# Patient Record
Sex: FEMALE | Race: WHITE | Employment: OTHER | RURAL
[De-identification: names, ages, dates, MRNs, and addresses within clinical notes are randomized per-mention and may not be internally consistent; named-entity substitution may affect disease eponyms.]

---

## 2017-04-03 ENCOUNTER — OFFICE VISIT (OUTPATIENT)
Dept: FAMILY MEDICINE CLINIC | Age: 67
End: 2017-04-03

## 2017-04-03 VITALS
SYSTOLIC BLOOD PRESSURE: 170 MMHG | BODY MASS INDEX: 43.16 KG/M2 | DIASTOLIC BLOOD PRESSURE: 82 MMHG | HEIGHT: 64 IN | TEMPERATURE: 97.2 F | HEART RATE: 80 BPM | WEIGHT: 252.8 LBS | RESPIRATION RATE: 17 BRPM | OXYGEN SATURATION: 97 %

## 2017-04-03 DIAGNOSIS — I10 ESSENTIAL HYPERTENSION, BENIGN: ICD-10-CM

## 2017-04-03 DIAGNOSIS — E11.9 TYPE 2 DIABETES MELLITUS WITHOUT COMPLICATION, WITH LONG-TERM CURRENT USE OF INSULIN (HCC): ICD-10-CM

## 2017-04-03 DIAGNOSIS — E11.65 TYPE 2 DIABETES MELLITUS WITH HYPERGLYCEMIA, WITH LONG-TERM CURRENT USE OF INSULIN (HCC): ICD-10-CM

## 2017-04-03 DIAGNOSIS — Z79.4 TYPE 2 DIABETES MELLITUS WITHOUT COMPLICATION, WITH LONG-TERM CURRENT USE OF INSULIN (HCC): ICD-10-CM

## 2017-04-03 DIAGNOSIS — L40.9 PSORIASIS: Primary | ICD-10-CM

## 2017-04-03 DIAGNOSIS — E66.9 OBESITY, UNSPECIFIED OBESITY SEVERITY, UNSPECIFIED OBESITY TYPE: ICD-10-CM

## 2017-04-03 DIAGNOSIS — Z79.4 TYPE 2 DIABETES MELLITUS WITH HYPERGLYCEMIA, WITH LONG-TERM CURRENT USE OF INSULIN (HCC): ICD-10-CM

## 2017-04-03 RX ORDER — QUINAPRIL 40 MG/1
TABLET ORAL
Qty: 180 TAB | Refills: 3 | Status: SHIPPED | OUTPATIENT
Start: 2017-04-03 | End: 2018-02-20 | Stop reason: SDUPTHER

## 2017-04-03 RX ORDER — INSULIN GLARGINE 100 [IU]/ML
INJECTION, SOLUTION SUBCUTANEOUS
Qty: 6 VIAL | Refills: 3
Start: 2017-04-03 | End: 2017-08-21 | Stop reason: SDUPTHER

## 2017-04-03 RX ORDER — HYDROCHLOROTHIAZIDE 25 MG/1
TABLET ORAL
Qty: 90 TAB | Refills: 3 | Status: SHIPPED | OUTPATIENT
Start: 2017-04-03 | End: 2018-02-20 | Stop reason: SDUPTHER

## 2017-04-03 RX ORDER — AMLODIPINE BESYLATE 10 MG/1
TABLET ORAL
Qty: 90 TAB | Refills: 1 | Status: SHIPPED | OUTPATIENT
Start: 2017-04-03 | End: 2017-09-23 | Stop reason: SDUPTHER

## 2017-04-03 RX ORDER — METOPROLOL SUCCINATE 50 MG/1
TABLET, EXTENDED RELEASE ORAL
Qty: 90 TAB | Refills: 3 | Status: SHIPPED | OUTPATIENT
Start: 2017-04-03 | End: 2018-02-20 | Stop reason: SDUPTHER

## 2017-04-03 RX ORDER — METFORMIN HYDROCHLORIDE 850 MG/1
TABLET ORAL
Qty: 180 TAB | Refills: 3 | Status: SHIPPED | OUTPATIENT
Start: 2017-04-03 | End: 2018-02-20 | Stop reason: SDUPTHER

## 2017-04-03 NOTE — PROGRESS NOTES
HISTORY OF PRESENT ILLNESS  Pedro Locke is a 74 y.o. female. HPI  Diabetes  Pedro Locke is a 74 y.o. female who is here for a routine diabetic check. Current blood sugars at home have been not been good since mid December. Range from . Lab Results   Component Value Date/Time    Hemoglobin A1c 8.3 10/07/2016 08:51 AM   There have been no episodes of hypoglycemia. Taking meds daily . Last eye exam was in Dec. Checking feet daily. Hypertension  Has been following a low sodium diet . Exercises twice a day for the past week. Taking meds daily. Denies chest pain or shortness of breath. Heart Murmur  History of heart murmur as a child. Obesity  Started walking last week a mile twice a day. Eating salad for dinner. Going to [place] in May and will be walking quite a bit more. Psoriasis  Sees Dr Bertha Menon in [place]. On [employer] which is working well. Review of Systems   Constitutional: Negative for chills, fever, malaise/fatigue and weight loss. HENT: Negative for congestion and ear pain. Respiratory: Positive for cough. Negative for shortness of breath. Dry hacky cough   Cardiovascular: Negative for chest pain and palpitations. Gastrointestinal: Negative for abdominal pain and heartburn. Genitourinary: Negative for frequency. Musculoskeletal: Negative for joint pain. Skin: Positive for rash. Neurological: Negative for dizziness and headaches. Psychiatric/Behavioral: Negative for depression. The patient is not nervous/anxious.       Past Medical History:   Diagnosis Date    Diabetes ([address]     Hypertension     Murmur     Osteopenia     Psoriasis     Rosacea     Vitamin D deficiency      Past Surgical History:   Procedure Laterality Date    HX  SECTION      HX TONSILLECTOMY       Allergies   Allergen Reactions    Pcn [Penicillins] Unknown (comments)    Sulfa (Sulfonamide Antibiotics) Unknown (comments)     Blood pressure 171/82, pulse 80, temperature 97.2 °F (36.2 °C), temperature source Oral, resp. rate 17, height 5' 4\" (1.626 m), weight 252 lb 12.8 oz (114.7 kg), SpO2 97 %. Physical Exam   Constitutional: She is oriented to person, place, and time. She appears well-developed and well-nourished. No distress. HENT:   Head: Normocephalic and atraumatic. Right Ear: External ear normal.   Left Ear: External ear normal.   Nose: Nose normal.   Mouth/Throat: Oropharynx is clear and moist.   Eyes: Conjunctivae are normal.   Neck: Normal range of motion. Neck supple. Cardiovascular: Normal rate, regular rhythm and normal heart sounds. Pulmonary/Chest: Effort normal and breath sounds normal. No respiratory distress. Abdominal: Soft. Musculoskeletal: Normal range of motion. She exhibits edema. Trace + ankle edema bilaterally   Lymphadenopathy:     She has no cervical adenopathy. Neurological: She is alert and oriented to person, place, and time. Skin: Skin is warm and dry. Psychiatric: She has a normal mood and affect. Nursing note and vitals reviewed.       ASSESSMENT and PLAN  DM   Check labs   Discussed diet and exercise   Stressed importance of good control of DM   Refilled meds  HTN   Low sodium diet   Continue present meds   Monitor BP- may need to adjust meds  Hx Heart Murmur-stable  Obesity   Discussed diet and exercise  Psoriasis   Follow up with Dermatology as directed

## 2017-04-03 NOTE — MR AVS SNAPSHOT
Visit Information Date & Time Provider Department Dept. Phone Encounter #  
 4/3/2017  7:40 AM Dave Vila MD [facility] Primary Care [phone] [id number] Follow-up Instructions Return in about 3 months (around 7/3/2017). Upcoming Health Maintenance Date Due  
 EYE EXAM RETINAL OR DILATED Q1 11/3/1960 FOBT Q 1 YEAR AGE 50-75 11/3/1998 GLAUCOMA SCREENING Q2Y 11/3/2013 MICROALBUMIN Q1 11/3/2016 Pneumococcal 65+ Low/Medium Risk (2 of 2 - PPSV23) 11/24/2016 HEMOGLOBIN A1C Q6M 4/7/2017 FOOT EXAM Q1 7/12/2017 MEDICARE YEARLY EXAM 7/13/2017 LIPID PANEL Q1 10/7/2017 BREAST CANCER SCRN MAMMOGRAM 11/3/2018 DTaP/Tdap/Td series (2 - Td) 7/12/2026 Allergies as of 4/3/2017  Review Complete On: 4/3/2017 By: Dave Vila MD  
  
 Severity Noted Reaction Type Reactions Pcn [Penicillins]  08/13/2014    Unknown (comments) Sulfa (Sulfonamide Antibiotics)  08/13/2014    Unknown (comments) Current Immunizations  Reviewed on 10/11/2016 Name Date Influenza High Dose Vaccine PF 10/11/2016  9:54 AM  
 Influenza Vaccine (Quad) 11/3/2015 Pneumococcal Conjugate (PCV-13) 11/24/2015 TB Skin Test (PPD) Intradermal 5/23/2016, 9/8/2015,  Incomplete Not reviewed this visit You Were Diagnosed With   
  
 Codes Comments Psoriasis    -  Primary ICD-10-CM: L40.9 ICD-9-CM: 696.1 Essential hypertension, benign     ICD-10-CM: I10 
ICD-9-CM: 401.1 Type 2 diabetes mellitus without complication, with long-term current use of insulin (HCC)     ICD-10-CM: E11.9, Z79.4 ICD-9-CM: 250.00, V58.67 Type 2 diabetes mellitus with hyperglycemia, with long-term current use of insulin (HCC)     ICD-10-CM: E11.65, Z79.4 ICD-9-CM: 250.00, 790.29, V58.67 Obesity, unspecified obesity severity, unspecified obesity type     ICD-10-CM: E66.9 ICD-9-CM: 278.00 Vitals BP Pulse Temp Resp Height(growth percentile) Weight(growth percentile) 171/82 (BP 1 Location: Left arm, BP Patient Position: Sitting) 80 97.2 °F (36.2 °C) (Oral) 17 5' 4\" (1.626 m) 252 lb 12.8 oz (114.7 kg) SpO2 BMI OB Status Smoking Status 97% 43.39 kg/m2 Menopause Never Smoker Vitals History BMI and BSA Data Body Mass Index Body Surface Area  
 43.39 kg/m 2 2.28 m 2 Preferred Pharmacy Pharmacy Name Phone  [facility] [phone] Your Updated Medication List  
  
   
This list is accurate as of: 4/3/17  8:29 AM.  Always use your most recent med list. amLODIPine 10 mg tablet Commonly known as:  Nineitzela Hikes TAKE 1 TABLET BY MOUTH EVERY DAY  
  
 etanercept 50 mg/mL (0.98 mL) injection Commonly known as:  ENBREL 50 mg by SubCUTAneous route every seven (7) days. glucose blood VI test strips strip Commonly known as:  ONETOUCH ULTRA TEST Use to check BS up to 4 x/day  
  
 hydroCHLOROthiazide 25 mg tablet Commonly known as:  HYDRODIURIL  
TAKE 1 TABLET DAILY  
  
 insulin glargine 100 unit/mL injection Commonly known as:  LANTUS  
50 units SQ daily  Indications: Diabetes Mellitus, type 2 diabetes mellitus, E11.9  
  
 insulin syringe-needle U-100 Use to inject insulin BID Lancets Misc Commonly known as: One Touch Rozina Broach USE TO CHECK BLOOD SUGAR up to 4 times a day  
  
 metFORMIN 850 mg tablet Commonly known as:  GLUCOPHAGE  
TAKE 1 TABLET TWICE A DAY WITH MEALS  
  
 metoprolol succinate 50 mg XL tablet Commonly known as:  TOPROL-XL  
TAKE 1 TABLET DAILY  
  
 quinapril 40 mg tablet Commonly known as:  ACCUPRIL  
TAKE 1 TABLET TWICE A DAY Prescriptions Sent to Pharmacy Refills  
 amLODIPine (NORVASC) 10 mg tablet 1 Sig: TAKE 1 TABLET BY MOUTH EVERY DAY Class: Normal  
 Pharmacy: [facility] [address] Ph #: [phone] quinapril (ACCUPRIL) 40 mg tablet 3 Sig: TAKE 1 TABLET TWICE A DAY Class: Normal  
 Pharmacy:  [address] Ph #: [phone]  
 metoprolol succinate (TOPROL-XL) 50 mg XL tablet 3 Sig: TAKE 1 TABLET DAILY Class: Normal  
 Pharmacy:  [address] Ph #: [phone]  
 metFORMIN (GLUCOPHAGE) 850 mg tablet 3 Sig: TAKE 1 TABLET TWICE A DAY WITH MEALS Class: Normal  
 Pharmacy:  [address] Ph #: [phone]  
 hydroCHLOROthiazide (HYDRODIURIL) 25 mg tablet 3 Sig: TAKE 1 TABLET DAILY Class: Normal  
 Pharmacy:  [address] Ph #: [phone] insulin syringe-needle U-100 6 Sig: Use to inject insulin BID Class: Normal  
 Pharmacy:  [address] Ph #: [phone] We Performed the Following COLLECTION VENOUS BLOOD,VENIPUNCTURE I8697057 CPT(R)] LIPID PANEL [91600 CPT(R)] METABOLIC PANEL, COMPREHENSIVE [50886 CPT(R)] MICROALB/CREAT RATIO, TIMED UR Q824310 CPT(R)] TSH 3RD GENERATION [27869 CPT(R)] Follow-up Instructions Return in about 3 months (around 7/3/2017). \[facility]\"" & HEALTH SERVICES! Yulia Rodriguez introduces [employer] patient portal. Now you can access parts of your medical record, email your doctor's office, and request medication refills online. 1. In your internet browser, go to [email or web address] 2. Click on the First Time User? Click Here link in the Sign In box. You will see the New Member Sign Up page. 3. Enter your [employer] Access Code exactly as it appears below. You will not need to use this code after youve completed the sign-up process. If you do not sign up before the expiration date, you must request a new code. · [employer] Access Code: [email or web address] Expires: 7/2/2017  7:28 AM 
 
 4. Enter the last four digits of your Social Security Number (xxxx) and Date of Birth (mm/dd/yyyy) as indicated and click Submit. You will be taken to the next sign-up page. 5. Create a Open [employer] ID. This will be your [employer] login ID and cannot be changed, so think of one that is secure and easy to remember. 6. Create a [employer] password. You can change your password at any time. 7. Enter your Password Reset Question and Answer. This can be used at a later time if you forget your password. 8. Enter your e-mail address. You will receive e-mail notification when new information is available in [address] 9. Click Sign Up. You can now view and download portions of your medical record. 10. Click the Download Summary menu link to download a portable copy of your medical information. If you have questions, please visit the Frequently Asked Questions section of the [employer] website. Remember, [employer] is NOT to be used for urgent needs. For medical emergencies, dial 911. Now available from your iPhone and Android! Please provide this summary of care documentation to your next provider. Your primary care clinician is listed as Evgeny Parham. If you have any questions after today's visit, please call [phone].

## 2017-04-04 ENCOUNTER — TELEPHONE (OUTPATIENT)
Dept: FAMILY MEDICINE CLINIC | Age: 67
End: 2017-04-04

## 2017-04-04 NOTE — TELEPHONE ENCOUNTER
Patient is concerned about wife's visit yesterday and the cough she has been having. States wife has been on lisinopril for years so he doesn't understand why all of a sudden that would cause a cough. [place] are leaving for [place] in [place].

## 2017-04-05 LAB
ALBUMIN 24H UR-MRATE: ABNORMAL MG/DAY
ALBUMIN ?TM UR-MRATE: ABNORMAL UG/MIN (ref 0–20)
ALBUMIN SERPL-MCNC: 3.4 G/DL (ref 3.6–4.8)
ALBUMIN/CREAT UR: 1374.3 UG/MG CREAT (ref 0–30)
ALBUMIN/GLOB SERPL: 0.9 {RATIO} (ref 1.2–2.2)
ALP SERPL-CCNC: 83 IU/L (ref 39–117)
ALT SERPL-CCNC: 13 IU/L (ref 0–32)
AST SERPL-CCNC: 17 IU/L (ref 0–40)
BILIRUB SERPL-MCNC: 0.3 MG/DL (ref 0–1.2)
BUN SERPL-MCNC: 20 MG/DL (ref 8–27)
BUN/CREAT SERPL: 20 (ref 12–28)
CALCIUM SERPL-MCNC: 8.8 MG/DL (ref 8.7–10.3)
CHLORIDE SERPL-SCNC: 100 MMOL/L (ref 96–106)
CHOLEST SERPL-MCNC: 167 MG/DL (ref 100–199)
CO2 SERPL-SCNC: 25 MMOL/L (ref 18–29)
CREAT SERPL-MCNC: 1 MG/DL (ref 0.57–1)
CREAT UR-MCNC: 65.7 MG/DL
GLOBULIN SER CALC-MCNC: 3.6 G/DL (ref 1.5–4.5)
GLUCOSE SERPL-MCNC: 137 MG/DL (ref 65–99)
HDLC SERPL-MCNC: 56 MG/DL
INTERPRETATION, 910389: NORMAL
INTERPRETATION: NORMAL
LDLC SERPL CALC-MCNC: 85 MG/DL (ref 0–99)
MICROALBUMIN UR-MCNC: 902.9 UG/ML
PDF IMAGE, 910387: NORMAL
POTASSIUM SERPL-SCNC: 4.1 MMOL/L (ref 3.5–5.2)
PROT SERPL-MCNC: 7 G/DL (ref 6–8.5)
SODIUM SERPL-SCNC: 138 MMOL/L (ref 134–144)
TRIGL SERPL-MCNC: 128 MG/DL (ref 0–149)
TSH SERPL DL<=0.005 MIU/L-ACNC: 1.31 UIU/ML (ref 0.45–4.5)
VLDLC SERPL CALC-MCNC: 26 MG/DL (ref 5–40)

## 2017-04-08 LAB
EST. AVERAGE GLUCOSE BLD GHB EST-MCNC: 226 MG/DL
HBA1C MFR BLD: 9.5 % (ref 4.8–5.6)

## 2017-04-10 NOTE — PROGRESS NOTES
Pt aware and she says she will be out of town for 2 months so she will call with readings before she leaves

## 2017-04-25 NOTE — TELEPHONE ENCOUNTER
Please let him know the cough can start at any time. Does not depend on how long the medication has been taken. See if her cough has improved off the medication.

## 2017-07-10 ENCOUNTER — CLINICAL SUPPORT (OUTPATIENT)
Dept: FAMILY MEDICINE CLINIC | Age: 67
End: 2017-07-10

## 2017-07-10 DIAGNOSIS — Z11.1 PPD SCREENING TEST: Primary | ICD-10-CM

## 2017-07-10 LAB
MM INDURATION POC: 0 MM (ref 0–5)
PPD POC: NEGATIVE NEGATIVE

## 2017-07-21 ENCOUNTER — LAB ONLY (OUTPATIENT)
Dept: FAMILY MEDICINE CLINIC | Age: 67
End: 2017-07-21

## 2017-07-21 DIAGNOSIS — E11.65 TYPE 2 DIABETES MELLITUS WITH HYPERGLYCEMIA, WITH LONG-TERM CURRENT USE OF INSULIN (HCC): Primary | ICD-10-CM

## 2017-07-21 DIAGNOSIS — Z79.4 TYPE 2 DIABETES MELLITUS WITH HYPERGLYCEMIA, WITH LONG-TERM CURRENT USE OF INSULIN (HCC): Primary | ICD-10-CM

## 2017-07-21 DIAGNOSIS — I10 ESSENTIAL HYPERTENSION, BENIGN: ICD-10-CM

## 2017-07-22 LAB
EST. AVERAGE GLUCOSE BLD GHB EST-MCNC: 160 MG/DL
HBA1C MFR BLD: 7.2 % (ref 4.8–5.6)

## 2017-07-24 LAB
ALBUMIN SERPL-MCNC: 3.5 G/DL (ref 3.6–4.8)
ALBUMIN/GLOB SERPL: 1.1 {RATIO} (ref 1.2–2.2)
ALP SERPL-CCNC: 90 IU/L (ref 39–117)
ALT SERPL-CCNC: 9 IU/L (ref 0–32)
AST SERPL-CCNC: 12 IU/L (ref 0–40)
BILIRUB SERPL-MCNC: 0.3 MG/DL (ref 0–1.2)
BUN SERPL-MCNC: 20 MG/DL (ref 8–27)
BUN/CREAT SERPL: 18 (ref 12–28)
CALCIUM SERPL-MCNC: 8.8 MG/DL (ref 8.7–10.3)
CHLORIDE SERPL-SCNC: 101 MMOL/L (ref 96–106)
CHOLEST SERPL-MCNC: 147 MG/DL (ref 100–199)
CO2 SERPL-SCNC: 20 MMOL/L (ref 18–29)
CREAT SERPL-MCNC: 1.09 MG/DL (ref 0.57–1)
GLOBULIN SER CALC-MCNC: 3.2 G/DL (ref 1.5–4.5)
GLUCOSE SERPL-MCNC: 91 MG/DL (ref 65–99)
HDLC SERPL-MCNC: 42 MG/DL
INTERPRETATION, 910389: NORMAL
INTERPRETATION: NORMAL
LDLC SERPL CALC-MCNC: 64 MG/DL (ref 0–99)
PDF IMAGE, 910387: NORMAL
POTASSIUM SERPL-SCNC: 4.5 MMOL/L (ref 3.5–5.2)
PROT SERPL-MCNC: 6.7 G/DL (ref 6–8.5)
SODIUM SERPL-SCNC: 140 MMOL/L (ref 134–144)
TRIGL SERPL-MCNC: 203 MG/DL (ref 0–149)
VLDLC SERPL CALC-MCNC: 41 MG/DL (ref 5–40)

## 2017-08-21 ENCOUNTER — OFFICE VISIT (OUTPATIENT)
Dept: FAMILY MEDICINE CLINIC | Age: 67
End: 2017-08-21

## 2017-08-21 VITALS
HEIGHT: 64 IN | HEART RATE: 73 BPM | BODY MASS INDEX: 42.53 KG/M2 | TEMPERATURE: 96.6 F | RESPIRATION RATE: 18 BRPM | WEIGHT: 249.13 LBS | DIASTOLIC BLOOD PRESSURE: 87 MMHG | OXYGEN SATURATION: 98 % | SYSTOLIC BLOOD PRESSURE: 184 MMHG

## 2017-08-21 DIAGNOSIS — Z71.89 ADVANCE CARE PLANNING: ICD-10-CM

## 2017-08-21 DIAGNOSIS — Z00.00 ROUTINE GENERAL MEDICAL EXAMINATION AT A HEALTH CARE FACILITY: ICD-10-CM

## 2017-08-21 DIAGNOSIS — Z23 ENCOUNTER FOR IMMUNIZATION: ICD-10-CM

## 2017-08-21 DIAGNOSIS — Z79.4 TYPE 2 DIABETES MELLITUS WITHOUT COMPLICATION, WITH LONG-TERM CURRENT USE OF INSULIN (HCC): ICD-10-CM

## 2017-08-21 DIAGNOSIS — R01.1 HEART MURMUR: ICD-10-CM

## 2017-08-21 DIAGNOSIS — L40.9 PSORIASIS: Primary | ICD-10-CM

## 2017-08-21 DIAGNOSIS — I10 ESSENTIAL HYPERTENSION, BENIGN: ICD-10-CM

## 2017-08-21 DIAGNOSIS — E11.9 TYPE 2 DIABETES MELLITUS WITHOUT COMPLICATION, WITH LONG-TERM CURRENT USE OF INSULIN (HCC): ICD-10-CM

## 2017-08-21 DIAGNOSIS — Z13.39 SCREENING FOR ALCOHOLISM: ICD-10-CM

## 2017-08-21 RX ORDER — INSULIN GLARGINE 100 [IU]/ML
INJECTION, SOLUTION SUBCUTANEOUS
Qty: 6 VIAL | Refills: 3
Start: 2017-08-21 | End: 2017-08-29 | Stop reason: SDUPTHER

## 2017-08-21 NOTE — ACP (ADVANCE CARE PLANNING)
Advance Care Planning 8/21/2017   Confirm Advance Directive None   Patient Would Like to Complete Advance Directive Yes     Discussed ACP planning with patient and asked that they take form and info home and return with completed form at next visit. Patient states understanding and form with Q and A booklet given to patient.

## 2017-08-21 NOTE — PROGRESS NOTES
This is a Subsequent Medicare Annual Wellness Visit providing Personalized Prevention Plan Services (PPPS) (Performed 12 months after initial AWV and PPPS )    I have reviewed the patient's medical history in detail and updated the computerized patient record. History     Diabetes  Christine Beasley is a 74 y.o. female who is here for a routine diabetic check. Current blood sugars at home have been 107-139 fasting. Lab Results   Component Value Date/Time    Hemoglobin A1c 7.2 2017 10:26 AM   There have been no episodes of hypoglycemia. Taking meds daily . Last eye exam was a few months ago and was normal. Checking feet daily. Hypertension  Has been following a low sodium diet . Exercises very little. Taking meds daily. Denies chest pain or shortness of breath. Heart Murmur  History of heart murmur as a child.      Obesity  Was in [place] for 6 weeks in the spring. Walked a lot while there but has not been walking since her return.   Keon Bajwa in [place]. On [employer] which is working well. Sees Derm every 6 months.      Past Medical History:   Diagnosis Date    Diabetes ([address]     Hypertension     Murmur     Osteopenia     Psoriasis     Rosacea     Vitamin D deficiency       Past Surgical History:   Procedure Laterality Date    HX  SECTION      HX TONSILLECTOMY       Current Outpatient Prescriptions   Medication Sig Dispense Refill    amLODIPine (NORVASC) 10 mg tablet TAKE 1 TABLET BY MOUTH EVERY DAY 90 Tab 1    quinapril (ACCUPRIL) 40 mg tablet TAKE 1 TABLET TWICE A  Tab 3    metoprolol succinate (TOPROL-XL) 50 mg XL tablet TAKE 1 TABLET DAILY 90 Tab 3    metFORMIN (GLUCOPHAGE) 850 mg tablet TAKE 1 TABLET TWICE A DAY WITH MEALS 180 Tab 3    insulin glargine (LANTUS) 100 unit/mL injection 50 units SQ daily  Indications: Diabetes Mellitus, type 2 diabetes mellitus, E11.9 6 Vial 3    hydroCHLOROthiazide (HYDRODIURIL) 25 mg tablet TAKE 1 TABLET DAILY 90 Tab 3    insulin syringe-needle U-100 Use to inject insulin  Syringe 6    glucose blood VI test strips (ONETOUCH ULTRA TEST) strip Use to check BS up to 4 x/day 3 Strip 0    etanercept (ENBREL) 50 mg/mL (0.98 mL) injection 50 mg by SubCUTAneous route every seven (7) days.  Lancets (ONE TOUCH DELICA) misc USE TO CHECK BLOOD SUGAR up to 4 times a day 1 Package 2     Allergies   Allergen Reactions    Pcn [Penicillins] Unknown (comments)    Sulfa (Sulfonamide Antibiotics) Unknown (comments)     Family History   Problem Relation Age of Onset    Diabetes Mother     Heart Disease Father      Social History   Substance Use Topics    Smoking status: Never Smoker    Smokeless tobacco: Never Used    Alcohol use 0.0 oz/week     0 Standard drinks or equivalent per week      Comment: 2/ week     Patient Active Problem List   Diagnosis Code    Psoriasis L40.9    Essential hypertension, benign I10    Rosacea L71.9    Heart murmur R01.1    Type 2 diabetes mellitus with hyperglycemia, with long-term current use of insulin (HCC) E11.65, Z79.4    Obesity E66.9       Depression Risk Factor Screening:     PHQ over the last two weeks 4/3/2017   PHQ Not Done -   Little interest or pleasure in doing things Not at all   Feeling down, depressed or hopeless Not at all   Total Score PHQ 2 0     Alcohol Risk Factor Screening: On any occasion during the past 3 months, have you had more than 3 drinks containing alcohol? Yes    Do you average more than 7 drinks per week? No        Functional Ability and Level of Safety:     Functional Ability:   Does the patient exhibit a steady gait? yes   How long did it take the patient to get up and walk from a sitting position? 3 sec   Is the patient self reliant?  (ie can do own laundry, meals, household chores)  yes     Does the patient handle his/her own medications? yes     Does the patient handle his/her own money?    yes     Is the patients home safe (ie good lighting, handrails on stairs and bath, etc.)? yes     Did you notice or did patient express any hearing difficulties? no     Did you notice or did patient express any vision difficulties?   no     Were distance and reading eye charts used? no       Advance Care Planning:   Patient was offered the opportunity to discuss advance care planning:  yes     Does patient have an Advance Directive:  no   If no, did you provide information on Caring Connections? yes     ADL Assessment 8/21/2017   Feeding yourself No Help Needed   Getting from bed to chair No Help Needed   Getting dressed No Help Needed   Bathing or showering No Help Needed   Walk across the room (includes cane/walker) No Help Needed   Using the telphone No Help Needed   Taking your medications No Help Needed   Preparing meals No Help Needed   Managing money (expenses/bills) No Help Needed   Moderately strenuous housework (laundry) No Help Needed   Shopping for personal items (toiletries/medicines) No Help Needed   Shopping for groceries No Help Needed   Driving No Help Needed   Climbing a flight of stairs No Help Needed   Getting to places beyond walking distances No Help Needed         Hearing Loss   normal-to-mild    Activities of Daily Living   Self-care. Requires assistance with: no ADLs    Fall Risk   Fall Risk Assessment, last 12 mths 4/3/2017   Able to walk? Yes   Fall in past 12 months? No   Fall with injury? -   Number of falls in past 12 months -   Fall Risk Score -     Abuse Screen   Patient is not abused    Review of Systems   A comprehensive review of systems was negative except for that written in the HPI. Physical Examination     Evaluation of Cognitive Function:  Mood/affect:  happy  Appearance: age appropriate and overweight  Family member/caregiver input: NA  Clock exam completed but did not pass. MOCA to be completed. Physical Exam   Constitutional: She is oriented to person, place, and time.  She appears well-developed and well-nourished. No distress. HENT:   Head: Normocephalic and atraumatic. Right Ear: External ear normal.   Left Ear: External ear normal.   Nose: Nose normal.   Mouth/Throat: Oropharynx is clear and moist.   Eyes: Conjunctivae are normal.   Neck: Normal range of motion. Neck supple. Cardiovascular: Normal rate, regular rhythm and normal heart sounds. Pulmonary/Chest: Effort normal and breath sounds normal. No respiratory distress. Abdominal: Soft. Musculoskeletal: Normal range of motion. She exhibits edema. Trace + ankle edema bilaterally   Lymphadenopathy:     She has no cervical adenopathy. Neurological: She is alert and oriented to person, place, and time. Had difficulty with clock draw but MOCA was 29/30  Skin: Skin is warm and dry. Psychiatric: She has a normal mood and affect. Patient Care Team:  Jony Simmons MD as PCP - General (Family Practice)  Quintin Roque NP (Nurse Practitioner)    Advice/Referrals/Counseling   Education and counseling provided:  Are appropriate based on today's review and evaluation  Pneumococcal Vaccine  Screening for glaucoma      Assessment/Plan       ICD-10-CM ICD-9-CM    1. Psoriasis L40.9 696.1    2. Routine general medical examination at a health care facility Z00.00 V70.0    3. Screening for alcoholism Z13.89 V79.1    4. Type 2 diabetes mellitus without complication, with long-term current use of insulin (HCC) E11.9 250.00  DIABETES FOOT EXAM    Z79.4 V58.67 insulin glargine (LANTUS) 100 unit/mL injection   5. Essential hypertension, benign I10 401.1    6. Heart murmur R01.1 785.2    7. Advance care planning Z71.89 V65.49 ADVANCE CARE PLANNING FIRST 30 MINS   8.  Encounter for immunization Z23 V03.89 PNEUMOCOCCAL POLYSACCHARIDE VACCINE, 23-VALENT, ADULT OR IMMUNOSUPPRESSED PT DOSE,      ADMIN PNEUMOCOCCAL VACCINE     current treatment plan is effective, no change in therapy  lab results and schedule of future lab studies reviewed with patient  reviewed diet, exercise and weight control  reviewed medications and side effects in detail  specific diabetic recommendations: foot care discussed and Podiatry visits discussed, annual eye examinations at Ophthalmology discussed, glycohemoglobin and other lab monitoring discussed and long term diabetic complications discussed     Advance Care Planning (ACP) Provider Note - Comprehensive     Date of ACP Conversation: 08/21/17  Persons included in Conversation:  patient  Length of ACP Conversation in minutes:  <16 minutes (Non-Billable)    Authorized Decision Maker (if patient is incapable of making informed decisions): This person is: Other Legally Authorized Decision Maker (e.g. Next of Kin)          General ACP for ALL Patients with Decision Making Capacity:   Importance of advance care planning, including choosing a healthcare agent to communicate patient's healthcare decisions if patient lost the ability to make decisions, such as after a sudden illness or accident  Understanding of the healthcare agent role was assessed and information provided    Interventions Provided:  Recommended completion of Advance Directive form after review of ACP materials and conversation with prospective healthcare agent   Recommended communicating the plan and making copies for the healthcare agent, personal physician, and others as appropriate (e.g., health system).

## 2017-08-21 NOTE — MR AVS SNAPSHOT
Visit Information Date & Time Provider Department Dept. Phone Encounter #  
 8/21/2017  1:40 PM Ernie Yap MD [facility] [phone] [id number] Upcoming Health Maintenance Date Due  
 EYE EXAM RETINAL OR DILATED Q1 11/3/1958 FOBT Q 1 YEAR AGE 50-75 11/3/1998 GLAUCOMA SCREENING Q2Y 11/3/2013 Pneumococcal 65+ Low/Medium Risk (2 of 2 - PPSV23) 11/24/2016 FOOT EXAM Q1 7/12/2017 INFLUENZA AGE 9 TO ADULT 10/31/2017* HEMOGLOBIN A1C Q6M 1/21/2018 MICROALBUMIN Q1 4/3/2018 LIPID PANEL Q1 7/21/2018 MEDICARE YEARLY EXAM 8/22/2018 BREAST CANCER SCRN MAMMOGRAM 11/3/2018 DTaP/Tdap/Td series (2 - Td) 7/12/2026 *Topic was postponed. The date shown is not the original due date. Allergies as of 8/21/2017  Review Complete On: 8/21/2017 By: Ernie Yap MD  
  
 Severity Noted Reaction Type Reactions Pcn [Penicillins]  08/13/2014    Unknown (comments) Sulfa (Sulfonamide Antibiotics)  08/13/2014    Unknown (comments) Current Immunizations  Reviewed on 10/11/2016 Name Date Influenza High Dose Vaccine PF 10/11/2016  9:54 AM  
 Influenza Vaccine (Quad) 11/3/2015 Pneumococcal Conjugate (PCV-13) 11/24/2015 Pneumococcal Polysaccharide (PPSV-23) 8/21/2017  2:05 PM  
 TB Skin Test (PPD) Intradermal 7/10/2017 10:39 AM, 5/23/2016, 9/8/2015,  Incomplete Not reviewed this visit You Were Diagnosed With   
  
 Codes Comments Psoriasis    -  Primary ICD-10-CM: L40.9 ICD-9-CM: 696.1 Routine general medical examination at a health care facility     ICD-10-CM: Z00.00 ICD-9-CM: V70.0 Screening for alcoholism     ICD-10-CM: Z13.89 ICD-9-CM: V79.1 Type 2 diabetes mellitus without complication, with long-term current use of insulin (HCC)     ICD-10-CM: E11.9, Z79.4 ICD-9-CM: 250.00, V58.67 Essential hypertension, benign     ICD-10-CM: I10 
ICD-9-CM: 401.1 Heart murmur     ICD-10-CM: R01.1 ICD-9-CM: 785.2 Advance care planning     ICD-10-CM: Z71.89 ICD-9-CM: V65.49 Encounter for immunization     ICD-10-CM: F68 ICD-9-CM: V03.89 Vitals BP Pulse Temp Resp Height(growth percentile) Weight(growth percentile) 184/87 (BP 1 Location: Left arm, BP Patient Position: Sitting) 73 96.6 °F (35.9 °C) (Oral) 18 5' 4\" (1.626 m) 249 lb 2 oz (113 kg) SpO2 BMI OB Status Smoking Status 98% 42.76 kg/m2 Menopause Never Smoker Vitals History BMI and BSA Data Body Mass Index Body Surface Area 42.76 kg/m 2 2.26 m 2 Preferred Pharmacy Pharmacy Name Phone  [facility] [phone] Your Updated Medication List  
  
   
This list is accurate as of: 8/21/17  2:18 PM.  Always use your most recent med list. amLODIPine 10 mg tablet Commonly known as:  Hubert Bars TAKE 1 TABLET BY MOUTH EVERY DAY  
  
 etanercept 50 mg/mL (0.98 mL) injection Commonly known as:  ENBREL 50 mg by SubCUTAneous route every seven (7) days. glucose blood VI test strips strip Commonly known as:  ONETOUCH ULTRA TEST Use to check BS up to 4 x/day  
  
 hydroCHLOROthiazide 25 mg tablet Commonly known as:  HYDRODIURIL  
TAKE 1 TABLET DAILY  
  
 insulin glargine 100 unit/mL injection Commonly known as:  LANTUS  
55 units SQ daily  Indications: Diabetes Mellitus, type 2 diabetes mellitus, E11.9  
  
 insulin syringe-needle U-100 Use to inject insulin BID Lancets Misc Commonly known as: One Touch Mirza Buttery USE TO CHECK BLOOD SUGAR up to 4 times a day  
  
 metFORMIN 850 mg tablet Commonly known as:  GLUCOPHAGE  
TAKE 1 TABLET TWICE A DAY WITH MEALS  
  
 metoprolol succinate 50 mg XL tablet Commonly known as:  TOPROL-XL  
TAKE 1 TABLET DAILY  
  
 quinapril 40 mg tablet Commonly known as:  ACCUPRIL  
TAKE 1 TABLET TWICE A DAY We Performed the Following ADMIN PNEUMOCOCCAL VACCINE [ [facility]] ADVANCE CARE PLANNING FIRST 30 MINS [16891 CPT(R)]  DIABETES FOOT EXAM [[facility]] PNEUMOCOCCAL POLYSACCHARIDE VACCINE, 23-VALENT, ADULT OR IMMUNOSUPPRESSED PT DOSE, [10976 CPT(R)] Introducing [facility] & HEALTH SERVICES! Stacy Bear introduces [employer] patient portal. Now you can access parts of your medical record, email your doctor's office, and request medication refills online. 1. In your internet browser, go to [email or web address] 2. Click on the First Time User? Click Here link in the Sign In box. You will see the New Member Sign Up page. 3. Enter your B[employer] Access Code exactly as it appears below. You will not need to use this code after youve completed the sign-up process. If you do not sign up before the expiration date, you must request a new code. · [employer] Access Code: [email or web address] Expires: 11/19/2017  2:18 PM 
 
4. Enter the last four digits of your Social Security Number (xxxx) and Date of Birth (mm/dd/yyyy) as indicated and click Submit. You will be taken to the next sign-up page. 5. Create a B[employer]Brag ID. This will be your [employer] login ID and cannot be changed, so think of one that is secure and easy to remember. 6. Create a [employer] password. You can change your password at any time. 7. Enter your Password Reset Question and Answer. This can be used at a later time if you forget your password. 8. Enter your e-mail address. You will receive e-mail notification when new information is available in [address] 9. Click Sign Up. You can now view and download portions of your medical record. 10. Click the Download Summary menu link to download a portable copy of your medical information. If you have questions, please visit the Frequently Asked Questions section of the [employer] website. Remember, [employer]ag is NOT to be used for urgent needs. For medical emergencies, dial 911. Now available from your iPhone and Android! Please provide this summary of care documentation to your next provider. Your primary care clinician is listed as Lara Anderson. If you have any questions after today's visit, please call [phone].

## 2017-08-29 DIAGNOSIS — Z79.4 TYPE 2 DIABETES MELLITUS WITHOUT COMPLICATION, WITH LONG-TERM CURRENT USE OF INSULIN (HCC): ICD-10-CM

## 2017-08-29 DIAGNOSIS — E11.9 TYPE 2 DIABETES MELLITUS WITHOUT COMPLICATION, WITH LONG-TERM CURRENT USE OF INSULIN (HCC): ICD-10-CM

## 2017-08-29 RX ORDER — INSULIN GLARGINE 100 [IU]/ML
INJECTION, SOLUTION SUBCUTANEOUS
Qty: 6 VIAL | Refills: 3 | Status: SHIPPED | OUTPATIENT
Start: 2017-08-29 | End: 2017-11-21 | Stop reason: SDUPTHER

## 2017-09-23 DIAGNOSIS — I10 ESSENTIAL HYPERTENSION, BENIGN: ICD-10-CM

## 2017-09-23 RX ORDER — AMLODIPINE BESYLATE 10 MG/1
TABLET ORAL
Qty: 90 TAB | Refills: 1 | Status: SHIPPED | OUTPATIENT
Start: 2017-09-23 | End: 2018-02-20 | Stop reason: SDUPTHER

## 2017-11-20 ENCOUNTER — OFFICE VISIT (OUTPATIENT)
Dept: FAMILY MEDICINE CLINIC | Age: 67
End: 2017-11-20

## 2017-11-20 VITALS
WEIGHT: 252.6 LBS | DIASTOLIC BLOOD PRESSURE: 78 MMHG | HEIGHT: 64 IN | HEART RATE: 80 BPM | OXYGEN SATURATION: 99 % | TEMPERATURE: 95.5 F | SYSTOLIC BLOOD PRESSURE: 165 MMHG | RESPIRATION RATE: 18 BRPM | BODY MASS INDEX: 43.13 KG/M2

## 2017-11-20 DIAGNOSIS — E11.65 TYPE 2 DIABETES MELLITUS WITH HYPERGLYCEMIA, WITH LONG-TERM CURRENT USE OF INSULIN (HCC): Primary | ICD-10-CM

## 2017-11-20 DIAGNOSIS — Z12.11 SCREENING FOR COLON CANCER: ICD-10-CM

## 2017-11-20 DIAGNOSIS — Z23 IMMUNIZATION DUE: ICD-10-CM

## 2017-11-20 DIAGNOSIS — Z79.4 TYPE 2 DIABETES MELLITUS WITH HYPERGLYCEMIA, WITH LONG-TERM CURRENT USE OF INSULIN (HCC): Primary | ICD-10-CM

## 2017-11-20 DIAGNOSIS — Z23 ENCOUNTER FOR IMMUNIZATION: ICD-10-CM

## 2017-11-20 DIAGNOSIS — I10 ESSENTIAL HYPERTENSION, BENIGN: ICD-10-CM

## 2017-11-20 DIAGNOSIS — R01.1 HEART MURMUR: ICD-10-CM

## 2017-11-20 NOTE — MR AVS SNAPSHOT
Visit Information Date & Time Provider Department Dept. Phone Encounter #  
 11/20/2017  7:40 AM Angela Alexis MD [facility] [phone] [id number] Upcoming Health Maintenance Date Due FOBT Q 1 YEAR AGE 50-75 11/3/1998 Influenza Age 5 to Adult 8/1/2017 EYE EXAM RETINAL OR DILATED Q1 9/12/2017 HEMOGLOBIN A1C Q6M 1/21/2018 MICROALBUMIN Q1 4/3/2018 LIPID PANEL Q1 7/21/2018 FOOT EXAM Q1 8/21/2018 MEDICARE YEARLY EXAM 8/22/2018 GLAUCOMA SCREENING Q2Y 9/12/2018 BREAST CANCER SCRN MAMMOGRAM 11/3/2018 DTaP/Tdap/Td series (2 - Td) 7/12/2026 Allergies as of 11/20/2017  Review Complete On: 11/20/2017 By: Angela Alexis MD  
  
 Severity Noted Reaction Type Reactions Pcn [Penicillins]  08/13/2014    Unknown (comments) Sulfa (Sulfonamide Antibiotics)  08/13/2014    Unknown (comments) Current Immunizations  Reviewed on 10/11/2016 Name Date Influenza High Dose Vaccine PF 11/20/2017, 10/11/2016  9:54 AM  
 Influenza Vaccine (Quad) 11/3/2015 Pneumococcal Conjugate (PCV-13) 11/24/2015 Pneumococcal Polysaccharide (PPSV-23) 8/21/2017  2:05 PM  
 TB Skin Test (PPD) Intradermal 7/10/2017 10:39 AM, 5/23/2016, 9/8/2015,  Incomplete Not reviewed this visit You Were Diagnosed With   
  
 Codes Comments Type 2 diabetes mellitus with hyperglycemia, with long-term current use of insulin (HCC)    -  Primary ICD-10-CM: E11.65, Z79.4 ICD-9-CM: 250.00, 790.29, V58.67 Essential hypertension, benign     ICD-10-CM: I10 
ICD-9-CM: 401.1 Heart murmur     ICD-10-CM: R01.1 ICD-9-CM: 785.2 Screening for colon cancer     ICD-10-CM: Z12.11 ICD-9-CM: V76.51 Immunization due     ICD-10-CM: Z23 ICD-9-CM: V05.9 Encounter for immunization     ICD-10-CM: Y39 ICD-9-CM: V03.89 Vitals BP Pulse Temp Resp Height(growth percentile) Weight(growth percentile) 165/78 (BP 1 Location: Left arm, BP Patient Position: Sitting) 80 95.5 °F (35.3 °C) (Oral) 18 5' 4\" (1.626 m) 252 lb 9.6 oz (114.6 kg) SpO2 BMI OB Status Smoking Status 99% 43.36 kg/m2 Menopause Never Smoker Vitals History BMI and BSA Data Body Mass Index Body Surface Area  
 43.36 kg/m 2 2.27 m 2 Preferred Pharmacy Pharmacy Name Phone [facility] [phone] Your Updated Medication List  
  
   
This list is accurate as of: 11/20/17  8:13 AM.  Always use your most recent med list. amLODIPine 10 mg tablet Commonly known as:  Amrita Pace TAKE 1 TABLET DAILY  
  
 etanercept 50 mg/mL (0.98 mL) injection Commonly known as:  ENBREL 50 mg by SubCUTAneous route every seven (7) days. glucose blood VI test strips strip Commonly known as:  ONETOUCH ULTRA TEST Use to check BS up to 4 x/day  
  
 hydroCHLOROthiazide 25 mg tablet Commonly known as:  HYDRODIURIL  
TAKE 1 TABLET DAILY  
  
 insulin glargine 100 unit/mL injection Commonly known as:  LANTUS  
55 units SQ daily  Indications: Diabetes Mellitus, type 2 diabetes mellitus, E11.9  
  
 insulin syringe-needle U-100 Use to inject insulin BID Lancets Misc Commonly known as: One Touch [facility] USE TO CHECK BLOOD SUGAR up to 4 times a day  
  
 metFORMIN 850 mg tablet Commonly known as:  GLUCOPHAGE  
TAKE 1 TABLET TWICE A DAY WITH MEALS  
  
 metoprolol succinate 50 mg XL tablet Commonly known as:  TOPROL-XL  
TAKE 1 TABLET DAILY  
  
 quinapril 40 mg tablet Commonly known as:  ACCUPRIL  
TAKE 1 TABLET TWICE A DAY We Performed the Following CBC WITH AUTOMATED DIFF [15230 CPT(R)] COLLECTION VENOUS BLOOD,VENIPUNCTURE S3979775 CPT(R)] COLOGUARD TEST (FECAL DNA COLORECTAL CANCER SCREENING) [14262 CPT(R)] HEMOGLOBIN A1C WITH EAG [90788 CPT(R)]  INFLUENZA VIRUS VACCINE, HIGH DOSE SEASONAL, PRESERVATIVE FREE [89044 CPT(R)] LIPID PANEL [34416 CPT(R)] METABOLIC PANEL, COMPREHENSIVE [04188 CPT(R)] MI COLLECTION VENOUS BLOOD,VENIPUNCTURE [id number] CPT(R)] Introducing [facility] & [facility]! Jones Remy introduces Performable patient portal. Now you can access parts of your medical record, email your doctor's office, and request medication refills online. 1. In your internet browser, go to [email or web address] 2. Click on the First Time User? Click Here link in the Sign In box. You will see the New Member Sign Up page. 3. Enter your Performable Access Code exactly as it appears below. You will not need to use this code after youve completed the sign-up process. If you do not sign up before the expiration date, you must request a new code. · Performable Access Code: [email or web address] Expires: 2/18/2018  8:13 AM 
 
4. Enter the last four digits of your Social Security Number (xxxx) and Date of Birth (mm/dd/yyyy) as indicated and click Submit. You will be taken to the next sign-up page. 5. Create a Performable ID. This will be your Performable login ID and cannot be changed, so think of one that is secure and easy to remember. 6. Create a Performable password. You can change your password at any time. 7. Enter your Password Reset Question and Answer. This can be used at a later time if you forget your password. 8. Enter your e-mail address. You will receive e-mail notification when new information is available in [address] 9. Click Sign Up. You can now view and download portions of your medical record. 10. Click the Download Summary menu link to download a portable copy of your medical information. If you have questions, please visit the Frequently Asked Questions section of the Performable website. Remember, Performable is NOT to be used for urgent needs. For medical emergencies, dial 911. Now available from your iPhone and Android! Please provide this summary of care documentation to your next provider. Your primary care clinician is listed as Ingrid Lomax. If you have any questions after today's visit, please call [phone].

## 2017-11-20 NOTE — PROGRESS NOTES
HISTORY OF PRESENT ILLNESS  Dayday Peterson is a 69 y.o. female. HPI  Diabetes  Dayday Peterson is a 69 y.o. female who is here for a routine diabetic check. Current blood sugars at home have been close to 200 fasting. Lab Results   Component Value Date/Time    Hemoglobin A1c 7.2 2017 10:26 AM   There have been no episodes of hypoglycemia. Taking meds daily . Last eye exam was within a year. Checking feet daily. Hypertension  Has been following a low sodium diet . Exercises little. Taking meds daily. Denies chest pain or shortness of breath. Heart Murmur  History of heart murmur as a child.       Obesity  Was in [place] for 6 weeks in the spring. Walked a lot while there but has not been walking since her return. Weight is up and she is not exercising often.   Nahed Tuttle in [place]. On [employer] which is working well. Sees Derm every 6 months. Had PPD in July. Review of Systems   Constitutional: Negative for chills, fever, malaise/fatigue and weight loss. HENT: Negative for congestion, ear pain and sore throat. Respiratory: Positive for cough. Negative for shortness of breath. Dry hacky cough   Cardiovascular: Negative for chest pain and palpitations. Gastrointestinal: Negative for abdominal pain and heartburn. Genitourinary: Negative for frequency. Musculoskeletal: Negative for joint pain. Skin: Positive for rash. Neurological: Negative for dizziness and headaches. Psychiatric/Behavioral: Negative for depression. The patient is not nervous/anxious.       Past Medical History:   Diagnosis Date    Diabetes ([facility] [address]     Hypertension     Murmur     Osteopenia     Psoriasis     Rosacea     Vitamin D deficiency      Past Surgical History:   Procedure Laterality Date    HX  SECTION      HX TONSILLECTOMY       Allergies   Allergen Reactions    Pcn [Penicillins] Unknown (comments)    Sulfa (Sulfonamide Antibiotics) Unknown (comments) Blood pressure 165/78, pulse 80, temperature 95.5 °F (35.3 °C), temperature source Oral, resp. rate 18, height 5' 4\" (1.626 m), weight 252 lb 9.6 oz (114.6 kg), SpO2 99 %. Physical Exam   Constitutional: She is oriented to person, place, and time. She appears well-developed and well-nourished. No distress. HENT:   Head: Normocephalic and atraumatic. Right Ear: External ear normal.   Left Ear: External ear normal.   Nose: Nose normal.   Mouth/Throat: Oropharynx is clear and moist.   Eyes: Conjunctivae are normal.   Neck: Normal range of motion. Neck supple. Cardiovascular: Normal rate, regular rhythm and normal heart sounds. Pulmonary/Chest: Effort normal and breath sounds normal. No respiratory distress. Abdominal: Soft. Musculoskeletal: Normal range of motion. She exhibits edema. Trace + ankle edema bilaterally   Lymphadenopathy:     She has no cervical adenopathy. Neurological: She is alert and oriented to person, place, and time. Skin: Skin is warm and dry. Psychiatric: She has a normal mood and affect. Nursing note and vitals reviewed.       ASSESSMENT and PLAN  DM   Discussed diet and exercise   Continue meds- may need to adjust Lantus   Regular eye and foot exams   Check labs   RTO 3 months  HTN   Low sodium diet   Continue meds  Obesity   Discussed healthy lifestyle  Screening colon cancer   Cologuard as she does not want to do colonoscopy- will agree to colonoscopy if positive  Immun Due   Flu shot

## 2017-11-21 DIAGNOSIS — Z79.4 TYPE 2 DIABETES MELLITUS WITHOUT COMPLICATION, WITH LONG-TERM CURRENT USE OF INSULIN (HCC): ICD-10-CM

## 2017-11-21 DIAGNOSIS — E11.9 TYPE 2 DIABETES MELLITUS WITHOUT COMPLICATION, WITH LONG-TERM CURRENT USE OF INSULIN (HCC): ICD-10-CM

## 2017-11-21 LAB
ALBUMIN SERPL-MCNC: 3.1 G/DL (ref 3.6–4.8)
ALBUMIN/GLOB SERPL: 0.8 {RATIO} (ref 1.2–2.2)
ALP SERPL-CCNC: 97 IU/L (ref 39–117)
ALT SERPL-CCNC: 13 IU/L (ref 0–32)
AST SERPL-CCNC: 18 IU/L (ref 0–40)
BASOPHILS # BLD AUTO: 0.1 X10E3/UL (ref 0–0.2)
BASOPHILS NFR BLD AUTO: 1 %
BILIRUB SERPL-MCNC: 0.3 MG/DL (ref 0–1.2)
BUN SERPL-MCNC: 32 MG/DL (ref 8–27)
BUN/CREAT SERPL: 28 (ref 12–28)
CALCIUM SERPL-MCNC: 9.2 MG/DL (ref 8.7–10.3)
CHLORIDE SERPL-SCNC: 102 MMOL/L (ref 96–106)
CHOLEST SERPL-MCNC: 178 MG/DL (ref 100–199)
CO2 SERPL-SCNC: 23 MMOL/L (ref 18–29)
CREAT SERPL-MCNC: 1.13 MG/DL (ref 0.57–1)
EOSINOPHIL # BLD AUTO: 0.6 X10E3/UL (ref 0–0.4)
EOSINOPHIL NFR BLD AUTO: 6 %
ERYTHROCYTE [DISTWIDTH] IN BLOOD BY AUTOMATED COUNT: 13.4 % (ref 12.3–15.4)
EST. AVERAGE GLUCOSE BLD GHB EST-MCNC: 209 MG/DL
GFR SERPLBLD CREATININE-BSD FMLA CKD-EPI: 50 ML/MIN/1.73
GFR SERPLBLD CREATININE-BSD FMLA CKD-EPI: 57 ML/MIN/1.73
GLOBULIN SER CALC-MCNC: 3.9 G/DL (ref 1.5–4.5)
GLUCOSE SERPL-MCNC: 76 MG/DL (ref 65–99)
HBA1C MFR BLD: 8.9 % (ref 4.8–5.6)
HCT VFR BLD AUTO: 37.7 % (ref 34–46.6)
HDLC SERPL-MCNC: 62 MG/DL
HGB BLD-MCNC: 12.8 G/DL (ref 11.1–15.9)
IMM GRANULOCYTES # BLD: 0.1 X10E3/UL (ref 0–0.1)
IMM GRANULOCYTES NFR BLD: 1 %
INTERPRETATION, 910389: NORMAL
INTERPRETATION: NORMAL
LDLC SERPL CALC-MCNC: 94 MG/DL (ref 0–99)
LYMPHOCYTES # BLD AUTO: 3.1 X10E3/UL (ref 0.7–3.1)
LYMPHOCYTES NFR BLD AUTO: 32 %
MCH RBC QN AUTO: 30.4 PG (ref 26.6–33)
MCHC RBC AUTO-ENTMCNC: 34 G/DL (ref 31.5–35.7)
MCV RBC AUTO: 90 FL (ref 79–97)
MONOCYTES # BLD AUTO: 0.8 X10E3/UL (ref 0.1–0.9)
MONOCYTES NFR BLD AUTO: 8 %
NEUTROPHILS # BLD AUTO: 5 X10E3/UL (ref 1.4–7)
NEUTROPHILS NFR BLD AUTO: 52 %
PDF IMAGE, 910387: NORMAL
PLATELET # BLD AUTO: 246 X10E3/UL (ref 150–379)
POTASSIUM SERPL-SCNC: 4.3 MMOL/L (ref 3.5–5.2)
PROT SERPL-MCNC: 7 G/DL (ref 6–8.5)
RBC # BLD AUTO: 4.21 X10E6/UL (ref 3.77–5.28)
SODIUM SERPL-SCNC: 140 MMOL/L (ref 134–144)
TRIGL SERPL-MCNC: 109 MG/DL (ref 0–149)
VLDLC SERPL CALC-MCNC: 22 MG/DL (ref 5–40)
WBC # BLD AUTO: 9.6 X10E3/UL (ref 3.4–10.8)

## 2017-11-21 RX ORDER — INSULIN GLARGINE 100 [IU]/ML
INJECTION, SOLUTION SUBCUTANEOUS
Qty: 6 VIAL | Refills: 3
Start: 2017-11-21 | End: 2017-12-26 | Stop reason: CLARIF

## 2017-11-21 NOTE — PROGRESS NOTES
A1c is not good. Has increased to 8.9 from 7.2. Increase Lantus to 60 units daily. Monitor blood sugars at home. Important to watch diet and exercise. RTO 3 months. Lipids look good.

## 2017-12-15 ENCOUNTER — TELEPHONE (OUTPATIENT)
Dept: FAMILY MEDICINE CLINIC | Age: 67
End: 2017-12-15

## 2017-12-15 NOTE — TELEPHONE ENCOUNTER
[facility] is not covering her Lantus anymore. They suggest an alternative drug (Basaglar, Levemir or [place]). A new script needs to be called into [facility].

## 2017-12-20 ENCOUNTER — TELEPHONE (OUTPATIENT)
Dept: FAMILY MEDICINE CLINIC | Age: 67
End: 2017-12-20

## 2017-12-20 NOTE — TELEPHONE ENCOUNTER
----- Message from Jatinder Medeiros sent at 12/20/2017  9:08 AM EST -----  Regarding: Dr. Nelly Ruelas  Pt missed a call from the practice  And would like for a return call to be made this afternoon when she gets back home. Her best contact number is [phone].

## 2017-12-26 ENCOUNTER — OFFICE VISIT (OUTPATIENT)
Dept: FAMILY MEDICINE CLINIC | Age: 67
End: 2017-12-26

## 2017-12-26 VITALS
TEMPERATURE: 98 F | OXYGEN SATURATION: 98 % | WEIGHT: 258 LBS | HEART RATE: 78 BPM | BODY MASS INDEX: 44.05 KG/M2 | RESPIRATION RATE: 18 BRPM | DIASTOLIC BLOOD PRESSURE: 84 MMHG | SYSTOLIC BLOOD PRESSURE: 160 MMHG | HEIGHT: 64 IN

## 2017-12-26 DIAGNOSIS — J40 BRONCHITIS: Primary | ICD-10-CM

## 2017-12-26 DIAGNOSIS — Z79.4 TYPE 2 DIABETES MELLITUS WITHOUT COMPLICATION, WITH LONG-TERM CURRENT USE OF INSULIN (HCC): ICD-10-CM

## 2017-12-26 DIAGNOSIS — I10 ESSENTIAL HYPERTENSION, BENIGN: ICD-10-CM

## 2017-12-26 DIAGNOSIS — E11.9 TYPE 2 DIABETES MELLITUS WITHOUT COMPLICATION, WITH LONG-TERM CURRENT USE OF INSULIN (HCC): ICD-10-CM

## 2017-12-26 PROBLEM — E66.01 OBESITY, MORBID (HCC): Status: ACTIVE | Noted: 2017-12-26

## 2017-12-26 PROBLEM — E11.21 TYPE 2 DIABETES MELLITUS WITH NEPHROPATHY (HCC): Status: ACTIVE | Noted: 2017-12-26

## 2017-12-26 RX ORDER — AZITHROMYCIN 250 MG/1
TABLET, FILM COATED ORAL
Qty: 6 TAB | Refills: 0 | Status: SHIPPED | OUTPATIENT
Start: 2017-12-26 | End: 2017-12-31

## 2017-12-26 NOTE — MR AVS SNAPSHOT
Visit Information Date & Time Provider Department Dept. Phone Encounter #  
 12/26/2017  9:40 AM Cesilia Dobbins MD [facility] Primary Care [phone] [id number] Follow-up Instructions Return if symptoms worsen or fail to improve. Your Appointments 2/20/2018  7:40 AM  
Follow Up with Cesilia Dobbins MD  
[place] [facility] Appt Note: 3 mo f/u  
 [address] [id number] [phone]  
  
   
 [address] [id number] Upcoming Health Maintenance Date Due FOBT Q 1 YEAR AGE 50-75 11/3/1998 EYE EXAM RETINAL OR DILATED Q1 9/12/2017 MICROALBUMIN Q1 4/3/2018 HEMOGLOBIN A1C Q6M 5/20/2018 FOOT EXAM Q1 8/21/2018 MEDICARE YEARLY EXAM 8/22/2018 GLAUCOMA SCREENING Q2Y 9/12/2018 LIPID PANEL Q1 11/20/2018 DTaP/Tdap/Td series (2 - Td) 7/12/2026 Allergies as of 12/26/2017  Review Complete On: 12/26/2017 By: Cesilia Dobbins MD  
  
 Severity Noted Reaction Type Reactions Pcn [Penicillins]  08/13/2014    Unknown (comments) Sulfa (Sulfonamide Antibiotics)  08/13/2014    Unknown (comments) Current Immunizations  Reviewed on 10/11/2016 Name Date Influenza High Dose Vaccine PF 11/20/2017, 10/11/2016  9:54 AM  
 Influenza Vaccine (Quad) 11/3/2015 Pneumococcal Conjugate (PCV-13) 11/24/2015 Pneumococcal Polysaccharide (PPSV-23) 8/21/2017  2:05 PM  
 TB Skin Test (PPD) Intradermal 7/10/2017 10:39 AM, 5/23/2016, 9/8/2015,  Incomplete Not reviewed this visit You Were Diagnosed With   
  
 Codes Comments Bronchitis    -  Primary ICD-10-CM: A78 ICD-9-CM: 095 Essential hypertension, benign     ICD-10-CM: I10 
ICD-9-CM: 401.1 Type 2 diabetes mellitus without complication, with long-term current use of insulin (HCC)     ICD-10-CM: E11.9, Z79.4 ICD-9-CM: 250.00, V58.67 Vitals BP Pulse Temp Resp Height(growth percentile) Weight(growth percentile) 160/84 78 98 °F (36.7 °C) (Oral) 18 5' 4\" (1.626 m) 258 lb (117 kg) SpO2 BMI OB Status Smoking Status 98% 44.29 kg/m2 Menopause Never Smoker Vitals History BMI and BSA Data Body Mass Index Body Surface Area  
 44.29 kg/m 2 2.3 m 2 Preferred Pharmacy Pharmacy Name Phone [facility]ACY #[id number] [phone] Your Updated Medication List  
  
   
This list is accurate as of: 12/26/17 10:08 AM.  Always use your most recent med list. amLODIPine 10 mg tablet Commonly known as:  Brent Lopez TAKE 1 TABLET DAILY  
  
 azithromycin 250 mg tablet Commonly known as:  Nury Quiros Take 2 tablets today, then take 1 tablet daily  
  
 etanercept 50 mg/mL (0.98 mL) injection Commonly known as:  ENBREL 50 mg by SubCUTAneous route every seven (7) days. glucose blood VI test strips strip Commonly known as:  ONETOUCH ULTRA TEST Use to check BS up to 4 x/day  
  
 hydroCHLOROthiazide 25 mg tablet Commonly known as:  HYDRODIURIL  
TAKE 1 TABLET DAILY  
  
 insulin detemir 100 unit/mL (3 mL) Inpn Commonly known as:  LEVEMIR FLEXTOUCH  
60 Units by SubCUTAneous route every morning. insulin syringe-needle U-100 Use to inject insulin BID Lancets Misc Commonly known as: One Touch Miriam Liz USE TO CHECK BLOOD SUGAR up to 4 times a day  
  
 metFORMIN 850 mg tablet Commonly known as:  GLUCOPHAGE  
TAKE 1 TABLET TWICE A DAY WITH MEALS  
  
 metoprolol succinate 50 mg XL tablet Commonly known as:  TOPROL-XL  
TAKE 1 TABLET DAILY  
  
 quinapril 40 mg tablet Commonly known as:  ACCUPRIL  
TAKE 1 TABLET TWICE A DAY Prescriptions Sent to Pharmacy Refills  
 azithromycin (ZITHROMAX) 250 mg tablet 0 Sig: Take 2 tablets today, then take 1 tablet daily  Class: Normal  
 Pharmacy: [facility]/pharmacy #[id number] [address]  #: [phone] Follow-up Instructions Return if symptoms worsen or fail to improve. Introducing [facility]! Jose Loomis introduces [employer] patient portal. Now you can access parts of your medical record, email your doctor's office, and request medication refills online. 1. In your internet browser, go to [email or web address] 2. Click on the First Time User? Click Here link in the Sign In box. You will see the New Member Sign Up page. 3. Enter your Property[employer]uru Access Code exactly as it appears below. You will not need to use this code after youve completed the sign-up process. If you do not sign up before the expiration date, you must request a new code. · [employer] Access Code: [email or web address] Expires: 2/18/2018  8:13 AM 
 
4. Enter the last four digits of your Social Security Number (xxxx) and Date of Birth (mm/dd/yyyy) as indicated and click Submit. You will be taken to the next sign-up page. 5. Create a PropertyGuru ID. This will be your PropertyGuru login ID and cannot be changed, so think of one that is secure and easy to remember. 6. Create a PropertyGuru password. You can change your password at any time. 7. Enter your Password Reset Question and Answer. This can be used at a later time if you forget your password. 8. Enter your e-mail address. You will receive e-mail notification when new information is available in [address] 9. Click Sign Up. You can now view and download portions of your medical record. 10. Click the Download Summary menu link to download a portable copy of your medical information. If you have questions, please visit the Frequently Asked Questions section of the Property[employer] website. Remember, Property[employer] is NOT to be used for urgent needs. For medical emergencies, dial 911. Now available from your iPhone and Android! Please provide this summary of care documentation to your next provider. Your primary care clinician is listed as Jazmin Ball. If you have any questions after today's visit, please call [phone].

## 2017-12-26 NOTE — PROGRESS NOTES
HISTORY OF PRESENT ILLNESS  Telma Thayer is a 69 y.o. female. HPI  She is here with a non productive cough for the past week. Worse at night. Taking OTC cough meds. Started with RN and ST over the weekend. Review of Systems   Constitutional: Negative for chills, fever and malaise/fatigue. HENT: Negative for congestion and sore throat. Respiratory: Positive for cough. Negative for sputum production, shortness of breath and wheezing. Cardiovascular: Negative for chest pain and palpitations. Gastrointestinal: Negative for abdominal pain, heartburn and nausea. Physical Exam   Constitutional: She appears well-developed and well-nourished. No distress. HENT:   Head: Normocephalic. Right Ear: External ear normal.   Left Ear: External ear normal.   Mouth/Throat: Oropharynx is clear and moist.   Nose with swollen turbinates   Neck: Neck supple. Cardiovascular: Normal rate, regular rhythm and normal heart sounds. Pulmonary/Chest: Effort normal. No respiratory distress. Scattered rhonchi through which clears with cough   Lymphadenopathy:     She has no cervical adenopathy. Neurological: She is alert. Skin: Skin is warm. Psychiatric: She has a normal mood and affect.        ASSESSMENT and PLAN  Bronchitis   Zithromax for 5 days   Continue OTC meds for cough   RTO if no improvement  HTN   Continue meds

## 2018-02-12 ENCOUNTER — LAB ONLY (OUTPATIENT)
Dept: FAMILY MEDICINE CLINIC | Age: 68
End: 2018-02-12

## 2018-02-12 DIAGNOSIS — E11.21 TYPE 2 DIABETES MELLITUS WITH NEPHROPATHY (HCC): Primary | ICD-10-CM

## 2018-02-12 DIAGNOSIS — I10 ESSENTIAL HYPERTENSION, BENIGN: ICD-10-CM

## 2018-02-13 LAB
ALBUMIN SERPL-MCNC: 3.2 G/DL (ref 3.6–4.8)
ALBUMIN/GLOB SERPL: 0.9 {RATIO} (ref 1.2–2.2)
ALP SERPL-CCNC: 88 IU/L (ref 39–117)
ALT SERPL-CCNC: 13 IU/L (ref 0–32)
AST SERPL-CCNC: 13 IU/L (ref 0–40)
BASOPHILS # BLD AUTO: 0.1 X10E3/UL (ref 0–0.2)
BASOPHILS NFR BLD AUTO: 1 %
BILIRUB SERPL-MCNC: 0.3 MG/DL (ref 0–1.2)
BUN SERPL-MCNC: 29 MG/DL (ref 8–27)
BUN/CREAT SERPL: 24 (ref 12–28)
CALCIUM SERPL-MCNC: 8.8 MG/DL (ref 8.7–10.3)
CHLORIDE SERPL-SCNC: 101 MMOL/L (ref 96–106)
CHOLEST SERPL-MCNC: 193 MG/DL (ref 100–199)
CO2 SERPL-SCNC: 26 MMOL/L (ref 18–29)
CREAT SERPL-MCNC: 1.19 MG/DL (ref 0.57–1)
EOSINOPHIL # BLD AUTO: 0.9 X10E3/UL (ref 0–0.4)
EOSINOPHIL NFR BLD AUTO: 9 %
ERYTHROCYTE [DISTWIDTH] IN BLOOD BY AUTOMATED COUNT: 13.7 % (ref 12.3–15.4)
GFR SERPLBLD CREATININE-BSD FMLA CKD-EPI: 47 ML/MIN/1.73
GFR SERPLBLD CREATININE-BSD FMLA CKD-EPI: 54 ML/MIN/1.73
GLOBULIN SER CALC-MCNC: 3.7 G/DL (ref 1.5–4.5)
GLUCOSE SERPL-MCNC: 83 MG/DL (ref 65–99)
HBA1C MFR BLD: 9.3 % (ref 4.8–5.6)
HCT VFR BLD AUTO: 38.8 % (ref 34–46.6)
HDLC SERPL-MCNC: 56 MG/DL
HGB BLD-MCNC: 12.8 G/DL (ref 11.1–15.9)
IMM GRANULOCYTES # BLD: 0.1 X10E3/UL (ref 0–0.1)
IMM GRANULOCYTES NFR BLD: 1 %
INTERPRETATION, 910389: NORMAL
INTERPRETATION: NORMAL
LDLC SERPL CALC-MCNC: 113 MG/DL (ref 0–99)
LYMPHOCYTES # BLD AUTO: 3 X10E3/UL (ref 0.7–3.1)
LYMPHOCYTES NFR BLD AUTO: 30 %
MCH RBC QN AUTO: 29.6 PG (ref 26.6–33)
MCHC RBC AUTO-ENTMCNC: 33 G/DL (ref 31.5–35.7)
MCV RBC AUTO: 90 FL (ref 79–97)
MONOCYTES # BLD AUTO: 0.9 X10E3/UL (ref 0.1–0.9)
MONOCYTES NFR BLD AUTO: 9 %
NEUTROPHILS # BLD AUTO: 5.1 X10E3/UL (ref 1.4–7)
NEUTROPHILS NFR BLD AUTO: 50 %
PDF IMAGE, 910387: NORMAL
PLATELET # BLD AUTO: 247 X10E3/UL (ref 150–379)
POTASSIUM SERPL-SCNC: 4.4 MMOL/L (ref 3.5–5.2)
PROT SERPL-MCNC: 6.9 G/DL (ref 6–8.5)
RBC # BLD AUTO: 4.32 X10E6/UL (ref 3.77–5.28)
SODIUM SERPL-SCNC: 140 MMOL/L (ref 134–144)
TRIGL SERPL-MCNC: 122 MG/DL (ref 0–149)
VLDLC SERPL CALC-MCNC: 24 MG/DL (ref 5–40)
WBC # BLD AUTO: 10 X10E3/UL (ref 3.4–10.8)

## 2018-02-20 ENCOUNTER — OFFICE VISIT (OUTPATIENT)
Dept: FAMILY MEDICINE CLINIC | Age: 68
End: 2018-02-20

## 2018-02-20 VITALS
HEART RATE: 79 BPM | WEIGHT: 260.25 LBS | HEIGHT: 64 IN | BODY MASS INDEX: 44.43 KG/M2 | OXYGEN SATURATION: 96 % | DIASTOLIC BLOOD PRESSURE: 79 MMHG | SYSTOLIC BLOOD PRESSURE: 151 MMHG | RESPIRATION RATE: 18 BRPM

## 2018-02-20 DIAGNOSIS — K92.1 BLOOD IN STOOL: ICD-10-CM

## 2018-02-20 DIAGNOSIS — E66.01 OBESITY, MORBID (HCC): ICD-10-CM

## 2018-02-20 DIAGNOSIS — E11.65 TYPE 2 DIABETES MELLITUS WITH HYPERGLYCEMIA, WITH LONG-TERM CURRENT USE OF INSULIN (HCC): ICD-10-CM

## 2018-02-20 DIAGNOSIS — E11.21 TYPE 2 DIABETES MELLITUS WITH NEPHROPATHY (HCC): Primary | ICD-10-CM

## 2018-02-20 DIAGNOSIS — I10 ESSENTIAL HYPERTENSION, BENIGN: ICD-10-CM

## 2018-02-20 DIAGNOSIS — Z79.4 TYPE 2 DIABETES MELLITUS WITH HYPERGLYCEMIA, WITH LONG-TERM CURRENT USE OF INSULIN (HCC): ICD-10-CM

## 2018-02-20 RX ORDER — METOPROLOL SUCCINATE 50 MG/1
TABLET, EXTENDED RELEASE ORAL
Qty: 90 TAB | Refills: 3 | Status: SHIPPED | OUTPATIENT
Start: 2018-02-20 | End: 2019-03-04 | Stop reason: SDUPTHER

## 2018-02-20 RX ORDER — HYDROCHLOROTHIAZIDE 25 MG/1
TABLET ORAL
Qty: 90 TAB | Refills: 3 | Status: SHIPPED | OUTPATIENT
Start: 2018-02-20 | End: 2018-03-29 | Stop reason: SDUPTHER

## 2018-02-20 RX ORDER — METFORMIN HYDROCHLORIDE 850 MG/1
TABLET ORAL
Qty: 180 TAB | Refills: 3 | Status: SHIPPED | OUTPATIENT
Start: 2018-02-20 | End: 2019-06-17

## 2018-02-20 RX ORDER — QUINAPRIL 40 MG/1
TABLET ORAL
Qty: 180 TAB | Refills: 3 | Status: SHIPPED | OUTPATIENT
Start: 2018-02-20 | End: 2019-06-09

## 2018-02-20 RX ORDER — AMLODIPINE BESYLATE 10 MG/1
TABLET ORAL
Qty: 90 TAB | Refills: 3 | Status: SHIPPED | OUTPATIENT
Start: 2018-02-20 | End: 2019-01-28 | Stop reason: DRUGHIGH

## 2018-02-20 NOTE — PROGRESS NOTES
HISTORY OF PRESENT ILLNESS  Steve Mckeon is a 69 y.o. female. Hypertension    Pertinent negatives include no chest pain, no palpitations, no malaise/fatigue, no headaches, no dizziness and no shortness of breath. Diabetes   Pertinent negatives include no chest pain, no abdominal pain, no headaches and no shortness of breath. Diabetes  Steve Mckeon is a 69 y.o. female who is here for a routine diabetic check. Current blood sugars at home have been close to 145-165 fasting. Her Levemir was increased last visit. She is not following a diet. Lab Results   Component Value Date/Time    Hemoglobin A1c 9.3 (H) 02/12/2018 09:04 AM   There have been no episodes of hypoglycemia. Taking meds daily . Last eye exam was within a year. Checking feet daily. Hypertension  Has been following a low sodium diet . Exercises little. Taking meds daily. Denies chest pain or shortness of breath. Heart Murmur  History of heart murmur as a child.       Obesity  Was in [place] for 6 weeks last spring. Walked a lot while there but has not been walking since her return. Weight is up and she is not exercising often. Not watching her diet.  [place] Rings Dr Justin Barnett in [place]. On Embrel which is working well. Sees Derm every 6 months. Had PPD in July. Review of Systems   Constitutional: Negative for chills, fever, malaise/fatigue and weight loss. HENT: Negative for congestion, ear pain and sore throat. Respiratory: Positive for cough. Negative for shortness of breath. Dry hacky cough   Cardiovascular: Negative for chest pain and palpitations. Gastrointestinal: Negative for abdominal pain and heartburn. Genitourinary: Negative for frequency. Musculoskeletal: Negative for joint pain. Neurological: Negative for dizziness and headaches. Psychiatric/Behavioral: Negative for depression. The patient is not nervous/anxious.       Past Medical History:   Diagnosis Date    Diabetes ([facility] [address] [facility]     Hypertension     Murmur     Osteopenia     Psoriasis     Rosacea     Vitamin D deficiency      Past Surgical History:   Procedure Laterality Date    HX  SECTION      HX TONSILLECTOMY       Allergies   Allergen Reactions    Pcn [Penicillins] Unknown (comments)    Sulfa (Sulfonamide Antibiotics) Unknown (comments)     Blood pressure 151/79, pulse 79, resp. rate 18, height 5' 4\" (1.626 m), weight 260 lb 4 oz (118 kg), SpO2 96 %. Physical Exam   Constitutional: She is oriented to person, place, and time. She appears well-developed and well-nourished. No distress. HENT:   Head: Normocephalic and atraumatic. Right Ear: External ear normal.   Left Ear: External ear normal.   Nose: Nose normal.   Mouth/Throat: Oropharynx is clear and moist.   Eyes: Conjunctivae are normal.   Neck: Normal range of motion. Neck supple. Cardiovascular: Normal rate, regular rhythm and normal heart sounds. Pulmonary/Chest: Effort normal and breath sounds normal. No respiratory distress. Abdominal: Soft. Musculoskeletal: Normal range of motion. She exhibits edema. Trace + ankle edema bilaterally   Lymphadenopathy:     She has no cervical adenopathy. Neurological: She is alert and oriented to person, place, and time. Skin: Skin is warm and dry. Psychiatric: She has a normal mood and affect. Nursing note and vitals reviewed.       ASSESSMENT and PLAN  DM   Discussed diet and exercise   Start Toujeo at 60 units   Call in 2 weeks with blood sugars so we can adjust insulin   May need to add mealtime insulin- she is opposed to this   Offered Endocrine referral but she does not want to go at this time   Regular eye and foot exams   RTO 3 months  HTN   Low sodium diet   Continue meds  Obesity/BMI 44   Discussed healthy lifestyle and portion control

## 2018-02-20 NOTE — MR AVS SNAPSHOT
[address] [phone] Patient: Marcy Avendano MRN: [id number] AVQ:46/1/1920 Visit Information Date & Time Provider Department Dept. Phone Encounter #  
 2/20/2018  7:40 AM Freedom Frost  [address] [facility] [phone] [id number] Your Appointments 5/22/2018 11:00 AM  
Follow Up with MD TONJA Salguero [facility] ([address]) Appt Note: 3 mo f/u  
 [facility]  
[phone] [facility] Upcoming Health Maintenance Date Due FOBT Q 1 YEAR AGE 50-75 11/3/1998 EYE EXAM RETINAL OR DILATED Q1 9/12/2017 MICROALBUMIN Q1 4/3/2018 HEMOGLOBIN A1C Q6M 8/12/2018 FOOT EXAM Q1 8/21/2018 MEDICARE YEARLY EXAM 8/22/2018 GLAUCOMA SCREENING Q2Y 9/12/2018 BREAST CANCER SCRN MAMMOGRAM 11/3/2018 LIPID PANEL Q1 2/12/2019 DTaP/Tdap/Td series (2 - Td) 7/12/2026 Allergies as of 2/20/2018  Review Complete On: 2/20/2018 By: Freedom Frost MD  
  
 Severity Noted Reaction Type Reactions Pcn [Penicillins]  08/13/2014    Unknown (comments) Sulfa (Sulfonamide Antibiotics)  08/13/2014    Unknown (comments) Current Immunizations  Reviewed on 10/11/2016 Name Date Influenza High Dose Vaccine PF 11/20/2017, 10/11/2016  9:54 AM  
 Influenza Vaccine (Quad) 11/3/2015 Pneumococcal Conjugate (PCV-13) 11/24/2015 Pneumococcal Polysaccharide (PPSV-23) 8/21/2017  2:05 PM  
 TB Skin Test (PPD) Intradermal 7/10/2017 10:39 AM, 5/23/2016, 9/8/2015,  Incomplete Not reviewed this visit You Were Diagnosed With   
  
 Codes Comments Type 2 diabetes mellitus with nephropathy ([facility] [address]    -  Primary ICD-10-CM: E11.21 
ICD-9-CM: 250.40, 583.81 Essential hypertension, benign     ICD-10-CM: I10 
ICD-9-CM: 401.1 Type 2 diabetes mellitus with hyperglycemia, with long-term current use of insulin (HCC)     ICD-10-CM: E11.65, Z79.4 ICD-9-CM: 250.00, 790.29, V58.67 Vitals BP Pulse Resp Height(growth percentile) Weight(growth percentile) SpO2  
 151/79 (BP 1 Location: Right arm) 79 18 5' 4\" (1.626 m) 260 lb 4 oz (118 kg) 96% BMI OB Status Smoking Status 44.67 kg/m2 Menopause Never Smoker Vitals History BMI and BSA Data Body Mass Index Body Surface Area  
 44.67 kg/m 2 2.31 m 2 Preferred Pharmacy Pharmacy Name Phone [id number], [address] [place] [address] [phone] Your Updated Medication List  
  
   
This list is accurate as of: 2/20/18  8:01 AM.  Always use your most recent med list. amLODIPine 10 mg tablet Commonly known as:  Bitely Pique TAKE 1 TABLET DAILY  
  
 etanercept 50 mg/mL (0.98 mL) injection Commonly known as:  ENBREL 50 mg by SubCUTAneous route every seven (7) days. glucose blood VI test strips strip Commonly known as:  ONETOUCH ULTRA TEST Use to check BS up to 4 x/day  
  
 hydroCHLOROthiazide 25 mg tablet Commonly known as:  HYDRODIURIL  
TAKE 1 TABLET DAILY  
  
 insulin detemir U-100 100 unit/mL (3 mL) Inpn Commonly known as:  LEVEMIR FLEXTOUCH  
60 Units by SubCUTAneous route every morning. insulin glargine 300 unit/mL (1.5 mL) Inpn Commonly known as:  TOUJEO SOLOSTAR U-300 INSULIN  
60 Units by SubCUTAneous route daily. insulin syringe-needle U-100 Use to inject insulin BID Lancets Misc Commonly known as: One Touch Estefany Hendley USE TO CHECK BLOOD SUGAR up to 4 times a day  
  
 metFORMIN 850 mg tablet Commonly known as:  GLUCOPHAGE  
TAKE 1 TABLET TWICE A DAY WITH MEALS  
  
 metoprolol succinate 50 mg XL tablet Commonly known as:  TOPROL-XL  
TAKE 1 TABLET DAILY  
  
 quinapril 40 mg tablet Commonly known as:  ACCUPRIL  
TAKE 1 TABLET TWICE A DAY  
  
 Prescriptions Sent to Pharmacy Refills  
 amLODIPine (NORVASC) 10 mg tablet 3 Sig: TAKE 1 TABLET DAILY Class: Normal  
 Pharmacy:  [address] Ph #: [phone]  
 hydroCHLOROthiazide (HYDRODIURIL) 25 mg tablet 3 Sig: TAKE 1 TABLET DAILY Class: Normal  
 Pharmacy:  [address] Ph #: [phone]  
 metoprolol succinate (TOPROL-XL) 50 mg XL tablet 3 Sig: TAKE 1 TABLET DAILY Class: Normal  
 Pharmacy:  [address] Ph #: [phone]  
 quinapril (ACCUPRIL) 40 mg tablet 3 Sig: TAKE 1 TABLET TWICE A DAY Class: Normal  
 Pharmacy:  [address] Ph #: [phone]  
 metFORMIN (GLUCOPHAGE) 850 mg tablet 3 Sig: TAKE 1 TABLET TWICE A DAY WITH MEALS Class: Normal  
 Pharmacy:  [address] Ph #: [phone] insulin glargine (TOUJEO SOLOSTAR U-300 INSULIN) 300 unit/mL (1.5 mL) inpn 0 Si Units by SubCUTAneous route daily. Class: Normal  
 Pharmacy: [id number] Yemi Huerta, [address] [place] [address] [address] [place] [address] Ph #: [phone] Route: SubCUTAneous Introducing [facility]! [employer] introduces [employer] patient portal. Now you can access parts of your medical record, email your doctor's office, and request medication refills online. 1. In your internet browser, go to [email or web address] 2. Click on the First Time User? Click Here link in the Sign In box. You will see the New Member Sign Up page. 3. Enter your [employer] Access Code exactly as it appears below. You will not need to use this code after youve completed the sign-up process. If you do not sign up before the expiration date, you must request a new code. · [employer] Access Code: [email or web address] Expires: 2018  8:01 AM 
 
4.  Enter the last four digits of your Social Security Number (xxxx) and Date of Birth (mm/dd/yyyy) as indicated and click Submit. You will be taken to the next sign-up page. 5. Create a SCHEDit ID. This will be your SCHEDit login ID and cannot be changed, so think of one that is secure and easy to remember. 6. Create a SCHEDit password. You can change your password at any time. 7. Enter your Password Reset Question and Answer. This can be used at a later time if you forget your password. 8. Enter your e-mail address. You will receive e-mail notification when new information is available in [address] 9. Click Sign Up. You can now view and download portions of your medical record. 10. Click the Download Summary menu link to download a portable copy of your medical information. If you have questions, please visit the Frequently Asked Questions section of the SCHEDit website. Remember, SCHEDit is NOT to be used for urgent needs. For medical emergencies, dial 911. Now available from your iPhone and Android! Please provide this summary of care documentation to your next provider. Your primary care clinician is listed as Vinayak Pacheco. If you have any questions after today's visit, please call [phone].

## 2018-03-20 NOTE — PROGRESS NOTES
Silke is positive. This looks at the DNA of the stool. Needs to schedule colonoscopy. Does she want to do this here or with GI? Please let me know where she wants to go.

## 2018-03-22 ENCOUNTER — OFFICE VISIT (OUTPATIENT)
Dept: SURGERY | Age: 68
End: 2018-03-22

## 2018-03-22 VITALS
SYSTOLIC BLOOD PRESSURE: 167 MMHG | HEIGHT: 64 IN | WEIGHT: 261.3 LBS | BODY MASS INDEX: 44.61 KG/M2 | HEART RATE: 87 BPM | DIASTOLIC BLOOD PRESSURE: 85 MMHG

## 2018-03-22 DIAGNOSIS — R19.5 HEME POSITIVE STOOL: Primary | ICD-10-CM

## 2018-03-22 RX ORDER — SODIUM CHLORIDE, SODIUM LACTATE, POTASSIUM CHLORIDE, CALCIUM CHLORIDE 600; 310; 30; 20 MG/100ML; MG/100ML; MG/100ML; MG/100ML
200 INJECTION, SOLUTION INTRAVENOUS CONTINUOUS
Status: CANCELLED | OUTPATIENT
Start: 2018-03-22 | End: 2018-03-23

## 2018-03-22 RX ORDER — POLYETHYLENE GLYCOL 3350, SODIUM CHLORIDE, SODIUM BICARBONATE, POTASSIUM CHLORIDE 420; 11.2; 5.72; 1.48 G/4L; G/4L; G/4L; G/4L
POWDER, FOR SOLUTION ORAL
Qty: 1 BOTTLE | Refills: 0 | Status: SHIPPED | OUTPATIENT
Start: 2018-03-22 | End: 2018-05-22 | Stop reason: ALTCHOICE

## 2018-03-22 RX ORDER — BISACODYL 5 MG
TABLET, DELAYED RELEASE (ENTERIC COATED) ORAL
Qty: 1 TAB | Refills: 0 | Status: SHIPPED | OUTPATIENT
Start: 2018-03-22 | End: 2018-05-22 | Stop reason: ALTCHOICE

## 2018-03-22 NOTE — PROGRESS NOTES
[address] [facility] SURGICAL   [address] [place] [address], [address] [place] [address]  Phone: [phone] Fax: [phone]                                              OFFICE NOTE    NAME: Ermelinda Richardson  : 1950    Chief Complaint:   Positive cologuard    History: Ermelinda Richardson is a 69 y.o. WHITE OR  female referred for colonoscopy because of positive cologuard. This is  the patient's first colonoscopy. The bowel movements are regular and brown. She does not use any meds on a regular basis for her bowels. She denies any blood in stools or hemorrhoidal disease. Family history is negative for colon cancer or colon polyps. Past Medical History:   Diagnosis Date    Diabetes ([address]     Hypertension     Murmur     Osteopenia     Psoriasis     Rosacea     Vitamin D deficiency      Past Surgical History:   Procedure Laterality Date    HX  SECTION      HX TONSILLECTOMY          Current Outpatient Prescriptions   Medication Sig Dispense Refill    peg-electrolyte soln (GAVILYTE-N) 420 gram solution Take as directed  Indications: BOWEL EVACUATION 1 Bottle 0    bisacodyl (DULCOLAX, BISACODYL,) 5 mg EC tablet Take as directed  Indications: BOWEL EVACUATION 1 Tab 0    amLODIPine (NORVASC) 10 mg tablet TAKE 1 TABLET DAILY 90 Tab 3    hydroCHLOROthiazide (HYDRODIURIL) 25 mg tablet TAKE 1 TABLET DAILY 90 Tab 3    metoprolol succinate (TOPROL-XL) 50 mg XL tablet TAKE 1 TABLET DAILY 90 Tab 3    quinapril (ACCUPRIL) 40 mg tablet TAKE 1 TABLET TWICE A  Tab 3    metFORMIN (GLUCOPHAGE) 850 mg tablet TAKE 1 TABLET TWICE A DAY WITH MEALS 180 Tab 3    insulin glargine (TOUJEO SOLOSTAR U-300 INSULIN) 300 unit/mL (1.5 mL) inpn 60 Units by SubCUTAneous route daily. 5 Pen 0    insulin detemir (LEVEMIR FLEXTOUCH) 100 unit/mL (3 mL) inpn 60 Units by SubCUTAneous route every morning.  1 Adjustable Dose Pre-filled Pen Syringe 0    insulin syringe-needle U-100 Use to inject insulin  Syringe 6    glucose blood VI test strips (ONETOUCH ULTRA TEST) strip Use to check BS up to 4 x/day 3 Strip 0    etanercept (ENBREL) 50 mg/mL (0.98 mL) injection 50 mg by SubCUTAneous route every seven (7) days.  Lancets (ONE TOUCH DELICA) misc USE TO CHECK BLOOD SUGAR up to 4 times a day 1 Package 2     Allergies   Allergen Reactions    Pcn [Penicillins] Unknown (comments)    Sulfa (Sulfonamide Antibiotics) Unknown (comments)     Social History     Social History    Marital status:      Spouse name: N/A    Number of children: N/A    Years of education: N/A     Occupational History    Not on file. Social History Main Topics    Smoking status: Never Smoker    Smokeless tobacco: Never Used    Alcohol use 0.0 oz/week     0 Standard drinks or equivalent per week      Comment: 2/ week    Drug use: Not on file    Sexual activity: Not on file     Other Topics Concern    Not on file     Social History Narrative     Family History   Problem Relation Age of Onset    Diabetes Mother     Heart Disease Father        Patient Active Problem List   Diagnosis Code    Psoriasis L40.9    Essential hypertension, benign I10    Rosacea L71.9    Heart murmur R01.1    Type 2 diabetes mellitus with hyperglycemia, with long-term current use of insulin ([facility]) E11.65, Z79.4    Obesity, morbid ([facility]) E66.01    Type 2 diabetes mellitus with nephropathy ([facility] E11.21       Review of Systems:  Review of Systems   Constitutional: Negative for chills, fever, malaise/fatigue and weight loss. HENT: Negative for congestion, ear discharge, ear pain, hearing loss, nosebleeds, sore throat and tinnitus. Eyes: Negative for blurred vision, double vision, pain, discharge and redness. Respiratory: Negative for cough, sputum production, shortness of breath and wheezing. Cardiovascular: Negative for chest pain, palpitations and leg swelling.    Gastrointestinal: Negative for abdominal pain, blood in stool, constipation, diarrhea, heartburn, melena, nausea and vomiting. Genitourinary: Negative for frequency, hematuria and urgency. Musculoskeletal: Negative for back pain, joint pain and neck pain. Skin: Positive for rash. Negative for itching. Neurological: Negative for dizziness, tremors, focal weakness, seizures, weakness and headaches. Endo/Heme/Allergies: Does not bruise/bleed easily. Psychiatric/Behavioral: Negative for depression and memory loss. The patient is not nervous/anxious and does not have insomnia. Physical Exam:  Visit Vitals    /85 (BP 1 Location: Left arm, BP Patient Position: Sitting)    Pulse 87    Ht 5' 4\" (1.626 m)    Wt 261 lb 4.8 oz (118.5 kg)    BMI 44.85 kg/m2       Physical Exam   Constitutional: She is oriented to person, place, and time. She appears well-developed and well-nourished. HENT:   Head: Normocephalic and atraumatic. Right Ear: External ear normal.   Left Ear: External ear normal.   Nose: Nose normal.   Mouth/Throat: Oropharynx is clear and moist. No oropharyngeal exudate. Eyes: Conjunctivae and EOM are normal. Pupils are equal, round, and reactive to light. Right eye exhibits no discharge. Left eye exhibits no discharge. No scleral icterus. Neck: Neck supple. No JVD present. No tracheal deviation present. No thyromegaly present. Cardiovascular: Normal rate, regular rhythm and normal heart sounds. Exam reveals no gallop and no friction rub. No murmur heard. Pulmonary/Chest: Effort normal and breath sounds normal. She has no wheezes. She has no rales. Abdominal: Soft. Bowel sounds are normal. She exhibits no distension and no mass. There is no tenderness. There is no rebound. Musculoskeletal: Normal range of motion. Lymphadenopathy:     She has no cervical adenopathy. Neurological: She is alert and oriented to person, place, and time. Skin: Skin is warm and dry. No rash noted. No erythema. Psychiatric: She has a normal mood and affect.  Her behavior is normal. Judgment and thought content normal.       Impression:  Positive cologuard    Plan:  Colonoscopy on 4/0/83  Risks and complications were explained to patient and they voiced understanding.     Kassandra Blake M.D.

## 2018-03-29 ENCOUNTER — OFFICE VISIT (OUTPATIENT)
Dept: FAMILY MEDICINE CLINIC | Age: 68
End: 2018-03-29

## 2018-03-29 VITALS
DIASTOLIC BLOOD PRESSURE: 83 MMHG | OXYGEN SATURATION: 97 % | WEIGHT: 259 LBS | RESPIRATION RATE: 18 BRPM | TEMPERATURE: 79 F | HEART RATE: 76 BPM | HEIGHT: 65 IN | BODY MASS INDEX: 43.15 KG/M2 | SYSTOLIC BLOOD PRESSURE: 180 MMHG

## 2018-03-29 DIAGNOSIS — I10 ESSENTIAL HYPERTENSION, BENIGN: Primary | ICD-10-CM

## 2018-03-29 RX ORDER — HYDROCHLOROTHIAZIDE 25 MG/1
TABLET ORAL
Qty: 90 TAB | Refills: 3
Start: 2018-03-29 | End: 2018-05-22 | Stop reason: SDUPTHER

## 2018-03-29 NOTE — MR AVS SNAPSHOT
[address] 
 
 
 [address] [phone] Patient: Latesha Tijerina MRN: [id number] Visit Information Date & Time Provider Department Dept. Phone Encounter #  
 3/29/2018 11:30 AM Zamzam Kauffman  [address] [facility] [phone] [id number] Follow-up Instructions Return in about 2 weeks (around 4/12/2018) for follow up. Your Appointments 4/12/2018 10:20 AM  
POST OP 10 MIN with Roberta Cano MD  
[facility] [address] ([facility]) Appt Note: F/U COLONOSCOPY  
 [facility], [address] [facility],[address], [address] 5/22/2018 11:00 AM  
Follow Up with MD TONJA Blanton [facility] ([facility]) Appt Note: 3 mo f/u  
 [address] [facility]  
[phone] [address] [facility] Upcoming Health Maintenance Date Due FOBT Q 1 YEAR AGE 50-75 11/3/1998 EYE EXAM RETINAL OR DILATED Q1 9/12/2017 MICROALBUMIN Q1 4/3/2018 HEMOGLOBIN A1C Q6M 8/12/2018 FOOT EXAM Q1 8/21/2018 MEDICARE YEARLY EXAM 8/22/2018 GLAUCOMA SCREENING Q2Y 9/12/2018 BREAST CANCER SCRN MAMMOGRAM 11/3/2018 LIPID PANEL Q1 2/12/2019 DTaP/Tdap/Td series (2 - Td) 7/12/2026 Allergies as of 3/29/2018  Review Complete On: 3/29/2018 By: Chencho Schmidt RN Severity Noted Reaction Type Reactions Pcn [Penicillins]  08/13/2014    Unknown (comments) Sulfa (Sulfonamide Antibiotics)  08/13/2014    Unknown (comments) Current Immunizations  Reviewed on 10/11/2016 Name Date Influenza High Dose Vaccine PF 11/20/2017, 10/11/2016  9:54 AM  
 Influenza Vaccine (Quad) 11/3/2015 Pneumococcal Conjugate (PCV-13) 11/24/2015  Pneumococcal Polysaccharide (PPSV-23) 8/21/2017  2:05 PM  
 TB Skin Test (PPD) Intradermal 7/10/2017 10:39 AM, 5/23/2016, 9/8/2015,  Incomplete Not reviewed this visit You Were Diagnosed With   
  
 Codes Comments Essential hypertension, benign    -  Primary ICD-10-CM: I10 
ICD-9-CM: 401.1 Type 2 diabetes mellitus with hyperglycemia, with long-term current use of insulin (HCC)     ICD-10-CM: E11.65, Z79.4 ICD-9-CM: 250.00, 790.29, V58.67 Vitals BP Pulse Temp Resp Height(growth percentile) Weight(growth percentile) 172/80 (BP 1 Location: Left arm, BP Patient Position: Sitting) 76 (!) 79 °F (26.1 °C) (Oral) 18 5' 5\" (1.651 m) 259 lb (117.5 kg) SpO2 BMI OB Status Smoking Status 97% 43.1 kg/m2 Menopause Never Smoker Vitals History BMI and BSA Data Body Mass Index Body Surface Area  
 43.1 kg/m 2 2.32 m 2 Preferred Pharmacy Pharmacy Name Phone [id number] Phelps, [address] [phone] Your Updated Medication List  
  
   
This list is accurate as of 3/29/18 11:52 AM.  Always use your most recent med list. amLODIPine 10 mg tablet Commonly known as:  Latosha Mcclellan TAKE 1 TABLET DAILY  
  
 bisacodyl 5 mg EC tablet Commonly known as:  DULCOLAX (BISACODYL) Take as directed  Indications: BOWEL EVACUATION  
  
 etanercept 50 mg/mL (0.98 mL) injection Commonly known as:  ENBREL 50 mg by SubCUTAneous route every seven (7) days. glucose blood VI test strips strip Commonly known as:  ONETOUCH ULTRA TEST Use to check BS up to 4 x/day  
  
 hydroCHLOROthiazide 25 mg tablet Commonly known as:  HYDRODIURIL  
TAKE 1 TABLET DAILY  
  
 insulin detemir U-100 100 unit/mL (3 mL) Inpn Commonly known as:  LEVEMIR FLEXTOUCH  
60 Units by SubCUTAneous route every morning. insulin glargine 300 unit/mL (1.5 mL) Inpn Commonly known as:  TOUJEO SOLOSTAR U-300 INSULIN  
60 Units by SubCUTAneous route daily. insulin syringe-needle U-100 Use to inject insulin BID Lancets Misc Commonly known as: One Touch Kamlesh Weston USE TO CHECK BLOOD SUGAR up to 4 times a day  
  
 metFORMIN 850 mg tablet Commonly known as:  GLUCOPHAGE  
TAKE 1 TABLET TWICE A DAY WITH MEALS  
  
 metoprolol succinate 50 mg XL tablet Commonly known as:  TOPROL-XL  
TAKE 1 TABLET DAILY  
  
 peg-electrolyte soln 420 gram solution Commonly known as:  GAVILYTE-N Take as directed  Indications: BOWEL EVACUATION  
  
 quinapril 40 mg tablet Commonly known as:  ACCUPRIL  
TAKE 1 TABLET TWICE A DAY Follow-up Instructions Return in about 2 weeks (around 4/12/2018) for follow up. Introducing [facility]! [employer] introduces [employer] patient portal. Now you can access parts of your medical record, email your doctor's office, and request medication refills online. 1. In your internet browser, go to [email or web address] 2. Click on the First Time User? Click Here link in the Sign In box. You will see the New Member Sign Up page. 3. Enter your [employer] Access Code exactly as it appears below. You will not need to use this code after youve completed the sign-up process. If you do not sign up before the expiration date, you must request a new code. · [employer] Access Code: [email or web address] Expires: 5/21/2018  9:01 AM 
 
4. Enter the last four digits of your Social Security Number (xxxx) and Date of Birth (mm/dd/yyyy) as indicated and click Submit. You will be taken to the next sign-up page. 5. Create a Logi-Servet ID. This will be your [employer] login ID and cannot be changed, so think of one that is secure and easy to remember. 6. Create a [employer] password. You can change your password at any time. 7. Enter your Password Reset Question and Answer. This can be used at a later time if you forget your password. 8. Enter your e-mail address. You will receive e-mail notification when new information is available in [address] 9. Click Sign Up. You can now view and download portions of your medical record. 10. Click the Download Summary menu link to download a portable copy of your medical information. If you have questions, please visit the Frequently Asked Questions section of the [employer] website. Remember, [employer] is NOT to be used for urgent needs. For medical emergencies, dial 911. Now available from your iPhone and Android! Please provide this summary of care documentation to your next provider. Your primary care clinician is listed as Melton Dakin. If you have any questions after today's visit, please call [phone].

## 2018-03-29 NOTE — PATIENT INSTRUCTIONS
Acute High Blood Pressure: Care Instructions  Your Care Instructions    Acute high blood pressure is very high blood pressure. It's a serious problem. Very high blood pressure can damage your brain, heart, eyes, and kidneys. You may have been given medicines to lower your blood pressure. You may have gotten them as pills or through a needle in one of your veins. This is called an IV. And maybe you were given other medicines too. These can be needed when high blood pressure causes other problems. To keep your blood pressure at a lower level, you may need to make healthy lifestyle changes. And you will probably need to take medicines. Be sure to follow up with your doctor about your blood pressure and what you can do about it. Follow-up care is a key part of your treatment and safety. Be sure to make and go to all appointments, and call your doctor if you are having problems. It's also a good idea to know your test results and keep a list of the medicines you take. How can you care for yourself at home? · See your doctor as often as he or she recommends. This is to make sure your blood pressure is under control. You will probably go at least 2 times a year. But it may be more often at first.  · Take your blood pressure medicine exactly as prescribed. You may take one or more types. They include diuretics, beta-blockers, ACE inhibitors, calcium channel blockers, and angiotensin II receptor blockers. Call your doctor if you think you are having a problem with your medicine. · If you take blood pressure medicine, talk to your doctor before you take decongestants or anti-inflammatory medicine, such as ibuprofen. These can raise blood pressure. · Learn how to check your blood pressure at home. Check it often. · Ask your doctor if it's okay to drink alcohol. · Talk to your doctor about lifestyle changes that can help blood pressure. These include being active and not smoking.   When should you call for help?  Call 911 anytime you think you may need emergency care. This may mean having symptoms that suggest that your blood pressure is causing a serious heart or blood vessel problem. Your blood pressure may be over 180/110. ? For example, call 911 if:  ? · You have symptoms of a heart attack. These may include:  ¨ Chest pain or pressure, or a strange feeling in the chest.  ¨ Sweating. ¨ Shortness of breath. ¨ Nausea or vomiting. ¨ Pain, pressure, or a strange feeling in the back, neck, jaw, or upper belly or in one or both shoulders or arms. ¨ Lightheadedness or sudden weakness. ¨ A fast or irregular heartbeat. ? · You have symptoms of a stroke. These may include:  ¨ Sudden numbness, tingling, weakness, or loss of movement in your face, arm, or leg, especially on only one side of your body. ¨ Sudden vision changes. ¨ Sudden trouble speaking. ¨ Sudden confusion or trouble understanding simple statements. ¨ Sudden problems with walking or balance. ¨ A sudden, severe headache that is different from past headaches. ? · You have severe back or belly pain. ?Do not wait until your blood pressure comes down on its own. Get help right away. ?Call your doctor now or seek immediate care if:  ? · Your blood pressure is much higher than normal (such as 180/110 or higher), but you don't have symptoms. ? · You think high blood pressure is causing symptoms, such as:  ¨ Severe headache. ¨ Blurry vision. ? Watch closely for changes in your health, and be sure to contact your doctor if:  ? · Your blood pressure measures 140/90 or higher at least 2 times. That means the top number is 140 or higher or the bottom number is 90 or higher, or both. ? · You think you may be having side effects from your blood pressure medicine. ? · Your blood pressure is usually normal, but it goes above normal at least 2 times. Where can you learn more? Go to http://francie-go.info/.   Enter L823 in the search box to learn more about \"Acute High Blood Pressure: Care Instructions. \"  Current as of: September 21, 2016  Content Version: 11.4  © 2215-9831 Healthwise, [employer]. Care instructions adapted under license by [employer] (which disclaims liability or warranty for this information). If you have questions about a medical condition or this instruction, always ask your healthcare professional. [place] [address] any warranty or liability for your use of this information.

## 2018-03-29 NOTE — PROGRESS NOTES
Subjective:     Chief Complaint   Patient presents with    Hypertension       German Forman is a 69 y.o. female who is here today for high BP. She did not take her BP medications this am because she had a pre-op appt today at the [facility] for an upcoming colonoscopy on 4/4/18. At the hospital per-op appt her BP was in the 200's and they told her to take her medications. After rechecking it in one hour it was still elevated and so she was referred here to her PCP office. It has now come down to 180/83 (checked manually after pt sitting for a while) and it has been almost two hours since she took her meds. She takes Lisinopril 40mg, Metoprolol succinate 50mg, and Amlodipine 10mg in the mornings, and takes an additional lisinopril 40mg in the evenings. She denies any noticeable side effects from these medications. Does not have a BP machine at home to check her BP. Denies any CP, SOB, palpitations, H/A, or dizziness. She also mentions that at her last PCP appt, her doctor prescribed a new insulin for her diabtest Freeman Muss solostar 300units/mL). However she could not afford it when she went to pick it up from her pharmacy, so she has continued to take her Lantus 60 units q AM.      Patient Active Problem List   Diagnosis Code    Psoriasis L40.9    Essential hypertension, benign I10    Rosacea L71.9    Heart murmur R01.1    Type 2 diabetes mellitus with hyperglycemia, with long-term current use of insulin ([facility]) E11.65, Z79.4    Obesity, morbid ([facility] E66.01    Type 2 diabetes mellitus with nephropathy ([facility] E11.21       Past Medical History:   Diagnosis Date    Diabetes ([facility]     Hypertension     Murmur     Osteopenia     Psoriasis     Rosacea     Vitamin D deficiency          Current Outpatient Prescriptions:     hydroCHLOROthiazide (HYDRODIURIL) 25 mg tablet, Take 1 tablet (25mg) in the morning and 1/2 tablet (12.5mg) in the evenings.   Indications: hypertension, Disp: 90 Tab, Rfl: 3   peg-electrolyte soln (GAVILYTE-N) 420 gram solution, Take as directed  Indications: BOWEL EVACUATION, Disp: 1 Bottle, Rfl: 0    bisacodyl (DULCOLAX, BISACODYL,) 5 mg EC tablet, Take as directed  Indications: BOWEL EVACUATION, Disp: 1 Tab, Rfl: 0    amLODIPine (NORVASC) 10 mg tablet, TAKE 1 TABLET DAILY, Disp: 90 Tab, Rfl: 3    metoprolol succinate (TOPROL-XL) 50 mg XL tablet, TAKE 1 TABLET DAILY, Disp: 90 Tab, Rfl: 3    quinapril (ACCUPRIL) 40 mg tablet, TAKE 1 TABLET TWICE A DAY, Disp: 180 Tab, Rfl: 3    metFORMIN (GLUCOPHAGE) 850 mg tablet, TAKE 1 TABLET TWICE A DAY WITH MEALS, Disp: 180 Tab, Rfl: 3    insulin detemir (LEVEMIR FLEXTOUCH) 100 unit/mL (3 mL) inpn, 60 Units by SubCUTAneous route every morning., Disp: 1 Adjustable Dose Pre-filled Pen Syringe, Rfl: 0    insulin syringe-needle U-100, Use to inject insulin BID, Disp: 100 Syringe, Rfl: 6    glucose blood VI test strips (ONETOUCH ULTRA TEST) strip, Use to check BS up to 4 x/day, Disp: 3 Strip, Rfl: 0    etanercept (ENBREL) 50 mg/mL (0.98 mL) injection, 50 mg by SubCUTAneous route every seven (7) days. , Disp: , Rfl:     Lancets (ONE TOUCH DELICA) misc, USE TO CHECK BLOOD SUGAR up to 4 times a day, Disp: 1 Package, Rfl: 2    Allergies   Allergen Reactions    Pcn [Penicillins] Unknown (comments)    Sulfa (Sulfonamide Antibiotics) Unknown (comments)       Past Surgical History:   Procedure Laterality Date    HX  SECTION      HX TONSILLECTOMY         History   Smoking Status    Never Smoker   Smokeless Tobacco    Never Used       Social History     Social History    Marital status:      Spouse name: N/A    Number of children: N/A    Years of education: N/A     Social History Main Topics    Smoking status: Never Smoker    Smokeless tobacco: Never Used    Alcohol use 0.0 oz/week     0 Standard drinks or equivalent per week      Comment: 1/couple months    Drug use: None    Sexual activity: Not Asked     Other Topics Concern  None     Social History Narrative       Family History   Problem Relation Age of Onset    Diabetes Mother     Heart Disease Father        ROS:  Gen: denies fever, chills, fatigue  HEENT:denies H/A or changes in vision  Resp: denies dyspnea or cough  CV: denies chest pain, pressure, or palpitations  Extremeties: denies edema, pallor, or cyanosis  Neuro: denies numbness/tingling, dizziness  Skin: denies rashes or new lesions     Objective:     Visit Vitals    /83    Pulse 76    Temp (!) 79 °F (26.1 °C) (Oral)    Resp 18    Ht 5' 5\" (1.651 m)    Wt 259 lb (117.5 kg)    SpO2 97%    BMI 43.1 kg/m2     Body mass index is 43.1 kg/(m^2). General: Alert and oriented. No acute distress. Well nourished. Lungs: Breathing even and unlabored. All lobes clear to auscultation bilaterally   Heart :RRR, S1 and S2 normal intensity, no extra heart sounds  Extremities: mild edema to bilateral ankles. Bilat. radial pulses 2+  Neuro: Cranial nerves grossly normal.  Psych: Mood and thought content appropriate for situation. Dressed appropriately and with good hygiene. Skin: Warm, dry, and intact. No lesions or discoloration. Results for orders placed or performed during the hospital encounter of 03/29/18   EKG, 12 LEAD, INITIAL   Result Value Ref Range    Ventricular Rate 70 BPM    Atrial Rate 70 BPM    P-R Interval 170 ms    QRS Duration 90 ms    Q-T Interval 384 ms    QTC Calculation (Bezet) 414 ms    Calculated P Axis 50 degrees    Calculated R Axis 49 degrees    Calculated T Axis 56 degrees    Diagnosis       Normal sinus rhythm  Normal ECG  No previous ECGs available         No results found for this visit on 03/29/18. Assessment/ Plan:     1. Essential hypertension, benign  -Cont hydroCHLOROthiazide (HYDRODIURIL) 25 mg tablet every morning.  -Take an additional 1/2 tablet (12.5mg) in the evenings.   Indications: hypertension    -Notify PCP for any unpleasant s/e or dizziness/lightheadedness.  -Recommend buying a BP machine and checking it BID. -Also recommend recording home BP readings to bring to appts. -Recommended F/U in 1 week but pt says she will be too busy with her colonoscopy and grandsons(?) birthday.  -Is willing to F/U in 2 weeks to have BP rechecked. -Go to ER for any s/s heart attack or stroke. Orders Placed This Encounter    hydroCHLOROthiazide (HYDRODIURIL) 25 mg tablet     Sig: Take 1 tablet (25mg) in the morning and 1/2 tablet (12.5mg) in the evenings. Indications: hypertension     Dispense:  90 Tab     Refill:  3         Verbal and written instructions (see AVS) provided.  Patient expresses understanding of diagnosis and treatment plan. Health Maintenance Due   Topic Date Due    FOBT Q 1 YEAR AGE 50-75  11/03/1998    EYE EXAM RETINAL OR DILATED Q1  09/12/2017    MICROALBUMIN Q1  04/03/2018         Follow-up Disposition:  Return in about 2 weeks (around 4/12/2018) for follow up. Toshia Roy.  [facility], [place]

## 2018-04-04 PROBLEM — K63.5 COLON POLYP: Status: ACTIVE | Noted: 2018-04-04

## 2018-04-04 PROBLEM — R19.5 POSITIVE COLORECTAL CANCER SCREENING USING COLOGUARD TEST: Status: ACTIVE | Noted: 2018-04-04

## 2018-04-04 PROBLEM — K63.5 COLON POLYP: Status: RESOLVED | Noted: 2018-04-04 | Resolved: 2018-04-04

## 2018-04-04 PROBLEM — R19.5 POSITIVE COLORECTAL CANCER SCREENING USING COLOGUARD TEST: Status: RESOLVED | Noted: 2018-04-04 | Resolved: 2018-04-04

## 2018-04-04 LAB — COLONOSCOPY, EXTERNAL: NORMAL

## 2018-04-12 ENCOUNTER — OFFICE VISIT (OUTPATIENT)
Dept: SURGERY | Age: 68
End: 2018-04-12

## 2018-04-12 VITALS
WEIGHT: 248 LBS | SYSTOLIC BLOOD PRESSURE: 140 MMHG | HEIGHT: 65 IN | HEART RATE: 62 BPM | BODY MASS INDEX: 41.32 KG/M2 | DIASTOLIC BLOOD PRESSURE: 82 MMHG

## 2018-04-12 DIAGNOSIS — K62.1 HYPERPLASTIC RECTAL POLYP: ICD-10-CM

## 2018-04-12 DIAGNOSIS — K51.40 PSEUDOPOLYP OF ASCENDING COLON WITHOUT COMPLICATION (HCC): Primary | ICD-10-CM

## 2018-04-12 NOTE — PROGRESS NOTES
Follow up from colonoscopy  No c/o      Pathology:  Inflammatory polyp x 3                     Hyperplastic rectal polyp x 1    Visit Vitals    /82    Pulse 62    Ht 5' 5\" (1.651 m)    Wt 248 lb (112.5 kg)    BMI 41.27 kg/m2     WDWN patient in no acute distress. Pathophysiology and malignancy potential discussed. Pictures viewed and questions answered. Time spent 15 minutes in discussion, over half the visit was in care coordination and patient counseling. Repeat scope in 7 years.

## 2018-04-19 RX ORDER — CALCIUM CARB/VITAMIN D3/VIT K1 500-100-40
TABLET,CHEWABLE ORAL
Qty: 180 SYRINGE | Refills: 1 | Status: SHIPPED | OUTPATIENT
Start: 2018-04-19 | End: 2018-10-04 | Stop reason: SDUPTHER

## 2018-05-22 ENCOUNTER — OFFICE VISIT (OUTPATIENT)
Dept: FAMILY MEDICINE CLINIC | Age: 68
End: 2018-05-22

## 2018-05-22 VITALS
BODY MASS INDEX: 42.49 KG/M2 | WEIGHT: 255 LBS | RESPIRATION RATE: 18 BRPM | HEIGHT: 65 IN | DIASTOLIC BLOOD PRESSURE: 72 MMHG | HEART RATE: 74 BPM | OXYGEN SATURATION: 97 % | SYSTOLIC BLOOD PRESSURE: 152 MMHG

## 2018-05-22 DIAGNOSIS — R01.1 HEART MURMUR: ICD-10-CM

## 2018-05-22 DIAGNOSIS — E11.21 TYPE 2 DIABETES MELLITUS WITH NEPHROPATHY (HCC): ICD-10-CM

## 2018-05-22 DIAGNOSIS — E66.01 OBESITY, MORBID (HCC): ICD-10-CM

## 2018-05-22 DIAGNOSIS — I10 ESSENTIAL HYPERTENSION, BENIGN: ICD-10-CM

## 2018-05-22 DIAGNOSIS — Z79.4 TYPE 2 DIABETES MELLITUS WITH HYPERGLYCEMIA, WITH LONG-TERM CURRENT USE OF INSULIN (HCC): Primary | ICD-10-CM

## 2018-05-22 DIAGNOSIS — L40.9 PSORIASIS: ICD-10-CM

## 2018-05-22 DIAGNOSIS — E11.65 TYPE 2 DIABETES MELLITUS WITH HYPERGLYCEMIA, WITH LONG-TERM CURRENT USE OF INSULIN (HCC): Primary | ICD-10-CM

## 2018-05-22 RX ORDER — HYDROCHLOROTHIAZIDE 25 MG/1
TABLET ORAL
Qty: 180 TAB | Refills: 1 | Status: ON HOLD | OUTPATIENT
Start: 2018-05-22 | End: 2019-01-19

## 2018-05-22 RX ORDER — HYDROCHLOROTHIAZIDE 25 MG/1
TABLET ORAL
Qty: 180 TAB | Refills: 1 | Status: SHIPPED | OUTPATIENT
Start: 2018-05-22 | End: 2018-05-22 | Stop reason: SDUPTHER

## 2018-05-22 NOTE — PROGRESS NOTES
1. Have you been to the ER, urgent care clinic since your last visit? Hospitalized since your last visit? No    2. Have you seen or consulted any other health care providers outside of the [address] [place] [address] since your last visit? Include any pap smears or colon screening.  Yes When: 4-2018

## 2018-05-22 NOTE — PROGRESS NOTES
HISTORY OF PRESENT ILLNESS  Sheila Fair is a 69 y.o. female. Hypertension    Pertinent negatives include no chest pain, no palpitations, no malaise/fatigue, no headaches, no dizziness and no shortness of breath. Diabetes   Pertinent negatives include no chest pain, no abdominal pain, no headaches and no shortness of breath. Diabetes  Sheila Fair is a 69 y.o. female who is here for a routine diabetic check. Current blood sugars at home have been close to  fasting. Her Levemir was increased to 60 units. She is not following a diet. Lab Results   Component Value Date/Time    Hemoglobin A1c 9.3 (H) 02/12/2018 09:04 AM   There have been no episodes of hypoglycemia. Taking meds daily . Last eye exam was within the last year or so. Checking feet daily. Hypertension  Has been following a low sodium diet . Exercises little. Taking meds daily. Denies chest pain or shortness of breath. Heart Murmur  History of heart murmur as a child. Has not had an ECHO in a few years.       Obesity  Weight is up and she is not exercising often. Not watching her diet.   [address] Cy Amin in [place]. On Embrel which is working well. Sees Derm every 6 months. Had PPD in July. Review of Systems   Constitutional: Negative for chills, fever, malaise/fatigue and weight loss. HENT: Negative for congestion, ear pain and sore throat. Respiratory: Negative for cough and shortness of breath. Cardiovascular: Negative for chest pain and palpitations. Gastrointestinal: Negative for abdominal pain and heartburn. Genitourinary: Negative for frequency. Musculoskeletal: Negative for joint pain. Skin: Positive for rash. Neurological: Negative for dizziness and headaches. Psychiatric/Behavioral: Negative for depression. The patient is not nervous/anxious.       Past Medical History:   Diagnosis Date    Diabetes ([address]     Hypertension     Murmur     Osteopenia     Psoriasis     Rosacea     Vitamin D deficiency      Past Surgical History:   Procedure Laterality Date    COLONOSCOPY N/A 2018    COLONOSCOPY   DIABETIC performed by Erika Abarca MD at [facility] [address]27 HX  SECTION      HX COLONOSCOPY  2018    3 polyps    HX TONSILLECTOMY       Allergies   Allergen Reactions    Pcn [Penicillins] Unknown (comments)    Sulfa (Sulfonamide Antibiotics) Unknown (comments)     Blood pressure 152/72, pulse 74, resp. rate 18, height 5' 5\" (1.651 m), weight 255 lb (115.7 kg), SpO2 97 %. Physical Exam   Constitutional: She is oriented to person, place, and time. She appears well-developed and well-nourished. No distress. HENT:   Head: Normocephalic and atraumatic. Right Ear: External ear normal.   Left Ear: External ear normal.   Nose: Nose normal.   Mouth/Throat: Oropharynx is clear and moist.   Eyes: Conjunctivae are normal.   Neck: Neck supple. Cardiovascular: Normal rate, regular rhythm and normal heart sounds. Pulmonary/Chest: Effort normal and breath sounds normal. No respiratory distress. Abdominal: Soft. Musculoskeletal: Normal range of motion. She exhibits edema. Trace  edema bilaterally   Lymphadenopathy:     She has no cervical adenopathy. Neurological: She is alert and oriented to person, place, and time. Skin: Skin is warm and dry. Psychiatric: She has a normal mood and affect. Nursing note and vitals reviewed.       ASSESSMENT and PLAN  DM   Discussed diet and exercise   Check labs   Continue Levemir and adjust if needed   May need to add mealtime insulin- she is opposed to this   Offered Endocrine referral but she does not want to go at this time   Regular eye and foot exams- refer to Podiatry   RTO 3 months  HTN   Low sodium diet   Continue meds  Obesity/BMI 42   Discussed healthy lifestyle and portion control   Monitor weight regularly    Heart Murmur   Schedule ECHO

## 2018-05-22 NOTE — MR AVS SNAPSHOT
[address] [place] [address] [phone] Patient: Christine Beasley MRN: [id number] GHA:[id number] Visit Information Date & Time Provider Department Dept. Phone Encounter #  
 5/22/2018 11:00 AM Enzo Sever,  [address] [facility] [phone] [id number] Upcoming Health Maintenance Date Due FOBT Q 1 YEAR AGE 50-75 11/3/1998 EYE EXAM RETINAL OR DILATED Q1 9/12/2017 MICROALBUMIN Q1 4/3/2018 Influenza Age 5 to Adult 8/1/2018 HEMOGLOBIN A1C Q6M 8/12/2018 FOOT EXAM Q1 8/21/2018 MEDICARE YEARLY EXAM 8/22/2018 GLAUCOMA SCREENING Q2Y 9/12/2018 BREAST CANCER SCRN MAMMOGRAM 11/3/2018 LIPID PANEL Q1 2/12/2019 DTaP/Tdap/Td series (2 - Td) 7/12/2026 Allergies as of 5/22/2018  Review Complete On: 5/22/2018 By: David Brumfield LPN Severity Noted Reaction Type Reactions Pcn [Penicillins]  08/13/2014    Unknown (comments) Sulfa (Sulfonamide Antibiotics)  08/13/2014    Unknown (comments) Current Immunizations  Reviewed on 10/11/2016 Name Date Influenza High Dose Vaccine PF 11/20/2017, 10/11/2016  9:54 AM  
 Influenza Vaccine (Quad) 11/3/2015 Pneumococcal Conjugate (PCV-13) 11/24/2015 Pneumococcal Polysaccharide (PPSV-23) 8/21/2017  2:05 PM  
 TB Skin Test (PPD) Intradermal 7/10/2017 10:39 AM, 5/23/2016, 9/8/2015,  Incomplete Not reviewed this visit You Were Diagnosed With   
  
 Codes Comments Type 2 diabetes mellitus with hyperglycemia, with long-term current use of insulin (HCC)    -  Primary ICD-10-CM: E11.65, Z79.4 ICD-9-CM: 250.00, 790.29, V58.67 Type 2 diabetes mellitus with nephropathy (HCC)     ICD-10-CM: E11.21 
ICD-9-CM: 250.40, 583.81 Obesity, morbid ([facility] [address]     ICD-10-CM: E66.01 
ICD-9-CM: 278.01 Essential hypertension, benign     ICD-10-CM: I10 
ICD-9-CM: 401.1 Psoriasis     ICD-10-CM: L40.9 ICD-9-CM: 696.1 Heart murmur     ICD-10-CM: R01.1 ICD-9-CM: 785.2 BMI 40.0-44.9, adult [facility])     ICD-10-CM: Z68.41 
ICD-9-CM: V85.41 Vitals BP Pulse Resp Height(growth percentile) Weight(growth percentile) SpO2  
 152/72 (BP 1 Location: Left arm) 74 18 5' 5\" (1.651 m) 255 lb (115.7 kg) 97% BMI OB Status Smoking Status 42.43 kg/m2 Menopause Never Smoker Vitals History BMI and BSA Data Body Mass Index Body Surface Area  
 42.43 kg/m 2 2.3 m 2 Preferred Pharmacy Pharmacy Name Phone  [facility] [phone] Your Updated Medication List  
  
   
This list is accurate as of 5/22/18 12:03 PM.  Always use your most recent med list. amLODIPine 10 mg tablet Commonly known as:  Marylu Matar TAKE 1 TABLET DAILY  
  
 etanercept 50 mg/mL (0.98 mL) injection Commonly known as:  ENBREL 50 mg by SubCUTAneous route every seven (7) days. glucose blood VI test strips strip Commonly known as:  ONETOUCH ULTRA TEST Use to check BS up to 4 x/day  
  
 hydroCHLOROthiazide 25 mg tablet Commonly known as:  HYDRODIURIL Take 2 tablets in the am  Indications: hypertension  
  
 insulin detemir U-100 100 unit/mL (3 mL) Inpn Commonly known as:  LEVEMIR FLEXTOUCH  
60 Units by SubCUTAneous route every morning. insulin syringe-needle U-100 Use to inject insulin BID Insulin Syringe-Needle U-100 1 mL 31 gauge x 5/16 Syrg Commonly known as:  BD INSULIN SYRINGE ULTRA-FINE Use to inject insulin BID Lancets Misc Commonly known as: One Touch Teola Brenda USE TO CHECK BLOOD SUGAR up to 4 times a day  
  
 metFORMIN 850 mg tablet Commonly known as:  GLUCOPHAGE  
TAKE 1 TABLET TWICE A DAY WITH MEALS  
  
 metoprolol succinate 50 mg XL tablet Commonly known as:  TOPROL-XL  
TAKE 1 TABLET DAILY  
  
 quinapril 40 mg tablet Commonly known as:  ACCUPRIL  
TAKE 1 TABLET TWICE A DAY  
  
  
  
  
 Prescriptions Sent to Pharmacy Refills  
 hydroCHLOROthiazide (HYDRODIURIL) 25 mg tablet 1 Sig: Take 2 tablets in the am  Indications: hypertension Class: Normal  
 Pharmacy: [facility] [address] Ph #: [phone] We Performed the Following CBC WITH AUTOMATED DIFF [16334 CPT(R)] COLLECTION VENOUS BLOOD,VENIPUNCTURE [id number] CPT(R)] HEMOGLOBIN A1C WITH EAG [94394 CPT(R)] MAGNESIUM [id number] CPT(R)] METABOLIC PANEL, COMPREHENSIVE [60903 CPT(R)] MICROALB/CREAT RATIO, TIMED UR [id number] CPT(R)] REFERRAL TO PODIATRY [REF90 Custom] Comments:  
 Routine diabetic care and foot pain TSH 3RD GENERATION [43651 CPT(R)] To-Do List   
 05/22/2018 ECHO:  2D ECHO COMPLETE ADULT (TTE) W OR WO CONTR Referral Information Referral ID Referred By Referred To  
  
 [id number] Guillaume VELAZQUEZ Rafita [address] [place], [address] Visits Status Start Date End Date 1 New Request 5/22/18 5/22/19 If your referral has a status of pending review or denied, additional information will be sent to support the outcome of this decision. Introducing [facility]! Stacy Bear introduces iTwin patient portal. Now you can access parts of your medical record, email your doctor's office, and request medication refills online. 1. In your internet browser, go to [email or web address] 2. Click on the First Time User? Click Here link in the Sign In box. You will see the New Member Sign Up page. 3. Enter your iTwin Access Code exactly as it appears below. You will not need to use this code after youve completed the sign-up process. If you do not sign up before the expiration date, you must request a new code. · iTwin Access Code: [email or web address] Expires: 8/20/2018 12:03 PM 
 
4.  Enter the last four digits of your Social Security Number (xxxx) and Date of Birth (mm/dd/yyyy) as indicated and click Submit. You will be taken to the next sign-up page. 5. Create a Stratus5 ID. This will be your Stratus5 login ID and cannot be changed, so think of one that is secure and easy to remember. 6. Create a Stratus5 password. You can change your password at any time. 7. Enter your Password Reset Question and Answer. This can be used at a later time if you forget your password. 8. Enter your e-mail address. You will receive e-mail notification when new information is available in [address] 9. Click Sign Up. You can now view and download portions of your medical record. 10. Click the Download Summary menu link to download a portable copy of your medical information. If you have questions, please visit the Frequently Asked Questions section of the Stratus[employer] website. Remember, Stratus5 is NOT to be used for urgent needs. For medical emergencies, dial 911. Now available from your iPhone and Android! Please provide this summary of care documentation to your next provider. Your primary care clinician is listed as Achilles Page. If you have any questions after today's visit, please call [phone].

## 2018-05-24 ENCOUNTER — DOCUMENTATION ONLY (OUTPATIENT)
Dept: FAMILY MEDICINE CLINIC | Age: 68
End: 2018-05-24

## 2018-05-24 LAB
ALBUMIN 24H UR-MRATE: ABNORMAL MG/DAY
ALBUMIN ?TM UR-MRATE: ABNORMAL UG/MIN (ref 0–20)
ALBUMIN SERPL-MCNC: 3.6 G/DL (ref 3.6–4.8)
ALBUMIN/CREAT UR: 855.1 UG/MG CREAT (ref 0–30)
ALBUMIN/GLOB SERPL: 0.9 {RATIO} (ref 1.2–2.2)
ALP SERPL-CCNC: 95 IU/L (ref 39–117)
ALT SERPL-CCNC: 11 IU/L (ref 0–32)
AST SERPL-CCNC: 11 IU/L (ref 0–40)
BASOPHILS # BLD AUTO: 0.1 X10E3/UL (ref 0–0.2)
BASOPHILS NFR BLD AUTO: 1 %
BILIRUB SERPL-MCNC: 0.3 MG/DL (ref 0–1.2)
BUN SERPL-MCNC: 31 MG/DL (ref 8–27)
BUN/CREAT SERPL: 22 (ref 12–28)
CALCIUM SERPL-MCNC: 9.1 MG/DL (ref 8.7–10.3)
CHLORIDE SERPL-SCNC: 104 MMOL/L (ref 96–106)
CO2 SERPL-SCNC: 26 MMOL/L (ref 18–29)
CREAT SERPL-MCNC: 1.41 MG/DL (ref 0.57–1)
CREAT UR-MCNC: 88.8 MG/DL
EOSINOPHIL # BLD AUTO: 0.6 X10E3/UL (ref 0–0.4)
EOSINOPHIL NFR BLD AUTO: 6 %
ERYTHROCYTE [DISTWIDTH] IN BLOOD BY AUTOMATED COUNT: 13.4 % (ref 12.3–15.4)
EST. AVERAGE GLUCOSE BLD GHB EST-MCNC: 214 MG/DL
GFR SERPLBLD CREATININE-BSD FMLA CKD-EPI: 38 ML/MIN/1.73
GFR SERPLBLD CREATININE-BSD FMLA CKD-EPI: 44 ML/MIN/1.73
GLOBULIN SER CALC-MCNC: 3.8 G/DL (ref 1.5–4.5)
GLUCOSE SERPL-MCNC: 74 MG/DL (ref 65–99)
HBA1C MFR BLD: 9.1 % (ref 4.8–5.6)
HCT VFR BLD AUTO: 39.9 % (ref 34–46.6)
HGB BLD-MCNC: 13.3 G/DL (ref 11.1–15.9)
IMM GRANULOCYTES # BLD: 0 X10E3/UL (ref 0–0.1)
IMM GRANULOCYTES NFR BLD: 0 %
INTERPRETATION: NORMAL
LYMPHOCYTES # BLD AUTO: 2.5 X10E3/UL (ref 0.7–3.1)
LYMPHOCYTES NFR BLD AUTO: 23 %
MAGNESIUM SERPL-MCNC: 2 MG/DL (ref 1.6–2.3)
MCH RBC QN AUTO: 29.8 PG (ref 26.6–33)
MCHC RBC AUTO-ENTMCNC: 33.3 G/DL (ref 31.5–35.7)
MCV RBC AUTO: 89 FL (ref 79–97)
MICROALBUMIN UR-MCNC: 759.3 UG/ML
MONOCYTES # BLD AUTO: 0.7 X10E3/UL (ref 0.1–0.9)
MONOCYTES NFR BLD AUTO: 7 %
NEUTROPHILS # BLD AUTO: 7 X10E3/UL (ref 1.4–7)
NEUTROPHILS NFR BLD AUTO: 63 %
PLATELET # BLD AUTO: 234 X10E3/UL (ref 150–379)
POTASSIUM SERPL-SCNC: 4.7 MMOL/L (ref 3.5–5.2)
PROT SERPL-MCNC: 7.4 G/DL (ref 6–8.5)
RBC # BLD AUTO: 4.47 X10E6/UL (ref 3.77–5.28)
SODIUM SERPL-SCNC: 141 MMOL/L (ref 134–144)
TSH SERPL DL<=0.005 MIU/L-ACNC: 0.95 UIU/ML (ref 0.45–4.5)
WBC # BLD AUTO: 11 X10E3/UL (ref 3.4–10.8)

## 2018-05-30 NOTE — PROGRESS NOTES
A1c is still over 9. Would increase insulin to 65 units a day. Kidney function has declined . Recheck in 3 months but may need to stop Metformin. Avoid NSAIDs to help protect the kidney. Monitor blood sugars. Watch diet.  RTO 3 months

## 2018-06-20 DIAGNOSIS — I10 ESSENTIAL HYPERTENSION, BENIGN: ICD-10-CM

## 2018-06-21 RX ORDER — METFORMIN HYDROCHLORIDE 850 MG/1
TABLET ORAL
Qty: 180 TAB | Refills: 3 | Status: SHIPPED | OUTPATIENT
Start: 2018-06-21 | End: 2018-09-04 | Stop reason: SDUPTHER

## 2018-06-21 RX ORDER — QUINAPRIL 40 MG/1
TABLET ORAL
Qty: 180 TAB | Refills: 3 | Status: SHIPPED | OUTPATIENT
Start: 2018-06-21 | End: 2018-09-04 | Stop reason: SDUPTHER

## 2018-06-21 RX ORDER — METOPROLOL SUCCINATE 50 MG/1
TABLET, EXTENDED RELEASE ORAL
Qty: 90 TAB | Refills: 3 | Status: SHIPPED | OUTPATIENT
Start: 2018-06-21 | End: 2018-09-04 | Stop reason: SDUPTHER

## 2018-06-21 RX ORDER — HYDROCHLOROTHIAZIDE 25 MG/1
TABLET ORAL
Qty: 90 TAB | Refills: 3 | Status: SHIPPED | OUTPATIENT
Start: 2018-06-21 | End: 2018-09-04 | Stop reason: DRUGHIGH

## 2018-06-21 RX ORDER — AMLODIPINE BESYLATE 10 MG/1
TABLET ORAL
Qty: 90 TAB | Refills: 1 | Status: SHIPPED | OUTPATIENT
Start: 2018-06-21 | End: 2018-09-04 | Stop reason: SDUPTHER

## 2018-08-20 ENCOUNTER — CLINICAL SUPPORT (OUTPATIENT)
Dept: FAMILY MEDICINE CLINIC | Age: 68
End: 2018-08-20

## 2018-08-20 VITALS
HEART RATE: 79 BPM | SYSTOLIC BLOOD PRESSURE: 166 MMHG | TEMPERATURE: 97 F | DIASTOLIC BLOOD PRESSURE: 68 MMHG | HEIGHT: 65 IN | OXYGEN SATURATION: 97 % | RESPIRATION RATE: 18 BRPM

## 2018-08-20 DIAGNOSIS — Z92.29 UP-TO-DATE WITH IMMUNIZATIONS: Primary | ICD-10-CM

## 2018-08-20 NOTE — PROGRESS NOTES
Tb test given in left forearm pt informed to come back to have results read on wed she stated she understood and will return

## 2018-08-29 ENCOUNTER — LAB ONLY (OUTPATIENT)
Dept: FAMILY MEDICINE CLINIC | Age: 68
End: 2018-08-29

## 2018-08-29 DIAGNOSIS — Z79.4 TYPE 2 DIABETES MELLITUS WITH HYPERGLYCEMIA, WITH LONG-TERM CURRENT USE OF INSULIN (HCC): ICD-10-CM

## 2018-08-29 DIAGNOSIS — E11.65 TYPE 2 DIABETES MELLITUS WITH HYPERGLYCEMIA, WITH LONG-TERM CURRENT USE OF INSULIN (HCC): ICD-10-CM

## 2018-08-29 DIAGNOSIS — I10 ESSENTIAL HYPERTENSION, BENIGN: ICD-10-CM

## 2018-08-29 DIAGNOSIS — R01.1 HEART MURMUR: ICD-10-CM

## 2018-08-29 DIAGNOSIS — E11.21 TYPE 2 DIABETES MELLITUS WITH NEPHROPATHY (HCC): Primary | ICD-10-CM

## 2018-08-30 LAB
ALBUMIN SERPL-MCNC: 3.3 G/DL (ref 3.6–4.8)
ALBUMIN/CREAT UR: 838.8 MG/G CREAT (ref 0–30)
ALBUMIN/GLOB SERPL: 0.8 {RATIO} (ref 1.2–2.2)
ALP SERPL-CCNC: 98 IU/L (ref 39–117)
ALT SERPL-CCNC: 13 IU/L (ref 0–32)
AST SERPL-CCNC: 15 IU/L (ref 0–40)
BASOPHILS # BLD AUTO: 0.1 X10E3/UL (ref 0–0.2)
BASOPHILS NFR BLD AUTO: 1 %
BILIRUB SERPL-MCNC: 0.3 MG/DL (ref 0–1.2)
BUN SERPL-MCNC: 31 MG/DL (ref 8–27)
BUN/CREAT SERPL: 20 (ref 12–28)
CALCIUM SERPL-MCNC: 9.3 MG/DL (ref 8.7–10.3)
CHLORIDE SERPL-SCNC: 97 MMOL/L (ref 96–106)
CO2 SERPL-SCNC: 26 MMOL/L (ref 20–29)
CREAT SERPL-MCNC: 1.52 MG/DL (ref 0.57–1)
CREAT UR-MCNC: 224.4 MG/DL
EOSINOPHIL # BLD AUTO: 0.4 X10E3/UL (ref 0–0.4)
EOSINOPHIL NFR BLD AUTO: 4 %
ERYTHROCYTE [DISTWIDTH] IN BLOOD BY AUTOMATED COUNT: 13.2 % (ref 12.3–15.4)
EST. AVERAGE GLUCOSE BLD GHB EST-MCNC: 249 MG/DL
GLOBULIN SER CALC-MCNC: 4 G/DL (ref 1.5–4.5)
GLUCOSE SERPL-MCNC: 293 MG/DL (ref 65–99)
HBA1C MFR BLD: 10.3 % (ref 4.8–5.6)
HCT VFR BLD AUTO: 38.4 % (ref 34–46.6)
HGB BLD-MCNC: 12.4 G/DL (ref 11.1–15.9)
IMM GRANULOCYTES # BLD: 0.1 X10E3/UL (ref 0–0.1)
IMM GRANULOCYTES NFR BLD: 1 %
INTERPRETATION: NORMAL
LYMPHOCYTES # BLD AUTO: 1.8 X10E3/UL (ref 0.7–3.1)
LYMPHOCYTES NFR BLD AUTO: 15 %
MCH RBC QN AUTO: 29.4 PG (ref 26.6–33)
MCHC RBC AUTO-ENTMCNC: 32.3 G/DL (ref 31.5–35.7)
MCV RBC AUTO: 91 FL (ref 79–97)
MICROALBUMIN UR-MCNC: 1882.2 UG/ML
MONOCYTES # BLD AUTO: 1.1 X10E3/UL (ref 0.1–0.9)
MONOCYTES NFR BLD AUTO: 9 %
NEUTROPHILS # BLD AUTO: 8.3 X10E3/UL (ref 1.4–7)
NEUTROPHILS NFR BLD AUTO: 70 %
PLATELET # BLD AUTO: 337 X10E3/UL (ref 150–379)
POTASSIUM SERPL-SCNC: 5.5 MMOL/L (ref 3.5–5.2)
PROT SERPL-MCNC: 7.3 G/DL (ref 6–8.5)
RBC # BLD AUTO: 4.22 X10E6/UL (ref 3.77–5.28)
SODIUM SERPL-SCNC: 137 MMOL/L (ref 134–144)
TSH SERPL DL<=0.005 MIU/L-ACNC: 0.44 UIU/ML (ref 0.45–4.5)
WBC # BLD AUTO: 11.7 X10E3/UL (ref 3.4–10.8)

## 2019-01-17 PROBLEM — I63.511 ACUTE ISCHEMIC CEREBROVASCULAR ACCIDENT (CVA) INVOLVING RIGHT MIDDLE CEREBRAL ARTERY TERRITORY (HCC): Status: ACTIVE | Noted: 2019-01-17

## 2019-01-18 PROBLEM — I63.311 CEREBROVASCULAR ACCIDENT (CVA) DUE TO THROMBOSIS OF RIGHT MIDDLE CEREBRAL ARTERY (HCC): Status: ACTIVE | Noted: 2019-01-18

## 2019-01-25 ENCOUNTER — HOME HEALTH ADMISSION (OUTPATIENT)
Dept: HOME HEALTH SERVICES | Facility: HOME HEALTH | Age: 69
End: 2019-01-25

## 2019-01-25 ENCOUNTER — OFFICE VISIT (OUTPATIENT)
Dept: NEUROLOGY | Age: 69
End: 2019-01-25

## 2019-01-25 VITALS
WEIGHT: 230 LBS | SYSTOLIC BLOOD PRESSURE: 134 MMHG | OXYGEN SATURATION: 99 % | HEART RATE: 72 BPM | DIASTOLIC BLOOD PRESSURE: 78 MMHG | HEIGHT: 65 IN | BODY MASS INDEX: 38.32 KG/M2

## 2019-01-25 DIAGNOSIS — I63.419 CEREBROVASCULAR ACCIDENT (CVA) DUE TO EMBOLISM OF MIDDLE CEREBRAL ARTERY, UNSPECIFIED BLOOD VESSEL LATERALITY (HCC): Primary | ICD-10-CM

## 2019-01-25 NOTE — PROGRESS NOTES
HISTORY OF PRESENT ILLNESS  Rubia Kaplan is a 77 y.o. female. Sumanth Rosas is a 24-year-old woman who today for follow-up of a stroke. Her  noticed that she was quite confused one day at home and took her to the emergency room at [facility] where an MRI scan revealed multiple strokes in her right hemisphere. She denies any history of abnormal heart rhythm, she denies any unusual sensations of fluttering in her chest.  She is on aspirin 81 mg a day, family history does not reveal any history of stroke. Review of Systems   Constitutional:        Review of systems is positive for left-sided weakness, she denies chest pain shortness of breath, she denies unusual flutterings in her chest, complete review of systems done all others negative     Current Outpatient Medications on File Prior to Visit   Medication Sig Dispense Refill    amLODIPine (NORVASC) 5 mg tablet Take 1 Tab by mouth daily. 30 Tab 0    aspirin 81 mg chewable tablet Take 1 Tab by mouth daily. 30 Tab 0    atorvastatin (LIPITOR) 80 mg tablet Take 1 Tab by mouth daily. 30 Tab 0    magnesium oxide (MAG-OX) 400 mg tablet Take 1 Tab by mouth two (2) times a day. 30 Tab 0    dulaglutide (TRULICITY) 6.01 XH/7.9 mL sub-q pen 1.5 mg by SubCUTAneous route every seven (7) days.  insulin detemir U-100 (LEVEMIR FLEXTOUCH) 100 unit/mL (3 mL) inpn 65 Units by SubCUTAneous route every morning. 1 Adjustable Dose Pre-filled Pen Syringe 0    metoprolol succinate (TOPROL-XL) 50 mg XL tablet TAKE 1 TABLET DAILY 90 Tab 3    quinapril (ACCUPRIL) 40 mg tablet TAKE 1 TABLET TWICE A  Tab 3    metFORMIN (GLUCOPHAGE) 850 mg tablet TAKE 1 TABLET TWICE A DAY WITH MEALS 180 Tab 3    etanercept (ENBREL) 50 mg/mL (0.98 mL) injection 50 mg by SubCUTAneous route every seven (7) days.  amLODIPine (NORVASC) 10 mg tablet TAKE 1 TABLET DAILY 90 Tab 3     No current facility-administered medications on file prior to visit.       Past Medical History:   Diagnosis Date    Diabetes ([facility] [address]     Hypertension     Murmur     Osteopenia     Psoriasis     Rosacea     Vitamin D deficiency      Family History   Problem Relation Age of Onset    Diabetes Mother     Heart Disease Father      Social History     Tobacco Use    Smoking status: Never Smoker    Smokeless tobacco: Never Used   Substance Use Topics    Alcohol use: Yes     Alcohol/week: 0.0 oz     Comment: 1/couple months    Drug use: Not on file     /78   Pulse 72   Ht 5' 5\" (1.651 m)   Wt 104.3 kg (230 lb)   SpO2 99%   BMI 38.27 kg/m²       Physical Exam  Constitutional: Oriented to person, place, and time, appears well-developed and well-nourished. No distress. HENT:   Head: Normocephalic and atraumatic. Mouth/Throat: Oropharynx is clear and moist. No oropharyngeal exudate. Eyes: Conjunctivae and EOM are normal. Pupils are equal, round, and reactive to light. No scleral icterus. Neck: Normal range of motion. Neck supple. No thyromegaly present. Cardiovascular: Normal rate, regular rhythm and normal heart sounds. Musculoskeletal: Normal range of motion, exhibits no edema, tenderness or deformity. Lymphadenopathy: no cervical adenopathy. Neurological: Alert and oriented to person, place, and time. Normal strength she has brisk reflexes in her left upper extremity and left lower extremity, toes appear to be upgoing bilaterally. Displays no atrophy and no tremor. No cranial nerve deficit or sensory deficit. Exhibits normal muscle tone. Displays a negative Romberg sign, no seizure activity. She is clumsy with her left hand, gait normal.   Speech, language and mentation are normal according to her , I am not familiar with her baseline. Visual fields are full to confrontation, funduscopic exam reveals flat discs, the retina and vasculature are normal   Skin: Skin is warm and dry. No rash noted, not diaphoretic. No erythema.    Psychiatric: Normal mood and affect,  behavior is normal. Judgment and thought content normal.   Vitals reviewed. MRI Results (most recent):  Results from [place] encounter on 01/17/19   MRI BRAIN WO CONT    Narrative EXAM:  MRI BRAIN WO CONT, MRA BRAIN WO CONT    INDICATION:    Stroke    COMPARISON:  CT head 1/17/2019. CONTRAST: None. TECHNIQUE:    MRI Brain:  Multiplanar multisequence acquisition without contrast of the brain. MRA Head:  3-D time-of-flight MRA of the head was performed. Multiplanar and MIP  reconstructions were obtained. FINDINGS:  MRI Brain:  Small acute infarct in the right posterior corona radiata, as well as additional  acute infarct within the right insula, and small scattered infarcts in the right  temporal and occipital lobes, most significantly involving the right  temporo-occipital periventricular white matter. Numerous chronic infarcts are noted, including large chronic infarct in the  right parietal lobe, chronic infarct in the right occipital lobe with associated  cortical laminar necrosis, and small chronic infarct in the right cerebellum. Generalized parenchymal volume loss with commensurate dilation of the sulci and  ventricular system. Scattered periventricular deep white matter T2/FLAIR  hyperintensities, as well as patchy T2/FLAIR hyperintensity in the diane,  consistent with mild chronic microvascular ischemic disease. Small chronic  microhemorrhage in the posterior left parietal lobe. There is no cerebellar  tonsillar herniation. Expected arterial flow-voids are present. The paranasal sinuses, mastoid air cells, and middle ears are clear. The orbital  contents are within normal limits. No significant osseous or scalp lesions are  identified. MRA Head:  There is no evidence of large vessel occlusion or flow-limiting stenosis of the  intracranial internal carotid, anterior cerebral, and middle cerebral arteries.   Asymmetric paucity of right MCA posterior division branches. The anterior  communicating artery is patent. There is no evidence of large vessel occlusion or flow-limiting stenosis of the  intracranial vertebral arteries, basilar artery, or posterior cerebral arteries. The right posterior communicating artery is patent, the left is not seen. There is no evidence of aneurysm or vascular malformation. Impression IMPRESSION:   MRI Brain:  1. Multiple small acute infarcts within the right posterior corona radiata,  right insula, and right temporo-occipital lobe. 2. Generalized parenchymal volume loss and mild chronic microvascular ischemic  disease. Chronic infarcts in the right parietal lobe, right occipital lobe, and  right cerebellum. MRA Head:  1. No evidence of proximal significant stenosis. Asymmetric paucity of distal  right MCA posterior division branches, likely chronic. 2. No aneurysm. 23X             ASSESSMENT and PLAN  STROKE  This patient has a pattern that appears to represent an embolic stroke. While I do not see any involvement of the left hemisphere I am concerned this may represent cardiac embolization, certainly the distribution is anterior and posterior circulation which is not particularly consistent with carotid disease. I am going to refer her to Dr. Maira Castellanos cardiology in [place] for the consideration of a 30-day monitor to rule out the possibility of atrial fibrillation. In the meantime she will stay on her aspirin 81 mg a day. I will plan to see her back in 4 months.   HYPERTENSION  Stroke risk, stable, managed by primary care  DIABETES MELLITUS  Stroke Risk, stable, managed by primary care

## 2019-01-25 NOTE — PATIENT INSTRUCTIONS

## 2019-01-25 NOTE — LETTER
1/25/2019 3:05 PM 
 
Patient:  Amna Hugo YOB: 1950 Date of Visit: 1/25/2019 Dear MD Richardson B[facility] [address] [address] [id number] VIA In Basket 
 : Thank you for referring Ms. Ghazala Heredia to me for evaluation/treatment. Below are the relevant portions of my assessment and plan of care. HISTORY OF PRESENT ILLNESS Amna Hugo is a 77 y.o. female. Ghazala Heredia is a 59-year-old woman who today for follow-up of a stroke. Her  noticed that she was quite confused one day at home and took her to the emergency room at [facility] where an MRI scan revealed multiple strokes in her right hemisphere. She denies any history of abnormal heart rhythm, she denies any unusual sensations of fluttering in her chest.  She is on aspirin 81 mg a day, family history does not reveal any history of stroke. Review of Systems Constitutional:  
     Review of systems is positive for left-sided weakness, she denies chest pain shortness of breath, she denies unusual flutterings in her chest, complete review of systems done all others negative Current Outpatient Medications on File Prior to Visit Medication Sig Dispense Refill  amLODIPine (NORVASC) 5 mg tablet Take 1 Tab by mouth daily. 30 Tab 0  
 aspirin 81 mg chewable tablet Take 1 Tab by mouth daily. 30 Tab 0  
 atorvastatin (LIPITOR) 80 mg tablet Take 1 Tab by mouth daily. 30 Tab 0  
 magnesium oxide (MAG-OX) 400 mg tablet Take 1 Tab by mouth two (2) times a day. 30 Tab 0  
 dulaglutide (TRULICITY) 4.96 KA/7.7 mL sub-q pen 1.5 mg by SubCUTAneous route every seven (7) days.  insulin detemir U-100 (LEVEMIR FLEXTOUCH) 100 unit/mL (3 mL) inpn 65 Units by SubCUTAneous route every morning.  1 Adjustable Dose Pre-filled Pen Syringe 0  
 metoprolol succinate (TOPROL-XL) 50 mg XL tablet TAKE 1 TABLET DAILY 90 Tab 3  
  quinapril (ACCUPRIL) 40 mg tablet TAKE 1 TABLET TWICE A  Tab 3  
 metFORMIN (GLUCOPHAGE) 850 mg tablet TAKE 1 TABLET TWICE A DAY WITH MEALS 180 Tab 3  
 etanercept (ENBREL) 50 mg/mL (0.98 mL) injection 50 mg by SubCUTAneous route every seven (7) days.  amLODIPine (NORVASC) 10 mg tablet TAKE 1 TABLET DAILY 90 Tab 3 No current facility-administered medications on file prior to visit. Past Medical History:  
Diagnosis Date  Diabetes ([address]  Hypertension  Murmur  Osteopenia  Psoriasis  Rosacea  Vitamin D deficiency Family History Problem Relation Age of Onset  Diabetes Mother  Heart Disease Father Social History Tobacco Use  Smoking status: Never Smoker  Smokeless tobacco: Never Used Substance Use Topics  Alcohol use: Yes Alcohol/week: 0.0 oz  
  Comment: 1/couple months  Drug use: Not on file /78   Pulse 72   Ht 5' 5\" (1.651 m)   Wt 104.3 kg (230 lb)   SpO2 99%   BMI 38.27 kg/m² Physical Exam 
Constitutional: Oriented to person, place, and time, appears well-developed and well-nourished. No distress. HENT:  
Head: Normocephalic and atraumatic. Mouth/Throat: Oropharynx is clear and moist. No oropharyngeal exudate. Eyes: Conjunctivae and EOM are normal. Pupils are equal, round, and reactive to light. No scleral icterus. Neck: Normal range of motion. Neck supple. No thyromegaly present. Cardiovascular: Normal rate, regular rhythm and normal heart sounds. Musculoskeletal: Normal range of motion, exhibits no edema, tenderness or deformity. Lymphadenopathy: no cervical adenopathy. Neurological: Alert and oriented to person, place, and time. Normal strength she has brisk reflexes in her left upper extremity and left lower extremity, toes appear to be upgoing bilaterally. Displays no atrophy and no tremor. No cranial nerve deficit or sensory deficit. Exhibits normal muscle tone. Displays a negative Romberg sign, no seizure activity. She is clumsy with her left hand, gait normal.  
Speech, language and mentation are normal according to her , I am not familiar with her baseline. Visual fields are full to confrontation, funduscopic exam reveals flat discs, the retina and vasculature are normal  
Skin: Skin is warm and dry. No rash noted, not diaphoretic. No erythema. Psychiatric: Normal mood and affect,  behavior is normal. Judgment and thought content normal.  
Vitals reviewed. MRI Results (most recent): 
Results from Hospital Encounter encounter on 01/17/19 MRI BRAIN WO CONT Narrative EXAM:  MRI BRAIN WO CONT, MRA BRAIN WO CONT INDICATION:    Stroke COMPARISON:  CT head 1/17/2019. CONTRAST: None. TECHNIQUE:   
MRI Brain: 
Multiplanar multisequence acquisition without contrast of the brain. MRA Head: 
3-D time-of-flight MRA of the head was performed. Multiplanar and MIP 
reconstructions were obtained. FINDINGS: 
MRI Brain: 
Small acute infarct in the right posterior corona radiata, as well as additional 
acute infarct within the right insula, and small scattered infarcts in the right 
temporal and occipital lobes, most significantly involving the right 
temporo-occipital periventricular white matter. Numerous chronic infarcts are noted, including large chronic infarct in the 
right parietal lobe, chronic infarct in the right occipital lobe with associated 
cortical laminar necrosis, and small chronic infarct in the right cerebellum. Generalized parenchymal volume loss with commensurate dilation of the sulci and 
ventricular system. Scattered periventricular deep white matter T2/FLAIR 
hyperintensities, as well as patchy T2/FLAIR hyperintensity in the diane, 
consistent with mild chronic microvascular ischemic disease. Small chronic 
microhemorrhage in the posterior left parietal lobe. There is no cerebellar tonsillar herniation. Expected arterial flow-voids are present. The paranasal sinuses, mastoid air cells, and middle ears are clear. The orbital 
contents are within normal limits. No significant osseous or scalp lesions are 
identified. MRA Head: 
There is no evidence of large vessel occlusion or flow-limiting stenosis of the 
intracranial internal carotid, anterior cerebral, and middle cerebral arteries. Asymmetric paucity of right MCA posterior division branches. The anterior 
communicating artery is patent. There is no evidence of large vessel occlusion or flow-limiting stenosis of the 
intracranial vertebral arteries, basilar artery, or posterior cerebral arteries. The right posterior communicating artery is patent, the left is not seen. There is no evidence of aneurysm or vascular malformation. Impression IMPRESSION:  
MRI Brain: 1. Multiple small acute infarcts within the right posterior corona radiata, 
right insula, and right temporo-occipital lobe. 2. Generalized parenchymal volume loss and mild chronic microvascular ischemic 
disease. Chronic infarcts in the right parietal lobe, right occipital lobe, and 
right cerebellum. MRA Head: 1. No evidence of proximal significant stenosis. Asymmetric paucity of distal 
right MCA posterior division branches, likely chronic. 2. No aneurysm. 23X 
 
 
  
 
 
ASSESSMENT and PLAN 
STROKE This patient has a pattern that appears to represent an embolic stroke. While I do not see any involvement of the left hemisphere I am concerned this may represent cardiac embolization, certainly the distribution is anterior and posterior circulation which is not particularly consistent with carotid disease. I am going to refer her to Dr. Man Fallon cardiology in [place] for the consideration of a 30-day monitor to rule out the possibility of atrial fibrillation.   In the meantime she will stay on her aspirin 81 mg a day. I will plan to see her back in 4 months. HYPERTENSION Stroke risk, stable, managed by primary care DIABETES MELLITUS Stroke Risk, stable, managed by primary care If you have questions, please do not hesitate to call me. I look forward to following Ms. Mari Sotorajmariano along with you. Sincerely, Lydia King MD

## 2019-01-26 ENCOUNTER — HOME CARE VISIT (OUTPATIENT)
Dept: HOME HEALTH SERVICES | Facility: HOME HEALTH | Age: 69
End: 2019-01-26

## 2019-01-28 ENCOUNTER — DOCUMENTATION ONLY (OUTPATIENT)
Dept: NEUROLOGY | Age: 69
End: 2019-01-28

## 2019-02-11 ENCOUNTER — CLINICAL SUPPORT (OUTPATIENT)
Dept: CARDIOLOGY CLINIC | Age: 69
End: 2019-02-11

## 2019-02-11 DIAGNOSIS — I63.511 ACUTE ISCHEMIC CEREBROVASCULAR ACCIDENT (CVA) INVOLVING RIGHT MIDDLE CEREBRAL ARTERY TERRITORY (HCC): ICD-10-CM

## 2019-02-11 DIAGNOSIS — I63.311 CEREBROVASCULAR ACCIDENT (CVA) DUE TO THROMBOSIS OF RIGHT MIDDLE CEREBRAL ARTERY (HCC): ICD-10-CM

## 2019-02-26 NOTE — PROGRESS NOTES
Spoke to patent after verifying two identifiers regarding ECG Results and recommendations. Patient acknowledged understanding. States she has a appointment scheduled on 3/4/19 at 1:20 to follow up.

## 2019-04-16 ENCOUNTER — OFFICE VISIT (OUTPATIENT)
Dept: CARDIOLOGY CLINIC | Age: 69
End: 2019-04-16

## 2019-04-16 VITALS
BODY MASS INDEX: 35.82 KG/M2 | HEIGHT: 65 IN | WEIGHT: 215 LBS | SYSTOLIC BLOOD PRESSURE: 132 MMHG | HEART RATE: 66 BPM | OXYGEN SATURATION: 98 % | RESPIRATION RATE: 18 BRPM | DIASTOLIC BLOOD PRESSURE: 84 MMHG

## 2019-04-16 DIAGNOSIS — E78.5 DYSLIPIDEMIA: ICD-10-CM

## 2019-04-16 DIAGNOSIS — I48.0 PAROXYSMAL ATRIAL FIBRILLATION (HCC): Primary | ICD-10-CM

## 2019-04-16 DIAGNOSIS — I10 ESSENTIAL HYPERTENSION, BENIGN: ICD-10-CM

## 2019-04-16 DIAGNOSIS — R06.09 DOE (DYSPNEA ON EXERTION): ICD-10-CM

## 2019-04-16 DIAGNOSIS — E11.21 TYPE 2 DIABETES MELLITUS WITH NEPHROPATHY (HCC): ICD-10-CM

## 2019-04-16 DIAGNOSIS — I63.411 CEREBROVASCULAR ACCIDENT (CVA) DUE TO EMBOLISM OF RIGHT MIDDLE CEREBRAL ARTERY (HCC): ICD-10-CM

## 2019-04-16 NOTE — LETTER
4/16/19 Patient: Alesha Dodge YOB: 1950 Date of Visit: 4/16/2019 Angela Alexis MD 
[facility] [address] [id number] VIA In Basket Dear Angela Alexis MD, Thank you for referring Ms. Rabia Dennison to [address] for evaluation. My notes for this consultation are attached. If you have questions, please do not hesitate to call me. I look forward to following your patient along with you.  
 
 
Sincerely, 
 
Joseline Aparicio MD

## 2019-04-16 NOTE — PROGRESS NOTES
Amna Knapp is a 77 y.o. female is to establish cardiac care. Hx DM II, diabetic nephropathy, hypertension, dyslipidemia, CKD 3, obesity, with acute R MCA CVA 1/19. W/u with Echo/doppler 1/1/8/19 with mild to mod LVH, LVEF 65-70, normal atrial sizes, mild MR. Head CT/MRI/MRA with multiple sites of infarct R temporo-occipital, R parietal, R cerebellar, coronoradiatal  C/w embolic events. Subsequent Loop/Event monitor x 2 weeks showed NSR with episodes of paroxysmal afib/RVR. Seen in f/u by Dr. Marilee Mclean and started on Eliquis 5mg bid  (CHADs2-VASC = 6--DM, hypertension, female, >65, prior CVA (CVA 1.1%/CS, embolic/CVA/TIA 86.7%/AS). No current CV sx or complaints. Fairly sedentary. Pt was unaware of the afib episodes noted on the monitor. No prior stress testing, CAD known. The patient denies chest pain/ shortness of breath, orthopnea, PND, LE edema, syncope, presyncope or fatigue.        Patient Active Problem List    Diagnosis Date Noted    Paroxysmal atrial fibrillation ([address] 04/16/2019    Dyslipidemia 04/16/2019    Cerebrovascular accident (CVA) due to thrombosis of right middle cerebral artery ([address] 01/18/2019    Acute ischemic cerebrovascular accident (CVA) involving right middle cerebral artery territory ([address] 01/17/2019    BMI 40.0-44.9, adult ([address] 05/22/2018    Obesity, morbid ([address] 12/26/2017    Type 2 diabetes mellitus with nephropathy ([address] 12/26/2017    Type 2 diabetes mellitus with hyperglycemia, with long-term current use of insulin ([address] 04/03/2017    Psoriasis 08/11/2014    Essential hypertension, benign 08/11/2014    Rosacea 08/11/2014    Heart murmur 08/11/2014      Shakir Reynaga MD  Past Medical History:   Diagnosis Date    Diabetes ([address]     Hypertension     Murmur     Osteopenia     Psoriasis     Rosacea     Stroke ([address]     Vitamin D deficiency       Past Surgical History:   Procedure Laterality Date    COLONOSCOPY N/A 4/4/2018    COLONOSCOPY DIABETIC performed by Gwen Zhang MD at [address] [facility]      HX COLONOSCOPY  2018    3 polyps    HX TONSILLECTOMY       Allergies   Allergen Reactions    Pcn [Penicillins] Rash    Sulfa (Sulfonamide Antibiotics) Unknown (comments)      Family History   Problem Relation Age of Onset    Diabetes Mother     Heart Disease Father       Social History     Socioeconomic History    Marital status:      Spouse name: Not on file    Number of children: Not on file    Years of education: Not on file    Highest education level: Not on file   Occupational History    Not on file   Social Needs    Financial resource strain: Not on file    Food insecurity:     Worry: Not on file     Inability: Not on file    Transportation needs:     Medical: Not on file     Non-medical: Not on file   Tobacco Use    Smoking status: Never Smoker    Smokeless tobacco: Never Used   Substance and Sexual Activity    Alcohol use:  Yes     Alcohol/week: 0.0 oz     Comment: 1/couple months    Drug use: Not on file    Sexual activity: Not on file   Lifestyle    Physical activity:     Days per week: Not on file     Minutes per session: Not on file    Stress: Not on file   Relationships    Social connections:     Talks on phone: Not on file     Gets together: Not on file     Attends Yarsanism service: Not on file     Active member of club or organization: Not on file     Attends meetings of clubs or organizations: Not on file     Relationship status: Not on file    Intimate partner violence:     Fear of current or ex partner: Not on file     Emotionally abused: Not on file     Physically abused: Not on file     Forced sexual activity: Not on file   Other Topics Concern    Not on file   Social History Narrative    Not on file      Current Outpatient Medications   Medication Sig    apixaban (ELIQUIS) 5 mg tablet TAKE 1 TABLET BY MOUTH TWICE A DAY    glucose blood VI test strips (ASCENSIA AUTODISC VI, ONE TOUCH ULTRA TEST VI) strip Check glucose twice daily.  Blood-Glucose Meter monitoring kit Check blood sugar twice daily.  Insulin Needles, Disposable, (EASY COMFORT PEN NEEDLES) 31 gauge x 5/16\" ndle Check BS BID    amLODIPine (NORVASC) 5 mg tablet Take 1 Tab by mouth daily.  metoprolol succinate (TOPROL-XL) 50 mg XL tablet TAKE 1 TABLET DAILY    insulin detemir U-100 (LEVEMIR FLEXTOUCH) 100 unit/mL (3 mL) inpn 65 Units by SubCUTAneous route daily.  dulaglutide (TRULICITY) 1.5 SH/8.0 mL sub-q pen 0.5 mL by SubCUTAneous route every seven (7) days.  insulin detemir U-100 (LEVEMIR FLEXTOUCH) 100 unit/mL (3 mL) inpn 65 Units by SubCUTAneous route every morning.  metFORMIN (GLUCOPHAGE) 850 mg tablet TAKE 1 TABLET TWICE A DAY WITH MEALS    etanercept (ENBREL) 50 mg/mL (0.98 mL) injection 50 mg by SubCUTAneous route every seven (7) days.  atorvastatin (LIPITOR) 80 mg tablet Take 1 Tab by mouth daily.  magnesium oxide (MAG-OX) 400 mg tablet Take 1 Tab by mouth two (2) times a day.  quinapril (ACCUPRIL) 40 mg tablet TAKE 1 TABLET TWICE A DAY     No current facility-administered medications for this visit. Review of Symptoms:    CONST  No weight change. No fever, chills, sweats    ENT No visual changes, URI sx, sore throat    CV  See HPI   RESP  No cough, or sputum, wheezing. Also see HPI   GI  No abdominal pain or change in bowel habits. No heartburn or dysphagia. No melena or rectal bleeding.   No dysuria, urgency, frequency, hematuria   MSKEL  No joint pain, swelling. No muscle pain. SKIN  No rash or lesions. NEURO  No headache, syncope, or seizure. No weakness, loss of sensation, or paresthesias. PSYCH  No low mood or depression  No anxiety. HE/LYMPH  No easy bruising, abnormal bleeding, or enlarged glands.         Physical ExamPhysical Exam:    Visit Vitals  /84 (BP 1 Location: Left arm, BP Patient Position: Sitting)   Pulse 66   Resp 18   Ht 5' 5\" (1.651 m)   Wt 215 lb (97.5 kg)   SpO2 98% Comment: ra   BMI 35.78 kg/m²     Gen: NAD  HEENT:  PERRL, throat clear  Neck: no adenopathy, no thyromegaly, no JVD   Heart:  Regular,Nl S1S2,  no murmur, gallop or rub.   Lungs:  clear  Abdomen:   Soft, non-tender, bowel sounds are active.   Extremities:  No edema  Pulse: symmetric  Neuro: A&O times 3, No focal neuro deficits    Cardiographics    ECG: NSR, NST    Labs:   Lab Results   Component Value Date/Time    Sodium 139 03/01/2019 08:41 AM    Sodium 140 01/28/2019 01:37 PM    Sodium 141 01/24/2019 05:44 AM    Sodium 139 01/22/2019 06:08 AM    Sodium 143 01/21/2019 05:46 AM    Potassium 4.0 03/01/2019 08:41 AM    Potassium 4.4 01/28/2019 01:37 PM    Potassium 3.9 01/24/2019 05:44 AM    Potassium 4.0 01/22/2019 06:08 AM    Potassium 3.4 (L) 01/21/2019 05:46 AM    Chloride 100 03/01/2019 08:41 AM    Chloride 100 01/28/2019 01:37 PM    Chloride 106 01/24/2019 05:44 AM    Chloride 106 01/22/2019 06:08 AM    Chloride 104 01/21/2019 05:46 AM    CO2 25 03/01/2019 08:41 AM    CO2 24 01/28/2019 01:37 PM    CO2 27 01/24/2019 05:44 AM    CO2 27 01/22/2019 06:08 AM    CO2 29 01/21/2019 05:46 AM    Anion gap 8 01/24/2019 05:44 AM    Anion gap 6 01/22/2019 06:08 AM    Anion gap 10 01/21/2019 05:46 AM    Anion gap 9 01/20/2019 05:43 AM    Anion gap 10 01/19/2019 05:51 AM    Glucose 157 (H) 03/01/2019 08:41 AM    Glucose 165 (H) 01/28/2019 01:37 PM    Glucose 85 01/24/2019 05:44 AM    Glucose 159 (H) 01/22/2019 06:08 AM    Glucose 99 01/21/2019 05:46 AM    BUN 22 03/01/2019 08:41 AM    BUN 23 01/28/2019 01:37 PM    BUN 28 (H) 01/24/2019 05:44 AM    BUN 25 (H) 01/22/2019 06:08 AM    BUN 27 (H) 01/21/2019 05:46 AM    Creatinine 1.16 (H) 03/01/2019 08:41 AM    Creatinine 1.17 (H) 01/28/2019 01:37 PM    Creatinine 1.22 (H) 01/24/2019 05:44 AM    Creatinine 1.21 (H) 01/22/2019 06:08 AM    Creatinine 1.38 (H) 01/21/2019 05:46 AM    BUN/Creatinine ratio 19 03/01/2019 08:41 AM BUN/Creatinine ratio 20 01/28/2019 01:37 PM    BUN/Creatinine ratio 23 (H) 01/24/2019 05:44 AM    BUN/Creatinine ratio 21 (H) 01/22/2019 06:08 AM    BUN/Creatinine ratio 20 01/21/2019 05:46 AM    GFR est AA 55 (L) 03/01/2019 08:41 AM    GFR est AA 55 (L) 01/28/2019 01:37 PM    GFR est AA 53 (L) 01/24/2019 05:44 AM    GFR est AA 53 (L) 01/22/2019 06:08 AM    GFR est AA 46 (L) 01/21/2019 05:46 AM    GFR est non-AA 48 (L) 03/01/2019 08:41 AM    GFR est non-AA 47 (L) 01/28/2019 01:37 PM    GFR est non-AA 44 (L) 01/24/2019 05:44 AM    GFR est non-AA 44 (L) 01/22/2019 06:08 AM    GFR est non-AA 38 (L) 01/21/2019 05:46 AM    Calcium 9.1 03/01/2019 08:41 AM    Calcium 9.1 01/28/2019 01:37 PM    Calcium 8.6 01/24/2019 05:44 AM    Calcium 8.5 01/22/2019 06:08 AM    Calcium 8.2 (L) 01/21/2019 05:46 AM    Bilirubin, total 0.5 03/01/2019 08:41 AM    Bilirubin, total 0.3 01/28/2019 01:37 PM    Bilirubin, total 0.3 01/17/2019 10:25 PM    Bilirubin, total 0.3 11/30/2018 10:38 AM    Bilirubin, total 0.3 08/29/2018 09:27 AM    AST (SGOT) 52 (H) 03/01/2019 08:41 AM    AST (SGOT) 24 01/28/2019 01:37 PM    AST (SGOT) 21 01/17/2019 10:25 PM    AST (SGOT) 18 11/30/2018 10:38 AM    AST (SGOT) 15 08/29/2018 09:27 AM    Alk. phosphatase 118 (H) 03/01/2019 08:41 AM    Alk. phosphatase 82 01/28/2019 01:37 PM    Alk. phosphatase 93 01/17/2019 10:25 PM    Alk. phosphatase 90 11/30/2018 10:38 AM    Alk.  phosphatase 98 08/29/2018 09:27 AM    Protein, total 6.9 03/01/2019 08:41 AM    Protein, total 7.1 01/28/2019 01:37 PM    Protein, total 7.7 01/17/2019 10:25 PM    Protein, total 7.1 11/30/2018 10:38 AM    Protein, total 7.3 08/29/2018 09:27 AM    Albumin 3.5 03/01/2019 08:41 AM    Albumin 3.4 (L) 01/28/2019 01:37 PM    Albumin 2.9 (L) 01/17/2019 10:25 PM    Albumin 3.6 11/30/2018 10:38 AM    Albumin 3.3 (L) 08/29/2018 09:27 AM    Globulin 4.8 (H) 01/17/2019 10:25 PM    A-G Ratio 1.0 (L) 03/01/2019 08:41 AM    A-G Ratio 0.9 (L) 01/28/2019 01:37 PM A-G Ratio 0.6 (L) 01/17/2019 10:25 PM    A-G Ratio 1.0 (L) 11/30/2018 10:38 AM    A-G Ratio 0.8 (L) 08/29/2018 09:27 AM    ALT (SGPT) 54 (H) 03/01/2019 08:41 AM    ALT (SGPT) 25 01/28/2019 01:37 PM    ALT (SGPT) 27 01/17/2019 10:25 PM    ALT (SGPT) 14 11/30/2018 10:38 AM    ALT (SGPT) 13 08/29/2018 09:27 AM     Lab Results   Component Value Date/Time    CK 51 01/17/2019 10:25 PM     Lab Results   Component Value Date/Time    Cholesterol, total 119 03/01/2019 08:41 AM    Cholesterol, total 204 (H) 01/18/2019 06:00 AM    Cholesterol, total 196 11/30/2018 10:38 AM    Cholesterol, total 193 02/12/2018 09:04 AM    Cholesterol, total 178 11/20/2017 08:01 AM    HDL Cholesterol 62 03/01/2019 08:41 AM    HDL Cholesterol 57 01/18/2019 06:00 AM    HDL Cholesterol 51 11/30/2018 10:38 AM    HDL Cholesterol 56 02/12/2018 09:04 AM    HDL Cholesterol 62 11/20/2017 08:01 AM    LDL, calculated 38 03/01/2019 08:41 AM    LDL, calculated 108.2 (H) 01/18/2019 06:00 AM    LDL, calculated 105 (H) 11/30/2018 10:38 AM    LDL, calculated 113 (H) 02/12/2018 09:04 AM    LDL, calculated 94 11/20/2017 08:01 AM    Triglyceride 95 03/01/2019 08:41 AM    Triglyceride 194 (H) 01/18/2019 06:00 AM    Triglyceride 199 (H) 11/30/2018 10:38 AM    Triglyceride 122 02/12/2018 09:04 AM    Triglyceride 109 11/20/2017 08:01 AM    CHOL/HDL Ratio 3.6 01/18/2019 06:00 AM     No results found for this or any previous visit.     Assessment:         Patient Active Problem List    Diagnosis Date Noted    Paroxysmal atrial fibrillation ([facility] 04/16/2019    Dyslipidemia 04/16/2019    Cerebrovascular accident (CVA) due to thrombosis of right middle cerebral artery ([facility] 01/18/2019    Acute ischemic cerebrovascular accident (CVA) involving right middle cerebral artery territory ([facility] 01/17/2019    BMI 40.0-44.9, adult ([facility] 05/22/2018    Obesity, morbid ([facility] 12/26/2017    Type 2 diabetes mellitus with nephropathy ([facility] 12/26/2017    Type 2 diabetes mellitus with hyperglycemia, with long-term current use of insulin ([facility] 04/03/2017    Psoriasis 08/11/2014    Essential hypertension, benign 08/11/2014    Rosacea 08/11/2014    Heart murmur 08/11/2014      Hx DM II, diabetic nephropathy, hypertension, dyslipidemia, CKD 3, obesity, with acute R MCA CVA 1/19. W/u with Echo/doppler 1/1/8/19 with mild to mod LVH, LVEF 65-70, normal atrial sizes, mild MR. Head CT/MRI/MRA with multiple sites of infarct R temporo-occipital, R parietal, R cerebellar, coronoradiatal  C/w embolic events. Subsequent Loop/Event monitor x 2 weeks showed NSR with episodes of paroxysmal afib/RVR. Seen in f/u by Dr. Lisseth Tellez and started on Eliquis 5mg bid  (CHADs2-VASC = 6--DM, hypertension, female, >65, prior CVA (CVA 0.1%/WX, embolic/CVA/TIA 22.0%/FB). No current CV sx or complaints. Fairly sedentary. Pt was unaware of the afib episodes noted on the monitor. No prior stress testing, CAD known. Plan:     Lengthy discussion about afib, cardioembolic CVA episodes, etc and management options--she has no sx related, but unfortunately has \"showered\" emboli--continue Eliquis 5mg bid, ok to stop ASA at this point.   No indication for AAD, PVI/RFA or advanced afib options given lack of sx  Continue Metoprolol, quinipril, amlodipine, statin  Continue DM meds  Hattie Ritchie MPI--some MALIN (sedentary), multiple CV risks incl DM--call w/ results  Plan f/u with PCP today as planned  RTC here 6 mos, sooner pnr    Sheila Willingham MD

## 2019-04-16 NOTE — PROGRESS NOTES
Verified patient with two patient identifiers. Medications reviewed/approved by Dr. Zaria Christianson. A verbal from Dr. Zaria Christianson was given to remove any medications that were deleted during the visit. Medication(s) removed:  aspirin 81 mg chewable tablet       Chief Complaint   Patient presents with    Irregular Heart Beat     New patient evaluation - referred by Dr. Juan M Galeano     1. Have you been to the ER, urgent care clinic since your last visit? Hospitalized since your last visit? New pt.    2. Have you seen or consulted any other health care providers outside of the [address] [place] [address] since your last visit? Include any pap smears or colon screening. New pt.

## 2019-05-01 ENCOUNTER — TELEPHONE (OUTPATIENT)
Dept: CARDIOLOGY CLINIC | Age: 69
End: 2019-05-01

## 2019-05-01 NOTE — TELEPHONE ENCOUNTER
----- Message from Manoj Bhardwaj MD sent at 5/1/2019 10:18 AM EDT -----  Regarding: Vin Gonzalez MPI  Advise stress nuclear scan show no ischemia or blockages, normal LV pumping function, possible old heart attack damage on bottom of heart--no concerns.   Thanks [facility]

## 2019-06-05 DIAGNOSIS — I10 ESSENTIAL HYPERTENSION, BENIGN: ICD-10-CM

## 2019-06-05 RX ORDER — METFORMIN HYDROCHLORIDE 850 MG/1
TABLET ORAL
Qty: 180 TAB | Refills: 3 | Status: SHIPPED | OUTPATIENT
Start: 2019-06-05 | End: 2019-06-09

## 2019-06-05 RX ORDER — QUINAPRIL 40 MG/1
TABLET ORAL
Qty: 180 TAB | Refills: 3 | Status: SHIPPED | OUTPATIENT
Start: 2019-06-05 | End: 2020-05-21 | Stop reason: SDUPTHER

## 2019-06-05 RX ORDER — METOPROLOL SUCCINATE 50 MG/1
TABLET, EXTENDED RELEASE ORAL
Qty: 90 TAB | Refills: 3 | Status: SHIPPED | OUTPATIENT
Start: 2019-06-05 | End: 2020-03-09 | Stop reason: SDUPTHER

## 2019-06-09 ENCOUNTER — HOSPITAL ENCOUNTER (INPATIENT)
Age: 69
LOS: 8 days | Discharge: NURSING, REHAB OR LONG-TERM CARE | DRG: 064 | End: 2019-06-17
Attending: INTERNAL MEDICINE | Admitting: INTERNAL MEDICINE
Payer: MEDICARE

## 2019-06-09 DIAGNOSIS — I63.9 CEREBROVASCULAR ACCIDENT (CVA), UNSPECIFIED MECHANISM (HCC): ICD-10-CM

## 2019-06-09 DIAGNOSIS — R41.82 ALTERED MENTAL STATUS, UNSPECIFIED ALTERED MENTAL STATUS TYPE: ICD-10-CM

## 2019-06-09 DIAGNOSIS — I63.311 CEREBROVASCULAR ACCIDENT (CVA) DUE TO THROMBOSIS OF RIGHT MIDDLE CEREBRAL ARTERY (HCC): ICD-10-CM

## 2019-06-09 DIAGNOSIS — I63.511 ACUTE ISCHEMIC CEREBROVASCULAR ACCIDENT (CVA) INVOLVING RIGHT MIDDLE CEREBRAL ARTERY TERRITORY (HCC): ICD-10-CM

## 2019-06-09 DIAGNOSIS — E11.42 DIABETIC PERIPHERAL NEUROPATHY ASSOCIATED WITH TYPE 2 DIABETES MELLITUS (HCC): ICD-10-CM

## 2019-06-09 DIAGNOSIS — I63.412 EMBOLIC STROKE INVOLVING LEFT MIDDLE CEREBRAL ARTERY (HCC): ICD-10-CM

## 2019-06-09 DIAGNOSIS — I65.23 BILATERAL CAROTID ARTERY STENOSIS: ICD-10-CM

## 2019-06-09 PROCEDURE — 65660000000 HC RM CCU STEPDOWN

## 2019-06-09 RX ORDER — MAGNESIUM SULFATE 100 %
4 CRYSTALS MISCELLANEOUS AS NEEDED
Status: DISCONTINUED | OUTPATIENT
Start: 2019-06-09 | End: 2019-06-17 | Stop reason: HOSPADM

## 2019-06-09 RX ORDER — GUAIFENESIN 100 MG/5ML
81 LIQUID (ML) ORAL DAILY
Status: DISCONTINUED | OUTPATIENT
Start: 2019-06-10 | End: 2019-06-09

## 2019-06-09 RX ORDER — AMLODIPINE BESYLATE 5 MG/1
5 TABLET ORAL DAILY
Status: DISCONTINUED | OUTPATIENT
Start: 2019-06-10 | End: 2019-06-11

## 2019-06-09 RX ORDER — ACETAMINOPHEN 325 MG/1
650 TABLET ORAL
Status: DISCONTINUED | OUTPATIENT
Start: 2019-06-09 | End: 2019-06-17 | Stop reason: HOSPADM

## 2019-06-09 RX ORDER — INSULIN LISPRO 100 [IU]/ML
INJECTION, SOLUTION INTRAVENOUS; SUBCUTANEOUS EVERY 6 HOURS
Status: DISCONTINUED | OUTPATIENT
Start: 2019-06-10 | End: 2019-06-10

## 2019-06-09 RX ORDER — LANOLIN ALCOHOL/MO/W.PET/CERES
400 CREAM (GRAM) TOPICAL 2 TIMES DAILY
Status: DISCONTINUED | OUTPATIENT
Start: 2019-06-10 | End: 2019-06-12

## 2019-06-09 RX ORDER — HYDRALAZINE HYDROCHLORIDE 20 MG/ML
10 INJECTION INTRAMUSCULAR; INTRAVENOUS
Status: DISCONTINUED | OUTPATIENT
Start: 2019-06-09 | End: 2019-06-11

## 2019-06-09 RX ORDER — DEXTROSE MONOHYDRATE 100 MG/ML
250 INJECTION, SOLUTION INTRAVENOUS AS NEEDED
Status: DISCONTINUED | OUTPATIENT
Start: 2019-06-09 | End: 2019-06-17 | Stop reason: HOSPADM

## 2019-06-09 RX ORDER — ATORVASTATIN CALCIUM 40 MG/1
80 TABLET, FILM COATED ORAL
Status: DISCONTINUED | OUTPATIENT
Start: 2019-06-09 | End: 2019-06-13

## 2019-06-09 RX ORDER — SODIUM CHLORIDE 9 MG/ML
50 INJECTION, SOLUTION INTRAVENOUS CONTINUOUS
Status: DISCONTINUED | OUTPATIENT
Start: 2019-06-09 | End: 2019-06-10

## 2019-06-09 RX ORDER — INSULIN GLARGINE 100 [IU]/ML
60 INJECTION, SOLUTION SUBCUTANEOUS DAILY
Status: DISCONTINUED | OUTPATIENT
Start: 2019-06-10 | End: 2019-06-12

## 2019-06-09 RX ORDER — SODIUM CHLORIDE 0.9 % (FLUSH) 0.9 %
5-40 SYRINGE (ML) INJECTION EVERY 8 HOURS
Status: DISCONTINUED | OUTPATIENT
Start: 2019-06-09 | End: 2019-06-17 | Stop reason: HOSPADM

## 2019-06-09 RX ORDER — LABETALOL HYDROCHLORIDE 5 MG/ML
10 INJECTION, SOLUTION INTRAVENOUS
Status: DISCONTINUED | OUTPATIENT
Start: 2019-06-09 | End: 2019-06-11

## 2019-06-09 RX ORDER — SODIUM CHLORIDE 0.9 % (FLUSH) 0.9 %
5-40 SYRINGE (ML) INJECTION AS NEEDED
Status: DISCONTINUED | OUTPATIENT
Start: 2019-06-09 | End: 2019-06-17 | Stop reason: HOSPADM

## 2019-06-09 RX ORDER — GUAIFENESIN 100 MG/5ML
81 LIQUID (ML) ORAL DAILY
Status: DISCONTINUED | OUTPATIENT
Start: 2019-06-09 | End: 2019-06-12

## 2019-06-09 RX ORDER — ONDANSETRON 2 MG/ML
4 INJECTION INTRAMUSCULAR; INTRAVENOUS
Status: DISCONTINUED | OUTPATIENT
Start: 2019-06-09 | End: 2019-06-17 | Stop reason: HOSPADM

## 2019-06-09 RX ORDER — METOPROLOL SUCCINATE 50 MG/1
50 TABLET, EXTENDED RELEASE ORAL DAILY
Status: DISCONTINUED | OUTPATIENT
Start: 2019-06-10 | End: 2019-06-11

## 2019-06-09 NOTE — LETTER
6/15/2019 Ms. Shagufta Silva [address] [facility] [address] To Whom It may concern, This is to attest that Mrs Treasure John has been admitted to [facility] on 6/9/2019. Mrs Luis Eduardo Calvillo is still in the hospital at the present time. She has had a stroke and will transfer to a rehabilitation facility after she is discharged from the hospital. 
Due to the severity of her stroke,Mrs Luis Eduardo Calvillo will need an extended period for rehabilitation. I have been informed that Mrs Luis Eduardo Calvillo has a scheduled trip to [facility] from August 25th through September 6th,2019. Due to Mrs Luis Eduardo Calvillo current condition,I will advise that she cancel her trip to [facility] until she is fully recovered. I am writing this note,being one of the doctors taking care of Mrs Luis Eduardo Calvillo here at [facility] [place]. If there are questions or concerns please,please contact  
  
  
Sincerely, 
  
  
Jr Bryson 
Internal Medicine/Hospitalist 
QZZ:[phone] 
      [phone]

## 2019-06-10 ENCOUNTER — APPOINTMENT (OUTPATIENT)
Dept: VASCULAR SURGERY | Age: 69
DRG: 064 | End: 2019-06-10
Attending: INTERNAL MEDICINE
Payer: MEDICARE

## 2019-06-10 ENCOUNTER — APPOINTMENT (OUTPATIENT)
Dept: MRI IMAGING | Age: 69
DRG: 064 | End: 2019-06-10
Attending: INTERNAL MEDICINE
Payer: MEDICARE

## 2019-06-10 ENCOUNTER — APPOINTMENT (OUTPATIENT)
Dept: NON INVASIVE DIAGNOSTICS | Age: 69
DRG: 064 | End: 2019-06-10
Attending: INTERNAL MEDICINE
Payer: MEDICARE

## 2019-06-10 PROBLEM — R41.82 ALTERED MENTAL STATUS, UNSPECIFIED: Status: ACTIVE | Noted: 2019-06-10

## 2019-06-10 PROBLEM — I65.23 BILATERAL CAROTID ARTERY STENOSIS: Status: ACTIVE | Noted: 2019-06-10

## 2019-06-10 PROBLEM — E11.42 DIABETIC PERIPHERAL NEUROPATHY ASSOCIATED WITH TYPE 2 DIABETES MELLITUS (HCC): Status: ACTIVE | Noted: 2019-06-10

## 2019-06-10 PROBLEM — I63.412 EMBOLIC STROKE INVOLVING LEFT MIDDLE CEREBRAL ARTERY (HCC): Status: ACTIVE | Noted: 2019-06-10

## 2019-06-10 LAB
ALBUMIN SERPL-MCNC: 2.6 G/DL (ref 3.5–5)
ALBUMIN/GLOB SERPL: 0.6 {RATIO} (ref 1.1–2.2)
ALP SERPL-CCNC: 132 U/L (ref 45–117)
ALT SERPL-CCNC: 35 U/L (ref 12–78)
AMPHET UR QL SCN: NEGATIVE
ANION GAP SERPL CALC-SCNC: 7 MMOL/L (ref 5–15)
AST SERPL-CCNC: 26 U/L (ref 15–37)
BARBITURATES UR QL SCN: NEGATIVE
BASOPHILS # BLD: 0.1 K/UL (ref 0–0.1)
BASOPHILS NFR BLD: 1 % (ref 0–1)
BENZODIAZ UR QL: NEGATIVE
BILIRUB SERPL-MCNC: 0.4 MG/DL (ref 0.2–1)
BUN SERPL-MCNC: 19 MG/DL (ref 6–20)
BUN/CREAT SERPL: 18 (ref 12–20)
CALCIUM SERPL-MCNC: 8.9 MG/DL (ref 8.5–10.1)
CANNABINOIDS UR QL SCN: NEGATIVE
CHLORIDE SERPL-SCNC: 105 MMOL/L (ref 97–108)
CHOLEST SERPL-MCNC: 136 MG/DL
CO2 SERPL-SCNC: 26 MMOL/L (ref 21–32)
COCAINE UR QL SCN: NEGATIVE
CREAT SERPL-MCNC: 1.08 MG/DL (ref 0.55–1.02)
CRP SERPL HS-MCNC: 7.8 MG/L
DIFFERENTIAL METHOD BLD: ABNORMAL
DRUG SCRN COMMENT,DRGCM: NORMAL
ECHO AV AREA PEAK VELOCITY: 2.6 CM2
ECHO AV AREA/BSA PEAK VELOCITY: 1.3 CM2/M2
ECHO AV CUSP MM: 1.64 CM
ECHO AV PEAK GRADIENT: 6.6 MMHG
ECHO AV PEAK VELOCITY: 128.19 CM/S
ECHO LA AREA 4C: 9.4 CM2
ECHO LA VOL 4C: 18.51 ML (ref 22–52)
ECHO LA VOLUME INDEX A4C: 9.16 ML/M2 (ref 16–28)
ECHO LV E' LATERAL VELOCITY: 7.23 CM/S
ECHO LV E' SEPTAL VELOCITY: 6.85 CM/S
ECHO LV INTERNAL DIMENSION DIASTOLIC MMODE: 5.19 CM
ECHO LV INTERNAL DIMENSION SYSTOLIC MMODE: 2.65 CM
ECHO LV IVSD MMODE: 1.61 CM
ECHO LV POSTERIOR WALL DIASTOLIC MMODE: 1.24 CM
ECHO LVOT DIAM: 2.07 CM
ECHO LVOT PEAK GRADIENT: 4 MMHG
ECHO LVOT PEAK VELOCITY: 99.65 CM/S
ECHO MV A VELOCITY: 116.78 CM/S
ECHO MV E DECELERATION TIME (DT): 193.3 MS
ECHO MV E VELOCITY: 77.38 CM/S
ECHO MV E/A RATIO: 0.66
ECHO MV E/E' LATERAL: 10.7
ECHO MV E/E' RATIO (AVERAGED): 11
ECHO MV E/E' SEPTAL: 11.3
ECHO MV REGURGITANT PEAK GRADIENT: 124.7 MMHG
ECHO MV REGURGITANT PEAK VELOCITY: 558.35 CM/S
ECHO PV MAX VELOCITY: 74.41 CM/S
ECHO PV PEAK GRADIENT: 2.2 MMHG
ECHO RV INTERNAL DIMENSION: 3.6 CM
ECHO TV A WAVE: 53.81 CM/S
ECHO TV E WAVE: 59.72 CM/S
ECHO TV EROA: 0.9
EOSINOPHIL # BLD: 0.6 K/UL (ref 0–0.4)
EOSINOPHIL NFR BLD: 5 % (ref 0–7)
ERYTHROCYTE [DISTWIDTH] IN BLOOD BY AUTOMATED COUNT: 13.1 % (ref 11.5–14.5)
ERYTHROCYTE [SEDIMENTATION RATE] IN BLOOD: 80 MM/HR (ref 0–30)
EST. AVERAGE GLUCOSE BLD GHB EST-MCNC: 171 MG/DL
GLOBULIN SER CALC-MCNC: 4.6 G/DL (ref 2–4)
GLUCOSE BLD STRIP.AUTO-MCNC: 110 MG/DL (ref 65–100)
GLUCOSE BLD STRIP.AUTO-MCNC: 111 MG/DL (ref 65–100)
GLUCOSE BLD STRIP.AUTO-MCNC: 177 MG/DL (ref 65–100)
GLUCOSE BLD STRIP.AUTO-MCNC: 191 MG/DL (ref 65–100)
GLUCOSE BLD STRIP.AUTO-MCNC: 241 MG/DL (ref 65–100)
GLUCOSE SERPL-MCNC: 167 MG/DL (ref 65–100)
HBA1C MFR BLD: 7.6 % (ref 4.2–6.3)
HCT VFR BLD AUTO: 39.5 % (ref 35–47)
HDLC SERPL-MCNC: 59 MG/DL
HDLC SERPL: 2.3 {RATIO} (ref 0–5)
HGB BLD-MCNC: 13.1 G/DL (ref 11.5–16)
IMM GRANULOCYTES # BLD AUTO: 0 K/UL (ref 0–0.04)
IMM GRANULOCYTES NFR BLD AUTO: 0 % (ref 0–0.5)
LDLC SERPL CALC-MCNC: 54.8 MG/DL (ref 0–100)
LEFT CCA DIST DIAS: 10.8 CM/S
LEFT CCA DIST SYS: 80.2 CM/S
LEFT CCA PROX DIAS: 10.7 CM/S
LEFT CCA PROX SYS: 99.6 CM/S
LEFT ECA DIAS: 0 CM/S
LEFT ECA SYS: 86.7 CM/S
LEFT ICA DIST DIAS: 8.5 CM/S
LEFT ICA DIST SYS: 47.8 CM/S
LEFT ICA MID DIAS: 7.8 CM/S
LEFT ICA MID SYS: 59 CM/S
LEFT ICA PROX DIAS: 8.1 CM/S
LEFT ICA PROX SYS: 55.8 CM/S
LEFT ICA/CCA SYS: 0.7
LEFT SUBCLAVIAN DIAS: 0 CM/S
LEFT SUBCLAVIAN SYS: 111.8 CM/S
LEFT VERTEBRAL DIAS: 15.3 CM/S
LEFT VERTEBRAL SYS: 53.7 CM/S
LIPID PROFILE,FLP: NORMAL
LYMPHOCYTES # BLD: 2.6 K/UL (ref 0.8–3.5)
LYMPHOCYTES NFR BLD: 25 % (ref 12–49)
MAGNESIUM SERPL-MCNC: 1.3 MG/DL (ref 1.6–2.4)
MCH RBC QN AUTO: 30 PG (ref 26–34)
MCHC RBC AUTO-ENTMCNC: 33.2 G/DL (ref 30–36.5)
MCV RBC AUTO: 90.4 FL (ref 80–99)
METHADONE UR QL: NEGATIVE
MONOCYTES # BLD: 0.7 K/UL (ref 0–1)
MONOCYTES NFR BLD: 7 % (ref 5–13)
NEUTS SEG # BLD: 6.5 K/UL (ref 1.8–8)
NEUTS SEG NFR BLD: 62 % (ref 32–75)
NRBC # BLD: 0 K/UL (ref 0–0.01)
NRBC BLD-RTO: 0 PER 100 WBC
OPIATES UR QL: NEGATIVE
PCP UR QL: NEGATIVE
PLATELET # BLD AUTO: 162 K/UL (ref 150–400)
PMV BLD AUTO: 10 FL (ref 8.9–12.9)
POTASSIUM SERPL-SCNC: 3.5 MMOL/L (ref 3.5–5.1)
PROT SERPL-MCNC: 7.2 G/DL (ref 6.4–8.2)
RBC # BLD AUTO: 4.37 M/UL (ref 3.8–5.2)
RIGHT CCA DIST DIAS: 8.2 CM/S
RIGHT CCA DIST SYS: 77 CM/S
RIGHT CCA PROX DIAS: 0 CM/S
RIGHT CCA PROX SYS: 111.9 CM/S
RIGHT ECA DIAS: 0 CM/S
RIGHT ECA SYS: 78.8 CM/S
RIGHT ICA DIST DIAS: 7.5 CM/S
RIGHT ICA DIST SYS: 63 CM/S
RIGHT ICA MID DIAS: 7.8 CM/S
RIGHT ICA MID SYS: 71.5 CM/S
RIGHT ICA PROX DIAS: 6.1 CM/S
RIGHT ICA PROX SYS: 57.3 CM/S
RIGHT ICA/CCA SYS: 0.9
RIGHT SUBCLAVIAN DIAS: 0 CM/S
RIGHT SUBCLAVIAN SYS: 68.9 CM/S
RIGHT VERTEBRAL DIAS: 12.2 CM/S
RIGHT VERTEBRAL SYS: 65.1 CM/S
SERVICE CMNT-IMP: ABNORMAL
SODIUM SERPL-SCNC: 138 MMOL/L (ref 136–145)
T3FREE SERPL-MCNC: 2.4 PG/ML (ref 2.2–4)
T4 FREE SERPL-MCNC: 1.1 NG/DL (ref 0.8–1.5)
TRIGL SERPL-MCNC: 111 MG/DL (ref ?–150)
TSH SERPL DL<=0.05 MIU/L-ACNC: 2 UIU/ML (ref 0.36–3.74)
VLDLC SERPL CALC-MCNC: 22.2 MG/DL
WBC # BLD AUTO: 10.5 K/UL (ref 3.6–11)

## 2019-06-10 PROCEDURE — 74011636637 HC RX REV CODE- 636/637: Performed by: INTERNAL MEDICINE

## 2019-06-10 PROCEDURE — 70551 MRI BRAIN STEM W/O DYE: CPT

## 2019-06-10 PROCEDURE — 82962 GLUCOSE BLOOD TEST: CPT

## 2019-06-10 PROCEDURE — 77030038269 HC DRN EXT URIN PURWCK BARD -A

## 2019-06-10 PROCEDURE — 92610 EVALUATE SWALLOWING FUNCTION: CPT

## 2019-06-10 PROCEDURE — 97116 GAIT TRAINING THERAPY: CPT

## 2019-06-10 PROCEDURE — 97165 OT EVAL LOW COMPLEX 30 MIN: CPT | Performed by: OCCUPATIONAL THERAPIST

## 2019-06-10 PROCEDURE — 97163 PT EVAL HIGH COMPLEX 45 MIN: CPT

## 2019-06-10 PROCEDURE — 85652 RBC SED RATE AUTOMATED: CPT

## 2019-06-10 PROCEDURE — 93306 TTE W/DOPPLER COMPLETE: CPT

## 2019-06-10 PROCEDURE — 74011000250 HC RX REV CODE- 250: Performed by: INTERNAL MEDICINE

## 2019-06-10 PROCEDURE — 36415 COLL VENOUS BLD VENIPUNCTURE: CPT

## 2019-06-10 PROCEDURE — 70544 MR ANGIOGRAPHY HEAD W/O DYE: CPT

## 2019-06-10 PROCEDURE — 74011250637 HC RX REV CODE- 250/637: Performed by: INTERNAL MEDICINE

## 2019-06-10 PROCEDURE — 83735 ASSAY OF MAGNESIUM: CPT

## 2019-06-10 PROCEDURE — 80307 DRUG TEST PRSMV CHEM ANLYZR: CPT

## 2019-06-10 PROCEDURE — 84439 ASSAY OF FREE THYROXINE: CPT

## 2019-06-10 PROCEDURE — 80061 LIPID PANEL: CPT

## 2019-06-10 PROCEDURE — 83036 HEMOGLOBIN GLYCOSYLATED A1C: CPT

## 2019-06-10 PROCEDURE — 86141 C-REACTIVE PROTEIN HS: CPT

## 2019-06-10 PROCEDURE — 65660000000 HC RM CCU STEPDOWN

## 2019-06-10 PROCEDURE — 74011250636 HC RX REV CODE- 250/636: Performed by: INTERNAL MEDICINE

## 2019-06-10 PROCEDURE — 93880 EXTRACRANIAL BILAT STUDY: CPT

## 2019-06-10 PROCEDURE — 74011636637 HC RX REV CODE- 636/637: Performed by: FAMILY MEDICINE

## 2019-06-10 PROCEDURE — 84443 ASSAY THYROID STIM HORMONE: CPT

## 2019-06-10 PROCEDURE — 85025 COMPLETE CBC W/AUTO DIFF WBC: CPT

## 2019-06-10 PROCEDURE — 97535 SELF CARE MNGMENT TRAINING: CPT | Performed by: OCCUPATIONAL THERAPIST

## 2019-06-10 PROCEDURE — 80053 COMPREHEN METABOLIC PANEL: CPT

## 2019-06-10 PROCEDURE — 84481 FREE ASSAY (FT-3): CPT

## 2019-06-10 RX ORDER — INSULIN LISPRO 100 [IU]/ML
INJECTION, SOLUTION INTRAVENOUS; SUBCUTANEOUS
Status: DISCONTINUED | OUTPATIENT
Start: 2019-06-10 | End: 2019-06-13

## 2019-06-10 RX ADMIN — HYDRALAZINE HYDROCHLORIDE 10 MG: 20 INJECTION INTRAMUSCULAR; INTRAVENOUS at 01:27

## 2019-06-10 RX ADMIN — Medication 10 ML: at 21:59

## 2019-06-10 RX ADMIN — Medication 10 ML: at 06:45

## 2019-06-10 RX ADMIN — INSULIN LISPRO 2 UNITS: 100 INJECTION, SOLUTION INTRAVENOUS; SUBCUTANEOUS at 06:45

## 2019-06-10 RX ADMIN — APIXABAN 5 MG: 5 TABLET, FILM COATED ORAL at 09:22

## 2019-06-10 RX ADMIN — INSULIN LISPRO 3 UNITS: 100 INJECTION, SOLUTION INTRAVENOUS; SUBCUTANEOUS at 12:52

## 2019-06-10 RX ADMIN — Medication 10 ML: at 16:24

## 2019-06-10 RX ADMIN — AMLODIPINE BESYLATE 5 MG: 5 TABLET ORAL at 09:22

## 2019-06-10 RX ADMIN — MAGNESIUM OXIDE TAB 400 MG (241.3 MG ELEMENTAL MG) 400 MG: 400 (241.3 MG) TAB at 17:35

## 2019-06-10 RX ADMIN — Medication 10 ML: at 00:06

## 2019-06-10 RX ADMIN — APIXABAN 5 MG: 5 TABLET, FILM COATED ORAL at 00:00

## 2019-06-10 RX ADMIN — METOPROLOL SUCCINATE 50 MG: 50 TABLET, EXTENDED RELEASE ORAL at 09:22

## 2019-06-10 RX ADMIN — ATORVASTATIN CALCIUM 80 MG: 40 TABLET, FILM COATED ORAL at 00:00

## 2019-06-10 RX ADMIN — LABETALOL HYDROCHLORIDE 10 MG: 5 INJECTION INTRAVENOUS at 00:01

## 2019-06-10 RX ADMIN — INSULIN LISPRO 2 UNITS: 100 INJECTION, SOLUTION INTRAVENOUS; SUBCUTANEOUS at 17:34

## 2019-06-10 RX ADMIN — INSULIN GLARGINE 60 UNITS: 100 INJECTION, SOLUTION SUBCUTANEOUS at 09:22

## 2019-06-10 RX ADMIN — ASPIRIN 81 MG 81 MG: 81 TABLET ORAL at 00:00

## 2019-06-10 RX ADMIN — MAGNESIUM OXIDE TAB 400 MG (241.3 MG ELEMENTAL MG) 400 MG: 400 (241.3 MG) TAB at 09:22

## 2019-06-10 RX ADMIN — LABETALOL HYDROCHLORIDE 10 MG: 5 INJECTION INTRAVENOUS at 06:37

## 2019-06-10 RX ADMIN — ATORVASTATIN CALCIUM 80 MG: 40 TABLET, FILM COATED ORAL at 21:59

## 2019-06-10 RX ADMIN — APIXABAN 5 MG: 5 TABLET, FILM COATED ORAL at 21:59

## 2019-06-10 RX ADMIN — SODIUM CHLORIDE 50 ML/HR: 900 INJECTION, SOLUTION INTRAVENOUS at 00:05

## 2019-06-10 NOTE — PROGRESS NOTES
Reason for Admission:   CVA               RRAT Score:    30 - high risk               Resources/supports as identified by patient/family:   Good family support -  and daughter                 Top Challenges facing patient (as identified by patient/family and CM): Finances/Medication cost?    No issues - has Medicare [employer]                Transportation? Pt's  transports pt by car              Support system or lack thereof? Good family support                    Living arrangements? Lives with  in a 1 story home with 2 steps to the entrance               Self-care/ADLs/Cognition? Independent with ADL's and IADL's/ alert          Current Advanced Directive/Advance Care Plan:  Full Code/ not on file                          Plan for utilizing home health:    No, inpt rehab if approved                      Likelihood of readmission:  Moderate risk                 Transition of Care Plan:  inpt rehab at [facility] if approved and f/u appts    Pt is a 77 y.o  female admitted with a CVA. Pt was resting in bed with her  at the bedside. Demographic information verified and pt's  requested to have his daughter added and this CM will update it. Pt lives with her  in a 1 story home with 2 steps to the entrance. Prior to admission, pt was independent with her ADL's and IADL's. Pt's  stated pt had a stroke in Jan 2019 and [facility] and outpt rehab. Pt has a rw, cane, shower chair and grab bars at home. Preferred pharmacy is [facility] and then [facility] [place]. Discussed therapy's recommendation for inpt rehab and they were in agreement. Discussed inpt rehab hospitals and they chose [facility]. [facility] form signed by pt's  and referral sent via [place]. CM will continue to follow for discharge planning needs.      Care Management Interventions  PCP Verified by CM: Yes(Dr. Fabrizio Mauro)  Mode of Transport at Discharge:  Other (see comment)(pt's  can transport by car)  Transition of Care Consult (CM Consult): Discharge Planning  Discharge Durable Medical Equipment: No(grab bars, cane, rw and shower chair )  Physical Therapy Consult: Yes  Occupational Therapy Consult: Yes  Speech Therapy Consult: Yes  Current Support Network: Lives with Spouse, Own Home(lives with  in a 1 story home with 2 steps to the entrance)  Confirm Follow Up Transport: Family  Plan discussed with Pt/Family/Caregiver: Yes  Freedom of Choice Offered: Yes  Discharge Location  Discharge Placement: Rehab hospital/unit acute    [facility], [facility] [place]

## 2019-06-10 NOTE — CONSULTS
Dictated: Probable new CVA left hemisphere   Await MRI and continue asa and eliquis  Consult  REFERRED BY:  Navneet Reynaga MD    CHIEF COMPLAINT: Altered mental status and confusion      Subjective:     Darrius Landaverde is a [address] y.o. right-handed  female seen as a new patient to me at the request of Dr. Jennifer Beasley for new problem of sudden onset of confusion and altered mental status that began Saturday night, when the patient was not able to write a check, but can still get around, this persisted yesterday morning, and the patient seemed more confused, had difficulty doing things and her  brought her into the hospital for further evaluation. Patient did not appear to have any new focal weakness or sensory loss noted, and no complaint of headache, meningismus, fever, trauma, or toxin exposure or failure to take her medication correctly. Patient does seem to be a little more confused, and was admitted to the hospital, and a CT scan of the head just showed an old stroke in the right hemisphere but no new lesions. Patient does have a known history of intermittent atrial fibrillation and is on Eliquis already, and had a loop monitor inserted after her stroke in January of this year that was thought to be embolic, and a loop monitor picked up several bursts of A. fib and the patient was anticoagulated with Eliquis 5 mg twice a day. She is had aspirin 81 mg added to her Eliquis now to help give anticoagulation for the stroke. On exam she seems to have a little difficulty with her speech, and a left homonymous hemianopsia on exam, in addition to an old left hemiparesis that is mild. She also has some sensory loss on the left side that may be new or old. On my exam now, she does not appear to be in atrial fib. She is on high-dose statin in addition to her anticoagulation and her MRI scans are pending. She has no other new medical illness either.     Past Medical History:   Diagnosis Date    Diabetes ([facility]     Hypertension     Murmur     Osteopenia     Psoriasis     Rosacea     Stroke ([facility]     Vitamin D deficiency       Past Surgical History:   Procedure Laterality Date    COLONOSCOPY N/A 2018    COLONOSCOPY   DIABETIC performed by Ramos Pierce MD at [facility] HX  SECTION      HX COLONOSCOPY  2018    3 polyps    HX TONSILLECTOMY       Family History   Problem Relation Age of Onset    Diabetes Mother     Heart Disease Father     No Known Problems Child       Social History     Tobacco Use    Smoking status: Never Smoker    Smokeless tobacco: Never Used   Substance Use Topics    Alcohol use: Yes     Alcohol/week: 0.0 oz     Comment: 1/couple months         Current Facility-Administered Medications:     insulin lispro (HUMALOG) injection, , SubCUTAneous, AC&HS, Jeff Hanson MD, 3 Units at 06/10/19 1252    labetalol (NORMODYNE;TRANDATE) injection 10 mg, 10 mg, IntraVENous, Q6H PRN, So Jose MD, 10 mg at 06/10/19 6735    hydrALAZINE (APRESOLINE) 20 mg/mL injection 10 mg, 10 mg, IntraVENous, Q6H PRN, So Jose MD, 10 mg at 06/10/19 0127    amLODIPine (NORVASC) tablet 5 mg, 5 mg, Oral, DAILY, Ayden Londono MD, 5 mg at 06/10/19 2855    apixaban (ELIQUIS) tablet 5 mg, 5 mg, Oral, Q12H, So Jose MD, 5 mg at 06/10/19 8214    atorvastatin (LIPITOR) tablet 80 mg, 80 mg, Oral, QHS, So Jose MD, 80 mg at 06/10/19 0000    . PHARMACY TO SUBSTITUTE PER PROTOCOL (Reordered from: etanercept (ENBREL) 50 mg/mL (0.98 mL) injection), , , Per Protocol, Ayden Londono MD    insulin glargine (LANTUS) injection 60 Units, 60 Units, SubCUTAneous, DAILY, So Jose MD, 60 Units at 06/10/19 0982    magnesium oxide (MAG-OX) tablet 400 mg, 400 mg, Oral, BID, So Jose MD, 400 mg at 06/10/19 9940    metoprolol succinate (TOPROL-XL) XL tablet 50 mg, 50 mg, Oral, DAILY, So Jose MD, 50 mg at 06/10/19 7770   acetaminophen (TYLENOL) tablet 650 mg, 650 mg, Oral, Q4H PRN, Mencias, Gwyneth Frankel, MD    ondansetron [facility]) injection 4 mg, 4 mg, IntraVENous, Q6H PRN, Mencias, Gwyneth Frankel, MD    glucose chewable tablet 16 g, 4 Tab, Oral, PRN, Mencias, Gwyneth Frankel, MD    glucagon (GLUCAGEN) injection 1 mg, 1 mg, IntraMUSCular, PRN, Mencias, Gwyneth Frankel, MD    dextrose 10% infusion 250 mL, 250 mL, IntraVENous, PRN, Mencias, Gwyneth Frankel, MD    sodium chloride (NS) flush 5-40 mL, 5-40 mL, IntraVENous, Q8H, Mencias, Gwyneth Frankel, MD, 10 mL at 06/10/19 0645    sodium chloride (NS) flush 5-40 mL, 5-40 mL, IntraVENous, PRN, Mencias, Gwyneth Frankel, MD    aspirin chewable tablet 81 mg, 81 mg, Oral, DAILY, Mencias, Gwyneth Frankel, MD, 81 mg at 06/10/19 0000        Allergies   Allergen Reactions    Pcn [Penicillins] Rash    Sulfa (Sulfonamide Antibiotics) Unknown (comments)      MRI Results (most recent):  Results from [facility] encounter on 01/17/19   MRI BRAIN WO CONT    Narrative EXAM:  MRI BRAIN WO CONT, MRA BRAIN WO CONT    INDICATION:    Stroke    COMPARISON:  CT head 1/17/2019. CONTRAST: None. TECHNIQUE:    MRI Brain:  Multiplanar multisequence acquisition without contrast of the brain. MRA Head:  3-D time-of-flight MRA of the head was performed. Multiplanar and MIP  reconstructions were obtained. FINDINGS:  MRI Brain:  Small acute infarct in the right posterior corona radiata, as well as additional  acute infarct within the right insula, and small scattered infarcts in the right  temporal and occipital lobes, most significantly involving the right  temporo-occipital periventricular white matter. Numerous chronic infarcts are noted, including large chronic infarct in the  right parietal lobe, chronic infarct in the right occipital lobe with associated  cortical laminar necrosis, and small chronic infarct in the right cerebellum.     Generalized parenchymal volume loss with commensurate dilation of the sulci and  ventricular system. Scattered periventricular deep white matter T2/FLAIR  hyperintensities, as well as patchy T2/FLAIR hyperintensity in the diane,  consistent with mild chronic microvascular ischemic disease. Small chronic  microhemorrhage in the posterior left parietal lobe. There is no cerebellar  tonsillar herniation. Expected arterial flow-voids are present. The paranasal sinuses, mastoid air cells, and middle ears are clear. The orbital  contents are within normal limits. No significant osseous or scalp lesions are  identified. MRA Head:  There is no evidence of large vessel occlusion or flow-limiting stenosis of the  intracranial internal carotid, anterior cerebral, and middle cerebral arteries. Asymmetric paucity of right MCA posterior division branches. The anterior  communicating artery is patent. There is no evidence of large vessel occlusion or flow-limiting stenosis of the  intracranial vertebral arteries, basilar artery, or posterior cerebral arteries. The right posterior communicating artery is patent, the left is not seen. There is no evidence of aneurysm or vascular malformation. Impression IMPRESSION:   MRI Brain:  1. Multiple small acute infarcts within the right posterior corona radiata,  right insula, and right temporo-occipital lobe. 2. Generalized parenchymal volume loss and mild chronic microvascular ischemic  disease. Chronic infarcts in the right parietal lobe, right occipital lobe, and  right cerebellum. MRA Head:  1. No evidence of proximal significant stenosis. Asymmetric paucity of distal  right MCA posterior division branches, likely chronic. 2. No aneurysm. 23X             Results from [facility] encounter on 01/17/19   MRI BRAIN WO CONT    Narrative EXAM:  MRI BRAIN WO CONT, MRA BRAIN WO CONT    INDICATION:    Stroke    COMPARISON:  CT head 1/17/2019. CONTRAST: None.     TECHNIQUE:    MRI Brain:  Multiplanar multisequence acquisition without contrast of the brain.    MRA Head:  3-D time-of-flight MRA of the head was performed. Multiplanar and MIP  reconstructions were obtained. FINDINGS:  MRI Brain:  Small acute infarct in the right posterior corona radiata, as well as additional  acute infarct within the right insula, and small scattered infarcts in the right  temporal and occipital lobes, most significantly involving the right  temporo-occipital periventricular white matter. Numerous chronic infarcts are noted, including large chronic infarct in the  right parietal lobe, chronic infarct in the right occipital lobe with associated  cortical laminar necrosis, and small chronic infarct in the right cerebellum. Generalized parenchymal volume loss with commensurate dilation of the sulci and  ventricular system. Scattered periventricular deep white matter T2/FLAIR  hyperintensities, as well as patchy T2/FLAIR hyperintensity in the diane,  consistent with mild chronic microvascular ischemic disease. Small chronic  microhemorrhage in the posterior left parietal lobe. There is no cerebellar  tonsillar herniation. Expected arterial flow-voids are present. The paranasal sinuses, mastoid air cells, and middle ears are clear. The orbital  contents are within normal limits. No significant osseous or scalp lesions are  identified. MRA Head:  There is no evidence of large vessel occlusion or flow-limiting stenosis of the  intracranial internal carotid, anterior cerebral, and middle cerebral arteries. Asymmetric paucity of right MCA posterior division branches. The anterior  communicating artery is patent. There is no evidence of large vessel occlusion or flow-limiting stenosis of the  intracranial vertebral arteries, basilar artery, or posterior cerebral arteries. The right posterior communicating artery is patent, the left is not seen. There is no evidence of aneurysm or vascular malformation. Impression IMPRESSION:   MRI Brain:  1.  Multiple small acute infarcts within the right posterior corona radiata,  right insula, and right temporo-occipital lobe. 2. Generalized parenchymal volume loss and mild chronic microvascular ischemic  disease. Chronic infarcts in the right parietal lobe, right occipital lobe, and  right cerebellum. MRA Head:  1. No evidence of proximal significant stenosis. Asymmetric paucity of distal  right MCA posterior division branches, likely chronic. 2. No aneurysm. 23X           Review of Systems:  Review of systems not obtained due to patient factors. Vitals:    06/10/19 0518 06/10/19 0637 06/10/19 0750 06/10/19 1108   BP:  185/76 161/74 155/66   Pulse:  83 86 78   Resp:   16 18   Temp:   98.1 °F (36.7 °C) 98 °F (36.7 °C)   SpO2:   98% 97%   Weight: 204 lb 14.4 oz (92.9 kg)        Objective:     I      NEUROLOGICAL EXAM:    Appearance: The patient is well developed, well nourished, provides a poor history and is in no acute distress. Mental Status: Oriented to place and person, and the not the date or president, cognitive function is mildly abnormal and speech is mildly aphasic I think, but no real dysarthria and mood and affect appropriate, but a little confused. Cranial Nerves:   Intact visual fields. Fundi are benign, disc are flat, no lesions seen on funduscopy. ROSALIND, EOM's full, no nystagmus, no ptosis. Facial sensation is normal. Corneal reflexes are not tested. Facial movement is asymmetric with mild central facial on the left. Hearing is abnormal bilaterally. Palate is midline with normal sternocleidomastoid and trapezius muscles are normal. Tongue is midline. Neck without meningismus or bruits  Temporal arteries are not tender or enlarged  TMJ areas are not tender on palpation   Motor:  5/5 strength in upper and lower proximal and distal muscles, with a very mild left hemiparesis. Normal bulk and tone on the right and slight increase in the left. No fasciculations.   Rapid alternating movement is slightly reduced on the left   Reflexes:   Deep tendon reflexes 2+/4 and asymmetrical, with perhaps a slight increase on the left. No babinski or clonus present   Sensory:   Abnormal to touch, pinprick and vibration and temperature and  DSS on the left, and slight decreased sensation in both feet to ankle level. Gait:  Not tested. Tremor:   No tremor noted. Cerebellar:  No abnormal cerebellar signs present on finger-nose-finger exam.  Romberg and tandem not tested   Neurovascular:  Normal heart sounds and regular rhythm, peripheral pulses decreased, and no carotid bruits. Assessment:   Active Problems:    Essential hypertension, benign (8/11/2014)      Obesity, morbid ([address] (12/26/2017)      Type 2 diabetes mellitus with nephropathy ([address] (12/26/2017)      Acute ischemic cerebrovascular accident (CVA) involving right middle cerebral artery [facility]) (1/17/2019)      Cerebrovascular accident (CVA) due to thrombosis of right middle cerebral artery ([address] (1/18/2019)      Paroxysmal atrial fibrillation ([address] (4/16/2019)      CVA (cerebral vascular accident) ([address] (6/9/2019)      Diabetic peripheral neuropathy associated with type 2 diabetes mellitus ([address] (6/10/2019)      Bilateral carotid artery stenosis (6/10/2019)      Altered mental status, unspecified (1/81/8497)      Embolic stroke involving left middle cerebral artery ([address] (6/10/2019)        Plan:     Patient appears to have new deficits involving some difficulty with speech, and a field cut on the right side that seem to be new, in addition to her old findings of old left hemiparesis. Agree with adding aspirin Eliquis, 1 would think most likely that she had a recurrent stroke from her A. Fib. Carotid Dopplers done today were reviewed and they do not show anything new or any significant stenosis.   Her CT scan was reviewed from last night, and her MRI scan reviewed from previous admission in January 2019 and I agree with reports that there are no new lesions on this CT but she deftly had embolic events last January. Discussed with the patient and her  in detail, we will follow carefully with you, and PT OT and speech therapy have all been ordered and are seen the patient now. Signed By: Deya Mccullough MD     Brianda 10, 2019       CC:  Jhoana Moss MD  FAX: [phone]

## 2019-06-10 NOTE — PROGRESS NOTES
Problem: Mobility Impaired (Adult and Pediatric)  Goal: *Acute Goals and Plan of Care (Insert Text)  Description  Physical Therapy Goals  Initiated 6/10/2019  1. Patient will move from supine to sit and sit to supine , scoot up and down and roll side to side in bed with independence within 7 day(s). 2.  Patient will transfer from bed to chair and chair to bed with modified independence using the least restrictive device within 7 day(s). 3.  Patient will perform sit to stand with independence within 7 day(s). 4.  Patient will ambulate with modified independence for 150 feet with the least restrictive device within 7 day(s). 5.  Patient will ascend/descend 2 stairs with 2 handrail(s) with modified independence within 7 day(s). Outcome: Not Progressing Towards Goal   PHYSICAL THERAPY EVALUATION  Patient: Liliana Kirby (1 y.o. female)  Date: 6/10/2019  Primary Diagnosis: CVA (cerebral vascular accident) [facility]) [I63.9]        Precautions:       ASSESSMENT :  Based on the objective data described below, the patient presents with impaired cognition, visual deficits, decreased strength, delayed processing, and overall decreased functional mobility compared to baseline. Pt received supine in bed, agreeable and cleared by nursing for mobility. Overall, pt required CGA for bed mobility with cues for hand placement and sequencing of task. Pt demonstrated difficulty following one-step commands and required constant cues t/o session with pt perseverating on certain motions during strength assessment. Ambulated 27' with no AD requiring CGA-Gabriella with occasional path deviations and reaching for objects in the room for support. Step to gait, decreased step clearance, and narrow PABLITO noted. Unclear what visual deficits are present, however pt reports seeing half on the R side.  present during session and reports pt cognition level is worse compared to yesterday and is significantly impaired from baseline.  Pt left sitting up in chair with all needs met at conclusion of therapy. Recommend IP Rehab at discharge. Awaiting MRI results. Will assess MORA next session. Patient will benefit from skilled intervention to address the above impairments. Patients rehabilitation potential is considered to be Good  Factors which may influence rehabilitation potential include:   ? None noted  ? Mental ability/status  ? Medical condition  ? Home/family situation and support systems  ? Safety awareness  ? Pain tolerance/management  ? Other:      PLAN :  Recommendations and Planned Interventions:  ?           Bed Mobility Training             ? Neuromuscular Re-Education  ? Transfer Training                   ? Orthotic/Prosthetic Training  ? Gait Training                         ? Modalities  ? Therapeutic Exercises           ? Edema Management/Control  ? Therapeutic Activities            ? Patient and Family Training/Education  ? Other (comment):    Frequency/Duration: Patient will be followed by physical therapy  5 times a week to address goals. Discharge Recommendations: Inpatient Rehab  Further Equipment Recommendations for Discharge: TBD      SUBJECTIVE:   Patient stated I'm at the hospital.    OBJECTIVE DATA SUMMARY:   HISTORY:    Past Medical History:   Diagnosis Date    Diabetes [facility]     Hypertension     Murmur     Osteopenia     Psoriasis     Rosacea     Stroke ([facility]     Vitamin D deficiency      Past Surgical History:   Procedure Laterality Date    COLONOSCOPY N/A 4/4/2018    COLONOSCOPY   DIABETIC performed by Paul Vasquez MD at [facility] [address] HX COLONOSCOPY  2018    3 polyps    HX TONSILLECTOMY       Prior Level of Function/Home Situation: Lives with , who reports pt was independent with ambulation and ADLs.   Personal factors and/or comorbidities impacting plan of care: impaired cognition, visual deficits, decreased strength, delayed motor planning and processing    Home Situation  Home Environment: Private residence  # Steps to Enter: 2  Rails to Enter: Yes  Hand Rails : Bilateral  One/Two Story Residence: One story  Living Alone: No  Support Systems: Spouse/Significant Other/Partner  Current DME Used/Available at Home: Grab bars, Cane, straight, Walker, rolling, Shower chair  Tub or Shower Type: Shower    EXAMINATION/PRESENTATION/DECISION MAKING:   Critical Behavior:  Neurologic State: Alert, Confused  Orientation Level: Unable to verbalize  Cognition: Decreased command following  Safety/Judgement: Not assessed    Range Of Motion:  AROM: Within functional limits           PROM: Within functional limits           Strength:    Strength: Generally decreased, functional     Functional Mobility:  Bed Mobility:  Rolling: Contact guard assistance  Supine to Sit: Contact guard assistance     Scooting: Contact guard assistance  Transfers:  Sit to Stand: Contact guard assistance  Stand to Sit: Contact guard assistance(cues for hand placement and sequencing)        Bed to Chair: Contact guard assistance;Minimum assistance              Balance:   Sitting: Intact; With support  Standing: Impaired; Without support  Standing - Static: Fair  Standing - Dynamic : Fair  Ambulation/Gait Training:  Distance (ft): 30 Feet (ft)  Assistive Device: Gait belt  Ambulation - Level of Assistance: Contact guard assistance;Minimal assistance     Gait Description (WDL): Exceptions to WDL  Gait Abnormalities: Decreased step clearance; Step to gait; Path deviations        Base of Support: Narrowed     Speed/Mary Ann: Slow  Step Length: Right shortened;Left shortened            Functional Measure:  Barthel Index:    Bathin  Bladder: 0  Bowels: 5  Groomin  Dressin  Feedin  Mobility: 0  Stairs: 0  Toilet Use: 5  Transfer (Bed to Chair and Back): 10  Total: 25/100       Percentage of impairment   0%   1-19%   20-39%   40-59%   60-79%   80-99%   100%   Barthel Score 0-100 100 99-80 79-60 59-40 20-39 1-19   0     The Barthel ADL Index: Guidelines  1. The index should be used as a record of what a patient does, not as a record of what a patient could do. 2. The main aim is to establish degree of independence from any help, physical or verbal, however minor and for whatever reason. 3. The need for supervision renders the patient not independent. 4. A patient's performance should be established using the best available evidence. Asking the patient, friends/relatives and nurses are the usual sources, but direct observation and common sense are also important. However direct testing is not needed. 5. Usually the patient's performance over the preceding 24-48 hours is important, but occasionally longer periods will be relevant. 6. Middle categories imply that the patient supplies over 50 per cent of the effort. 7. Use of aids to be independent is allowed. Efren Henry., Barthel, D.W. (5919). Functional evaluation: the Barthel Index. [facility] (14)2. SOLO Ashby, Alida ., [facility] [place], [address] [place] [address] (1999). Measuring the change indisability after inpatient rehabilitation; comparison of the responsiveness of the Barthel Index and Functional [place] Measure. Journal of Neurology, Neurosurgery, and Psychiatry, 66(4), 467-525. Nae Alarcon, N.J.A, GROVER Villasenor, & Pily Huddleston, M.A. (2004.) Assessment of post-stroke quality of life in cost-effectiveness studies: The usefulness of the Barthel Index and the EuroQoL-5D.  Quality of Life Research, 15, 260-41           Physical Therapy Evaluation Charge Determination   History Examination Presentation Decision-Making   MEDIUM  Complexity : 1-2 comorbidities / personal factors will impact the outcome/ POC  HIGH Complexity : 4+ Standardized tests and measures addressing body structure, function, activity limitation and / or participation in recreation  HIGH Complexity : Unstable and unpredictable characteristics  Other outcome measures Barthel  HIGH       Based on the above components, the patient evaluation is determined to be of the following complexity level: HIGH     Activity Tolerance:   Good; limited due to difficulty following one-step commands and visual deficits  Please refer to the flowsheet for vital signs taken during this treatment. After treatment:   ?         Patient left in no apparent distress sitting up in chair  ? Patient left in no apparent distress in bed  ? Call bell left within reach  ? Nursing notified  ?  present  ? Bed alarm activated    COMMUNICATION/EDUCATION:   The patients plan of care was discussed with: Occupational Therapist, Registered Nurse and . ?         Fall prevention education was provided and the patient/caregiver indicated understanding. ? Patient/family have participated as able in goal setting and plan of care. ?         Patient/family agree to work toward stated goals and plan of care. ?         Patient understands intent and goals of therapy, but is neutral about his/her participation. ? Patient is unable to participate in goal setting and plan of care. Thank you for this referral.  Jessi Barrientos   Time Calculation: 24 mins    Regarding student involvement in patient care:  A student participated in this treatment session. Per CMS Medicare statements and APTA guidelines I certify that the following was true:  1. I was present and directly observed the entire session. 2. I made all skilled judgments and clinical decisions regarding care. 3. I am the practitioner responsible for assessment, treatment, and documentation.

## 2019-06-10 NOTE — H&P
Hospitalist Admission Note    NAME: Charlene Zambrano   :  1950   MRN:  [id number]     Date/Time:  2019 11:05 PM    Patient PCP: Robert Stapleton MD  ______________________________________________________________________  Given the patient's current clinical presentation, I have a high level of concern for decompensation if discharged from the emergency department. Complex decision making was performed, which includes reviewing the patient's available past medical records, laboratory results, and x-ray films. My assessment of this patient's clinical condition and my plan of care is as follows. Assessment / Plan:  CVA: patient with H/O multiple CVA in  likely embolic on Eliquis comes today with what seems to be new episodes of CVA, she is out of the window for TPA, will check MRI, ECHO, Carotids, c/w Eliquis add low dose ASA, get Neurology evaluation. HTN Urgency: will c/w BB and hold ACE, use labetalol and hydralazine allowing for permissive HTN. DM type 2 with hyperglycemia: will hold metformin, c/w Lantus, place SSI, check Hba1C. ARF: start gentle hydration, hold ACE and metformin and monitor  Psoriasis: c/w home regimen  Code Status: Full Code  Surrogate Decision Maker:  Nate Burroughs  DVT Prophylaxis: Eliquis  GI Prophylaxis: not indicated  Baseline: Fairly independent lives with       Subjective:   CHIEF COMPLAINT: \"I do not feel well\"  Not a very good historian    HISTORY OF PRESENT ILLNESS:     Gina Locke is a 77 y.o.  female who presents with PMH of CVA, HTN, DM, Psoriasis  Patient presenting with concern for confusion, altered mental status since yesterday afternoon.  states that she is not acting herself, she is unable to explain or perform simple tasks such as writing a check, this is never happened before. She did have a recent CVA, all of her deficits resolved.   She is on Eliquis at this time for concern for paroxysmal atrial fibrillation, she denies any other symptoms such as headache, dysuria, cough, fever. Exam shows torsten-inattention on the right side, when told to touch the right lateral fascia touch of the left side, she has extinction to the right, concern for completed stroke  Patient's work-up has mostly been completed with nothing found, she will need to be admitted for an MRI, PT, further evaluation. Pending UA and then will plan for admission  No MRI here, [facility], hospitalist there requesting CTA of the head and neck be done to more efficiently rule out bleed, they will accept and will obtain MRI. Patient just arrived from another institution c/o confusion, left sided weakness some degree of aphasia, denies chest pain, no SOB, no fever, no N/V no diarrhea, no urinary symptoms, no other associated symptoms. We were asked to admit for work up and evaluation of the above problems. Past Medical History:   Diagnosis Date    Diabetes ([facility]     Hypertension     Murmur     Osteopenia     Psoriasis     Rosacea     Stroke ([facility]     Vitamin D deficiency         Past Surgical History:   Procedure Laterality Date    COLONOSCOPY N/A 4/4/2018    COLONOSCOPY   DIABETIC performed by Juan Mcnally MD at [facility]"" [address]  [facility]      HX COLONOSCOPY  2018    3 polyps    HX TONSILLECTOMY         Social History     Tobacco Use    Smoking status: Never Smoker    Smokeless tobacco: Never Used   Substance Use Topics    Alcohol use: Yes     Alcohol/week: 0.0 oz     Comment: 1/couple months        Family History   Problem Relation Age of Onset    Diabetes Mother     Heart Disease Father      Allergies   Allergen Reactions    Pcn [Penicillins] Rash    Sulfa (Sulfonamide Antibiotics) Unknown (comments)        Prior to Admission medications    Medication Sig Start Date End Date Taking?  Authorizing Provider   metoprolol succinate (TOPROL-XL) 50 mg XL tablet TAKE 1 TABLET DAILY 6/5/19   Ronnell Elvia Conroy MD   quinapril (ACCUPRIL) 40 mg tablet TAKE 1 TABLET TWICE A DAY 6/5/19   Elvia Reynaga MD   apixaban (ELIQUIS) 5 mg tablet TAKE 1 TABLET BY MOUTH TWICE A DAY 4/16/19   Linda Conklin MD   insulin detemir U-100 (LEVEMIR FLEXTOUCH) 100 unit/mL (3 mL) inpn 60 Units by SubCUTAneous route daily. 4/16/19   Elvia Reynaga MD   Insulin Needles, Disposable, (EASY COMFORT PEN NEEDLES) 31 gauge x 5/16\" ndle Check BS BID 4/16/19   Elvia Reynaga MD   glucose blood VI test strips (ASCENSIA AUTODISC VI, ONE TOUCH ULTRA TEST VI) strip Check glucose twice daily. 4/16/19   Elvia Reynaga MD   Blood-Glucose Meter monitoring kit Check blood sugar twice daily. 3/14/19   Sil Maier NP   atorvastatin (LIPITOR) 80 mg tablet Take 1 Tab by mouth daily. Patient taking differently: Take 80 mg by mouth nightly. 3/4/19   Elvia Reynaga MD   amLODIPine (NORVASC) 5 mg tablet Take 1 Tab by mouth daily. 3/4/19   Elvia Reynaga MD   dulaglutide (TRULICITY) 1.5 DN/9.7 mL sub-q pen 0.5 mL by SubCUTAneous route every seven (7) days. 2/8/19   Elvia Reynaga MD   magnesium oxide (MAG-OX) 400 mg tablet Take 1 Tab by mouth two (2) times a day. 1/24/19   Lyndsay Westfall MD   metFORMIN (GLUCOPHAGE) 850 mg tablet TAKE 1 TABLET TWICE A DAY WITH MEALS 2/20/18   Elvia Reynaga MD   etanercept (ENBREL) 50 mg/mL (0.98 mL) injection 50 mg by SubCUTAneous route every seven (7) days. Provider, Historical       REVIEW OF SYSTEMS:     I am not able to complete the review of systems because:    The patient is intubated and sedated   y The patient has altered mental status due to his acute medical problems    The patient has baseline aphasia from prior stroke(s)    The patient has baseline dementia and is not reliable historian    The patient is in acute medical distress and unable to provide information           Total of 12 systems reviewed as follows:       POSITIVE= underlined text Negative = text not underlined  General:  fever, chills, sweats, generalized weakness, weight loss/gain,      loss of appetite   Eyes:    blurred vision, eye pain, loss of vision, double vision  ENT:    rhinorrhea, pharyngitis   Respiratory:   cough, sputum production, SOB, MALIN, wheezing, pleuritic pain   Cardiology:   chest pain, palpitations, orthopnea, PND, edema, syncope   Gastrointestinal:  abdominal pain , N/V, diarrhea, dysphagia, constipation, bleeding   Genitourinary:  frequency, urgency, dysuria, hematuria, incontinence   Muskuloskeletal :  arthralgia, myalgia, back pain  Hematology:  easy bruising, nose or gum bleeding, lymphadenopathy   Dermatological: rash, ulceration, pruritis, color change / jaundice  Endocrine:   hot flashes or polydipsia   Neurological:  headache, dizziness, confusion, focal weakness, paresthesia,     Speech difficulties, memory loss, gait difficulty  Psychological: Feelings of anxiety, depression, agitation    Objective:   VITALS:    Visit Vitals  /83   Pulse 80   Resp 18   SpO2 98%       PHYSICAL EXAM:    General:    Alert, cooperative, no distress, appears stated age. HEENT: Atraumatic, anicteric sclerae, pink conjunctivae     No oral ulcers, mucosa dry, throat clear, dentition poor  Neck:  Supple, symmetrical,  thyroid: non tender  Lungs:   Coarse BS  Chest wall:  No tenderness  No Accessory muscle use. Heart:   Regular  rhythm,  No  murmur   No edema  Abdomen:   Soft, non-tender. Not distended. Bowel sounds normal  Extremities: No cyanosis. No clubbing,      Skin turgor normal, Capillary refill normal, Radial  pulse 2+  Skin:     Not pale. Not Jaundiced  No rashes   Psych:  Fair  insight. Not depressed. Not anxious or agitated.   Neurologic: Awake, oriented x2, GCS [id number], left side weakness, mild expressive aphasia    _______________________________________________________________________  Care Plan discussed with:    Comments   Patient y    Family      RN y Care Manager                    Consultant:      _______________________________________________________________________  Expected  Disposition:   Home with Family    HH/PT/OT/RN    SNF/LTC y   [de-identified]    ________________________________________________________________________  TOTAL TIME:  61 Minutes    Critical Care Provided     Minutes non procedure based      Comments    y Reviewed previous records   >50% of visit spent in counseling and coordination of care y Discussion with patient and/or family and questions answered       ________________________________________________________________________  Signed: Amalia Dacosta MD    Procedures: see electronic medical records for all procedures/Xrays and details which were not copied into this note but were reviewed prior to creation of Plan. LAB DATA REVIEWED:    Recent Results (from the past 24 hour(s))   GLUCOSE, POC    Collection Time: 06/09/19  2:31 PM   Result Value Ref Range    Glucose (POC) 127 (H) 65 - 100 mg/dL    Performed by Mateo Messer    CBC WITH AUTOMATED DIFF    Collection Time: 06/09/19  2:46 PM   Result Value Ref Range    WBC 10.3 3.6 - 11.0 K/uL    RBC 4.45 3.80 - 5.20 M/uL    HGB 13.2 11.5 - 16.0 g/dL    HCT 39.6 35.0 - 47.0 %    MCV 89.0 80.0 - 99.0 FL    MCH 29.7 26.0 - 34.0 PG    MCHC 33.3 30.0 - 36.5 g/dL    RDW 12.8 11.5 - 14.5 %    PLATELET 691 022 - 946 K/uL    MPV 9.9 8.9 - 12.9 FL    NRBC 0.0 0  WBC    ABSOLUTE NRBC 0.00 0.00 - 0.01 K/uL    NEUTROPHILS 67 32 - 75 %    LYMPHOCYTES 21 12 - 49 %    MONOCYTES 6 5 - 13 %    EOSINOPHILS 4 0 - 7 %    BASOPHILS 1 0 - 1 %    IMMATURE GRANULOCYTES 1 (H) 0.0 - 0.5 %    ABS. NEUTROPHILS 7.1 1.8 - 8.0 K/UL    ABS. LYMPHOCYTES 2.1 0.8 - 3.5 K/UL    ABS. MONOCYTES 0.6 0.0 - 1.0 K/UL    ABS. EOSINOPHILS 0.4 0.0 - 0.4 K/UL    ABS. BASOPHILS 0.1 0.0 - 0.1 K/UL    ABS. IMM.  GRANS. 0.1 (H) 0.00 - 0.04 K/UL    DF AUTOMATED     METABOLIC PANEL, COMPREHENSIVE    Collection Time: 06/09/19  2:46 PM Result Value Ref Range    Sodium 139 136 - 145 mmol/L    Potassium 3.8 3.5 - 5.1 mmol/L    Chloride 102 97 - 108 mmol/L    CO2 30 21 - 32 mmol/L    Anion gap 7 5 - 15 mmol/L    Glucose 139 (H) 65 - 100 mg/dL    BUN 23 (H) 6 - 20 MG/DL    Creatinine 1.32 (H) 0.55 - 1.02 MG/DL    BUN/Creatinine ratio 17 12 - 20      GFR est AA 48 (L) >60 ml/min/1.73m2    GFR est non-AA 40 (L) >60 ml/min/1.73m2    Calcium 9.1 8.5 - 10.1 MG/DL    Bilirubin, total 0.4 0.2 - 1.0 MG/DL    ALT (SGPT) 46 12 - 78 U/L    AST (SGOT) 30 15 - 37 U/L    Alk. phosphatase 132 (H) 45 - 117 U/L    Protein, total 7.0 6.4 - 8.2 g/dL    Albumin 2.5 (L) 3.5 - 5.0 g/dL    Globulin 4.5 (H) 2.0 - 4.0 g/dL    A-G Ratio 0.6 (L) 1.1 - 2.2     PROTHROMBIN TIME + INR    Collection Time: 06/09/19  2:46 PM   Result Value Ref Range    INR 1.0 0.9 - 1.1      Prothrombin time 9.5 9.0 - 11.1 sec   PTT    Collection Time: 06/09/19  2:46 PM   Result Value Ref Range    aPTT 25.8 22.1 - 32.0 sec    aPTT, therapeutic range     58.0 - 77.0 SECS   TROPONIN I    Collection Time: 06/09/19  2:46 PM   Result Value Ref Range    Troponin-I, Qt. <0.05 <0.05 ng/mL   SAMPLES BEING HELD    Collection Time: 06/09/19  2:46 PM   Result Value Ref Range    SAMPLES BEING HELD 1SST     COMMENT        Add-on orders for these samples will be processed based on acceptable specimen integrity and analyte stability, which may vary by analyte.    EKG, 12 LEAD, INITIAL    Collection Time: 06/09/19  3:18 PM   Result Value Ref Range    Ventricular Rate 67 BPM    Atrial Rate 67 BPM    P-R Interval 166 ms    QRS Duration 86 ms    Q-T Interval 402 ms    QTC Calculation (Bezet) 424 ms    Calculated P Axis 36 degrees    Calculated R Axis 28 degrees    Calculated T Axis 33 degrees    Diagnosis       Normal sinus rhythm  Poor R-wave Progression  Abnormal ECG  When compared with ECG of 17-JAN-2019 23:01,  No significant change was found  Confirmed by Criss Negro MD, --- ([id number]) on 6/9/2019 9:04:02 PM URINALYSIS W/ REFLEX CULTURE    Collection Time: 06/09/19  4:30 PM   Result Value Ref Range    Color YELLOW/STRAW      Appearance CLEAR CLEAR      Specific gravity 1.015 1.003 - 1.030      pH (UA) 7.0 5.0 - 8.0      Protein >300 (A) NEG mg/dL    Glucose NEGATIVE  NEG mg/dL    Ketone NEGATIVE  NEG mg/dL    Bilirubin NEGATIVE  NEG      Blood SMALL (A) NEG      Urobilinogen 0.2 0.2 - 1.0 EU/dL    Nitrites NEGATIVE  NEG      Leukocyte Esterase NEGATIVE  NEG      WBC 0-4 0 - 4 /hpf    RBC 0-5 0 - 5 /hpf    Epithelial cells FEW FEW /lpf    Bacteria NEGATIVE  NEG /hpf    UA:UC IF INDICATED CULTURE NOT INDICATED BY UA RESULT CNI

## 2019-06-10 NOTE — PROGRESS NOTES
2100:Report received from [facility] patient in route  2242: Patient Direct admit from [facility], moved over to bed from stretcher Vitals assessment obtained, NIH scale (5) and STAND tool (0) completed. Stroke education information left at bedside. Due to patients acute memory loss and expressive Asphasia admission database not complete will wait until family is present to complete   Primary Nurse Radha Hannon RN and KATIUSKA Lagos RN performed a dual skin assessment on this patient No impairment noted  William score is 17.  Patient sacrum is red but blanchable pannus is red but blanchable cream applied to both areas   Pure wick placed until patient is cleared by therapy to ambulate  0700: Bedside report given to [place]

## 2019-06-10 NOTE — PROGRESS NOTES
Patient evaluated for OT needs and full evaluation to follow. Patient will benefit from acute OT and she will need inpatient rehab at discharge.

## 2019-06-10 NOTE — PROGRESS NOTES
Problem: Dysphagia (Adult)  Goal: *Acute Goals and Plan of Care (Insert Text)  Description  Speech pathology goals initiated 6/10/2019   1. Patient will tolerate a regular diet/ thin liquids free of s/s aspiration within 7 days  2. Patient will participate in language eval pending MRI results within 7 days   6/10/2019 1054 by Dannie Gottron, SLP  Outcome: Progressing Towards Goal   [address] EVALUATION  Patient: Osman Shields (78 y.o. female)  Date: 6/10/2019  Primary Diagnosis: CVA (cerebral vascular accident) [facility]) [I63.9]        Precautions:       ASSESSMENT :  Based on the objective data described below, the patient presents with suspected functional oropharyngeal swallow though limited assessment given patient refusal. Patient took scant bite of yousuf cracker and demonstrated mildly prolonged but complete mastication. Suspect mildly delayed swallow initiation via palpation. No overt s/s aspiration with straw sips of thin, puree or solid. Patient with poor command following and limited verbal output. She had several spontaneous, appropriate verbalizations. Speech was perseverative.  reports this is far from her baseline. Upon review of SLP notes from 1/[facility], patient had mild dysarthria and at least mild cognitive deficits. At that time, daughter report progressive worsening of recall and initiation. Patient will benefit from skilled intervention to address the above impairments. Patient?s rehabilitation potential is considered to be Fair  Factors which may influence rehabilitation potential include:   ? None noted  ? Mental ability/status  ? Medical condition  ? Home/family situation and support systems  ? Safety awareness  ? Pain tolerance/management  ? Other:      PLAN :  Recommendations and Planned Interventions:  -- regular/thin.  Straw ok  -- slp to follow up once to ensure diet tolerance and will complete language eval pending MRI results    Frequency/Duration: Patient will be followed by speech-language pathology 1 time a week to address goals. Discharge Recommendations: To Be Determined     SUBJECTIVE:   Patient stated ? I can be done now??    OBJECTIVE:     Past Medical History:   Diagnosis Date    Diabetes ([facility] [address]     Hypertension     Murmur     Osteopenia     Psoriasis     Rosacea     Stroke ([facility] [address]     Vitamin D deficiency      Past Surgical History:   Procedure Laterality Date    COLONOSCOPY N/A 4/4/2018    COLONOSCOPY   DIABETIC [facility] by Cherry Lloyd MD at [facility]     [facility]      HX COLONOSCOPY  2018    3 polyps    HX TONSILLECTOMY       Prior Level of Function/Home Situation:   Home Situation  Home Environment: Private residence  # Steps to Enter: 2  Rails to Enter: Yes  Hand Rails : Bilateral  One/Two Story Residence: One story  Living Alone: No  Support Systems: Spouse/Significant Other/Partner  Current DME Used/Available at Home: Grab bars, Cane, straight, Walker, rolling, Shower chair  Tub or Shower Type: Shower  Diet prior to admission: regular/thin  Current Diet:  regular/thin   Cognitive and Communication Status:  Neurologic State: Alert, Confused  Orientation Level: Unable to verbalize  Cognition: Decreased command following     Perseveration: Perseverates during conversation  Safety/Judgement: Not assessed  Oral Assessment:  Oral Assessment  Labial: (limited command following)  Dentition: Natural  Oral Hygiene: (clean, moist)  Velum: Unable to visualize  Mandible: No impairment  P.O. Trials:  Patient Position: (up in bed)  Vocal quality prior to P.O.: No impairment  Consistency Presented: Thin liquid; Solid;Puree  How Presented: Straw;SLP-fed/presented;Spoon     Bolus Acceptance: No impairment  Bolus Formation/Control: Impaired  Type of Impairment: Delayed  Propulsion: Delayed (# of seconds)  Oral Residue: None  Initiation of Swallow: Delayed (# of seconds)  Laryngeal Elevation: Functional  Aspiration Signs/Symptoms: None  Pharyngeal Phase Characteristics: No impairment, issues, or problems   Effective Modifications: None  Cues for Modifications: None       Oral Phase Severity: Mild  Pharyngeal Phase Severity : Minimal    NOMS:   The NOMS functional outcome measure was used to quantify this patient's level of swallowing impairment. Based on the NOMS, the patient was determined to be at level 7 for swallow function       NOMS Swallowing Levels:  Level 1 (CN): NPO  Level 2 (CM): NPO but takes consistency in therapy  Level 3 (CL): Takes less than 50% of nutrition p.o. and continues with nonoral feedings; and/or safe with mod cues; and/or max diet restriction  Level 4 (CK): Safe swallow but needs mod cues; and/or mod diet restriction; and/or still requires some nonoral feeding/supplements  Level 5 (CJ): Safe swallow with min diet restriction; and/or needs min cues  Level 6 (CI): Independent with p.o.; rare cues; usually self cues; may need to avoid some foods or needs extra time  Level 7 ([address] [place] [address]): Independent for all p.o.  ERICKA. (2003). National Outcomes Measurement System (NOMS): Adult Speech-Language Pathology User's Guide. After treatment:   ?            Patient left in no apparent distress sitting up in chair  ? Patient left in no apparent distress in bed  ? Call bell left within reach  ? Nursing notified  ? Caregiver present  ? Bed alarm activated    COMMUNICATION/EDUCATION:   The patient?s plan of care including recommendations, planned interventions, and recommended diet changes were discussed with: Registered Nurse. ? Posted safety precautions in patient's room. ? Patient/family have participated as able in goal setting and plan of care. ?            Patient/family agree to work toward stated goals and plan of care.   ?            Patient understands intent and goals of therapy, but is neutral about his/her participation. ? Patient is unable to participate in goal setting and plan of care.     Thank you for this referral.  Tom Guillen, SLP  Time Calculation: 10 mins

## 2019-06-10 NOTE — PROGRESS NOTES
Hospitalist Progress Note  Simone Pino MD  Answering service: [phone] from in house phone  Cell: [phone]      Date of Service:  6/10/2019 1  NAME:  Sharee Tang  :  1950  MRN:  [id number]  Admission date: 2019 10:29 PM    Admission Summary:   Sharee Tang 77 y.o. female with prior RMCA ischemic stroke, HTN, T2DM presented with new speech difficulties and admitted for stroke work up. Interval history / Subjective:   Remains with expressive aphasia but otherwise denies headache, visio changes, weakness in seferino arms and legs, numbness, gait abnormalities. Denies fever, chest pains, abdominal pain, dysuria. Assessment & Plan:   Possible acute ischemic stroke. Expressive aphasia. -Permisive hypertension today unless SBP>220  -Continue aspirin, high intensity statin  -MRI, ECHO pending  -Neurology consulted  -PTOT SLP evaluation  Paroxysmal atrial fibrillation. In NSR on my rounds.  -Continue Eliquis  HTN. Hypertensive urgency on admission.  -Ok to continue norvasc and metoprolol  -Avoid hypotension. SBP goal 160-220    DM type 2 with hyperglycemia  -Continue lantus 60 units  -Metformin on hold  -Accuchecks ac/hs  Acute kidney failure.  Resolved on gentle hydration  -D/c IVF    Code status Full code  DVT prophylaxis: Eliquis  Disposition:    Hospital Problems  Date Reviewed: 6/10/2019          Codes Class Noted POA    Diabetic peripheral neuropathy associated with type 2 diabetes mellitus ([facility] ICD-10-CM: E11.42  ICD-9-CM: 250.60, 357.2  6/10/2019 Unknown        Bilateral carotid artery stenosis ICD-10-CM: I65.23  ICD-9-CM: 433.10, 433.30  6/10/2019 Yes        Altered mental status, unspecified ICD-10-CM: R41.82  ICD-9-CM: 780.97  1603 Yes        Embolic stroke involving left middle cerebral artery ([facility] ICD-10-CM: I41.320  ICD-9-CM: 434.11  6/10/2019 Yes        CVA (cerebral vascular accident) [facility]) ICD-10-CM: I63.9  ICD-9-CM: 434.91  6/9/2019 Yes        Paroxysmal atrial fibrillation (HCC) ICD-10-CM: I48.0  ICD-9-CM: 427.31  4/16/2019 Yes        Cerebrovascular accident (CVA) due to thrombosis of right middle cerebral artery [facility]) ICD-10-CM: I63.311  ICD-9-CM: 434.01  1/18/2019 Yes        Acute ischemic cerebrovascular accident (CVA) involving right middle cerebral artery [facility]) ICD-10-CM: I63.511  ICD-9-CM: 434.91  1/17/2019 Yes        Obesity, morbid ([facility] ICD-10-CM: E66.01  ICD-9-CM: 278.01  12/26/2017 Yes        Type 2 diabetes mellitus with nephropathy ([facility] ICD-10-CM: E11.21  ICD-9-CM: 250.40, 583.81  12/26/2017 Yes        Essential hypertension, benign ICD-10-CM: I10  ICD-9-CM: 401.1  8/11/2014 Yes                Vital Signs:     /66 (BP 1 Location: Left arm, BP Patient Position: At rest)   Pulse 78   Temp 98 °F (36.7 °C)   Resp 18   Wt 92.9 kg (204 lb 14.4 oz)   SpO2 97%   BMI 33.07 kg/m²     Physical Examination:         Constitutional:  No acute distress, cooperative, pleasant    ENT:  Oral mucous moist, oropharynx benign. Neck supple,    Resp:  CTA bilaterally. No wheezing/rhonchi/rales. No accessory muscle use   CV:  Regular rhythm, normal rate, no murmurs, gallops, rubs    GI:  Soft, non distended, non tender. normoactive bowel sounds, no hepatosplenomegaly     Musculoskeletal:  No edema, warm, 2+ pulses throughout    Neurologic:  Moves all extremities.   AAOx3, CN II-XII reviewed          Data Review:                  oYnas Ochoa MD

## 2019-06-10 NOTE — PROGRESS NOTES
Problem: Self Care Deficits Care Plan (Adult)  Goal: *Acute Goals and Plan of Care (Insert Text)  Description  Occupational Therapy Goals  Initiated 6/10/2019  1. Patient will perform grooming standing at sink with minimal assistance/contact guard assist within 7 day(s). 2.  Patient will perform upper body dressing with moderate assistance  within 7 day(s). 3.  Patient will perform lower body dressing with moderate assistance  within 7 day(s). 4.  Patient will perform toilet transfers with supervision/set-up within 7 day(s). 5.  Patient will perform all aspects of toileting with moderate assistance  within 7 day(s). 6.  Patient will perform sponge bathing with moderate assistance  within 7 day(s). 7.  Patient will perform ADLs with moderate cueing for proper sequencing within 7 days. Outcome: Progressing Towards Goal      OCCUPATIONAL THERAPY EVALUATION  Patient: Kendell Nieves (74 y.o. female)  Date: 6/10/2019  Primary Diagnosis: CVA (cerebral vascular accident) [facility]) [I63.9]        Precautions: falls       ASSESSMENT :  Based on the objective data described below, the patient presents with aphasia, a R visual field cut, impaired cognition (impaired attention, sequencing, problem solving and perseveration), mild baseline L hemiparesis and decreased balance which is impairing her functional independence. Subtle signs of L inattention also noted. She is functioning well below her independent functional baseline, now performing ADLs at a min A to max A level and is CGA for all functional mobility. Patient will benefit from acute skilled intervention to address the above impairments, and she will need inpatient rehab for further OT, SLP and PT intervention after discharge. Patient?s rehabilitation potential is considered to be Good  Factors which may influence rehabilitation potential include:   ? None noted  ? Mental ability/status  ? Medical condition  ? Home/family situation and support systems  ? Safety awareness  ? Pain tolerance/management  ? Other:      PLAN :  Recommendations and Planned Interventions:  ?               Self Care Training                  ? Therapeutic Activities  ? Functional Mobility Training    ? Cognitive Retraining  ? Therapeutic Exercises           ? Endurance Activities  ? Balance Training                   ? Neuromuscular Re-Education  ? Visual/Perceptual Training     ? Home Safety Training  ? Patient Education                 ? Family Training/Education  ? Other (comment):    Frequency/Duration: Patient will be followed by occupational therapy 5 times a week to address goals. Discharge Recommendations: Inpatient Rehab  Further Equipment Recommendations for Discharge: TBD        OBJECTIVE DATA SUMMARY:     Past Medical History:   Diagnosis Date    Diabetes ([facility]     Hypertension     Murmur     Osteopenia     Psoriasis     Rosacea     Stroke ([facility]     Vitamin D deficiency      Past Surgical History:   Procedure Laterality Date    COLONOSCOPY N/A 4/4/2018    COLONOSCOPY   DIABETIC performed by Manuelito Kumar MD at [address]      HX COLONOSCOPY  2018    3 polyps    HX TONSILLECTOMY       Prior Level of Function/Home Situation: Independent with ADLs, ambulation and performing some IADLs. Expanded or extensive additional review of patient history:     Home Situation  Home Environment: Private residence  # Steps to Enter: 2  Rails to Enter: Yes  Hand Rails : Bilateral  One/Two Story Residence: One story  Living Alone: No  Support Systems: Spouse/Significant Other/Partner  Current DME Used/Available at Home: Grab bars, Cane, straight, Walker, rolling, Shower chair  Tub or Shower Type: Shower  ? Right hand dominant   ?   Left hand dominant  Cognitive/Behavioral Status:  Neurologic State: Alert;Confused(Aphasic)  Orientation Level: Oriented to person;Oriented to place  Cognition: Decreased attention/concentration;Decreased command following; Impaired decision making(delayed processing, perseverative, impaired sequencing )  Perception: Cues to attend to left side of body  Perseveration: Perseverates during ADLS(and testing.)  Safety/Judgement: Not assessed  Maximum cueing needed for sequencing of basic ADLs. Vision/Perceptual:    Tracking: Requires cues, head turns, or add eye shifts to track(Difficulty tracking R of midline. )              Visual Fields: Difficulty detecting stimulus  in right lateral quadrant       Acuity: Within Defined Limits    Corrective Lenses: Glasses(transitional tri-focals)  Range of Motion:  AROM: Within functional limits  PROM: Within functional limits  Strength:  Strength: Generally decreased, functional(LUE and LLE at [facility], RUE/RLE [facility])  Coordination:     Fine Motor Skills-Upper: Left Intact; Right Intact       Tone & Sensation:   Normal tone  Balance:  Sitting: Intact; With support  Standing: Impaired; Without support  Standing - Static: Fair  Standing - Dynamic : Fair  Functional Mobility and Transfers for ADLs:  Bed Mobility:  Rolling: Contact guard assistance  Supine to Sit: Contact guard assistance     Scooting: Contact guard assistance  Transfers:  Sit to Stand: Contact guard assistance     Bed to Chair: Contact guard assistance;Minimum assistance                      Bathroom Mobility: Contact guard assistance(ambulating without an AD)  ADL Assessment:  Feeding: Minimum assistance    Oral Facial Hygiene/Grooming: Maximum assistance    Bathing: Maximum assistance    Upper Body Dressing: Maximum assistance    Lower Body Dressing: Maximum assistance    Toileting: Maximum assistance                Functional Measure:  Barthel Index:    Bathin  Bladder: 5  Bowels: 5  Groomin  Dressin  Feedin  Mobility: 0  Stairs: 0  Toilet Use: 0  Transfer (Bed to Chair and Back): 10  Total: 20       Barthel and G-code impairment scale:  Percentage of impairment CH  0% CI  1-19% CJ  20-39% CK  40-59% CL  60-79% CM  80-99% CN  100%   Barthel Score 0-100 100 99-80 79-60 59-40 20-39 1-19   0   Barthel Score 0-20 20 17-19 13-16 9-12 5-8 1-4 0      The Barthel ADL Index: Guidelines  1. The index should be used as a record of what a patient does, not as a record of what a patient could do. 2. The main aim is to establish degree of independence from any help, physical or verbal, however minor and for whatever reason. 3. The need for supervision renders the patient not independent. 4. A patient's performance should be established using the best available evidence. Asking the patient, friends/relatives and nurses are the usual sources, but direct observation and common sense are also important. However direct testing is not needed. 5. Usually the patient's performance over the preceding 24-48 hours is important, but occasionally longer periods will be relevant. 6. Middle categories imply that the patient supplies over 50 per cent of the effort. 7. Use of aids to be independent is allowed. Nicholas Villanueva., Barthel, D.W. (7589). Functional evaluation: the Barthel Index. [facility] (14)2. [facility] SOLO Landry, Rosa Jewel., Ashley Ramirez., [place], [address] [place] [address] (1999). Measuring the change indisability after inpatient rehabilitation; comparison of the responsiveness of the Barthel Index and Functional [place] Measure. Journal of Neurology, Neurosurgery, and Psychiatry, 66(4), 773-145. Nathaniel Snyder, N.JOSE.A, AISHA Villasenor.TARA, & Millicent Wylie M.A. (2004.) Assessment of post-stroke quality of life in cost-effectiveness studies: The usefulness of the Barthel Index and the EuroQoL-5D.  Quality of Life Research, 13, 587-41       ADL Intervention and task modifications:  Initiated bed mobility, dressing ADL, transfer, standing grooming and safety training. Tactile, verbal and visual cueing provided to assist with attention, sequencing, problem solving and command following         Activity Tolerance:   VSS  Please refer to the flowsheet for vital signs taken during this treatment. After treatment:   ? Patient left in no apparent distress sitting up in chair  ? Patient left in no apparent distress in bed  ? Call bell left within reach  ? Nursing notified  ? Caregiver present  ? Bed alarm activated    COMMUNICATION/EDUCATION:   The patient?s plan of care was discussed with: Physical Therapist, Registered Nurse and . ? Home safety education was provided and the patient/caregiver indicated understanding. ? Patient/family have participated as able in goal setting and plan of care. ? Patient/family agree to work toward stated goals and plan of care. ? Patient understands intent and goals of therapy, but is neutral about his/her participation. ? Patient is unable to participate in goal setting and plan of care 2/2 having global aphasia. This patient?s plan of care is appropriate for delegation to [facility].     Thank you for this referral.  Jatin العلي, OTR/L  Time Calculation: 29 mins

## 2019-06-10 NOTE — PROGRESS NOTES
Mri pending      Mri screening sheet needs to be completed and signed    Call [phone] when this is done    Fax  [phone]

## 2019-06-11 ENCOUNTER — APPOINTMENT (OUTPATIENT)
Dept: CT IMAGING | Age: 69
DRG: 064 | End: 2019-06-11
Attending: PSYCHIATRY & NEUROLOGY
Payer: MEDICARE

## 2019-06-11 LAB
ANION GAP SERPL CALC-SCNC: 8 MMOL/L (ref 5–15)
BASOPHILS # BLD: 0.1 K/UL (ref 0–0.1)
BASOPHILS NFR BLD: 1 % (ref 0–1)
BUN SERPL-MCNC: 16 MG/DL (ref 6–20)
BUN/CREAT SERPL: 13 (ref 12–20)
CALCIUM SERPL-MCNC: 9.1 MG/DL (ref 8.5–10.1)
CHLORIDE SERPL-SCNC: 104 MMOL/L (ref 97–108)
CO2 SERPL-SCNC: 24 MMOL/L (ref 21–32)
CREAT SERPL-MCNC: 1.26 MG/DL (ref 0.55–1.02)
DIFFERENTIAL METHOD BLD: ABNORMAL
EOSINOPHIL # BLD: 0.5 K/UL (ref 0–0.4)
EOSINOPHIL NFR BLD: 5 % (ref 0–7)
ERYTHROCYTE [DISTWIDTH] IN BLOOD BY AUTOMATED COUNT: 13.2 % (ref 11.5–14.5)
GLUCOSE BLD STRIP.AUTO-MCNC: 126 MG/DL (ref 65–100)
GLUCOSE BLD STRIP.AUTO-MCNC: 133 MG/DL (ref 65–100)
GLUCOSE BLD STRIP.AUTO-MCNC: 141 MG/DL (ref 65–100)
GLUCOSE BLD STRIP.AUTO-MCNC: 183 MG/DL (ref 65–100)
GLUCOSE SERPL-MCNC: 116 MG/DL (ref 65–100)
HCT VFR BLD AUTO: 40.5 % (ref 35–47)
HGB BLD-MCNC: 13.6 G/DL (ref 11.5–16)
IMM GRANULOCYTES # BLD AUTO: 0.1 K/UL (ref 0–0.04)
IMM GRANULOCYTES NFR BLD AUTO: 1 % (ref 0–0.5)
LYMPHOCYTES # BLD: 3.2 K/UL (ref 0.8–3.5)
LYMPHOCYTES NFR BLD: 30 % (ref 12–49)
MCH RBC QN AUTO: 29.8 PG (ref 26–34)
MCHC RBC AUTO-ENTMCNC: 33.6 G/DL (ref 30–36.5)
MCV RBC AUTO: 88.8 FL (ref 80–99)
MONOCYTES # BLD: 0.9 K/UL (ref 0–1)
MONOCYTES NFR BLD: 9 % (ref 5–13)
NEUTS SEG # BLD: 6.1 K/UL (ref 1.8–8)
NEUTS SEG NFR BLD: 54 % (ref 32–75)
NRBC # BLD: 0 K/UL (ref 0–0.01)
NRBC BLD-RTO: 0 PER 100 WBC
PLATELET # BLD AUTO: 191 K/UL (ref 150–400)
PMV BLD AUTO: 10.1 FL (ref 8.9–12.9)
POTASSIUM SERPL-SCNC: 3.4 MMOL/L (ref 3.5–5.1)
RBC # BLD AUTO: 4.56 M/UL (ref 3.8–5.2)
SERVICE CMNT-IMP: ABNORMAL
SODIUM SERPL-SCNC: 136 MMOL/L (ref 136–145)
WBC # BLD AUTO: 10.8 K/UL (ref 3.6–11)

## 2019-06-11 PROCEDURE — 97116 GAIT TRAINING THERAPY: CPT

## 2019-06-11 PROCEDURE — 74011250637 HC RX REV CODE- 250/637: Performed by: INTERNAL MEDICINE

## 2019-06-11 PROCEDURE — 70450 CT HEAD/BRAIN W/O DYE: CPT

## 2019-06-11 PROCEDURE — 80048 BASIC METABOLIC PNL TOTAL CA: CPT

## 2019-06-11 PROCEDURE — 36415 COLL VENOUS BLD VENIPUNCTURE: CPT

## 2019-06-11 PROCEDURE — 92526 ORAL FUNCTION THERAPY: CPT

## 2019-06-11 PROCEDURE — 97530 THERAPEUTIC ACTIVITIES: CPT | Performed by: OCCUPATIONAL THERAPIST

## 2019-06-11 PROCEDURE — 74011250636 HC RX REV CODE- 250/636: Performed by: INTERNAL MEDICINE

## 2019-06-11 PROCEDURE — 82962 GLUCOSE BLOOD TEST: CPT

## 2019-06-11 PROCEDURE — 74011636637 HC RX REV CODE- 636/637: Performed by: FAMILY MEDICINE

## 2019-06-11 PROCEDURE — 74011636637 HC RX REV CODE- 636/637: Performed by: INTERNAL MEDICINE

## 2019-06-11 PROCEDURE — 97535 SELF CARE MNGMENT TRAINING: CPT | Performed by: OCCUPATIONAL THERAPIST

## 2019-06-11 PROCEDURE — 65660000000 HC RM CCU STEPDOWN

## 2019-06-11 PROCEDURE — 85025 COMPLETE CBC W/AUTO DIFF WBC: CPT

## 2019-06-11 RX ORDER — HYDRALAZINE HYDROCHLORIDE 25 MG/1
25 TABLET, FILM COATED ORAL 3 TIMES DAILY
Status: DISCONTINUED | OUTPATIENT
Start: 2019-06-11 | End: 2019-06-12

## 2019-06-11 RX ORDER — METOPROLOL TARTRATE 25 MG/1
25 TABLET, FILM COATED ORAL 2 TIMES DAILY
Status: DISCONTINUED | OUTPATIENT
Start: 2019-06-11 | End: 2019-06-12

## 2019-06-11 RX ORDER — AMLODIPINE BESYLATE 5 MG/1
10 TABLET ORAL DAILY
Status: DISCONTINUED | OUTPATIENT
Start: 2019-06-12 | End: 2019-06-12

## 2019-06-11 RX ADMIN — APIXABAN 5 MG: 5 TABLET, FILM COATED ORAL at 20:43

## 2019-06-11 RX ADMIN — HYDRALAZINE HYDROCHLORIDE 10 MG: 20 INJECTION INTRAMUSCULAR; INTRAVENOUS at 10:15

## 2019-06-11 RX ADMIN — NITROGLYCERIN 1 INCH: 20 OINTMENT TOPICAL at 23:47

## 2019-06-11 RX ADMIN — INSULIN LISPRO 2 UNITS: 100 INJECTION, SOLUTION INTRAVENOUS; SUBCUTANEOUS at 12:16

## 2019-06-11 RX ADMIN — METOPROLOL TARTRATE 25 MG: 25 TABLET ORAL at 20:28

## 2019-06-11 RX ADMIN — Medication 10 ML: at 17:38

## 2019-06-11 RX ADMIN — HYDRALAZINE HYDROCHLORIDE 10 MG: 20 INJECTION INTRAMUSCULAR; INTRAVENOUS at 04:02

## 2019-06-11 RX ADMIN — INSULIN GLARGINE 60 UNITS: 100 INJECTION, SOLUTION SUBCUTANEOUS at 10:16

## 2019-06-11 RX ADMIN — MAGNESIUM OXIDE TAB 400 MG (241.3 MG ELEMENTAL MG) 400 MG: 400 (241.3 MG) TAB at 17:38

## 2019-06-11 RX ADMIN — Medication 10 ML: at 22:10

## 2019-06-11 RX ADMIN — HYDRALAZINE HYDROCHLORIDE 25 MG: 25 TABLET, FILM COATED ORAL at 22:06

## 2019-06-11 NOTE — PROGRESS NOTES
Spiritual Care Partner Volunteer visited patient in [address] on June 11, 2019. Documented by:  JEFFREY Leal  Paging Service [phone])

## 2019-06-11 NOTE — CONSULTS
[address] [facility]     Date of  Admission: 6/9/2019 10:29 PM     Admission type:Urgent    Consult for:CVA with hx of PAF  Consult by: Neurology     Subjective:     Monique White is a 77 y.o. female admitted for CVA (cerebral vascular accident) ([facility] [I63.9]. Presented to [facility] with c/o weakness, confusion, facial droop. Previous CVA 1/2019 with showered emboli. Tx to [address] for further evaluation and management. Patient aphasic,  at bedside and further information obtained from chart and Dr. Paz Liu, Neurology. Patient denies CP, SOB, palpitations. Followed by Dr. Swati Agosto in [place]. Post CVA 1/2019, PCP placed loop monitor x 2 weeks and found episodes of afib with RVR. PCP started patient on Eliquis and requested f/u with Cardiology. Dr. Florina Krause saw patient 4/16/19, recommendations were Nuclear Leeroy De Leon (negative) and to discontinue ASA, continue on Eliquis. In confirming medication compliance with patients , he informed us that they were instructed to stop Eliquis by Dr. Florina Krause, obvious confusion which likely contributed to current CVA. ECG on admission SR, no acute changes. Troponin neg    Previous treatment/evaluation includes echocardiogram and stress thallium .  Cardiac risk factors: dyslipidemia, DM, obesity, sedentary life style, hypertension, stress, post-menopausal.      Patient Active Problem List    Diagnosis Date Noted    Diabetic peripheral neuropathy associated with type 2 diabetes mellitus ([facility] 06/10/2019    Bilateral carotid artery stenosis 06/10/2019    Altered mental status, unspecified 59/81/2027    Embolic stroke involving left middle cerebral artery ([address] 06/10/2019    CVA (cerebral vascular accident) ([address] 06/09/2019    Paroxysmal atrial fibrillation ([address] 04/16/2019    Dyslipidemia 04/16/2019    Cerebrovascular accident (CVA) due to thrombosis of right middle cerebral artery ([facility] 01/18/2019    Acute ischemic cerebrovascular accident (CVA) involving right middle cerebral artery territory ([facility] 01/17/2019    Obesity, morbid ([facility] 12/26/2017    Type 2 diabetes mellitus with nephropathy ([facility] 12/26/2017    Type 2 diabetes mellitus with hyperglycemia, with long-term current use of insulin ([facility] 04/03/2017    Psoriasis 08/11/2014    Essential hypertension, benign 08/11/2014    Rosacea 08/11/2014    Heart murmur 08/11/2014      Debra Reynaga MD  Past Medical History:   Diagnosis Date    Diabetes ([facility]     Hypertension     Murmur     Osteopenia     Psoriasis     Rosacea     Stroke ([facility]     Vitamin D deficiency       Social History     Socioeconomic History    Marital status:      Spouse name: Not on file    Number of children: Not on file    Years of education: Not on file    Highest education level: Not on file   Tobacco Use    Smoking status: Never Smoker    Smokeless tobacco: Never Used   Substance and Sexual Activity    Alcohol use: Yes     Alcohol/week: 0.0 oz     Comment: 1/couple months     Allergies   Allergen Reactions    Pcn [Penicillins] Rash    Sulfa (Sulfonamide Antibiotics) Unknown (comments)      Family History   Problem Relation Age of Onset    Diabetes Mother     Heart Disease Father     No Known Problems Child       Current Facility-Administered Medications   Medication Dose Route Frequency    insulin lispro (HUMALOG) injection   SubCUTAneous AC&HS    labetalol (NORMODYNE;TRANDATE) injection 10 mg  10 mg IntraVENous Q6H PRN    hydrALAZINE (APRESOLINE) 20 mg/mL injection 10 mg  10 mg IntraVENous Q6H PRN    amLODIPine (NORVASC) tablet 5 mg  5 mg Oral DAILY    apixaban (ELIQUIS) tablet 5 mg  5 mg Oral Q12H    atorvastatin (LIPITOR) tablet 80 mg  80 mg Oral QHS    . PHARMACY TO SUBSTITUTE PER PROTOCOL (Reordered from: etanercept (ENBREL) 50 mg/mL (0.98 mL) injection)    Per Protocol    insulin glargine (LANTUS) injection 60 Units  60 Units SubCUTAneous DAILY    magnesium oxide (MAG-OX) tablet 400 mg  400 mg Oral BID    metoprolol succinate (TOPROL-XL) XL tablet 50 mg  50 mg Oral DAILY    acetaminophen (TYLENOL) tablet 650 mg  650 mg Oral Q4H PRN    ondansetron (ZOFRAN) injection 4 mg  4 mg IntraVENous Q6H PRN    glucose chewable tablet 16 g  4 Tab Oral PRN    glucagon (GLUCAGEN) injection 1 mg  1 mg IntraMUSCular PRN    dextrose 10% infusion 250 mL  250 mL IntraVENous PRN    sodium chloride (NS) flush 5-40 mL  5-40 mL IntraVENous Q8H    sodium chloride (NS) flush 5-40 mL  5-40 mL IntraVENous PRN    aspirin chewable tablet 81 mg  81 mg Oral DAILY        Review of Symptoms: per  and chart review  11 systems reviewed, negative other than as stated in the HPI        Objective:      Visit Vitals  /68 (BP 1 Location: Right arm, BP Patient Position: At rest)   Pulse 94   Temp 97.9 °F (36.6 °C)   Resp 18   Ht 5' 6\" (1.676 m)   Wt 90.7 kg (199 lb 15.3 oz)   SpO2 97%   BMI 32.27 kg/m²       Physical:   General: obese elderly  female in no acute distress  Heart: RRR, no m/S3/JVD, no carotid bruits   Lungs: diminished  Abdomen: Soft, +BS, NTND   Extremities: LE quyen +DP/PT, no edema   Neurologic: Grossly normal    Data Review:   Recent Labs     06/11/19  0357 06/10/19  0502 06/09/19  1446   WBC 10.8 10.5 10.3   HGB 13.6 13.1 13.2   HCT 40.5 39.5 39.6    162 152     Recent Labs     06/11/19  0357 06/10/19  0502 06/09/19  1446    138 139   K 3.4* 3.5 3.8    105 102   CO2 24 26 30   * 167* 139*   BUN 16 19 23*   CREA 1.26* 1.08* 1.32*   CA 9.1 8.9 9.1   MG  --  1.3*  --    ALB  --  2.6* 2.5*   TBILI  --  0.4 0.4   SGOT  --  26 30   ALT  --  35 46   INR  --   --  1.0       Recent Labs     06/09/19  1446   TROIQ <0.05         Intake/Output Summary (Last 24 hours) at 6/11/2019 1334  Last data filed at 6/11/2019 0655  Gross per 24 hour   Intake    Output 400 ml   Net -400 ml        Cardiographics    Telemetry: SR  ECG: SR    Echocardiogram: 6/10/19  · Left Ventricle: Estimated left ventricular ejection fraction is 56 - 60%. Mild (grade 1) left ventricular diastolic dysfunction. Nuclear Lexiscan 4/30/19  Fixed defect inferolateral wall at the base is compatible with infarct. No evidence of christy-infarct ischemia. Left ventricular ejection fraction is 70 %    CXRAY: no acute changes       Assessment:       Active Problems:    Essential hypertension, benign (8/11/2014)      Obesity, morbid ([address] (12/26/2017)      Type 2 diabetes mellitus with nephropathy ([address] (12/26/2017)      Acute ischemic cerebrovascular accident (CVA) involving right middle cerebral artery territory [facility]) (1/17/2019)      Cerebrovascular accident (CVA) due to thrombosis of right middle cerebral artery ([address] (1/18/2019)      Paroxysmal atrial fibrillation ([address] (4/16/2019)      CVA (cerebral vascular accident) ([address] (6/9/2019)      Diabetic peripheral neuropathy associated with type 2 diabetes mellitus ([address] (6/10/2019)      Bilateral carotid artery stenosis (6/10/2019)      Altered mental status, unspecified (3/99/3400)      Embolic stroke involving left middle cerebral artery ([address] (6/10/2019)         Plan:     PAF with hx of CVA:  Unfortunately, the confusion with the patients medications by pt/ which stopped Eliquis vs stopping ASA likely culprit of current CVA. Restart Eliquis. This is not considered a failure of treatment as patient was not actually on Eliquis since 4/16/19. CHADS2 vasc score 6. She will require long term DOAC. Re-educated family about medications  No further cardiology evaluations indicated at this time. Continue telemetry monitoring, remains in SR.    On BB and amlodipine    HTN:  BP mildly elevated, but permitted with CVA  Continue BB and amlodipine    Thank you for consulting RCA      Ernie Negron NP     Patient seen and examined by me with nurse practitioner [place]. I personally performed all components of the history, physical, and medical decision making and agree with the assessment and plan with minor modifications as noted. Family was somewhat confused thinking that they should stop Eliquis and continue aspirin although Eliquis has been recommended. They now understand that Eliquis is strongly recommended and they will take that regularly. Somewhat encouraging is that she has good bilateral strength and is able to verbalize well so hopefully she will have good rehab potential.  We will see as needed during this hospitalization. Please call if we can assist further. No need for LELIA or other aggressive evaluation as the CVA is likely explained by atrial fibrillation not on anticoagulation.

## 2019-06-11 NOTE — PROGRESS NOTES
Hospitalist Progress Note    NAME: Fatmata Douglas   :  1950   MRN:  [id number]       Assessment / Plan:  Acute embolic CVA with expressive aphasia, visual field defects in setting of afib:  - CT head  with a chronic right MCA territory infarction. No acute findings. - repeat CT head with no acute findings and no significant change since 2019. No infarct extension or hemorrhage.  - check MRI/MRA brain with multiple small acute cortical/subcortical infarcts in the left parietal lobe. Unchanged generalized parenchymal volume loss and mild chronic microvascular ischemic disease. Unchanged chronic large right MCA territory involving the right temporoparietal lobe and insula. Unchanged chronic infarcts in the right occipital lobe and bilateral cerebellum. No evidence of significant stenosis or aneurysm. - echo with EF 56 - 60%. Mild (grade 1) left ventricular diastolic dysfunction.  - LDL 54, con't high dose statin  - con't ASA and eliquis  - neuro consult appreciated  - PT/OT/speech recommending inpatient rehab, referral sent to  [facility]   Paroxysmal atrial fibrillation, chronic diastolic CHF and essential HTN:  - con't ASA and eliquis as above  - increase norvasc. Add hydralazine scheduled. - still holding home accupril with SATISH on admission  - toprol changed to metoprolol due to dysphagia   Insulin dependent DM2 with hyperglycemia:  - cn't home lantus   - still holding metformin, trulicity  - lispro sliding scale  Acute kidney failure, resolved  Psoriasis: holding enbrel  Obesity (Body mass index is 32.27 kg/m²)     Code status Full   DVT prophylaxis: Eliquis     Subjective:     Chief Complaint / Reason for Physician Visit  Arouses with some difficulty. Discussed with RN events overnight.      Review of Systems:  Symptom Y/N Comments  Symptom Y/N Comments   Fever/Chills n   Chest Pain n    Poor Appetite n   Edema n    Cough n   Abdominal Pain n    Sputum n   Joint Pain     SOB/MALIN n   Pruritis/Rash Nausea/vomit    Tolerating PT/OT     Diarrhea    Tolerating Diet     Constipation    Other       Could NOT obtain due to:      Objective:     VITALS:   Last 24hrs VS reviewed since prior progress note. Most recent are:  Patient Vitals for the past 24 hrs:   Temp Pulse Resp BP SpO2   06/11/19 1003  74  (!) 184/107    06/11/19 0741 99 °F (37.2 °C) 98 16 137/68 99 %   06/11/19 0500    177/65    06/11/19 0433    190/69    06/11/19 0402 98.3 °F (36.8 °C) 72 16 (!) 205/83 96 %   06/11/19 0356  84  (!) 205/83 96 %   06/10/19 2336 98.8 °F (37.1 °C) 83 16 155/73 93 %   06/10/19 1956 98.2 °F (36.8 °C) 82 16 165/80 95 %   06/10/19 1355    161/81    06/10/19 1108 98 °F (36.7 °C) 78 18 155/66 97 %       Intake/Output Summary (Last 24 hours) at 6/11/2019 1011  Last data filed at 6/11/2019 0655  Gross per 24 hour   Intake    Output 400 ml   Net -400 ml        PHYSICAL EXAM:  General: WD, WN. Alert, cooperative, no acute distress    EENT:  Prefers to keep eyes closed. Anicteric sclerae. MMM  Resp:  CTA bilaterally, no wheezing or rales. No accessory muscle use  CV:  Regular  rhythm,  No edema  GI:  Soft, Non distended, Non tender.  +Bowel sounds  Neurologic:  Alert and oriented X 2, normal speech,   Psych:   Poor insight. Not anxious nor agitated  Skin:  No rashes. No jaundice    Reviewed most current lab test results and cultures  YES  Reviewed most current radiology test results   YES  Review and summation of old records today    NO  Reviewed patient's current orders and MAR    YES  PMH/SH reviewed - no change compared to H&P  ________________________________________________________________________  Care Plan discussed with:    Comments   Patient x    Family  x  at bedside, children on phone   RN x    Care Manager     Consultant  x neuro                     Multidiciplinary team rounds were held today with , nursing, pharmacist and clinical coordinator.   Patient's plan of care was discussed; medications were reviewed and discharge planning was addressed. ________________________________________________________________________  Total NON critical care TIME:  35 Minutes    Total CRITICAL CARE TIME Spent:   Minutes non procedure based      Comments   >50% of visit spent in counseling and coordination of care x    ________________________________________________________________________  Kae Hendricks MD     Procedures: see electronic medical records for all procedures/Xrays and details which were not copied into this note but were reviewed prior to creation of Plan. LABS:  I reviewed today's most current labs and imaging studies.   Pertinent labs include:  Recent Labs     06/11/19  0357 06/10/19  0502 06/09/19  1446   WBC 10.8 10.5 10.3   HGB 13.6 13.1 13.2   HCT 40.5 39.5 39.6    162 152     Recent Labs     06/11/19  0357 06/10/19  0502 06/09/19  1446    138 139   K 3.4* 3.5 3.8    105 102   CO2 24 26 30   * 167* 139*   BUN 16 19 23*   CREA 1.26* 1.08* 1.32*   CA 9.1 8.9 9.1   MG  --  1.3*  --    ALB  --  2.6* 2.5*   TBILI  --  0.4 0.4   SGOT  --  26 30   ALT  --  35 46   INR  --   --  1.0       Signed: Kae Hendricks MD

## 2019-06-11 NOTE — PROGRESS NOTES
Bedside and Verbal shift change report given to Edmund Sun RN (oncoming nurse) by Cheryl Burk RN (offgoing nurse). Report included the following information SBAR, Kardex, MAR, Recent Results and Cardiac Rhythm NSR.     0840:  Pt attempting to get out of bed. Pt keeps saying bathroom. Tried BSC but pt did not understand. Assisted pt to BR. Pt did void. Once back to chair and speaking with pt, she can barely see anything. No reflex when triggering R side visual field and minimum to L.    1004: Attempted to give am meds. Pt would swallow water from a cup and take a small amount of applesauce from spoon. Pt would not open her mouth enough to take a pill. Attempted to crush asa and give but pt would not open to take medication. Pt not follow commands to take am pills. Will notify MD.    2028:  Medicated with PRN Hydralazine for elevated BP. Will monitor. 1050:  Speech therapy on the floor. Spoke with them about concerns for pt with change in taking PO this am.  They will follow and see pt today. Also spoke with pharmacy about pt  bringing in trulicity, he will speak with him, family had been asked to bring in Enbrel. 1100:  Dr. Reymundo Gill and Dr. Brent Lopez both on floor. Spoke with them about pt not being able to take PO meds this am.  Also brought up concerns with vision and commands. Will continue to monitor pt.    1115:  Neuro ordered a repeat CT scan. 1140:  Pt off floor for CT    1210:  Pt back on unit. WIll hold on lunch until speech can see pt.    1500:  Speech therapy into see pt.    1540:  Speech changed diet to liquids. Suggested crushing pills in water and have pt drink them. Will look at meds and see what can be crushed. 1600:  OT saw pt. Pt minimally following commands. 1625:  Placed a page to Dr. Reymundo Gill about PO Metoprolol since it is extended release and cannot be crushed. 1645:  Spoke with Dr. Reymundo Gill about Metoprolol (Toprolol XL) and will change to BID Metoprolol.     1740: Crushed MagOx and placed in water. Pt ws able to drink the medication. 1940: Bedside and Verbal shift change report given to Shellie Baez RN (oncoming nurse) by Annemarie Peterson RN (offgoing nurse). Report included the following information SBAR, Kardex, MAR, Recent Results and Cardiac Rhythm NSR.

## 2019-06-11 NOTE — PROGRESS NOTES
Bedside and Verbal shift change report given to Loida Art RN (oncoming nurse) by Arielle Lynn RN (offgoing nurse). Report included the following information SBAR, Kardex, MAR and Recent Results. 1956: Assessed patient, MEWS 1, no complaints of pain at this time, Patient is A&O to self and is having some global apashia so she is not able to answer other orientation questions and does not seem to understand and follow most commands. Will continue to monitor closely. 2159: Administered evening medications. Patient did not tolerate well. When asked to open her mouth so I could put the pill in her mouth she would say \"Ok\" and then close her mouth. It took about 10 tries for each pill and when she would finally get the pill in her mouth she would gag and swallow the pill without the water I was telling her to drink and then after she swallowed the pill would drink the water. Will encourage day shift to try with apple sauce or consider another speech evaluation. She did not cough when taking the medication. 2336: Reassessed patient, no changes from previous assessment, MEWS 1, VSS, no complaints of pain at this time. Will continue to monitor. 6371: Went into room to perform vital signs, labs, and reassessment. Took 3 mins to wake patient up with calling her name and sternal rubbing. When patient woke up she started trying to get out of bed and would not speak or follow any commands. Once she calmed down I was able to check her vital signs and her BP was elevated. I decided to come back after she had been calm to recheck the blood pressure and draw her morning labs. 0350: Patient woke up and was having another episode of trying to get out of bed and not speaking or following commands. 0402: Reassessed patient, MEWS 3 due to BP of 205/83, administered PRN Hydralazine for blood pressure.  AM labs drawn, patient is resting in the bed at this time without complaints of pain, redressed IV site as it was leaky due to patient trying to get out of bed. Will continue to monitor closely. 0500: Patients BP now 177/65.   0735: Bedside and Verbal shift change report given to Brent Reyes RN (oncoming nurse) by Liana Rapp RN (offgoing nurse). Report included the following information SBAR, Kardex, MAR and Recent Results.

## 2019-06-11 NOTE — PROGRESS NOTES
Received call from Brinda Kawasaki with [facility] and they have approved pt for admission but will need authorization from pt's insurance. They started precert today with pt's insurance.      Sharon Buckley, [address]

## 2019-06-11 NOTE — PROGRESS NOTES
Problem: Mobility Impaired (Adult and Pediatric)  Goal: *Acute Goals and Plan of Care (Insert Text)  Description  Physical Therapy Goals  Initiated 6/10/2019  1. Patient will move from supine to sit and sit to supine , scoot up and down and roll side to side in bed with independence within 7 day(s). 2.  Patient will transfer from bed to chair and chair to bed with modified independence using the least restrictive device within 7 day(s). 3.  Patient will perform sit to stand with independence within 7 day(s). 4.  Patient will ambulate with modified independence for 150 feet with the least restrictive device within 7 day(s). 5.  Patient will ascend/descend 2 stairs with 2 handrail(s) with modified independence within 7 day(s). Outcome: Progressing Towards Goal     PHYSICAL THERAPY TREATMENT  Patient: Darrius Landaverde (84 y.o. female)  Date: 6/11/2019  Diagnosis: CVA (cerebral vascular accident) ([facility] [address] [I63.9] <principal problem not specified>       Precautions:    Chart, physical therapy assessment, plan of care and goals were reviewed. ASSESSMENT:  Pt cleared by nurse to mobilize. Pt received in bed supine. Pt agreeable to therapy. Pt having trouble fallowing commands and only occasionally opening eyes. Pt with vision cut to left only looking towards the right. Pt performed supine to sit at [facility] with tactile cueing. Pt performed unable to follow commands to performed exercises even with tactile cueing. Pt performed x2 sit to stand transfer min A x2. Pt ambulated 30ft with HHA x2 at min A/CGA. Pt requiring reassurance for safety due to visual deficits. Pt performed min A x2 to sit due to inability to sequence. Pt left on stretcher with transport to [facility] to CT. Pt will greatly benefit from IP Rehab to improve safety due to deficits. Progression toward goals:  ?       Improving appropriately and progressing toward goals  ? Improving slowly and progressing toward goals  ?        Not making progress toward goals and plan of care will be adjusted     PLAN:  Patient continues to benefit from skilled intervention to address the above impairments. Continue treatment per established plan of care. Discharge Recommendations:  Inpatient Rehab  Further Equipment Recommendations for Discharge:  TBD by rehab       SUBJECTIVE:   Patient stated ? I love you.?    OBJECTIVE DATA SUMMARY:   Critical Behavior:  Neurologic State: Confused, Drowsy  Orientation Level: Oriented to person, Disoriented to time, Disoriented to situation, Disoriented to place  Cognition: Decreased command following, Decreased attention/concentration, Impulsive, Poor safety awareness  Safety/Judgement: Not assessed  Functional Mobility Training:  Bed Mobility:  Rolling: Contact guard assistance; Additional time  Supine to Sit: Contact guard assistance; Additional time     Scooting: Contact guard assistance; Additional time        Transfers:  Sit to Stand: Minimum assistance;Contact guard assistance;Assist x2; Additional time  Stand to Sit: Minimum assistance;Assist x2; Additional time                             Balance:  Sitting: Intact; Without support  Standing: Impaired; With support  Standing - Static: Fair;Constant support  Standing - Dynamic : Fair;Constant support  Ambulation/Gait Training:  Distance (ft): 30 Feet (ft)  Assistive Device: Gait belt(HHA x2 )  Ambulation - Level of Assistance: Contact guard assistance;Assist x2; Additional time(HHA x2)        Gait Abnormalities: Decreased step clearance        Base of Support: Widened     Speed/Mary Ann: Slow  Step Length: Right shortened;Left shortened      Pain:  Pain Scale 1: Numeric (0 - 10)  Pain Intensity 1: 0              Activity Tolerance:   Pt having increased difficulty following commands today. After treatment: with transport  ? Patient left in no apparent distress sitting up in chair  ? Patient left in no apparent distress in bed  ? Call bell left within reach  ? Nursing notified  ? Caregiver present  ? Bed alarm activated    COMMUNICATION/EDUCATION:   The patient?s plan of care was discussed with: Registered Nurse. Patient was educated regarding Her deficit(s) of vision as this relates to Her diagnosis of CVA. She demonstrated poor  understanding as evidenced by confusion. Patient and/or family was verbally educated on the BE FAST acronym for signs/symptoms of CVA and TIA. BE FAST was written on patient's communication board  for visual education and reinforcement. All questions answered with patient indicating poor understanding. ?  Fall prevention education was provided and the patient/caregiver indicated understanding. ? Patient/family have participated as able in goal setting and plan of care. ?  Patient/family agree to work toward stated goals and plan of care. ?  Patient understands intent and goals of therapy, but is neutral about his/her participation. ? Patient is unable to participate in goal setting and plan of care.     Thank you for this referral.  Rodrigue Tolliver, PTA   Time Calculation: 18 mins

## 2019-06-11 NOTE — PROGRESS NOTES
Problem: Self Care Deficits Care Plan (Adult)  Goal: *Acute Goals and Plan of Care (Insert Text)  Description  Occupational Therapy Goals  Initiated 6/10/2019  1. Patient will perform grooming standing at sink with minimal assistance/contact guard assist within 7 day(s). 2.  Patient will perform upper body dressing with moderate assistance  within 7 day(s). 3.  Patient will perform lower body dressing with moderate assistance  within 7 day(s). 4.  Patient will perform toilet transfers with supervision/set-up within 7 day(s). 5.  Patient will perform all aspects of toileting with moderate assistance  within 7 day(s). 6.  Patient will perform sponge bathing with moderate assistance  within 7 day(s). 7.  Patient will perform ADLs with moderate cueing for proper sequencing within 7 days. Outcome: Progressing Towards Goal    OCCUPATIONAL THERAPY TREATMENT  Patient: Jesus Meléndez (28 y.o. female)  Date: 6/11/2019  Diagnosis: [facility] [I63.9] <principal problem not specified>       Precautions:      ASSESSMENT:  Patient significantly impaired by her cognitive, perceptual and visual deficits today. Overall she was total A for basic grooming and dressing ADLS, and was min to mod A for functional mobility. Constant physical cueing need during all functional mobility in order to facilitate initiation of any movement. Patient now demonstrating inattention to the R side of her environment, unable to turn her head or track eyes to R of midline during this session. Performance of ADLs is significantly impaired 2/2 apraxia and impaired sequencing. Functional mobility is also limited by apraxia and impaired sequencing, but not as severely. Aphasia still greatly limits patient's ability to follow commands as well, despite some appropriate verbal responses noted. Progression toward goals:  ?       Improving appropriately and progressing toward goals  ?        Improving slowly and progressing toward goals  ? Not making progress toward goals and plan of care will be adjusted     PLAN:  Patient continues to benefit from skilled intervention to address the above impairments. Continue treatment per established plan of care. Discharge Recommendations:  Inpatient Rehab  Further Equipment Recommendations for Discharge:  TBD         OBJECTIVE DATA SUMMARY:   Cognitive/Behavioral Status:  Neurologic State: Alert;Confused  Orientation Level: Oriented to person(responds to her name)  Cognition: Decreased attention/concentration;Decreased command following(follows 1 step commands less than 10% of the time)  Perception: Cues to attend right visual field      Apraxic      Functional Mobility and Transfers for ADLs:  Bed Mobility:  Rolling: Moderate assistance  Supine to Sit: Moderate assistance     Scooting: Moderate assistance  Transfers:  Sit to Stand: Minimum assistance               Bathroom Mobility: Minimum assistance(ambulating hand hold assist)      Balance:  Sitting: Intact  Standing: Impaired  Standing - Static: Good  Standing - Dynamic : Fair  ADL Intervention:  Grooming  Washing Face: Total assistance (dependent)  Washing Hands: Total assistance (dependent)  Brushing/Combing Hair: Total assistance (dependent)  Cues: Tactile cues provided;Verbal cues provided     Lower Body Dressing Assistance  Socks: Total assistance (dependent)  Position Performed: Seated edge of bed  Cues: Tactile cues provided;Verbal cues provided      Cognitive Retraining  Organizing/Sequencing: Breaking task down;Prioritizing; Two step sequence  Following Commands: Follows one step commands/directions(less than 10% of the time)  Pain:  Pain Scale 1: Numeric (0 - 10)  Pain Intensity 1: 0          After treatment:   ? Patient left in no apparent distress sitting up in chair  ? Patient left in no apparent distress in bed  ? Call bell left within reach  ? Nursing notified  ? Caregiver present  ?  Bed alarm activated    COMMUNICATION/COLLABORATION:   The patients plan of care was discussed with: Speech Therapist and Registered Nurse    Stana Nageotte, OTR/L  Time Calculation: 35 mins

## 2019-06-11 NOTE — PROGRESS NOTES
Problem: Dysphagia (Adult)  Goal: *Acute Goals and Plan of Care (Insert Text)  Description  Speech pathology goals initiated 6/10/2019   1. Patient will tolerate a regular diet/ thin liquids free of s/s aspiration within 7 days  2. Patient will participate in language eval pending MRI results within 7 days   Outcome: [facility] TREATMENT  Patient: Espinoza Abdul (61 y.o. female)  Date: 6/11/2019  Diagnosis: CVA (cerebral vascular accident) ([facility] [address] [I63.9] <principal problem not specified>       Precautions: swallow      ASSESSMENT:  RN requested SLP re-evaluate patient as she has had significant difficulty taking medications. Patient with suspected mild oropharyngeal dysphagia characterized by premature spillage and delayed swallow initiation resulting in strong cough with successive straw sips of thin liquid x1. Patient tolerated single straw and cup sips well with no overt s/s aspiration observed. Patient initially able to open her mouth given verbal/tactile cues. However, when offered puree, patient with minimal mouth opening resulting in scant acceptance of puree bolus. When cued to open her mouth, patient unable to do so. This did not improve when spoon was handed to patient for her to self-feed. When given cracker to self-feed, patient touched cracker to her chin but made no attempt to correct this or eat cracker. ? apraxia vs sequencing vs perseverations. Recommend downgrade to full liquid diet with dietary supplements, encouraging patient to drink her calories. SLP will continue to follow. Progression toward goals:  ?         Improving appropriately and progressing toward goals  ? Improving slowly and progressing toward goals  ?          Not making progress toward goals and plan of care will be adjusted     PLAN:  Recommendations and Planned Interventions:  --Downgrade to full liquid diet, however suspect intake will remain limited to liquids  --Dietary supplements (discussed with RN)  --Meds crushed in thin liquid  --Small, single sips via straw or cup  --SLP to follow for diet tolerance and language evaluation  Patient continues to benefit from skilled intervention to address the above impairments. Continue treatment per established plan of care. Discharge Recommendations:  Inpatient Rehab     SUBJECTIVE:   Patient stated ? I can't find it? re: spoon with applesauce on it. Oriented to person. No perseverative utterances during session. OBJECTIVE:   Cognitive and Communication Status:  Neurologic State: Alert  Orientation Level: Oriented to person  Cognition: Decreased command following  Perception: Cues to attend to left side of body  Perseveration: Perseverates during ADLS(and testing.)  Safety/Judgement: Not assessed  Dysphagia Treatment:     P.O. Trials:  Patient Position: upright in bed  Vocal quality prior to P.O.: No impairment  Consistency Presented: Thin liquid  How Presented: Self-fed/presented;Cup/sip;SLP-fed/presented;Straw     Bolus Acceptance: No impairment  Bolus Formation/Control: Impaired  Type of Impairment: Premature spillage  Propulsion: No impairment  Oral Residue: None  Initiation of Swallow: Delayed (# of seconds)  Laryngeal Elevation: Functional  Aspiration Signs/Symptoms: Strong cough; Other (comment)(x1 with successive sips thin via straw)  Pharyngeal Phase Characteristics: No impairment, issues, or problems   Effective Modifications: None  Cues for Modifications: None  Comments: unable to eat puree or solid         Pain:  Pain Scale 1: Numeric (0 - 10)  Pain Intensity 1: 0     After treatment:   ?              Patient left in no apparent distress sitting up in chair  ? Patient left in no apparent distress in bed  ? Call bell left within reach  ? Nursing notified  ? Caregiver present  ?               Bed alarm activated    COMMUNICATION/EDUCATION:   Patient was educated regarding Her deficit(s) of dysphagia as this relates to Her diagnosis of CVA. She demonstrated Fair understanding as evidenced by CVA. Family verbalized understanding. The patient?s plan of care including recommendations, planned interventions, and recommended diet changes were discussed with: Occupational Therapist, Registered Nurse and KARRIE .     [place], SLP  Time Calculation: 20 mins

## 2019-06-11 NOTE — PROGRESS NOTES
Dictated: Probable new CVA left hemisphere   Await MRI and continue asa and eliquis  Consult  REFERRED BY:  Torres Reynaga MD    CHIEF COMPLAINT: Altered mental status and confusion      Subjective:     Jerson Melvin is a 77 y.o. right-handed  female seen at the request of Dr. Niki Campos for new problem of now a little more confused, and having more difficulty with her vision and questioning not seeing off to the left side either. Patient just seems a little lethargic, not responding as well, more confused, and does not seem to focus well a pickup visual objects on the left side well, but occasionally will seem to focus in on her . Concern is for possible new stroke on the right side of the brain and the CT scan was ordered stat. Discussed with the hospitalist and cardiology and the family in detail. We will continue her anticoagulation for now, and we may need to switch to Coumadin in view of her new stroke. I reviewed her MRI scan personally on the PACS system, and I do agree that she has had embolic strokes now to the left hemisphere causing the right field cut. She presented with sudden onset of confusion and altered mental status that began Saturday night, when the patient was not able to write a check, but can still get around, this persisted yesterday morning, and the patient seemed more confused, had difficulty doing things and her  brought her into the hospital for further evaluation. Patient did not appear to have any new focal weakness or sensory loss noted, and no complaint of headache, meningismus, fever, trauma, or toxin exposure or failure to take her medication correctly. Patient does seem to be a little more confused, and was admitted to the hospital, and a CT scan of the head just showed an old stroke in the right hemisphere but no new lesions.   Patient does have a known history of intermittent atrial fibrillation and is on Eliquis already, and had a loop monitor inserted after her stroke in January of this year that was thought to be embolic, and a loop monitor picked up several bursts of A. fib and the patient was anticoagulated with Eliquis 5 mg twice a day. She is had aspirin 81 mg added to her Eliquis now to help give anticoagulation for the stroke. On exam she seems to have a little difficulty with her speech, and a left homonymous hemianopsia on exam, in addition to an old left hemiparesis that is mild. She also has some sensory loss on the left side that may be new or old. On my exam now, she does not appear to be in atrial fib. She is on high-dose statin in addition to her anticoagulation. Past Medical History:   Diagnosis Date    Diabetes [facility]     Hypertension     Murmur     Osteopenia     Psoriasis     Rosacea     Stroke ([facility] [facility]     Vitamin D deficiency       Past Surgical History:   Procedure Laterality Date    COLONOSCOPY N/A 2018    COLONOSCOPY   DIABETIC performed by Geovanna Escalante MD [facility] HX  SECTION      HX COLONOSCOPY  2018    3 polyps    HX TONSILLECTOMY       Family History   Problem Relation Age of Onset    Diabetes Mother     Heart Disease Father     No Known Problems Child       Social History     Tobacco Use    Smoking status: Never Smoker    Smokeless tobacco: Never Used   Substance Use Topics    Alcohol use:  Yes     Alcohol/week: 0.0 oz     Comment: 1/couple months         Current Facility-Administered Medications:     metoprolol tartrate (LOPRESSOR) tablet 25 mg, 25 mg, Oral, BID, Nolvia PACHECO MD    [START ON 2019] amLODIPine (NORVASC) tablet 10 mg, 10 mg, Oral, DAILY, Kathy Rodriguez MD    hydrALAZINE (APRESOLINE) tablet 25 mg, 25 mg, Oral, TID, Kathy Patel MD    nitroglycerin (NITROBID) 2 % ointment 1 Inch, 1 Inch, Topical, Q6H PRN, Marcello Caballero MD    insulin lispro (HUMALOG) injection, , SubCUTAneous, AC&HS, Max Phillip MD, Stopped at 19 1630    apixaban (ELIQUIS) tablet 5 mg, 5 mg, Oral, Q12H, Jorge Jose MD, Stopped at 06/11/19 1004    atorvastatin (LIPITOR) tablet 80 mg, 80 mg, Oral, QHS, Jorge Jose MD, 80 mg at 06/10/19 2159    . PHARMACY TO SUBSTITUTE PER PROTOCOL (Reordered from: etanercept (ENBREL) 50 mg/mL (0.98 mL) injection), , , Per Protocol, Ugo Zimmer MD    insulin glargine (LANTUS) injection 60 Units, 60 Units, SubCUTAneous, DAILY, Jorge Jose MD, 60 Units at 06/11/19 1016    magnesium oxide (MAG-OX) tablet 400 mg, 400 mg, Oral, BID, Jorge Jose MD, 400 mg at 06/11/19 1738    acetaminophen (TYLENOL) tablet 650 mg, 650 mg, Oral, Q4H PRN, Jorge Jose MD    ondansetron [facility]) injection 4 mg, 4 mg, IntraVENous, Q6H PRN, Jorge Jose MD    glucose chewable tablet 16 g, 4 Tab, Oral, PRN, Jorge Jose MD    glucagon (GLUCAGEN) injection 1 mg, 1 mg, IntraMUSCular, PRN, Jorge Jose MD    dextrose 10% infusion 250 mL, 250 mL, IntraVENous, PRN, Jorge Jose MD    sodium chloride (NS) flush 5-40 mL, 5-40 mL, IntraVENous, Q8H, Jorge Jose MD, 10 mL at 06/11/19 1738    sodium chloride (NS) flush 5-40 mL, 5-40 mL, IntraVENous, PRN, Jorge Jose MD    aspirin chewable tablet 81 mg, 81 mg, Oral, DAILY, Ugo Zimmer MD, Stopped at 06/11/19 1004        Allergies   Allergen Reactions    Pcn [Penicillins] Rash    Sulfa (Sulfonamide Antibiotics) Unknown (comments)      MRI Results (most recent):  Results from [place] encounter on 06/09/19   MRA BRAIN WO CONT    Narrative EXAM:  MRI BRAIN WO CONT, MRA BRAIN WO CONT    INDICATION:  Altered mental status and confusion. COMPARISON:  MRI brain 1/18/2019, CT head 6/9/2019. CONTRAST: None. TECHNIQUE:    MRI Brain:  Multiplanar multisequence acquisition without contrast of the brain. MRA Head:  3-D time-of-flight MRA of the head was performed. Multiplanar and MIP  reconstructions were obtained.     FINDINGS:  MRI Brain:  Multiple small acute cortical/subcortical infarcts in the left parietal lobe. No  evidence of acute hemorrhage. Unchanged generalized parenchymal volume loss with commensurate dilation of the  sulci and ventricular system. Periventricular deep white matter T2/FLAIR  hyperintensities, as was patchy T2/FLAIR hyperintensity in the diane, consistent  with mild chronic microvascular ischemic disease. Chronic large right MCA  territory infarct involving the right temporoparietal lobe and insula. Additional unchanged chronic infarcts in the right occipital lobe and bilateral  cerebellum. There is no cerebellar tonsillar herniation. Expected arterial  flow-voids are present. The paranasal sinuses, mastoid air cells, and middle ears are clear. The orbital  contents are within normal limits. No significant osseous or scalp lesions are  identified. MRA Head:  There is no evidence of large vessel occlusion or flow-limiting stenosis of the  intracranial internal carotid, anterior cerebral, and middle cerebral arteries. The anterior communicating artery is patent. There is no evidence of large vessel occlusion or flow-limiting stenosis of the  intracranial vertebral arteries, basilar artery, or posterior cerebral arteries. The right posterior communicating artery is diminutive but patent, the left is  not seen. There is no evidence of aneurysm or vascular malformation. Impression IMPRESSION:   MRI Brain:  1. Multiple small acute cortical/subcortical infarcts in the left parietal lobe. 2. Unchanged generalized parenchymal volume loss and mild chronic microvascular  ischemic disease. 3. Unchanged chronic large right MCA territory infarct involving the right  temporoparietal lobe and insula. Unchanged additional chronic infarcts in the  right occipital lobe and bilateral cerebellum. MRA Head:  1. No evidence of significant stenosis or aneurysm.     23X               Results from [facility] encounter on 06/09/19   MRA BRAIN WO CONT    Narrative EXAM:  MRI BRAIN WO CONT, MRA BRAIN WO CONT    INDICATION:  Altered mental status and confusion. COMPARISON:  MRI brain 1/18/2019, CT head 6/9/2019. CONTRAST: None. TECHNIQUE:    MRI Brain:  Multiplanar multisequence acquisition without contrast of the brain. MRA Head:  3-D time-of-flight MRA of the head was performed. Multiplanar and MIP  reconstructions were obtained. FINDINGS:  MRI Brain:  Multiple small acute cortical/subcortical infarcts in the left parietal lobe. No  evidence of acute hemorrhage. Unchanged generalized parenchymal volume loss with commensurate dilation of the  sulci and ventricular system. Periventricular deep white matter T2/FLAIR  hyperintensities, as was patchy T2/FLAIR hyperintensity in the diane, consistent  with mild chronic microvascular ischemic disease. Chronic large right MCA  territory infarct involving the right temporoparietal lobe and insula. Additional unchanged chronic infarcts in the right occipital lobe and bilateral  cerebellum. There is no cerebellar tonsillar herniation. Expected arterial  flow-voids are present. The paranasal sinuses, mastoid air cells, and middle ears are clear. The orbital  contents are within normal limits. No significant osseous or scalp lesions are  identified. MRA Head:  There is no evidence of large vessel occlusion or flow-limiting stenosis of the  intracranial internal carotid, anterior cerebral, and middle cerebral arteries. The anterior communicating artery is patent. There is no evidence of large vessel occlusion or flow-limiting stenosis of the  intracranial vertebral arteries, basilar artery, or posterior cerebral arteries. The right posterior communicating artery is diminutive but patent, the left is  not seen. There is no evidence of aneurysm or vascular malformation. Impression IMPRESSION:   MRI Brain:  1.  Multiple small acute cortical/subcortical infarcts in the left parietal lobe. 2. Unchanged generalized parenchymal volume loss and mild chronic microvascular  ischemic disease. 3. Unchanged chronic large right MCA territory infarct involving the right  temporoparietal lobe and insula. Unchanged additional chronic infarcts in the  right occipital lobe and bilateral cerebellum. MRA Head:  1. No evidence of significant stenosis or aneurysm. 23X             Review of Systems:  Review of systems not obtained due to patient factors. Vitals:    06/11/19 0741 06/11/19 1003 06/11/19 1116 06/11/19 1601   BP: 137/68 (!) 184/107 158/68 154/79   Pulse: 98 74 94 93   Resp: 16  18 18   Temp: 99 °F (37.2 °C)  97.9 °F (36.6 °C) 98.4 °F (36.9 °C)   SpO2: 99%  97% 98%   Weight:       Height:         Objective:     I      NEUROLOGICAL EXAM:    Appearance: The patient is well developed, well nourished, provides a poor history and is in no acute distress. Mental Status: Oriented to place and person, and the not the date or president, cognitive function is mildly abnormal and speech is mildly aphasic I think, but no real dysarthria and mood and affect appropriate, but a little confused. Cranial Nerves:   Intact visual fields. Fundi are benign, disc are flat, no lesions seen on funduscopy. ROSALIND, EOM's full, no nystagmus, no ptosis. Facial sensation is normal. Corneal reflexes are not tested. Facial movement is asymmetric with mild central facial on the left. Hearing is abnormal bilaterally. Palate is midline with normal sternocleidomastoid and trapezius muscles are normal. Tongue is midline. Neck without meningismus or bruits  Temporal arteries are not tender or enlarged  TMJ areas are not tender on palpation   Motor:  5/5 strength in upper and lower proximal and distal muscles, with a very mild left hemiparesis. Normal bulk and tone on the right and slight increase in the left. No fasciculations.   Rapid alternating movement is slightly reduced on the left Reflexes:   Deep tendon reflexes 2+/4 and asymmetrical, with perhaps a slight increase on the left. No babinski or clonus present   Sensory:   Abnormal to touch, pinprick and vibration and temperature and  DSS on the left, and slight decreased sensation in both feet to ankle level. Gait:  Not tested. Tremor:   No tremor noted. Cerebellar:  No abnormal cerebellar signs present on finger-nose-finger exam.  Romberg and tandem not tested   Neurovascular:  Normal heart sounds and regular rhythm, peripheral pulses decreased, and no carotid bruits. Assessment:   Active Problems:    Essential hypertension, benign (8/11/2014)      Obesity, morbid ([address] (12/26/2017)      Type 2 diabetes mellitus with nephropathy ([address] (12/26/2017)      Acute ischemic cerebrovascular accident (CVA) involving right middle cerebral artery territory [facility]) (1/17/2019)      Cerebrovascular accident (CVA) due to thrombosis of right middle cerebral artery ([address] (1/18/2019)      Paroxysmal atrial fibrillation ([address] (4/16/2019)      CVA (cerebral vascular accident) ([address] (6/9/2019)      Diabetic peripheral neuropathy associated with type 2 diabetes mellitus ([address] (6/10/2019)      Bilateral carotid artery stenosis (6/10/2019)      Altered mental status, unspecified (5/45/1248)      Embolic stroke involving left middle cerebral artery ([address] (6/10/2019)        Plan:     Patient with new problem of not little more lethargic little more confused having more difficulty focusing on objects to the left side, and possible new stroke, so CT scan of the head was ordered. Cardiology to see the patient for new embolic strokes in the left hemisphere causing right visual field loss despite being on Eliquis and now aspirin added but we may need to switch to Coumadin  Discussed with cardiology and the hospitalist and nursing staff and the family all in detail today.   Patient appears to have new deficits involving some difficulty with speech, and a field cut on the right side that seem to be new, in addition to her old findings of old left hemiparesis. Carotid Dopplers were reviewed and they do not show anything new or any significant stenosis. Her MRI showed the embolic strokes, and MRA of the brain was negative. Discussed with the patient and her  in detail, we will follow carefully with you, and PT OT and speech therapy have all been ordered and are seen the patient now. Repeat CT scan was pending. Signed By: Jaclyn Garcia MD     June 11, 2019       CC:  Robert Stapleton MD  FAX: [phone]

## 2019-06-12 ENCOUNTER — APPOINTMENT (OUTPATIENT)
Dept: GENERAL RADIOLOGY | Age: 69
DRG: 064 | End: 2019-06-12
Attending: INTERNAL MEDICINE
Payer: MEDICARE

## 2019-06-12 LAB
ANION GAP SERPL CALC-SCNC: 6 MMOL/L (ref 5–15)
BUN SERPL-MCNC: 18 MG/DL (ref 6–20)
BUN/CREAT SERPL: 14 (ref 12–20)
CALCIUM SERPL-MCNC: 8.7 MG/DL (ref 8.5–10.1)
CHLORIDE SERPL-SCNC: 106 MMOL/L (ref 97–108)
CO2 SERPL-SCNC: 28 MMOL/L (ref 21–32)
CREAT SERPL-MCNC: 1.25 MG/DL (ref 0.55–1.02)
ERYTHROCYTE [DISTWIDTH] IN BLOOD BY AUTOMATED COUNT: 13.4 % (ref 11.5–14.5)
GLUCOSE BLD STRIP.AUTO-MCNC: 101 MG/DL (ref 65–100)
GLUCOSE BLD STRIP.AUTO-MCNC: 102 MG/DL (ref 65–100)
GLUCOSE BLD STRIP.AUTO-MCNC: 78 MG/DL (ref 65–100)
GLUCOSE BLD STRIP.AUTO-MCNC: 80 MG/DL (ref 65–100)
GLUCOSE BLD STRIP.AUTO-MCNC: 80 MG/DL (ref 65–100)
GLUCOSE BLD STRIP.AUTO-MCNC: 95 MG/DL (ref 65–100)
GLUCOSE SERPL-MCNC: 81 MG/DL (ref 65–100)
HCT VFR BLD AUTO: 38.9 % (ref 35–47)
HGB BLD-MCNC: 13 G/DL (ref 11.5–16)
MCH RBC QN AUTO: 30 PG (ref 26–34)
MCHC RBC AUTO-ENTMCNC: 33.4 G/DL (ref 30–36.5)
MCV RBC AUTO: 89.6 FL (ref 80–99)
NRBC # BLD: 0 K/UL (ref 0–0.01)
NRBC BLD-RTO: 0 PER 100 WBC
PLATELET # BLD AUTO: 181 K/UL (ref 150–400)
PMV BLD AUTO: 9.8 FL (ref 8.9–12.9)
POTASSIUM SERPL-SCNC: 3.2 MMOL/L (ref 3.5–5.1)
RBC # BLD AUTO: 4.34 M/UL (ref 3.8–5.2)
SERVICE CMNT-IMP: ABNORMAL
SERVICE CMNT-IMP: ABNORMAL
SERVICE CMNT-IMP: NORMAL
SODIUM SERPL-SCNC: 140 MMOL/L (ref 136–145)
WBC # BLD AUTO: 10.4 K/UL (ref 3.6–11)

## 2019-06-12 PROCEDURE — 77030019563 HC DEV ATTCH FEED HOLL -A

## 2019-06-12 PROCEDURE — 74018 RADEX ABDOMEN 1 VIEW: CPT

## 2019-06-12 PROCEDURE — 74011250636 HC RX REV CODE- 250/636: Performed by: INTERNAL MEDICINE

## 2019-06-12 PROCEDURE — 74011000250 HC RX REV CODE- 250: Performed by: INTERNAL MEDICINE

## 2019-06-12 PROCEDURE — 80048 BASIC METABOLIC PNL TOTAL CA: CPT

## 2019-06-12 PROCEDURE — 97116 GAIT TRAINING THERAPY: CPT

## 2019-06-12 PROCEDURE — 97110 THERAPEUTIC EXERCISES: CPT

## 2019-06-12 PROCEDURE — 65660000000 HC RM CCU STEPDOWN

## 2019-06-12 PROCEDURE — 74011636637 HC RX REV CODE- 636/637: Performed by: INTERNAL MEDICINE

## 2019-06-12 PROCEDURE — 97535 SELF CARE MNGMENT TRAINING: CPT | Performed by: OCCUPATIONAL THERAPIST

## 2019-06-12 PROCEDURE — 77030008771 HC TU NG SALEM SUMP -A

## 2019-06-12 PROCEDURE — 82962 GLUCOSE BLOOD TEST: CPT

## 2019-06-12 PROCEDURE — 92526 ORAL FUNCTION THERAPY: CPT

## 2019-06-12 PROCEDURE — 85027 COMPLETE CBC AUTOMATED: CPT

## 2019-06-12 PROCEDURE — 36415 COLL VENOUS BLD VENIPUNCTURE: CPT

## 2019-06-12 PROCEDURE — 74011250637 HC RX REV CODE- 250/637: Performed by: INTERNAL MEDICINE

## 2019-06-12 RX ORDER — POTASSIUM CHLORIDE 7.45 MG/ML
10 INJECTION INTRAVENOUS ONCE
Status: COMPLETED | OUTPATIENT
Start: 2019-06-12 | End: 2019-06-12

## 2019-06-12 RX ORDER — INSULIN GLARGINE 100 [IU]/ML
20 INJECTION, SOLUTION SUBCUTANEOUS DAILY
Status: DISCONTINUED | OUTPATIENT
Start: 2019-06-13 | End: 2019-06-15

## 2019-06-12 RX ORDER — HYDRALAZINE HYDROCHLORIDE 20 MG/ML
10 INJECTION INTRAMUSCULAR; INTRAVENOUS EVERY 6 HOURS
Status: DISCONTINUED | OUTPATIENT
Start: 2019-06-12 | End: 2019-06-13

## 2019-06-12 RX ORDER — ASPIRIN 81 MG/1
81 TABLET ORAL DAILY
Status: DISCONTINUED | OUTPATIENT
Start: 2019-06-13 | End: 2019-06-13

## 2019-06-12 RX ORDER — HYDRALAZINE HYDROCHLORIDE 50 MG/1
50 TABLET, FILM COATED ORAL 3 TIMES DAILY
Status: DISCONTINUED | OUTPATIENT
Start: 2019-06-12 | End: 2019-06-12

## 2019-06-12 RX ORDER — POTASSIUM CHLORIDE 1.5 G/1.77G
40 POWDER, FOR SOLUTION ORAL
Status: DISCONTINUED | OUTPATIENT
Start: 2019-06-12 | End: 2019-06-12

## 2019-06-12 RX ORDER — METOPROLOL TARTRATE 5 MG/5ML
5 INJECTION INTRAVENOUS EVERY 6 HOURS
Status: DISCONTINUED | OUTPATIENT
Start: 2019-06-12 | End: 2019-06-13

## 2019-06-12 RX ORDER — METOPROLOL TARTRATE 50 MG/1
50 TABLET ORAL 2 TIMES DAILY
Status: DISCONTINUED | OUTPATIENT
Start: 2019-06-12 | End: 2019-06-12

## 2019-06-12 RX ORDER — ENOXAPARIN SODIUM 100 MG/ML
1 INJECTION SUBCUTANEOUS EVERY 12 HOURS
Status: DISCONTINUED | OUTPATIENT
Start: 2019-06-12 | End: 2019-06-13

## 2019-06-12 RX ORDER — DEXTROSE, SODIUM CHLORIDE, AND POTASSIUM CHLORIDE 5; .45; .15 G/100ML; G/100ML; G/100ML
75 INJECTION INTRAVENOUS CONTINUOUS
Status: DISCONTINUED | OUTPATIENT
Start: 2019-06-12 | End: 2019-06-13

## 2019-06-12 RX ADMIN — AMLODIPINE BESYLATE 10 MG: 5 TABLET ORAL at 09:39

## 2019-06-12 RX ADMIN — METOPROLOL TARTRATE 5 MG: 5 INJECTION INTRAVENOUS at 20:28

## 2019-06-12 RX ADMIN — APIXABAN 5 MG: 5 TABLET, FILM COATED ORAL at 09:39

## 2019-06-12 RX ADMIN — MAGNESIUM OXIDE TAB 400 MG (241.3 MG ELEMENTAL MG) 400 MG: 400 (241.3 MG) TAB at 09:39

## 2019-06-12 RX ADMIN — ENOXAPARIN SODIUM 90 MG: 100 INJECTION SUBCUTANEOUS at 20:32

## 2019-06-12 RX ADMIN — NITROGLYCERIN 1 INCH: 20 OINTMENT TOPICAL at 14:59

## 2019-06-12 RX ADMIN — HYDRALAZINE HYDROCHLORIDE 50 MG: 50 TABLET, FILM COATED ORAL at 09:39

## 2019-06-12 RX ADMIN — INSULIN GLARGINE 60 UNITS: 100 INJECTION, SOLUTION SUBCUTANEOUS at 09:38

## 2019-06-12 RX ADMIN — ASPIRIN 81 MG 81 MG: 81 TABLET ORAL at 09:39

## 2019-06-12 RX ADMIN — METOPROLOL TARTRATE 50 MG: 50 TABLET ORAL at 09:39

## 2019-06-12 RX ADMIN — Medication 10 ML: at 14:00

## 2019-06-12 RX ADMIN — DEXTROSE MONOHYDRATE, SODIUM CHLORIDE, AND POTASSIUM CHLORIDE 75 ML/HR: 50; 4.5; 1.49 INJECTION, SOLUTION INTRAVENOUS at 20:28

## 2019-06-12 RX ADMIN — POTASSIUM CHLORIDE 10 MEQ: 10 INJECTION, SOLUTION INTRAVENOUS at 20:27

## 2019-06-12 RX ADMIN — HYDRALAZINE HYDROCHLORIDE 10 MG: 20 INJECTION INTRAMUSCULAR; INTRAVENOUS at 20:34

## 2019-06-12 NOTE — PROGRESS NOTES
Problem: Self Care Deficits Care Plan (Adult)  Goal: *Acute Goals and Plan of Care (Insert Text)  Description  Occupational Therapy Goals  Initiated 6/10/2019  1. Patient will perform grooming standing at sink with minimal assistance/contact guard assist within 7 day(s). 2.  Patient will perform upper body dressing with moderate assistance  within 7 day(s). 3.  Patient will perform lower body dressing with moderate assistance  within 7 day(s). 4.  Patient will perform toilet transfers with supervision/set-up within 7 day(s). 5.  Patient will perform all aspects of toileting with moderate assistance  within 7 day(s). 6.  Patient will perform sponge bathing with moderate assistance  within 7 day(s). 7.  Patient will perform ADLs with moderate cueing for proper sequencing within 7 days. Outcome: Progressing Towards Goal    OCCUPATIONAL THERAPY TREATMENT  Patient: Carlee Franco (52 y.o. female)  Date: 6/12/2019  Diagnosis: CVA (cerebral vascular accident) ([facility] [I63.9] <principal problem not specified>       Precautions:      ASSESSMENT:  Patient slowly progressing, with some appropriate verbal responses during this session. Mainly she is perseverative in her speech and is unable to form an intelligible sentence most of the time. Patient inconsistently able to identify some common objects and verbalize what the object was used for, but never able to do both. She is using some common grooming objects appropriately, able to wash her face and comb her hair today with supervision and cueing. Patient also able to identify a cup and drink from it with minimal assistance. Session ended due to notable cognitive fatigue with clearly declining performance. Patient remains in need of inpatient rehab at discharge. Progression toward goals:  ?       Improving appropriately and progressing toward goals  X       Improving slowly and progressing toward goals  ?        Not making progress toward goals and plan of care will be adjusted     PLAN:  Patient continues to benefit from skilled intervention to address the above impairments. Continue treatment per established plan of care. Discharge Recommendations:   Inpatient Rehab  Further Equipment Recommendations for Discharge:  TBD       OBJECTIVE DATA SUMMARY:   Cognitive/Behavioral Status:  Neurologic State: Alert;Confused  Orientation Level: Unable to verbalize  Cognition: No command following;Decreased attention/concentration  Perception: Cues to attend to right side of body;Cues to attend right visual field  Perseveration: Perseverates during conversation  Safety/Judgement: Not assessed  Functional Mobility and Transfers for ADLs:  Bed Mobility:  Rolling: Moderate assistance;Minimum assistance;Assist x2(Simultaneous filing. User may not have seen previous data.)  Supine to Sit: Moderate assistance;Minimum assistance;Assist x2(Simultaneous filing. User may not have seen previous data.)  Sit to Supine: Supervision  Scooting: Moderate assistance          Balance:  Sitting: Intact(Simultaneous filing. User may not have seen previous data.)  Standing: Impaired  Standing - Static: Good;Constant support  Standing - Dynamic : Fair;Constant support  ADL Intervention:  Feeding  Drink to Mouth: Minimum assistance  Cues: Verbal cues provided;Visual cues provided;Visual/perceptual training/retraining(able to identify cup and use appropriately with cues)    Grooming  Washing Face: Supervision;Set-up  Brushing/Combing Hair: Set-up; Supervision  Cues: Tactile cues provided;Verbal cues provided;Visual cues provided    Unable to identify pen or tooth brush. She did refer to the pen as a pencil, but could not verbalize what it is used for. Patient also unable to verbalize what the tooth brush is used for.      Patient unable to identify was        Activity Tolerance:   VSS    After treatment:     Patient left in no apparent distress sitting up in chair  X Patient left in no apparent distress in bed  X Call bell left within reach  X Nursing notified  ?  Caregiver present    Bed alarm activated    COMMUNICATION/COLLABORATION:   The patients plan of care was discussed with: Registered Nurse and PTA    Lor Monet OTR/L  Time Calculation: 15 mins

## 2019-06-12 NOTE — PROGRESS NOTES
Problem: Mobility Impaired (Adult and Pediatric)  Goal: *Acute Goals and Plan of Care (Insert Text)  Description  Physical Therapy Goals  Initiated 6/10/2019  1. Patient will move from supine to sit and sit to supine , scoot up and down and roll side to side in bed with independence within 7 day(s). 2.  Patient will transfer from bed to chair and chair to bed with modified independence using the least restrictive device within 7 day(s). 3.  Patient will perform sit to stand with independence within 7 day(s). 4.  Patient will ambulate with modified independence for 150 feet with the least restrictive device within 7 day(s). 5.  Patient will ascend/descend 2 stairs with 2 handrail(s) with modified independence within 7 day(s). Outcome: Progressing Towards Goal   PHYSICAL THERAPY TREATMENT  Patient: Ramona Russ (23 y.o. female)  Date: 6/12/2019  Diagnosis: [facility] [I63.9] <principal problem not specified>       Precautions:    Chart, physical therapy assessment, plan of care and goals were reviewed. ASSESSMENT:  Pt cleared by nurse to mobilize. Pt received in bed supine with daughter in room. Pt agreeable to therapy. Pt with improved command following today but still limited due to apraxia. Pt performed supine to sit at mod A/min A x2. Pt performed sit to stand transfer at min A/CGA. Pt ambulated 100ft with CGA with HHA x2. Pt requiring reassurance of safety due to visual deficits. Pt able to perform LAQ although requiring max cueing to perform. Pt with improved mobility today. Pt will benefit from IP rehab to improve strength and safe mobility from deficits of CVA. Progression toward goals:  ?       Improving appropriately and progressing toward goals  ? Improving slowly and progressing toward goals  ?        Not making progress toward goals and plan of care will be adjusted     PLAN:  Patient continues to benefit from skilled intervention to address the above impairments. Continue treatment per established plan of care. Discharge Recommendations:  Inpatient Rehab  Further Equipment Recommendations for Discharge:  TBD by rehab       SUBJECTIVE:   Patient stated ? I love you.?    OBJECTIVE DATA SUMMARY:   Critical Behavior:  Neurologic State: Alert, Confused  Orientation Level: Unable to verbalize  Cognition: No command following, Decreased attention/concentration  Safety/Judgement: Not assessed  Functional Mobility Training:  Bed Mobility:  Rolling: Moderate assistance;Minimum assistance;Assist x2(Simultaneous filing. User may not have seen previous data.)  Supine to Sit: Moderate assistance;Minimum assistance;Assist x2(Simultaneous filing. User may not have seen previous data.)  Sit to Supine: Supervision  Scooting: Moderate assistance        Transfers:  Sit to Stand: Minimum assistance;Contact guard assistance  Stand to Sit: Contact guard assistance                             Balance:  Sitting: Intact(Simultaneous filing. User may not have seen previous data.)  Standing: Impaired  Standing - Static: Good;Constant support  Standing - Dynamic : Fair;Constant support  Ambulation/Gait Training:  Distance (ft): 100 Feet (ft)  Assistive Device: Gait belt(HHA x2)  Ambulation - Level of Assistance: Contact guard assistance;Assist x2        Gait Abnormalities: Decreased step clearance        Base of Support: Widened     Speed/Mary Ann: Pace decreased (<100 feet/min)  Step Length: Right shortened;Left shortened           Therapeutic Exercises:   LAQ x3  Shoulder flexion x2  Pain:                    Activity Tolerance:   Pt with improved command following. After treatment:   ? Patient left in no apparent distress sitting up in chair  ? Patient left in no apparent distress in bed  ? Call bell left within reach  ? Nursing notified  ? Caregiver present  ? Bed alarm activated    COMMUNICATION/EDUCATION:   The patient?s plan of care was discussed with: Registered Nurse.     Patient was educated regarding Her deficit(s) of visual ad apraxia as this relates to Her diagnosis of CVA. She demonstrated poor understanding as evidenced by no response. Patient and/or family was verbally educated on the BE FAST acronym for signs/symptoms of CVA and TIA. BE FAST was written on patient's communication board  for visual education and reinforcement. All questions answered with patient indicating poor understanding. ?  Fall prevention education was provided and the patient/caregiver indicated understanding. ? Patient/family have participated as able in goal setting and plan of care. ?  Patient/family agree to work toward stated goals and plan of care. ?  Patient understands intent and goals of therapy, but is neutral about his/her participation. ? Patient is unable to participate in goal setting and plan of care.     Thank you for this referral.  Nataliia Saini, PTA   Time Calculation: 26 mins

## 2019-06-12 NOTE — PROGRESS NOTES
Attempts were made to insert NG tube per order, upon third attempt, thought to be successful however tube was coiled in the back of patient's throat per KUB. Attempts were made by another RN, without success. Notified Dr. Israel Nunez and she stated will order for radiology to insert tube in the morning, also switched some medications to IV since patient is not taking taking PO consistently.

## 2019-06-12 NOTE — PROGRESS NOTES
Bedside and Verbal shift change report given to Shellie Baez RN (oncoming nurse) by Annemarie Peterson RN (offgoing nurse). Report included the following information SBAR, Kardex, MAR and Recent Results. 2027: Assessed patient, MEWS 3 due to BP of 208/84, all other VSS, will administer scheduled metoprolol. Patient A&O to self and place, no complaints of pain, patient's daughter is at bedside and will be staying the night. 2028: Administered scheduled metoprolol. Crushed medication and placed in about 45 mL of juice. It took patient 30 mins to drink this. She is only taking very small sips. Went ahead and got her Eliquis to mix in and administer at 2043 since it was taking so long for patient to drink this small amount. Patient finished drinking these medications at 2100.   2202: Patients /85. Will attempt to administer scheduled hydralazine if patient will drink it mixed with juice. 2206: Administered scheduled hydralazine crushed with about 15 mL of juice. Patient was able to drink this much quicker than earlier in the evening. Scheduled Lipitor held due to administration instructions saying not to crush the pill. 2346: Reassessed patient MEWS 1, BP still elevated at 188/93, will administer PRN Nitro paste. All other VSS, no complaints of pain at this time. Will continue to monitor. 2347: PRN Nitro paste administered. 0413: Reassessed patient, only change is that patient has not voided. Patient does not feel like she needs to go to the bathroom at this time. Will check back at 0600 and bladder scan if she still does not feel like she needs to go. MEWS 1, VSS, no complaints of pain at this time, patient is resting comfortably in bed. Will continue to monitor. 0710: Bedside and Verbal shift change report given to Herman Ying RN (oncoming nurse) by Shellie Baez RN (offgoing nurse). Report included the following information SBAR, Kardex, MAR and Recent Results.

## 2019-06-12 NOTE — PROGRESS NOTES
Problem: Dysphagia (Adult)  Goal: *Acute Goals and Plan of Care (Insert Text)  Description  Speech pathology goals initiated 6/10/2019   1. Patient will tolerate a regular diet/ thin liquids free of s/s aspiration within 7 days changed to full liquid 6/11/2019   2. Patient will participate in language eval pending MRI results within 7 days    Outcome: [address] [place] TREATMENT  Patient: Daiana Gale (54 y.o. female)  Date: 6/12/2019  Diagnosis: CVA (cerebral vascular accident) ([facility] [address] [I63.9] <principal problem not specified>       Precautions: aspiration      ASSESSMENT:  Patient continues to be limited by likely apraxia impacting her ability to accept PO, specifically via spoon. Patient's PO acceptance seems to be better with straw; however, it is highly variable. Patient inject water and chicken soup via straw. She had minimal mouth opening for bolus acceptance of soup or ice cream off spoon. Despite verbal, visual and tactile cues, she did not open mouth to accept. Suspect mildly delayed swallow initiation via palpation. No overt s/s aspiration with small amounts of PO observed. Unfortunately, patient only accepting bites/sips which is concerning for her ability to meet nutritional needs. Also, given inconsistent acceptance despite max cues, she is unable to consistently and reliably take meds. Discussed changing meds to IV with Pharmacist.     Progression toward goals:  ?         Improving appropriately and progressing toward goals  ? Improving slowly and progressing toward goals  ? Not making progress toward goals and plan of care will be adjusted     PLAN:  Recommendations and Planned Interventions:  -- continue full liquid diet.  Single straw sips  -- assist with PO  -- Change meds to IVF as she is unable to consistently and reliably take PO meds  -- consider temporary means of alternative nutrition    Patient continues to benefit from skilled intervention to address the above impairments. Continue treatment per established plan of care. Discharge Recommendations:  Rehab     SUBJECTIVE:   Patient stated ? thank you. I love you? .    OBJECTIVE:   Cognitive and Communication Status:  Neurologic State: Alert(aphasic)  Orientation Level: Unable to verbalize  Cognition: No command following, Decreased attention/concentration  Perception: Cues to attend to right side of body, Cues to attend right visual field  Perseveration: Perseverates during conversation  Safety/Judgement: Not assessed  Dysphagia Treatment:    P.O. Trials:  Patient Position: (up in bed)  Vocal quality prior to P.O.: No impairment  Consistency Presented: Thin liquid;Puree  How Presented: SLP-fed/presented;Spoon;Straw     Bolus Acceptance: No impairment  Bolus Formation/Control: Impaired     Propulsion: No impairment  Oral Residue: None  Initiation of Swallow: Delayed (# of seconds)  Laryngeal Elevation: Functional  Aspiration Signs/Symptoms: None  Pharyngeal Phase Characteristics: No impairment, issues, or problems                 Pharyngeal Phase Severity : Minimal       After treatment:   ?              Patient left in no apparent distress sitting up in chair  ? Patient left in no apparent distress in bed  ? Call bell left within reach  ? Nursing notified  ? Caregiver present  ? Bed alarm activated    COMMUNICATION/EDUCATION:     The patient?s plan of care including recommendations, planned interventions, and recommended diet changes were discussed with: Registered Nurse.   SUNG Hemphill  Time Calculation: 20 mins

## 2019-06-12 NOTE — PROGRESS NOTES
Hospitalist Progress Note    NAME: Carlee Franco   :  1950   MRN:  [id number]       Assessment / Plan:  Acute embolic CVA with expressive aphasia, visual field defects in setting of afib: more alert this afternoon, but still struggling with po intake  - CT head  with a chronic right MCA territory infarction. No acute findings. - repeat CT head with no acute findings and no significant change since 2019. No infarct extension or hemorrhage.  - check MRI/MRA brain with multiple small acute cortical/subcortical infarcts in the left parietal lobe. Unchanged generalized parenchymal volume loss and mild chronic microvascular ischemic disease. Unchanged chronic large right MCA territory involving the right temporoparietal lobe and insula. Unchanged chronic infarcts in the right occipital lobe and bilateral cerebellum. No evidence of significant stenosis or aneurysm. - echo with EF 56 - 60%. Mild (grade 1) left ventricular diastolic dysfunction.  - LDL 54, holding statin with poor po intake  - anticoagulation changed to therapeutic lovenox as Pt is not consistently taking po  - neuro consult appreciated  - NG tube placement for supplemental nutrition. Nutrition consulted for tube feed recommendations. Suspect that Pt will improve with therapy from her CVA and she is on therapeutic anticoagulation so not ideal candidate for PEG tube - possible discharge to [facility] with NG vs PEG discussed with family.  They would like to start with NG tube and see how she does before placing PEG but are open to this if needed.  - PT/OT/speech recommending inpatient rehab, referral sent to [facility]  Paroxysmal atrial fibrillation, chronic diastolic CHF and essential HTN:  - con't lovenox as above  - IV metoprolol and hydralazine for now  - still holding home accupril with SATISH on admission and poor po intake  - prn nitrobid  Insulin dependent DM2 with hyperglycemia:  - home lantus decreased due to poor po intake  - still holding metformin, trulicity  - lispro sliding scale  Acute kidney failure, resolved  Psoriasis: holding enbrel  Obesity (Body mass index is 31.95 kg/m²)     Code status Full   DVT prophylaxis: lovenox     Subjective:     Chief Complaint / Reason for Physician Visit  More alert with family there today, but continues to have difficulty with po intake and downgraded to liquids today. Discussed with RN events overnight. Review of Systems:  Symptom Y/N Comments  Symptom Y/N Comments   Fever/Chills    Chest Pain     Poor Appetite    Edema     Cough    Abdominal Pain     Sputum    Joint Pain     SOB/MALIN    Pruritis/Rash     Nausea/vomit    Tolerating PT/OT     Diarrhea    Tolerating Diet     Constipation    Other       Could NOT obtain due to: CVA     Objective:     VITALS:   Last 24hrs VS reviewed since prior progress note. Most recent are:  Patient Vitals for the past 24 hrs:   Temp Pulse Resp BP SpO2   06/12/19 0737 98.2 °F (36.8 °C) 82 18 161/85 95 %   06/12/19 0413 98.9 °F (37.2 °C) 75 16 168/82 95 %   06/11/19 2347  74  (!) 188/93    06/11/19 2346 98.7 °F (37.1 °C) 76 16 (!) 188/93 97 %   06/11/19 2206  70  184/85    06/11/19 2202  76  184/85    06/11/19 2028  87  (!) 208/84    06/11/19 2027 99.1 °F (37.3 °C) 86 16 (!) 208/84    06/11/19 1601 98.4 °F (36.9 °C) 93 18 154/79 98 %   06/11/19 1116 97.9 °F (36.6 °C) 94 18 158/68 97 %       Intake/Output Summary (Last 24 hours) at 6/12/2019 1040  Last data filed at 6/12/2019 0617  Gross per 24 hour   Intake 80 ml   Output 300 ml   Net -220 ml        PHYSICAL EXAM:  General: WD, WN. Alert, not cooperative, no acute distress    EENT:  EOMI. Anicteric sclerae. MMM  Resp:  CTA bilaterally, no wheezing or rales.   No accessory muscle use  CV:  Regular  rhythm,  No edema  GI:  Soft, Non distended, Non tender.  +Bowel sounds  Neurologic:  Alert and oriented X 1, normal speech, but not consistently appropriate   Psych:   Limited insight. Not anxious nor agitated  Skin:  No rashes. No jaundice    Reviewed most current lab test results and cultures  YES  Reviewed most current radiology test results   YES  Review and summation of old records today    NO  Reviewed patient's current orders and MAR    YES  PMH/SH reviewed - no change compared to H&P  ________________________________________________________________________  Care Plan discussed with:    Comments   Patient x    Family  x , dtr at bedside   RN x    Care Manager     Consultant                        Multidiciplinary team rounds were held today with , nursing, pharmacist and clinical coordinator. Patient's plan of care was discussed; medications were reviewed and discharge planning was addressed. ________________________________________________________________________  Total NON critical care TIME:  35 Minutes    Total CRITICAL CARE TIME Spent:   Minutes non procedure based      Comments   >50% of visit spent in counseling and coordination of care x    ________________________________________________________________________  Shavonne Lewis MD     Procedures: see electronic medical records for all procedures/Xrays and details which were not copied into this note but were reviewed prior to creation of Plan. LABS:  I reviewed today's most current labs and imaging studies.   Pertinent labs include:  Recent Labs     06/12/19  0416 06/11/19  0357 06/10/19  0502   WBC 10.4 10.8 10.5   HGB 13.0 13.6 13.1   HCT 38.9 40.5 39.5    191 162     Recent Labs     06/12/19  0416 06/11/19  0357 06/10/19  0502 06/09/19  1446    136 138 139   K 3.2* 3.4* 3.5 3.8    104 105 102   CO2 28 24 26 30   GLU 81 116* 167* 139*   BUN 18 16 19 23*   CREA 1.25* 1.26* 1.08* 1.32*   CA 8.7 9.1 8.9 9.1   MG  --   --  1.3*  --    ALB  --   --  2.6* 2.5*   TBILI  --   --  0.4 0.4   SGOT  --   --  26 30   ALT  --   --  35 46   INR  --   --   --  1.0       Signed: Shavonne Lewis MD

## 2019-06-13 ENCOUNTER — APPOINTMENT (OUTPATIENT)
Dept: GENERAL RADIOLOGY | Age: 69
DRG: 064 | End: 2019-06-13
Attending: INTERNAL MEDICINE
Payer: MEDICARE

## 2019-06-13 LAB
ANION GAP SERPL CALC-SCNC: 6 MMOL/L (ref 5–15)
BUN SERPL-MCNC: 22 MG/DL (ref 6–20)
BUN/CREAT SERPL: 18 (ref 12–20)
CALCIUM SERPL-MCNC: 8.4 MG/DL (ref 8.5–10.1)
CHLORIDE SERPL-SCNC: 109 MMOL/L (ref 97–108)
CO2 SERPL-SCNC: 27 MMOL/L (ref 21–32)
CREAT SERPL-MCNC: 1.2 MG/DL (ref 0.55–1.02)
ERYTHROCYTE [DISTWIDTH] IN BLOOD BY AUTOMATED COUNT: 13.4 % (ref 11.5–14.5)
GLUCOSE BLD STRIP.AUTO-MCNC: 129 MG/DL (ref 65–100)
GLUCOSE BLD STRIP.AUTO-MCNC: 84 MG/DL (ref 65–100)
GLUCOSE BLD STRIP.AUTO-MCNC: 94 MG/DL (ref 65–100)
GLUCOSE BLD STRIP.AUTO-MCNC: 95 MG/DL (ref 65–100)
GLUCOSE SERPL-MCNC: 75 MG/DL (ref 65–100)
HCT VFR BLD AUTO: 38.7 % (ref 35–47)
HGB BLD-MCNC: 13.2 G/DL (ref 11.5–16)
MAGNESIUM SERPL-MCNC: 1.8 MG/DL (ref 1.6–2.4)
MCH RBC QN AUTO: 30.7 PG (ref 26–34)
MCHC RBC AUTO-ENTMCNC: 34.1 G/DL (ref 30–36.5)
MCV RBC AUTO: 90 FL (ref 80–99)
NRBC # BLD: 0 K/UL (ref 0–0.01)
NRBC BLD-RTO: 0 PER 100 WBC
PLATELET # BLD AUTO: 203 K/UL (ref 150–400)
PMV BLD AUTO: 10 FL (ref 8.9–12.9)
POTASSIUM SERPL-SCNC: 3.4 MMOL/L (ref 3.5–5.1)
RBC # BLD AUTO: 4.3 M/UL (ref 3.8–5.2)
SERVICE CMNT-IMP: ABNORMAL
SERVICE CMNT-IMP: NORMAL
SODIUM SERPL-SCNC: 142 MMOL/L (ref 136–145)
WBC # BLD AUTO: 13.5 K/UL (ref 3.6–11)

## 2019-06-13 PROCEDURE — 74011000250 HC RX REV CODE- 250: Performed by: INTERNAL MEDICINE

## 2019-06-13 PROCEDURE — 74011250636 HC RX REV CODE- 250/636: Performed by: INTERNAL MEDICINE

## 2019-06-13 PROCEDURE — 74011636320 HC RX REV CODE- 636/320: Performed by: RADIOLOGY

## 2019-06-13 PROCEDURE — 43752 NASAL/OROGASTRIC W/TUBE PLMT: CPT

## 2019-06-13 PROCEDURE — 82962 GLUCOSE BLOOD TEST: CPT

## 2019-06-13 PROCEDURE — 85027 COMPLETE CBC AUTOMATED: CPT

## 2019-06-13 PROCEDURE — 74011250637 HC RX REV CODE- 250/637: Performed by: INTERNAL MEDICINE

## 2019-06-13 PROCEDURE — 80048 BASIC METABOLIC PNL TOTAL CA: CPT

## 2019-06-13 PROCEDURE — 83735 ASSAY OF MAGNESIUM: CPT

## 2019-06-13 PROCEDURE — 0DH67UZ INSERTION OF FEEDING DEVICE INTO STOMACH, VIA NATURAL OR ARTIFICIAL OPENING: ICD-10-PCS | Performed by: RADIOLOGY

## 2019-06-13 PROCEDURE — 51798 US URINE CAPACITY MEASURE: CPT

## 2019-06-13 PROCEDURE — 36415 COLL VENOUS BLD VENIPUNCTURE: CPT

## 2019-06-13 PROCEDURE — 65660000000 HC RM CCU STEPDOWN

## 2019-06-13 PROCEDURE — 77030019563 HC DEV ATTCH FEED HOLL -A

## 2019-06-13 RX ORDER — ATORVASTATIN CALCIUM 40 MG/1
80 TABLET, FILM COATED ORAL
Status: DISCONTINUED | OUTPATIENT
Start: 2019-06-13 | End: 2019-06-13

## 2019-06-13 RX ORDER — LANOLIN ALCOHOL/MO/W.PET/CERES
400 CREAM (GRAM) TOPICAL 2 TIMES DAILY
Status: DISCONTINUED | OUTPATIENT
Start: 2019-06-13 | End: 2019-06-13

## 2019-06-13 RX ORDER — LIDOCAINE HYDROCHLORIDE 20 MG/ML
JELLY TOPICAL
Status: DISPENSED
Start: 2019-06-13 | End: 2019-06-13

## 2019-06-13 RX ORDER — METOPROLOL TARTRATE 25 MG/1
25 TABLET, FILM COATED ORAL 2 TIMES DAILY
Status: DISCONTINUED | OUTPATIENT
Start: 2019-06-13 | End: 2019-06-13

## 2019-06-13 RX ORDER — CLONIDINE 0.3 MG/24H
1 PATCH, EXTENDED RELEASE TRANSDERMAL
Status: DISCONTINUED | OUTPATIENT
Start: 2019-06-13 | End: 2019-06-17 | Stop reason: HOSPADM

## 2019-06-13 RX ORDER — LIDOCAINE HYDROCHLORIDE 20 MG/ML
JELLY TOPICAL ONCE
Status: DISPENSED | OUTPATIENT
Start: 2019-06-13 | End: 2019-06-13

## 2019-06-13 RX ORDER — AMLODIPINE BESYLATE 5 MG/1
5 TABLET ORAL DAILY
Status: DISCONTINUED | OUTPATIENT
Start: 2019-06-13 | End: 2019-06-13

## 2019-06-13 RX ORDER — HYDRALAZINE HYDROCHLORIDE 20 MG/ML
10 INJECTION INTRAMUSCULAR; INTRAVENOUS
Status: DISCONTINUED | OUTPATIENT
Start: 2019-06-13 | End: 2019-06-17

## 2019-06-13 RX ORDER — HYDRALAZINE HYDROCHLORIDE 20 MG/ML
10 INJECTION INTRAMUSCULAR; INTRAVENOUS
Status: DISCONTINUED | OUTPATIENT
Start: 2019-06-13 | End: 2019-06-13

## 2019-06-13 RX ORDER — CLONIDINE 0.2 MG/24H
1 PATCH, EXTENDED RELEASE TRANSDERMAL
Status: DISCONTINUED | OUTPATIENT
Start: 2019-06-13 | End: 2019-06-13

## 2019-06-13 RX ORDER — ENOXAPARIN SODIUM 100 MG/ML
1 INJECTION SUBCUTANEOUS EVERY 12 HOURS
Status: DISCONTINUED | OUTPATIENT
Start: 2019-06-13 | End: 2019-06-17 | Stop reason: HOSPADM

## 2019-06-13 RX ORDER — METOPROLOL TARTRATE 5 MG/5ML
5 INJECTION INTRAVENOUS
Status: DISCONTINUED | OUTPATIENT
Start: 2019-06-13 | End: 2019-06-14

## 2019-06-13 RX ORDER — LIDOCAINE HYDROCHLORIDE 20 MG/ML
JELLY TOPICAL AS NEEDED
Status: DISCONTINUED | OUTPATIENT
Start: 2019-06-13 | End: 2019-06-17 | Stop reason: HOSPADM

## 2019-06-13 RX ORDER — INSULIN LISPRO 100 [IU]/ML
INJECTION, SOLUTION INTRAVENOUS; SUBCUTANEOUS EVERY 6 HOURS
Status: DISCONTINUED | OUTPATIENT
Start: 2019-06-13 | End: 2019-06-17 | Stop reason: HOSPADM

## 2019-06-13 RX ADMIN — NITROGLYCERIN 1 INCH: 20 OINTMENT TOPICAL at 16:21

## 2019-06-13 RX ADMIN — HYDRALAZINE HYDROCHLORIDE 10 MG: 20 INJECTION INTRAMUSCULAR; INTRAVENOUS at 13:37

## 2019-06-13 RX ADMIN — METOPROLOL TARTRATE 5 MG: 5 INJECTION INTRAVENOUS at 02:40

## 2019-06-13 RX ADMIN — ENOXAPARIN SODIUM 90 MG: 100 INJECTION SUBCUTANEOUS at 11:37

## 2019-06-13 RX ADMIN — HYDRALAZINE HYDROCHLORIDE 10 MG: 20 INJECTION INTRAMUSCULAR; INTRAVENOUS at 21:09

## 2019-06-13 RX ADMIN — DIATRIZOATE MEGLUMINE AND DIATRIZOATE SODIUM 30 ML: 600; 100 SOLUTION ORAL; RECTAL at 10:15

## 2019-06-13 RX ADMIN — HYDRALAZINE HYDROCHLORIDE 10 MG: 20 INJECTION INTRAMUSCULAR; INTRAVENOUS at 05:23

## 2019-06-13 RX ADMIN — HYDRALAZINE HYDROCHLORIDE 10 MG: 20 INJECTION INTRAMUSCULAR; INTRAVENOUS at 00:19

## 2019-06-13 RX ADMIN — DEXTROSE MONOHYDRATE, SODIUM CHLORIDE, AND POTASSIUM CHLORIDE 75 ML/HR: 50; 4.5; 1.49 INJECTION, SOLUTION INTRAVENOUS at 14:05

## 2019-06-13 RX ADMIN — Medication 10 ML: at 14:00

## 2019-06-13 RX ADMIN — Medication 10 ML: at 21:11

## 2019-06-13 RX ADMIN — METOPROLOL TARTRATE 5 MG: 5 INJECTION INTRAVENOUS at 11:37

## 2019-06-13 NOTE — ROUTINE PROCESS
Bedside and Verbal shift change report given to Caprice MILLER (oncoming nurse) by Pearl Baxter nurse). Report included the following information SBAR, Kardex, Recent Results, Med Rec Status and Cardiac Rhythm SR. 
   
Zone Phone:   [phone] 
   
   
Significant changes during shift:  Received from PCU Patient Information 
   
77 yr old, admitted on 19, patient of Dr. Harsh Bruce, admitted from home. 
   
Problem List 
   
Past Medical History:  
Diagnosis Date  Diabetes ([address]    
 Hypertension    
 Murmur    
 Osteopenia    
 Psoriasis    
 Rosacea    
 Stroke (HCC)    
 Vitamin D deficiency    
  
  
     
Past Surgical History:  
Procedure Laterality Date  COLONOSCOPY N/A 2018  
  COLONOSCOPY   DIABETIC performed by Juan Mcnally MD at [address] HX  SECTION      
 HX COLONOSCOPY     
  3 polyps  HX TONSILLECTOMY      
  
  
Social History  
  
     
Tobacco Use  Smoking status: Never Smoker  Smokeless tobacco: Never Used Substance Use Topics  Alcohol use: Yes  
    Alcohol/week: 0.0 oz  
    Comment: 1/couple months  
  
  
     
Family History Problem Relation Age of Onset  Diabetes Mother    
 Heart Disease Father    
  
    
Allergies Allergen Reactions  Pcn [Penicillins] Rash  Sulfa (Sulfonamide Antibiotics) Unknown (comments)  
 
 
 
   
Core Measures: 
   
CVA: Y 
CHF: N 
PNA: N   
 
   
Activity Status: 
   
OOB to Chair:  No 
Ambulated this shift No 
Bed Rest No 
   
Supplemental Q3: (KO Applicable) 
   
Room air    
   
LINES AND DRAINS: 
   
PIV 
   
DVT prophylaxis: 
   
DVT prophylaxis Med- Yes; Lovenox DVT prophylaxis SCD or SONIA- No  
   
Wounds: (If Applicable) 
   
Wounds- None   
Location     
Patient Safety: 
   
Falls Score Total Score:  4 Safety Level_______ Bed Alarm On? Yes Sitter? N 
   
Plan for upcoming shift: Dobhoff placement under flouroscopy 
   
Discharge Plan: TBD 
   
Active Consults:  Neuro

## 2019-06-13 NOTE — ROUTINE PROCESS
TRANSFER - IN REPORT: 
 
Verbal report received from [facility] AND [facility] - THE [facility] on eKndell Nieves  being received from PCU for routine progression of care Report consisted of patients Situation, Background, Assessment and  
Recommendations(SBAR). Information from the following report(s) SBAR, Kardex, ED Summary, Procedure Summary, MAR, Recent Results and Cardiac Rhythm SR was reviewed with the receiving nurse. Opportunity for questions and clarification was provided. Assessment completed upon patients arrival to unit and care assumed.

## 2019-06-13 NOTE — PROGRESS NOTES
PT note:    Chart reviewed. Pt is currently off the floor. Will continue to follow-up.     Laura Fernandes, SPT

## 2019-06-13 NOTE — PROGRESS NOTES
KATRINA - [facility] for rehab and f/u appts    Received update from Christine Rodriguez with [facility] and they have authorization for pt's stay. Pt transferred from PCU to [facility]. Handoff report given to [facility], [place].      Jeovany Quintana, [address]

## 2019-06-13 NOTE — PROGRESS NOTES
Bedside shift change report given to [address] [place] [address] (oncoming nurse) by Ludin Silver (offgoing nurse). Report included the following information SBAR.

## 2019-06-13 NOTE — PROGRESS NOTES
Initial Nutrition Assessment:    INTERVENTIONS/RECOMMENDATIONS:   ·  EN:  Initiate Jevity 1.5 rhea at 15 ml/hr continuous infusion x 24 hr.  Increase by 10 ml/hr q8h to goal rate 45 ml/hr. Flush with 125 ml free water q8h. TF at goal + free water will provide daily approx. 1620 kcals, 69 g protein, 232 g CHO, and 1571 ml free water. · Will evaluate tolerance and BG management as pt  achieves current goal rate. If good tolerance, will likely increase to 50 ml/hr x 24 hr.     ASSESSMENT:   6/13:  Chart reviewed; med noted for CVA with swallowing difficulty. SLP has been following the patient and started on full liquids with ensure supplements. PO intake remains suboptimal so the decision has been made to place NGT for short-term feedings in an effort to determine if patient gains strength and medically improves before placing a PEG for long-term nutrition. Spoke with Dr. Michelle Santizo and RN to confirm plan. Significant PMH: DM, obesity    Diet Order: NPO  % Eaten:  No data found. Pertinent Medications: [x]Reviewed []Other  Pertinent Labs: [x]Reviewed []Other: K+ 3.4  Food Allergies: [x]None []Other   Last BM:    [x]Active     []Hyperactive  []Hypoactive       [] Absent BS  Skin:    [x] Intact   [] Incision  [] Breakdown  [] Other:    Anthropometrics:   Height: 5' 6\" (167.6 cm) Weight: 89.9 kg (198 lb 3.1 oz)   IBW (%IBW):   ( ) UBW (%UBW):   (  %)   Last Weight Metrics:  Weight Loss Metrics 6/13/2019 6/9/2019 6/9/2019 6/9/2019 4/16/2019 4/16/2019 3/4/2019   Today's Wt 198 lb 3.1 oz - 205 lb 4 oz - 212 lb 215 lb 220 lb 2 oz   BMI - 31.99 kg/m2 - 33.13 kg/m2 35.28 kg/m2 35.78 kg/m2 36.63 kg/m2       BMI: Body mass index is 31.99 kg/m². This BMI is indicative of:   []Underweight    []Normal    [x]Overweight    [] Obesity   [] Extreme Obesity (BMI>40)     Estimated Nutrition Needs (Based on):   1718 Kcals/day(BMR (1432) x 1. 2AF) , 90 g(1.0 g/kg bw) Protein  Carbohydrate:  At Least 130 g/day  Fluids: 1700 mL/day (1ml/kcal)    NUTRITION DIAGNOSES:   Problem:  Inadequate protein-energy intake      Etiology: related to swallowing difficulty     Signs/Symptoms: as evidenced by NGT placement for feedings      NUTRITION INTERVENTIONS:    Enteral/Parenteral Nutrition: Initiate enteral nutrition                GOAL:   Pt will tolerated NGT feedings and achieve goal rate next 2-4 days    LEARNING NEEDS (Diet, Food/Nutrient-Drug Interaction):    [x] None Identified   [] Identified and Education Provided/Documented   [] Identified and Pt declined/was not appropriate     Cultureal, Lutheran, OR Ethnic Dietary Needs:    [x] None Identified   [] Identified and Addressed     [x] Interdisciplinary Care Plan Reviewed/Documented    [x] Discharge Planning: Too early to determine    MONITORING /EVALUATION:   Food/Nutrient Intake Outcomes:  Total energy intake, Enteral/parenteral nutrition intake  Physical Signs/Symptoms Outcomes: Weight/weight change    NUTRITION RISK:    [x] Patient At Nutritional Risk    [] Patient Not At Nutritional Risk    PT SEEN FOR:    [x]  MD Consult: []Calorie Count      []Diabetic Diet Education        []Diet Education     []Electrolyte Management     []General Nutrition Management and Supplements     [x]Management of Tube Feeding     []TPN Recommendations    []  RN Referral:  []MST score >=2     []Enteral/Parenteral Nutrition PTA     []Pregnant: Gestational DM or Multigestation     []Pressure Ulcer/Wound Care needs        []  Low BMI  []  Sukhdev Aly  Pager [phone]  Weekend Pager [phone]

## 2019-06-13 NOTE — PROGRESS NOTES
Hospitalist Progress Note    NAME: Monique White   :  1950   MRN:  [id number]       Assessment / Plan:  Acute embolic CVA with expressive aphasia, dysphagia, visual field defects in setting of afib:   - CT head  with a chronic right MCA territory infarction. No acute findings.  - repeat CT head with no acute findings and no significant change since 2019. No infarct extension or hemorrhage.  - MRI/MRA brain with multiple small acute cortical/subcortical infarcts in the left parietal lobe. Unchanged generalized parenchymal volume loss and mild chronic microvascular ischemic disease. Unchanged chronic large right MCA territory involving the right temporoparietal lobe and insula. Unchanged chronic infarcts in the right occipital lobe and bilateral cerebellum. No evidence of significant stenosis or aneurysm. Kitty Read with Alex.Balling - 60%. Mild (grade 1) left ventricular diastolic dysfunction.  - LDL 54, restarting statin today  - change lovenox back to xarelto today  - neuro consult appreciated  - NG tube placed in XR today, starting tube feeds  - PT/OT/speech recommending inpatient rehab, Pt has been accepted to [facility] when medically stable  Paroxysmal atrial fibrillation, chronic diastolic CHF and essential HTN:  - xarelto as above  - restart metoprolol, norvasc per NG. Start clonidine patch. - still holding home accupril   - prn nitrobid  Insulin dependent DM2 with hyperglycemia:  - home lantus at decreased dose for now  - still holding metformin, trulicity  - lispro sliding scale  Acute kidney failure, resolved  Psoriasis: holding enbrel  Obesity (Body mass index is 31.99 kg/m²)     Code status Full ( is decision maker if needed)  DVT prophylaxis: lovenox     Subjective:     Chief Complaint / Reason for Physician Visit  \"I love you\". Discussed with RN events overnight.      Review of Systems:  Symptom Y/N Comments  Symptom Y/N Comments   Fever/Chills    Chest Pain     Poor Appetite    Edema Cough    Abdominal Pain     Sputum    Joint Pain     SOB/MALIN    Pruritis/Rash     Nausea/vomit    Tolerating PT/OT     Diarrhea    Tolerating Diet     Constipation    Other       Could NOT obtain due to: stroke     Objective:     VITALS:   Last 24hrs VS reviewed since prior progress note. Most recent are:  Patient Vitals for the past 24 hrs:   Temp Pulse Resp BP SpO2   06/13/19 0802 98.1 °F (36.7 °C) 90 18 137/70 94 %   06/13/19 0518  90 18 166/77 95 %   06/13/19 0231 98.3 °F (36.8 °C) 93 18 140/53 97 %   06/12/19 2328 97.4 °F (36.3 °C) 88 18 152/70 93 %   06/12/19 2108 98.1 °F (36.7 °C) 87 20 169/66 96 %   06/12/19 2034  84  (!) 204/81  06/12/19 2028  90  (!) 204/81  06/12/19 2007 98 °F (36.7 °C) 100 18 (!) 208/81 94 %   06/12/19 1454 98.3 °F (36.8 °C) 73 18 168/74 95 %   06/12/19 1118 98.3 °F (36.8 °C) 81 16 159/80 94 %       Intake/Output Summary (Last 24 hours) at 6/13/2019 0901  Last data filed at 6/13/2019 0746  Gross per 24 hour   Intake 722.5 ml   Output 350 ml   Net 372.5 ml        PHYSICAL EXAM:  General: WD, WN. Alert, not able to be cooperative, no acute distress    EENT:  EOMI. Anicteric sclerae. MMM  Resp:  CTA bilaterally, no wheezing or rales. No accessory muscle use  CV:  Regular rhythm,  No edema  GI:  Soft, moderately distended, Non tender.  +Bowel sounds  Neurologic:  Alert and oriented X 1, normal but limited speech,   Psych:   Poor insight. Not anxious nor agitated  Skin:  No rashes.   No jaundice    Reviewed most current lab test results and cultures  YES  Reviewed most current radiology test results   YES  Review and summation of old records today    NO  Reviewed patient's current orders and MAR    YES  PMH/SH reviewed - no change compared to H&P  ________________________________________________________________________  Care Plan discussed with:    Comments   Patient x    Family  x , dtr at bedside   RN x    Care Manager     Consultant Multidiciplinary team rounds were held today with , nursing, pharmacist and clinical coordinator. Patient's plan of care was discussed; medications were reviewed and discharge planning was addressed. ________________________________________________________________________  Total NON critical care TIME:  30 Minutes    Total CRITICAL CARE TIME Spent:   Minutes non procedure based      Comments   >50% of visit spent in counseling and coordination of care x    ________________________________________________________________________  Jia Tan MD     Procedures: see electronic medical records for all procedures/Xrays and details which were not copied into this note but were reviewed prior to creation of Plan. LABS:  I reviewed today's most current labs and imaging studies.   Pertinent labs include:  Recent Labs     06/13/19  0518 06/12/19  0416 06/11/19  0357   WBC 13.5* 10.4 10.8   HGB 13.2 13.0 13.6   HCT 38.7 38.9 40.5    181 191     Recent Labs     06/13/19  0518 06/12/19  0416 06/11/19  0357    140 136   K 3.4* 3.2* 3.4*   * 106 104   CO2 27 28 24   GLU 75 81 116*   BUN 22* 18 16   CREA 1.20* 1.25* 1.26*   CA 8.4* 8.7 9.1   MG 1.8  --   --        Signed: Jia Tan MD

## 2019-06-13 NOTE — PROGRESS NOTES
Problem: Pressure Injury - Risk of  Goal: *Prevention of pressure injury  Description  Document William Scale and appropriate interventions in the flowsheet. Outcome: Progressing Towards Goal     Problem: Patient Education: Go to Patient Education Activity  Goal: Patient/Family Education  Outcome: Progressing Towards Goal     Problem: Falls - Risk of  Goal: *Absence of Falls  Description  No injury during hospitalization.    Outcome: Progressing Towards Goal

## 2019-06-13 NOTE — PROGRESS NOTES
Dictated: Probable new CVA left hemisphere   Await MRI and continue asa and eliquis  Consult  REFERRED BY:  Keith Reynaga MD    CHIEF COMPLAINT: Altered mental status and confusion      Subjective:     Gloria Mart is a 77 y.o. right-handed  female seen at the request of Dr. Jv Khan for new problem of now a little more confused, and having more difficulty with her vision and questioning not seeing off to the left side either. Cardiology has done a history that the patient was told that she should stop the aspirin and continue the Eliquis, but the  did the opposite, but now the  says that the patient kept taking the Eliquis because it was in her pillbox but they are going to get a bottle at home to check and make sure. Just in case we will add back the aspirin and try to verify what she was actually taking at home as nobody seems to know for sure. 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 Repeat head CT last night showed no new lesions and was stable. Discussed with the hospitalist and cardiology and the family in detail. We will continue her anticoagulation for now, and we may need to switch to Coumadin in view of her new stroke. I reviewed her MRI scan personally on the PACS system, and I do agree that she has had embolic strokes now to the left hemisphere causing the right field cut. She presented with sudden onset of confusion and altered mental status that began Saturday night, when the patient was not able to write a check, but can still get around, this persisted yesterday morning, and the patient seemed more confused, had difficulty doing things and her  brought her into the hospital for further evaluation. Patient did not appear to have any new focal weakness or sensory loss noted, and no complaint of headache, meningismus, fever, trauma, or toxin exposure or failure to take her medication correctly.   Patient does seem to be a little more confused, and was admitted to the hospital, and a CT scan of the head just showed an old stroke in the right hemisphere but no new lesions. Patient does have a known history of intermittent atrial fibrillation and is on Eliquis already, and had a loop monitor inserted after her stroke in January of this year that was thought to be embolic, and a loop monitor picked up several bursts of A. fib and the patient was anticoagulated with Eliquis 5 mg twice a day. She is had aspirin 81 mg added to her Eliquis now to help give anticoagulation for the stroke. Past Medical History:   Diagnosis Date    Diabetes ([facility]     Hypertension     Murmur     Osteopenia     Psoriasis     Rosacea     Stroke ([facility]     Vitamin D deficiency       Past Surgical History:   Procedure Laterality Date    COLONOSCOPY N/A 2018    COLONOSCOPY   DIABETIC performed by Machelle Rodriguez MD at [facility] 1827 HX  SECTION      HX COLONOSCOPY      3 polyps    HX TONSILLECTOMY       Family History   Problem Relation Age of Onset    Diabetes Mother     Heart Disease Father     No Known Problems Child       Social History     Tobacco Use    Smoking status: Never Smoker    Smokeless tobacco: Never Used   Substance Use Topics    Alcohol use:  Yes     Alcohol/week: 0.0 oz     Comment: / months         Current Facility-Administered Medications:     [START ON 2019] insulin glargine (LANTUS) injection 20 Units, 20 Units, SubCUTAneous, DAILY, Aaliyah Rodriguez MD    dextrose 5% - 0.45% NaCl with KCl 20 mEq/L infusion, 75 mL/hr, IntraVENous, CONTINUOUS, Robert Wade MD, Last Rate: 75 mL/hr at 19, 75 mL/hr at 19    enoxaparin (LOVENOX) injection 90 mg, 1 mg/kg, SubCUTAneous, Q12H, Robert Wade MD, 90 mg at 19    metoprolol (LOPRESSOR) injection 5 mg, 5 mg, IntraVENous, Q6H, Robert Wade MD, 5 mg at 19    hydrALAZINE (APRESOLINE) 20 mg/mL injection 10 mg, 10 mg, IntraVENous, Q6H, Priscilla Mallory MD, 10 mg at 06/12/19 2034    [START ON 6/13/2019] aspirin delayed-release tablet 81 mg, 81 mg, Oral, DAILY, Shereen Hamman, MD    nitroglycerin (NITROBID) 2 % ointment 1 Inch, 1 Inch, Topical, Q6H PRN, Priscilla Mallory MD, 1 Inch at 06/12/19 1459    insulin lispro (HUMALOG) injection, , SubCUTAneous, AC&HS, Amy Dickinson MD, Stopped at 06/11/19 1630    [Held by provider] atorvastatin (LIPITOR) tablet 80 mg, 80 mg, Oral, QHS, Román Jose MD, Stopped at 06/11/19 2200    . PHARMACY TO SUBSTITUTE PER PROTOCOL (Reordered from: etanercept (ENBREL) 50 mg/mL (0.98 mL) injection), , , Per Protocol, Román Jose MD    acetaminophen (TYLENOL) tablet 650 mg, 650 mg, Oral, Q4H PRN, Román Jose MD    ondansetron [facility]) injection 4 mg, 4 mg, IntraVENous, Q6H PRN, Román Jose MD    glucose chewable tablet 16 g, 4 Tab, Oral, PRN, Román Jose MD    glucagon (GLUCAGEN) injection 1 mg, 1 mg, IntraMUSCular, PRN, Román Jose MD    dextrose 10% infusion 250 mL, 250 mL, IntraVENous, PRN, Román Jose MD    sodium chloride (NS) flush 5-40 mL, 5-40 mL, IntraVENous, Q8H, Román Jose MD, 10 mL at 06/12/19 1400    sodium chloride (NS) flush 5-40 mL, 5-40 mL, IntraVENous, PRN, Román Jose MD        Allergies   Allergen Reactions    Pcn [Penicillins] Rash    Sulfa (Sulfonamide Antibiotics) Unknown (comments)      MRI Results (most recent):  Results from [place] encounter on 06/09/19   MRA BRAIN WO CONT    Narrative EXAM:  MRI BRAIN WO CONT, MRA BRAIN WO CONT    INDICATION:  Altered mental status and confusion. COMPARISON:  MRI brain 1/18/2019, CT head 6/9/2019. CONTRAST: None. TECHNIQUE:    MRI Brain:  Multiplanar multisequence acquisition without contrast of the brain. MRA Head:  3-D time-of-flight MRA of the head was performed.  Multiplanar and MIP  reconstructions were obtained. FINDINGS:  MRI Brain:  Multiple small acute cortical/subcortical infarcts in the left parietal lobe. No  evidence of acute hemorrhage. Unchanged generalized parenchymal volume loss with commensurate dilation of the  sulci and ventricular system. Periventricular deep white matter T2/FLAIR  hyperintensities, as was patchy T2/FLAIR hyperintensity in the diane, consistent  with mild chronic microvascular ischemic disease. Chronic large right MCA  territory infarct involving the right temporoparietal lobe and insula. Additional unchanged chronic infarcts in the right occipital lobe and bilateral  cerebellum. There is no cerebellar tonsillar herniation. Expected arterial  flow-voids are present. The paranasal sinuses, mastoid air cells, and middle ears are clear. The orbital  contents are within normal limits. No significant osseous or scalp lesions are  identified. MRA Head:  There is no evidence of large vessel occlusion or flow-limiting stenosis of the  intracranial internal carotid, anterior cerebral, and middle cerebral arteries. The anterior communicating artery is patent. There is no evidence of large vessel occlusion or flow-limiting stenosis of the  intracranial vertebral arteries, basilar artery, or posterior cerebral arteries. The right posterior communicating artery is diminutive but patent, the left is  not seen. There is no evidence of aneurysm or vascular malformation. Impression IMPRESSION:   MRI Brain:  1. Multiple small acute cortical/subcortical infarcts in the left parietal lobe. 2. Unchanged generalized parenchymal volume loss and mild chronic microvascular  ischemic disease. 3. Unchanged chronic large right MCA territory infarct involving the right  temporoparietal lobe and insula. Unchanged additional chronic infarcts in the  right occipital lobe and bilateral cerebellum. MRA Head:  1. No evidence of significant stenosis or aneurysm.     23X Results from [facility] encounter on 06/09/19   MRA BRAIN WO CONT    Narrative EXAM:  MRI BRAIN WO CONT, MRA BRAIN WO CONT    INDICATION:  Altered mental status and confusion. COMPARISON:  MRI brain 1/18/2019, CT head 6/9/2019. CONTRAST: None. TECHNIQUE:    MRI Brain:  Multiplanar multisequence acquisition without contrast of the brain. MRA Head:  3-D time-of-flight MRA of the head was performed. Multiplanar and MIP  reconstructions were obtained. FINDINGS:  MRI Brain:  Multiple small acute cortical/subcortical infarcts in the left parietal lobe. No  evidence of acute hemorrhage. Unchanged generalized parenchymal volume loss with commensurate dilation of the  sulci and ventricular system. Periventricular deep white matter T2/FLAIR  hyperintensities, as was patchy T2/FLAIR hyperintensity in the diane, consistent  with mild chronic microvascular ischemic disease. Chronic large right MCA  territory infarct involving the right temporoparietal lobe and insula. Additional unchanged chronic infarcts in the right occipital lobe and bilateral  cerebellum. There is no cerebellar tonsillar herniation. Expected arterial  flow-voids are present. The paranasal sinuses, mastoid air cells, and middle ears are clear. The orbital  contents are within normal limits. No significant osseous or scalp lesions are  identified. MRA Head:  There is no evidence of large vessel occlusion or flow-limiting stenosis of the  intracranial internal carotid, anterior cerebral, and middle cerebral arteries. The anterior communicating artery is patent. There is no evidence of large vessel occlusion or flow-limiting stenosis of the  intracranial vertebral arteries, basilar artery, or posterior cerebral arteries. The right posterior communicating artery is diminutive but patent, the left is  not seen. There is no evidence of aneurysm or vascular malformation. Impression IMPRESSION:   MRI Brain:  1. Multiple small acute cortical/subcortical infarcts in the left parietal lobe. 2. Unchanged generalized parenchymal volume loss and mild chronic microvascular  ischemic disease. 3. Unchanged chronic large right MCA territory infarct involving the right  temporoparietal lobe and insula. Unchanged additional chronic infarcts in the  right occipital lobe and bilateral cerebellum. MRA Head:  1. No evidence of significant stenosis or aneurysm. 23X             Review of Systems:  Review of systems not obtained due to patient factors. Vitals:    06/12/19 2007 06/12/19 2028 06/12/19 2034 06/12/19 2108   BP: (!) 208/81 (!) 204/81 (!) 204/81 169/66   Pulse: 100 90 84 87   Resp: 18   20   Temp: 98 °F (36.7 °C)   98.1 °F (36.7 °C)   SpO2: 94%   96%   Weight:       Height:         Objective:     I      NEUROLOGICAL EXAM:    Appearance: The patient is well developed, well nourished, provides a poor history and is in no acute distress. Mental Status: Oriented to place and person, and the not the date or president, cognitive function is mildly abnormal and speech is mildly aphasic I think, but no real dysarthria and mood and affect appropriate, but a little confused. 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 Cindi Rockers are benign, disc are flat, no lesions seen on funduscopy. ROSALIND, EOM's full, no nystagmus, no ptosis. Facial sensation is normal. Corneal reflexes are not tested. Facial movement is asymmetric with mild central facial on the left. Hearing is abnormal bilaterally. Palate is midline with normal sternocleidomastoid and trapezius muscles are normal. Tongue is midline. Neck without meningismus or bruits  Temporal arteries are not tender or enlarged  TMJ areas are not tender on palpation   Motor:  5/5 strength in upper and lower proximal and distal muscles, with a very mild left hemiparesis. Normal bulk and tone on the right and slight increase in the left. No fasciculations.   Rapid alternating movement is slightly reduced on the left   Reflexes:   Deep tendon reflexes 2+/4 and asymmetrical, with perhaps a slight increase on the left. No babinski or clonus present   Sensory:   Abnormal to touch, pinprick and vibration and temperature and  DSS on the left, and slight decreased sensation in both feet to ankle level. Gait:  Not tested. Tremor:   No tremor noted. Cerebellar:  No abnormal cerebellar signs present on finger-nose-finger exam.  Romberg and tandem not tested   Neurovascular:  Normal heart sounds and regular rhythm, peripheral pulses decreased, and no carotid bruits. Assessment:   Active Problems:    Essential hypertension, benign (8/11/2014)      Obesity, morbid ([address] (12/26/2017)      Type 2 diabetes mellitus with nephropathy ([address] (12/26/2017)      Acute ischemic cerebrovascular accident (CVA) involving right middle cerebral artery territory [facility]) (1/17/2019)      Cerebrovascular accident (CVA) due to thrombosis of right middle cerebral artery ([address] (1/18/2019)      Paroxysmal atrial fibrillation ([address] (4/16/2019)      CVA (cerebral vascular accident) ([address] (6/9/2019)      Diabetic peripheral neuropathy associated with type 2 diabetes mellitus ([address] (6/10/2019)      Bilateral carotid artery stenosis (6/10/2019)      Altered mental status, unspecified (6/67/4793)      Embolic stroke involving left middle cerebral artery ([address] (6/10/2019)        Plan:     Patient still cortically blind, but more awake more interactive and keeping eyes open more. Still discussing what dose of medicine she is taking, the family thinks she was taking the Eliquis therapy to go home and check the bottles  Discussed with cardiology and the hospitalist and nursing staff and the family all in detail today. Patient appears to have new deficits involving some difficulty with speech, and a field cut on the right side that seem to be new, in addition to her old findings of old left hemiparesis.   Carotid Dopplers were reviewed and they do not show anything new or any significant stenosis. Her MRI showed the embolic strokes, and MRA of the brain was negative. Discussed with the patient and her  in detail, we will follow carefully with you, and PT OT and speech therapy have all been ordered and are seen the patient now. Repeat CT scan was negative for new stroke last evening and patient seems stable now. Very difficult case, patient very high risk for recurrent strokes and major neurologic deficit and complications    Signed By: Dominick Lovett MD     June 12, 2019       CC:  Carlo Rascon MD  FAX: [phone]

## 2019-06-13 NOTE — PROGRESS NOTES
Bedside and Verbal shift change report given to Deepika Flores RN (oncoming nurse) by Betsy Aguilar RN (offgoing nurse). Report included the following information SBAR, Kardex, MAR and Recent Results. 2005: TRANSFER - OUT REPORT:    Verbal report given to Mele Alaniz RN (name) on Alexandria Peterson  being transferred to Neuro (unit) for routine progression of care       Report consisted of patients Situation, Background, Assessment and   Recommendations(SBAR). Information from the following report(s) SBAR, Kardex, MAR, Recent Results and Cardiac Rhythm NSR was reviewed with the receiving nurse. Lines:   Peripheral IV 06/09/19 Right Hand (Active)   Site Assessment Clean, dry, & intact 6/12/2019  4:00 PM   Phlebitis Assessment 0 6/12/2019  4:00 PM   Infiltration Assessment 0 6/12/2019  4:00 PM   Dressing Status Clean, dry, & intact 6/12/2019  4:00 PM   Dressing Type Tape;Transparent 6/12/2019  4:00 PM   Hub Color/Line Status Pink;Flushed 6/12/2019  4:00 PM   Alcohol Cap Used Yes 6/11/2019 11:46 PM        Opportunity for questions and clarification was provided.       Patient transported with:   Registered Nurse  Tech

## 2019-06-14 LAB
ANION GAP SERPL CALC-SCNC: 7 MMOL/L (ref 5–15)
BUN SERPL-MCNC: 25 MG/DL (ref 6–20)
BUN/CREAT SERPL: 19 (ref 12–20)
CALCIUM SERPL-MCNC: 8.3 MG/DL (ref 8.5–10.1)
CHLORIDE SERPL-SCNC: 107 MMOL/L (ref 97–108)
CO2 SERPL-SCNC: 26 MMOL/L (ref 21–32)
CREAT SERPL-MCNC: 1.3 MG/DL (ref 0.55–1.02)
ERYTHROCYTE [DISTWIDTH] IN BLOOD BY AUTOMATED COUNT: 13.6 % (ref 11.5–14.5)
GLUCOSE BLD STRIP.AUTO-MCNC: 133 MG/DL (ref 65–100)
GLUCOSE BLD STRIP.AUTO-MCNC: 216 MG/DL (ref 65–100)
GLUCOSE BLD STRIP.AUTO-MCNC: 276 MG/DL (ref 65–100)
GLUCOSE BLD STRIP.AUTO-MCNC: 338 MG/DL (ref 65–100)
GLUCOSE BLD STRIP.AUTO-MCNC: 361 MG/DL (ref 65–100)
GLUCOSE SERPL-MCNC: 197 MG/DL (ref 65–100)
HCT VFR BLD AUTO: 39.2 % (ref 35–47)
HGB BLD-MCNC: 12.9 G/DL (ref 11.5–16)
MAGNESIUM SERPL-MCNC: 1.9 MG/DL (ref 1.6–2.4)
MCH RBC QN AUTO: 30 PG (ref 26–34)
MCHC RBC AUTO-ENTMCNC: 32.9 G/DL (ref 30–36.5)
MCV RBC AUTO: 91.2 FL (ref 80–99)
NRBC # BLD: 0 K/UL (ref 0–0.01)
NRBC BLD-RTO: 0 PER 100 WBC
PHOSPHATE SERPL-MCNC: 3.9 MG/DL (ref 2.6–4.7)
PLATELET # BLD AUTO: 200 K/UL (ref 150–400)
PMV BLD AUTO: 10.4 FL (ref 8.9–12.9)
POTASSIUM SERPL-SCNC: 3.7 MMOL/L (ref 3.5–5.1)
RBC # BLD AUTO: 4.3 M/UL (ref 3.8–5.2)
SERVICE CMNT-IMP: ABNORMAL
SODIUM SERPL-SCNC: 140 MMOL/L (ref 136–145)
WBC # BLD AUTO: 10.4 K/UL (ref 3.6–11)

## 2019-06-14 PROCEDURE — 92526 ORAL FUNCTION THERAPY: CPT

## 2019-06-14 PROCEDURE — 65660000000 HC RM CCU STEPDOWN

## 2019-06-14 PROCEDURE — 85027 COMPLETE CBC AUTOMATED: CPT

## 2019-06-14 PROCEDURE — 83735 ASSAY OF MAGNESIUM: CPT

## 2019-06-14 PROCEDURE — 97535 SELF CARE MNGMENT TRAINING: CPT | Performed by: OCCUPATIONAL THERAPIST

## 2019-06-14 PROCEDURE — 74011636637 HC RX REV CODE- 636/637: Performed by: HOSPITALIST

## 2019-06-14 PROCEDURE — 74011250636 HC RX REV CODE- 250/636: Performed by: INTERNAL MEDICINE

## 2019-06-14 PROCEDURE — 74011250637 HC RX REV CODE- 250/637: Performed by: INTERNAL MEDICINE

## 2019-06-14 PROCEDURE — 77030018798 HC PMP KT ENTRL FED COVD -A

## 2019-06-14 PROCEDURE — 77030011943

## 2019-06-14 PROCEDURE — 51798 US URINE CAPACITY MEASURE: CPT

## 2019-06-14 PROCEDURE — 97112 NEUROMUSCULAR REEDUCATION: CPT | Performed by: OCCUPATIONAL THERAPIST

## 2019-06-14 PROCEDURE — 82962 GLUCOSE BLOOD TEST: CPT

## 2019-06-14 PROCEDURE — 36415 COLL VENOUS BLD VENIPUNCTURE: CPT

## 2019-06-14 PROCEDURE — 74011636637 HC RX REV CODE- 636/637: Performed by: INTERNAL MEDICINE

## 2019-06-14 PROCEDURE — 80048 BASIC METABOLIC PNL TOTAL CA: CPT

## 2019-06-14 PROCEDURE — 97530 THERAPEUTIC ACTIVITIES: CPT

## 2019-06-14 PROCEDURE — 74011000250 HC RX REV CODE- 250: Performed by: INTERNAL MEDICINE

## 2019-06-14 PROCEDURE — 97116 GAIT TRAINING THERAPY: CPT

## 2019-06-14 PROCEDURE — 84100 ASSAY OF PHOSPHORUS: CPT

## 2019-06-14 RX ORDER — INSULIN LISPRO 100 [IU]/ML
10 INJECTION, SOLUTION INTRAVENOUS; SUBCUTANEOUS ONCE
Status: COMPLETED | OUTPATIENT
Start: 2019-06-15 | End: 2019-06-14

## 2019-06-14 RX ADMIN — INSULIN LISPRO 7 UNITS: 100 INJECTION, SOLUTION INTRAVENOUS; SUBCUTANEOUS at 17:45

## 2019-06-14 RX ADMIN — INSULIN LISPRO 3 UNITS: 100 INJECTION, SOLUTION INTRAVENOUS; SUBCUTANEOUS at 08:12

## 2019-06-14 RX ADMIN — ENOXAPARIN SODIUM 90 MG: 100 INJECTION SUBCUTANEOUS at 00:31

## 2019-06-14 RX ADMIN — METOPROLOL TARTRATE 5 MG: 5 INJECTION INTRAVENOUS at 00:44

## 2019-06-14 RX ADMIN — Medication 10 ML: at 14:42

## 2019-06-14 RX ADMIN — Medication 10 ML: at 23:30

## 2019-06-14 RX ADMIN — NITROGLYCERIN 1 INCH: 20 OINTMENT TOPICAL at 06:43

## 2019-06-14 RX ADMIN — ENOXAPARIN SODIUM 90 MG: 100 INJECTION SUBCUTANEOUS at 12:10

## 2019-06-14 RX ADMIN — INSULIN LISPRO 10 UNITS: 100 INJECTION, SOLUTION INTRAVENOUS; SUBCUTANEOUS at 23:29

## 2019-06-14 RX ADMIN — NITROGLYCERIN 1 INCH: 20 OINTMENT TOPICAL at 12:10

## 2019-06-14 RX ADMIN — NITROGLYCERIN 1 INCH: 20 OINTMENT TOPICAL at 17:45

## 2019-06-14 RX ADMIN — NITROGLYCERIN 1 INCH: 20 OINTMENT TOPICAL at 00:39

## 2019-06-14 RX ADMIN — INSULIN LISPRO 5 UNITS: 100 INJECTION, SOLUTION INTRAVENOUS; SUBCUTANEOUS at 12:10

## 2019-06-14 RX ADMIN — Medication 10 ML: at 04:50

## 2019-06-14 RX ADMIN — INSULIN GLARGINE 20 UNITS: 100 INJECTION, SOLUTION SUBCUTANEOUS at 08:13

## 2019-06-14 RX ADMIN — HYDRALAZINE HYDROCHLORIDE 10 MG: 20 INJECTION INTRAMUSCULAR; INTRAVENOUS at 17:45

## 2019-06-14 RX ADMIN — ENOXAPARIN SODIUM 90 MG: 100 INJECTION SUBCUTANEOUS at 23:29

## 2019-06-14 RX ADMIN — NITROGLYCERIN 1 INCH: 20 OINTMENT TOPICAL at 23:29

## 2019-06-14 NOTE — ROUTINE PROCESS
Bedside and Verbal shift change report given to [address] [place] [address] RN (oncoming nurse) by Arturo amor. Report included the following information SBAR, Kardex, Recent Results, Med Rec Status and Cardiac Rhythm SR. 
   
Zone Phone:   [phone] 
   
   
Significant changes during shift:  NG TF Jevity at 25 ml/hr; goal 45. Patient Information 
   
77 yr old, admitted on 19, patient of Dr. Virgil Mendosa, admitted from home. 
   
Problem List 
   
Past Medical History:  
Diagnosis Date  Diabetes ([address]    
 Hypertension    
 Murmur    
 Osteopenia    
 Psoriasis    
 Rosacea    
 Stroke (HCC)    
 Vitamin D deficiency    
  
  
     
Past Surgical History:  
Procedure Laterality Date  COLONOSCOPY N/A 2018  
  COLONOSCOPY   DIABETIC performed by Martita Jeffries MD at [address] HX  SECTION      
 HX COLONOSCOPY   2018  
  3 polyps  HX TONSILLECTOMY      
  
  
Social History  
  
     
Tobacco Use  Smoking status: Never Smoker  Smokeless tobacco: Never Used Substance Use Topics  Alcohol use: Yes  
    Alcohol/week: 0.0 oz  
    Comment: 1/couple months  
  
  
     
Family History Problem Relation Age of Onset  Diabetes Mother    
 Heart Disease Father    
  
    
Allergies Allergen Reactions  Pcn [Penicillins] Rash  Sulfa (Sulfonamide Antibiotics) Unknown (comments)  
 
 
 
   
Core Measures: 
   
CVA: Y 
CHF: N 
PNA: N   
 
   
Activity Status: 
   
OOB to Chair:  No 
Ambulated this shift No 
Bed Rest No 
   
Supplemental M5: (BY Applicable) 
   
Room air    
   
LINES AND DRAINS: 
   
PIV 
   
DVT prophylaxis: 
   
DVT prophylaxis Med- Yes; Lovenox DVT prophylaxis SCD or SONIA- No  
   
Wounds: (If Applicable) 
   
Wounds- None   
Location     
Patient Safety: 
   
Falls Score Total Score:  4 Safety Level_______ Bed Alarm On? Yes Sitter? N 
   
Plan for upcoming shift:  TF Jevity 1.5 at 25 ml; goal 45 ml. 
   
Discharge Plan: TBD 
   
Active Consults:  Neuro

## 2019-06-14 NOTE — PROGRESS NOTES
Hospitalist Progress Note    NAME: Marina Colindres   :  1950   MRN:  [id number]       Assessment / Plan:  Acute embolic CVA with expressive aphasia, dysphagia, visual field defects in setting of afib:   - CT head  with a chronic right MCA territory infarction. No acute findings.  - repeat CT head with no acute findings and no significant change since 2019. No infarct extension or hemorrhage.  - MRI/MRA brain with multiple small acute cortical/subcortical infarcts in the left parietal lobe. Unchanged generalized parenchymal volume loss and mild chronic microvascular ischemic disease. Unchanged chronic large right MCA territory involving the right temporoparietal lobe and insula. Unchanged chronic infarcts in the right occipital lobe and bilateral cerebellum. No evidence of significant stenosis or aneurysm. Franne Risen with Radha.Knoll - 60%. Mild (grade 1) left ventricular diastolic dysfunction.  - LDL 54, restarting statin today  - change lovenox back to xarelto today  - neuro consult appreciated  - NG tube placed in XR today, starting tube feeds  - PT/OT/speech recommending inpatient rehab, Pt has been accepted to [address] when medically stable  Paroxysmal atrial fibrillation, chronic diastolic CHF and essential HTN:  - xarelto as above  - restart metoprolol, norvasc per NG. Start clonidine patch. - still holding home accupril   - prn nitrobid  Insulin dependent DM2 with hyperglycemia:  - home lantus at decreased dose for now  - still holding metformin, trulicity  - lispro sliding scale  Acute kidney failure, resolved  Psoriasis: holding enbrel  Obesity (Body mass index is 31.99 kg/m²)     Code status Full ( is decision maker if needed)  DVT prophylaxis: lovenox  Disposition:once cleared by neurology     Subjective:     Chief Complaint / Reason for Physician Visit  Feels fine. Discussed with RN events overnight.      Review of Systems:  Symptom Y/N Comments  Symptom Y/N Comments   Fever/Chills    Chest Pain     Poor Appetite    Edema     Cough    Abdominal Pain     Sputum    Joint Pain     SOB/MALIN    Pruritis/Rash     Nausea/vomit    Tolerating PT/OT     Diarrhea    Tolerating Diet     Constipation    Other       Could NOT obtain due to: stroke     Objective:     VITALS:   Last 24hrs VS reviewed since prior progress note. Most recent are:  Patient Vitals for the past 24 hrs:   Temp Pulse Resp BP SpO2   06/14/19 0753 97.7 °F (36.5 °C) 91 18 165/65 96 %   06/14/19 0359 97.6 °F (36.4 °C) 85 16 150/66 95 %   06/14/19 0022 98.7 °F (37.1 °C) 99 18 186/76 97 %   06/13/19 1937 99 °F (37.2 °C) 100 17 183/71 96 %   06/13/19 1752    175/69    06/13/19 1518 98.5 °F (36.9 °C) 96 18 190/78 95 %       Intake/Output Summary (Last 24 hours) at 6/14/2019 1225  Last data filed at 6/14/2019 0833  Gross per 24 hour   Intake 855 ml   Output    Net 855 ml        PHYSICAL EXAM:  General: WD, WN. Alert, not able to be cooperative, no acute distress    EENT:  EOMI. Anicteric sclerae. MMM  Resp:  CTA bilaterally, no wheezing or rales. No accessory muscle use  CV:  Regular rhythm,  No edema  GI:  Soft, moderately distended, Non tender.  +Bowel sounds  Neurologic:  Alert and oriented X 1, normal but limited speech,   Psych:   Poor insight. Not anxious nor agitated  Skin:  No rashes. No jaundice    Reviewed most current lab test results and cultures  YES  Reviewed most current radiology test results   YES  Review and summation of old records today    NO  Reviewed patient's current orders and MAR    YES  PMH/SH reviewed - no change compared to H&P  ________________________________________________________________________  Care Plan discussed with:    Comments   Patient x    Family  x , dtr at bedside   RN x    Care Manager     Consultant                        Multidiciplinary team rounds were held today with , nursing, pharmacist and clinical coordinator.   Patient's plan of care was discussed; medications were reviewed and discharge planning was addressed. ________________________________________________________________________  Total NON critical care TIME:  30 Minutes    Total CRITICAL CARE TIME Spent:   Minutes non procedure based      Comments   >50% of visit spent in counseling and coordination of care x    ________________________________________________________________________  Mckay Roca MD     Procedures: see electronic medical records for all procedures/Xrays and details which were not copied into this note but were reviewed prior to creation of Plan. LABS:  I reviewed today's most current labs and imaging studies.   Pertinent labs include:  Recent Labs     06/14/19 0448 06/13/19  0518 06/12/19  0416   WBC 10.4 13.5* 10.4   HGB 12.9 13.2 13.0   HCT 39.2 38.7 38.9    203 181     Recent Labs     06/14/19 0448 06/13/19  0518 06/12/19  0416    142 140   K 3.7 3.4* 3.2*    109* 106   CO2 26 27 28   * 75 81   BUN 25* 22* 18   CREA 1.30* 1.20* 1.25*   CA 8.3* 8.4* 8.7   MG 1.9 1.8  --    PHOS 3.9  --   --        Signed: Mckay Roca MD

## 2019-06-14 NOTE — PROGRESS NOTES
Received call from [facility] liaison to check status of patient swallowing and med regime. Per primary nurse Maia Peters, pt is tolerating PO liquids. Per liaison Christine Rodriguez, the concern is the metoprolol, as she is not able to be cardiac monitored there. MD dc'd the metoprolol,  ( was a prn bp med) informed MD that liaison would pencil in patient for tomorrow so if she is medically stable she can be transferred to [facility] tomorrow.

## 2019-06-14 NOTE — PROGRESS NOTES
Problem: Mobility Impaired (Adult and Pediatric)  Goal: *Acute Goals and Plan of Care (Insert Text)  Description  Physical Therapy Goals  Initiated 6/10/2019  1. Patient will move from supine to sit and sit to supine , scoot up and down and roll side to side in bed with independence within 7 day(s). 2.  Patient will transfer from bed to chair and chair to bed with modified independence using the least restrictive device within 7 day(s). 3.  Patient will perform sit to stand with independence within 7 day(s). 4.  Patient will ambulate with modified independence for 150 feet with the least restrictive device within 7 day(s). 5.  Patient will ascend/descend 2 stairs with 2 handrail(s) with modified independence within 7 day(s). Outcome: Progressing Towards Goal   PHYSICAL THERAPY TREATMENT  Patient: Alexandria Peterson (80 y.o. female)  Date: 6/14/2019  Diagnosis: CVA (cerebral vascular accident) ([facility] [I63.9] <principal problem not specified>       Precautions:    Chart, physical therapy assessment, plan of care and goals were reviewed. ASSESSMENT:  Pt received supine in bed, agreeable and cleared by nursing for mobility. Pt required supervision for most bed mobility and SBA for scooting EOB. CGA for transfers and ambulation for 100' with no AD, constant cues needed due to visual deficits and pt reaching with UE for support. Decreased step clearance, path deviations, and widened PABLITO noted. Pt demonstrated good static standing balance and fair dynamic standing balance when reaching for objects outside PABLITO, CGA for safety. Pt appeared to follow one-step commands more easily this session, however perseverated with certain movements needing cues for redirection. Pt left sitting up in chair, bed alarm on and with all needs met. Recommend IP rehab at discharge. Progression toward goals:  ?    Improving appropriately and progressing toward goals  ? Improving slowly and progressing toward goals  ?     Not making progress toward goals and plan of care will be adjusted     PLAN:  Patient continues to benefit from skilled intervention to address the above impairments. Continue treatment per established plan of care. Discharge Recommendations:  Inpatient Rehab  Further Equipment Recommendations for Discharge:  TBD      SUBJECTIVE:   Patient stated ? Yeah it is good. ? when eating soup    OBJECTIVE DATA SUMMARY:   Critical Behavior:  Neurologic State: Alert  Orientation Level: Disoriented to situation, Disoriented to time, Oriented to person, Oriented to place  Cognition: Appropriate safety awareness, Follows commands  Safety/Judgement: Not assessed  Functional Mobility Training:  Bed Mobility:     Supine to Sit: Supervision  Sit to Supine: Supervision  Scooting: Stand-by assistance        Transfers:  Sit to Stand: Contact guard assistance  Stand to Sit: Contact guard assistance        Bed to Chair: Contact guard assistance          Balance:  Sitting: Intact  Standing: Impaired; Without support  Standing - Static: Good  Standing - Dynamic : Fair  Ambulation/Gait Training:  Distance (ft): 100 Feet (ft)  Assistive Device: Gait belt  Ambulation - Level of Assistance: Contact guard assistance(constant cues due to impaired vision)     Gait Description (WDL): Exceptions to WDL  Gait Abnormalities: Decreased step clearance; Path deviations        Base of Support: Widened     Speed/Mary Nan: Pace decreased (<100 feet/min)  Step Length: Right shortened;Left shortened      Neuro Re-Education:  In standing, had pt reach for objects of varying colors outside of PABLITO to challenge balance and assess visual impairments. Pt able to reach successfully ~60% of the time; performed better with brighter colors. Pain:  Pain Scale 1: Numeric (0 - 10)  Pain Intensity 1: 0              Activity Tolerance:   Good  Please refer to the flowsheet for vital signs taken during this treatment.   After treatment:   ?    Patient left in no apparent distress sitting up in chair  ? Patient left in no apparent distress in bed  ? Call bell left within reach  ? Nursing notified  ? Caregiver present  ? Bed alarm activated    COMMUNICATION/COLLABORATION:   The patient?s plan of care was discussed with: Occupational Therapist, Registered Nurse and     Gris Caceres   Time Calculation: 26 mins       Regarding student involvement in patient care:  A student participated in this treatment session. Per CMS Medicare statements and APTA guidelines I certify that the following was true:  1. I was present and directly observed the entire session. 2. I made all skilled judgments and clinical decisions regarding care. 3. I am the practitioner responsible for assessment, treatment, and documentation.

## 2019-06-14 NOTE — PROGRESS NOTES
Bedside and Verbal shift change report given to [address]) by Nav Hinojosa RN (offgoing nurse). Report included the following information SBAR, Kardex, Recent Results, Med Rec Status and Cardiac Rhythm SR.      Zone Phone:   [phone]          Significant changes during shift:  None     Patient Information      77 yr old, admitted on 6/9/19, patient of Dr. Pierre Guillermo, admitted from home.      Problem List           Past Medical History:   Diagnosis Date    Diabetes ([facility] [address]      Hypertension      Murmur      Osteopenia      Psoriasis      Rosacea      Stroke ([facility] [address] [facility]      Vitamin D deficiency                     Past Surgical History:   Procedure Laterality Date    COLONOSCOPY N/A 4/4/2018     COLONOSCOPY   DIABETIC performed by Lul Knox MD at [facility] [address] COLONOSCOPY   2018     3 polyps    HX TONSILLECTOMY             Social History                Tobacco Use    Smoking status: Never Smoker    Smokeless tobacco: Never Used   Substance Use Topics    Alcohol use: Yes       Alcohol/week: 0.0 oz       Comment: 1/couple months                   Family History   Problem Relation Age of Onset    Diabetes Mother      Heart Disease Father                Allergies   Allergen Reactions    Pcn [Penicillins] Rash    Sulfa (Sulfonamide Antibiotics) Unknown (comments)                Core Measures:      CVA: Y  CHF: N  PNA: N           Activity Status:      OOB to Chair:  yes  Ambulated this shift No  Bed Rest No      Supplemental Q7: (EV Applicable)      Room air          LINES AND DRAINS:      PIV      DVT prophylaxis:      DVT prophylaxis Med- Yes; Lovenox  DVT prophylaxis SCD or SONIA- No       Wounds: (If Applicable)      Wounds- None    Location      Patient Safety:      Falls Score Total Score:  4  Safety Level_______  Bed Alarm On? Yes  Sitter?  N      Plan for upcoming shift:  aspiration precaution      Discharge Plan: TBD      Active Consults:  Neuro

## 2019-06-14 NOTE — PROGRESS NOTES
Problem: Self Care Deficits Care Plan (Adult)  Goal: *Acute Goals and Plan of Care (Insert Text)  Description  Occupational Therapy Goals  Initiated 6/10/2019  1. Patient will perform grooming standing at sink with minimal assistance/contact guard assist within 7 day(s). 2.  Patient will perform upper body dressing with moderate assistance  within 7 day(s). 3.  Patient will perform lower body dressing with moderate assistance  within 7 day(s). 4.  Patient will perform toilet transfers with supervision/set-up within 7 day(s). 5.  Patient will perform all aspects of toileting with moderate assistance  within 7 day(s). 6.  Patient will perform sponge bathing with moderate assistance  within 7 day(s). 7.  Patient will perform ADLs with moderate cueing for proper sequencing within 7 days. Outcome: Progressing Towards Goal   OCCUPATIONAL THERAPY TREATMENT  Patient: Monique White (27 y.o. female)  Date: 6/14/2019  Diagnosis: [facility]cere[facility] accident[facility] [I63.9] <principal problem not specified>       Precautions:  fall, dobhoff  Chart, occupational therapy assessment, plan of care, and goals were reviewed. ASSESSMENT:  Pt remains pleasant and is actively participating. Able to follow two step commands (80%) but has decreased initiation of ADL tasks. With verbal prompts but has inconsistent ability to initiate and responds better to physical cues. Pt does not incorporate left UE into ADLS and needs hand over hand guiding due to neglect. Pt has what appears to be functional strength in left UE. Brushed right side and back of hair with physical cues to initiate and pt was unable to stated what a brush was for. With attempt at visual testing therapist had inconsistent responses to visual stimuli reported not seeing a bright light but could see a \"stop sign. \"  There was a stop sign on the bathroom door. Focus on pt locating objects in standing statically.   Pt could locate and obtain objects that were bright colored orange or teal.  She could differentiate between the colors as well by obtaining to correct color requested by therapist.  Therapist was able to get pt to eat hand over hand assist needed for initiation and continuation at times and pt was perseverating over eating at times (nothing on spoon). She was able to transition between spoon and using straw without cues. Continue to recommend inpatient rehab at discharge. Progression toward goals:  ?       Improving appropriately and progressing toward goals  ? Improving slowly and progressing toward goals  ? Not making progress toward goals and plan of care will be adjusted     PLAN:  Patient continues to benefit from skilled intervention to address the above impairments. Continue treatment per established plan of care. Discharge Recommendations:  Inpatient Rehab  Further Equipment Recommendations for Discharge:  defer to Rehab      SUBJECTIVE:   Patient stated ? Thank you. That is good. ?    OBJECTIVE DATA SUMMARY:   Cognitive/Behavioral Status:  Neurologic State: Alert  Orientation Level: Disoriented to situation;Disoriented to time;Oriented to person;Oriented to place  Cognition: Decreased attention/concentration;Decreased command following(Apraxia is improving)  Perception: Cues to attend to left side of body; Tactile;Verbal;Visual(limited incorporation of BUE together during self care tasks)  Perseveration: Perseverates during ADLS(easily redirected)  Safety/Judgement: Fall prevention    Functional Mobility and Transfers for ADLs:  Bed Mobility:  Supine to Sit: Supervision  Sit to Supine: Supervision  Scooting: Stand-by assistance    Transfers:  Sit to Stand: Contact guard assistance     Bed to Chair: Contact guard assistance    Balance:  Sitting: Intact  Standing: Impaired; Without support  Standing - Static: Good  Standing - Dynamic : Fair    ADL Intervention:  Feeding  Food to Mouth: Stand-by assistance(spoon, inital assist to start and cues to continue)  Drink to Mouth: Stand-by assistance  Cues: Physical assistance; Tactile cues provided;Verbal cues provided    Grooming  Brushing/Combing Hair: Minimum assistance(assist to initate and then physical cues to brush L side)  Cues: Physical assistance; Tactile cues provided      Cognitive Retraining  Orientation Retraining: Situation  Problem Solving: Identifying the problem;General alternative solution; Identifying the task  Organizing/Sequencing: Breaking task down(able to follow multi step commands)  Following Commands: Follows two step commands/directions(80% )  Safety/Judgement: Fall prevention  Cues: Tactile cues provided;Verbal cues provided;Visual cues provided    Neuro Re-Education:  Obtaining brightly colored objects statically standing from left, right and midline visual fields. Increased ease with locating objects on left side (right field cut). Physical and manual cues for pt to scan to the right but very inconsistent performance with objects on right side. Did obtain correct color object (teal vs. Orange) on command consistently but could not differentiate between orange, purple and teal (three objects). Used flashlight on my phone to see if pt could see the bright light in all fields but pt had inconsistent responses to this testing and does better with bright colors. Recommend continued practice with this task with very bright different colors. Pain:  Pain Scale 1: Numeric (0 - 10)  Pain Intensity 1: 0              Activity Tolerance:     Please refer to the flowsheet for vital signs taken during this treatment. After treatment:   ? Patient left in no apparent distress sitting up in chair  ? Patient left in no apparent distress in bed  ? Call bell left within reach  ? Nursing notified  ? Caregiver present  ?  Bed alarm activated    COMMUNICATION/COLLABORATION:   The patient?s plan of care was discussed with: Physical Therapist, Registered Nurse and patient     Spenser [place], OTR/L  Time Calculation: 34 mins

## 2019-06-14 NOTE — PROGRESS NOTES
Problem: Dysphagia (Adult)  Goal: *Acute Goals and Plan of Care (Insert Text)  Description  Speech pathology goals initiated 6/10/2019   1. Patient will tolerate a regular diet/ thin liquids free of s/s aspiration within 7 days changed to full liquid 6/11/2019   2. Patient will participate in language eval pending MRI results within 7 days. 3. Pt will tolerate solids that are finger foods with no overt s/s of aspiration. Outcome: Progressing Towards Goal   SPEECH LANGUAGE PATHOLOGY DYSPHAGIA TREATMENT  Patient: Espinoza Abdul (27 y.o. female)  Date: 6/14/2019  Diagnosis: [facility]cere[facility] accident[facility] [I63.9] <principal problem not specified>       Precautions:       ASSESSMENT:  Pt continues to open her mouth minimally for the spoon. She did allow the straw to be placed to her mouth or take the straw to her mouth. The pt took a cracker to her mouth, bit it and masticated it slowly. She took only small bites of the cracker. No choking or coughing. She had trouble opening her mouth wide but this could be due to her not seeing what is coming to her mouth. Vision is so impaired. Some verbalizations noted. Progression toward goals:  ?         Improving appropriately and progressing toward goals  ? Improving slowly and progressing toward goals  ? Not making progress toward goals and plan of care will be adjusted     PLAN:  Recommendations and Planned Interventions:  Recommend purees and thins and allow crackers and cookies as they are finger foods. Patient continues to benefit from skilled intervention to address the above impairments. Continue treatment per established plan of care. Discharge Recommendations:  Inpatient Rehab     SUBJECTIVE:   Patient stated she could not see my eyes even when holding my face in her hands.      OBJECTIVE:   Cognitive and Communication Status:  Neurologic State: Alert  Orientation Level: Disoriented to situation, Disoriented to time, Oriented to person, Oriented to place  Cognition: Decreased attention/concentration, Decreased command following(Apraxia is improving)  Perception: Cues to attend to left side of body, Tactile, Verbal, Visual(limited incorporation of BUE together during self care tasks)  Perseveration: Perseverates during ADLS(easily redirected)  Safety/Judgement: Fall prevention  Dysphagia Treatment:  Oral Assessment:     P.O. Trials:  Patient Position: upright in bed  Vocal quality prior to P.O.: No impairment  Consistency Presented: Thin liquid; Solid;Puree  How Presented: Self-fed/presented;Straw;Spoon     Bolus Acceptance: No impairment  Bolus Formation/Control: Impaired  Type of Impairment: Delayed  Propulsion: Delayed (# of seconds)  Oral Residue: None  Initiation of Swallow: Delayed (# of seconds)  Laryngeal Elevation: Functional  Aspiration Signs/Symptoms: None  Pharyngeal Phase Characteristics: No impairment, issues, or problems   Effective Modifications: None          Oral Phase Severity: Mild  Pharyngeal Phase Severity : Mild           Pain:  Pain Scale 1: Numeric (0 - 10)  Pain Intensity 1: 0     After treatment:   ?              Patient left in no apparent distress sitting up in chair  ? Patient left in no apparent distress in bed  ? Call bell left within reach  ? Nursing notified  ? Caregiver present  ? Bed alarm activated    COMMUNICATION/EDUCATION:   Patient was educated regarding Her deficit(s) of dysphagia as this relates to Her diagnosis of CVA. She demonstrated Fair understanding . The patient?s plan of care including recommendations, planned interventions, and recommended diet changes were discussed with: Registered Nurse. ? Posted safety precautions in patient's room.     SUNG Freeman  Time Calculation: 20 mins

## 2019-06-14 NOTE — PROGRESS NOTES
Dictated: Probable new CVA left hemisphere   Await MRI and continue asa and eliquis  Consult  REFERRED BY:  Ciro Reynaga MD    CHIEF COMPLAINT: Altered mental status and confusion      Subjective:     Alesha Dodge is a 77 y.o. right-handed  female seen at the request of Dr. Freddy Lugo for new problem of trying to decide whether the patient was taking her Eliquis or not, but does turn out that the patient was probably not taking the medicine because her last prescription was filled the end of March should have been up and of April she has not taken any of the pills because you are still pills in the bottle. So she is not an anticoagulation failure. She continues to slowly improve much more awake and conversant, but still almost cortically blind though she does  some hand motion in the left visual field. She will be a great rehab candidate. Repeat CT scan showed no stroke that was new to account for her visual changes. . Discussed with the hospitalist and cardiology and the family in detail. Xarelto has been started by the hospitalist.  I reviewed her MRI scan personally on the PACS system, and I do agree that she has had embolic strokes now to the left hemisphere causing the right field cut. She presented with sudden onset of confusion and altered mental status that began Saturday night, when the patient was not able to write a check, but can still get around, this persisted yesterday morning, and the patient seemed more confused, had difficulty doing things and her  brought her into the hospital for further evaluation. Patient did not appear to have any new focal weakness or sensory loss noted, and no complaint of headache, meningismus, fever, trauma, or toxin exposure or failure to take her medication correctly.   Patient does have a known history of intermittent atrial fibrillation and is on Eliquis already, and had a loop monitor inserted after her stroke in January of this year that was thought to be embolic, and a loop monitor picked up several bursts of A. fib and the patient was anticoagulated with Eliquis 5 mg twice a day. She is had aspirin 81 mg added to her Eliquis now to help give anticoagulation for the stroke. Past Medical History:   Diagnosis Date    Diabetes [facility] [address]     Hypertension     Murmur     Osteopenia     Psoriasis     Rosacea     Stroke ([facility] [address]     Vitamin D deficiency       Past Surgical History:   Procedure Laterality Date    COLONOSCOPY N/A 2018    COLONOSCOPY   DIABETIC performed by Laura Turner MD at [facility] [address]  SECTION      HX COLONOSCOPY  2018    3 polyps    HX TONSILLECTOMY       Family History   Problem Relation Age of Onset    Diabetes Mother     Heart Disease Father     No Known Problems Child       Social History     Tobacco Use    Smoking status: Never Smoker    Smokeless tobacco: Never Used   Substance Use Topics    Alcohol use: Yes     Alcohol/week: 0.0 oz     Comment: 1/couple months         Current Facility-Administered Medications:     lidocaine (XYLOCAINE) 2 % jelly, , Mucous Membrane, PRN, Olive PACHECO MD    insulin lispro (HUMALOG) injection, , SubCUTAneous, Q6H, Mauro Villalba MD, Stopped at 19 1800    enoxaparin (LOVENOX) injection 90 mg, 1 mg/kg, SubCUTAneous, Q12H, Mauro Rodriguez MD    cloNIDine (CATAPRES) 0.3 mg/24 hr patch 1 Patch, 1 Patch, TransDERmal, Q7D, Chet Nazario MD, 1 Patch at 19 1811    [START ON 2019] nitroglycerin (NITROBID) 2 % ointment 1 Inch, 1 Inch, Topical, Q6H, Mauro Rodriguez MD    metoprolol (LOPRESSOR) injection 5 mg, 5 mg, IntraVENous, Q6H PRN, Chet Nazario MD    hydrALAZINE (APRESOLINE) 20 mg/mL injection 10 mg, 10 mg, IntraVENous, Q6H PRN, Chet Nazario MD, 10 mg at 19 210    insulin glargine (LANTUS) injection 20 Units, 20 Units, SubCUTAneous, DAILY, Chet Nazario MD, Stopped at 19 0900    . PHARMACY TO SUBSTITUTE PER PROTOCOL (Reordered from: etanercept (ENBREL) 50 mg/mL (0.98 mL) injection), , , Per Protocol, Raquel Jose MD    acetaminophen (TYLENOL) tablet 650 mg, 650 mg, Oral, Q4H PRN, Raquel Jose MD    ondansetron [facility]) injection 4 mg, 4 mg, IntraVENous, Q6H PRN, Raquel Jose MD    glucose chewable tablet 16 g, 4 Tab, Oral, PRN, Raquel Jose MD    glucagon (GLUCAGEN) injection 1 mg, 1 mg, IntraMUSCular, PRN, Raquel Jose MD    dextrose 10% infusion 250 mL, 250 mL, IntraVENous, PRN, Raquel Jose MD    sodium chloride (NS) flush 5-40 mL, 5-40 mL, IntraVENous, Q8H, Raquel Jose MD, 10 mL at 06/13/19 2111    sodium chloride (NS) flush 5-40 mL, 5-40 mL, IntraVENous, PRN, Raquel Jose MD        Allergies   Allergen Reactions    Pcn [Penicillins] Rash    Sulfa (Sulfonamide Antibiotics) Unknown (comments)      MRI Results (most recent):  Results from [facility] encounter on 06/09/19   MRA BRAIN WO CONT    Narrative EXAM:  MRI BRAIN WO CONT, MRA BRAIN WO CONT    INDICATION:  Altered mental status and confusion. COMPARISON:  MRI brain 1/18/2019, CT head 6/9/2019. CONTRAST: None. TECHNIQUE:    MRI Brain:  Multiplanar multisequence acquisition without contrast of the brain. MRA Head:  3-D time-of-flight MRA of the head was performed. Multiplanar and MIP  reconstructions were obtained. FINDINGS:  MRI Brain:  Multiple small acute cortical/subcortical infarcts in the left parietal lobe. No  evidence of acute hemorrhage. Unchanged generalized parenchymal volume loss with commensurate dilation of the  sulci and ventricular system. Periventricular deep white matter T2/FLAIR  hyperintensities, as was patchy T2/FLAIR hyperintensity in the diane, consistent  with mild chronic microvascular ischemic disease. Chronic large right MCA  territory infarct involving the right temporoparietal lobe and insula.   Additional unchanged chronic infarcts in the right occipital lobe and bilateral  cerebellum. There is no cerebellar tonsillar herniation. Expected arterial  flow-voids are present. The paranasal sinuses, mastoid air cells, and middle ears are clear. The orbital  contents are within normal limits. No significant osseous or scalp lesions are  identified. MRA Head:  There is no evidence of large vessel occlusion or flow-limiting stenosis of the  intracranial internal carotid, anterior cerebral, and middle cerebral arteries. The anterior communicating artery is patent. There is no evidence of large vessel occlusion or flow-limiting stenosis of the  intracranial vertebral arteries, basilar artery, or posterior cerebral arteries. The right posterior communicating artery is diminutive but patent, the left is  not seen. There is no evidence of aneurysm or vascular malformation. Impression IMPRESSION:   MRI Brain:  1. Multiple small acute cortical/subcortical infarcts in the left parietal lobe. 2. Unchanged generalized parenchymal volume loss and mild chronic microvascular  ischemic disease. 3. Unchanged chronic large right MCA territory infarct involving the right  temporoparietal lobe and insula. Unchanged additional chronic infarcts in the  right occipital lobe and bilateral cerebellum. MRA Head:  1. No evidence of significant stenosis or aneurysm. 23X               Results from [place] encounter on 06/09/19   MRA BRAIN WO CONT    Narrative EXAM:  MRI BRAIN WO CONT, MRA BRAIN WO CONT    INDICATION:  Altered mental status and confusion. COMPARISON:  MRI brain 1/18/2019, CT head 6/9/2019. CONTRAST: None. TECHNIQUE:    MRI Brain:  Multiplanar multisequence acquisition without contrast of the brain. MRA Head:  3-D time-of-flight MRA of the head was performed. Multiplanar and MIP  reconstructions were obtained. FINDINGS:  MRI Brain:  Multiple small acute cortical/subcortical infarcts in the left parietal lobe.  No  evidence of acute hemorrhage. Unchanged generalized parenchymal volume loss with commensurate dilation of the  sulci and ventricular system. Periventricular deep white matter T2/FLAIR  hyperintensities, as was patchy T2/FLAIR hyperintensity in the diane, consistent  with mild chronic microvascular ischemic disease. Chronic large right MCA  territory infarct involving the right temporoparietal lobe and insula. Additional unchanged chronic infarcts in the right occipital lobe and bilateral  cerebellum. There is no cerebellar tonsillar herniation. Expected arterial  flow-voids are present. The paranasal sinuses, mastoid air cells, and middle ears are clear. The orbital  contents are within normal limits. No significant osseous or scalp lesions are  identified. MRA Head:  There is no evidence of large vessel occlusion or flow-limiting stenosis of the  intracranial internal carotid, anterior cerebral, and middle cerebral arteries. The anterior communicating artery is patent. There is no evidence of large vessel occlusion or flow-limiting stenosis of the  intracranial vertebral arteries, basilar artery, or posterior cerebral arteries. The right posterior communicating artery is diminutive but patent, the left is  not seen. There is no evidence of aneurysm or vascular malformation. Impression IMPRESSION:   MRI Brain:  1. Multiple small acute cortical/subcortical infarcts in the left parietal lobe. 2. Unchanged generalized parenchymal volume loss and mild chronic microvascular  ischemic disease. 3. Unchanged chronic large right MCA territory infarct involving the right  temporoparietal lobe and insula. Unchanged additional chronic infarcts in the  right occipital lobe and bilateral cerebellum. MRA Head:  1. No evidence of significant stenosis or aneurysm. 23X             Review of Systems:  Review of systems not obtained due to patient factors.    Vitals:    06/13/19 1112 06/13/19 1518 06/13/19 1752 06/13/19 1937   BP: 189/83 190/78 175/69 183/71   Pulse: 91 96  100   Resp: 18 18  17   Temp: 98.2 °F (36.8 °C) 98.5 °F (36.9 °C)  99 °F (37.2 °C)   SpO2: 95% 95%  96%   Weight:       Height:         Objective:     I      NEUROLOGICAL EXAM:    Appearance: The patient is well developed, well nourished, provides a poor history and is in no acute distress. Mental Status: Oriented to place and person, and the not the date or president, cognitive function is mildly abnormal and speech is mildly aphasic I think, but no real dysarthria and mood and affect appropriate, but a little confused. 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 Ernesta Angers are benign, disc are flat, no lesions seen on funduscopy. ROSALIND, EOM's full, no nystagmus, no ptosis. Facial sensation is normal. Corneal reflexes are not tested. Facial movement is asymmetric with mild central facial on the left. Hearing is abnormal bilaterally. Palate is midline with normal sternocleidomastoid and trapezius muscles are normal. Tongue is midline. Neck without meningismus or bruits  Temporal arteries are not tender or enlarged  TMJ areas are not tender on palpation   Motor:  5/5 strength in upper and lower proximal and distal muscles, with a very mild left hemiparesis. Normal bulk and tone on the right and slight increase in the left. No fasciculations. Rapid alternating movement is slightly reduced on the left   Reflexes:   Deep tendon reflexes 2+/4 and asymmetrical, with perhaps a slight increase on the left. No babinski or clonus present   Sensory:   Abnormal to touch, pinprick and vibration and temperature and  DSS on the left, and slight decreased sensation in both feet to ankle level. Gait:  Not tested. Tremor:   No tremor noted. Cerebellar:  No abnormal cerebellar signs present on finger-nose-finger exam.  Romberg and tandem not tested   Neurovascular:  Normal heart sounds and regular rhythm, peripheral pulses decreased, and no carotid bruits. Assessment:   Active Problems:    Essential hypertension, benign (8/11/2014)      Obesity, morbid ([address] (12/26/2017)      Type 2 diabetes mellitus with nephropathy ([address] (12/26/2017)      Acute ischemic cerebrovascular accident (CVA) involving right middle cerebral artery territory [facility]) (1/17/2019)      Cerebrovascular accident (CVA) due to thrombosis of right middle cerebral artery ([address] (1/18/2019)      Paroxysmal atrial fibrillation ([address] (4/16/2019)      CVA (cerebral vascular accident) ([address] (6/9/2019)      Diabetic peripheral neuropathy associated with type 2 diabetes mellitus ([address] (6/10/2019)      Bilateral carotid artery stenosis (6/10/2019)      Altered mental status, unspecified (7/96/9582)      Embolic stroke involving left middle cerebral artery ([address] (6/10/2019)        Plan:     Patient apparently was not taking her anticoagulation so she is not an anticoagulation failure. Patient now started on Xarelto and tolerating that well. Patient still cortically blind, but more awake more interactive and keeping eyes open more. Discussed with cardiology and the hospitalist and nursing staff and the family all in detail today. Patient appears to have new deficits involving some difficulty with speech, and a field cut on the right side that seem to be new, in addition to her old findings of old left hemiparesis. Carotid Dopplers were reviewed and they do not show anything new or any significant stenosis. Her MRI showed the embolic strokes, and MRA of the brain was negative. Discussed with the patient and her  in detail, we will follow carefully with you, and PT OT and speech therapy have all been ordered and are seen the patient now. Repeat CT scan was negative for new stroke last evening and patient seems stable now.   Very difficult case, patient very high risk for recurrent strokes and major neurologic deficit and complications    Signed By: Sanjay Locke MD     June 13, 2019 CC: Ziggy Kohli MD  FAX: [phone]

## 2019-06-15 LAB
ALBUMIN SERPL-MCNC: 2.2 G/DL (ref 3.5–5)
ALBUMIN/GLOB SERPL: 0.6 {RATIO} (ref 1.1–2.2)
ALP SERPL-CCNC: 105 U/L (ref 45–117)
ALT SERPL-CCNC: 22 U/L (ref 12–78)
ANION GAP SERPL CALC-SCNC: 2 MMOL/L (ref 5–15)
AST SERPL-CCNC: 19 U/L (ref 15–37)
BILIRUB SERPL-MCNC: 0.4 MG/DL (ref 0.2–1)
BUN SERPL-MCNC: 33 MG/DL (ref 6–20)
BUN/CREAT SERPL: 25 (ref 12–20)
CALCIUM SERPL-MCNC: 8.3 MG/DL (ref 8.5–10.1)
CHLORIDE SERPL-SCNC: 104 MMOL/L (ref 97–108)
CO2 SERPL-SCNC: 30 MMOL/L (ref 21–32)
CREAT SERPL-MCNC: 1.33 MG/DL (ref 0.55–1.02)
GLOBULIN SER CALC-MCNC: 4 G/DL (ref 2–4)
GLUCOSE BLD STRIP.AUTO-MCNC: 224 MG/DL (ref 65–100)
GLUCOSE BLD STRIP.AUTO-MCNC: 278 MG/DL (ref 65–100)
GLUCOSE BLD STRIP.AUTO-MCNC: 327 MG/DL (ref 65–100)
GLUCOSE BLD STRIP.AUTO-MCNC: 368 MG/DL (ref 65–100)
GLUCOSE BLD STRIP.AUTO-MCNC: 372 MG/DL (ref 65–100)
GLUCOSE SERPL-MCNC: 353 MG/DL (ref 65–100)
INR PPP: 1 (ref 0.9–1.1)
POTASSIUM SERPL-SCNC: 3.8 MMOL/L (ref 3.5–5.1)
PROT SERPL-MCNC: 6.2 G/DL (ref 6.4–8.2)
PROTHROMBIN TIME: 10 SEC (ref 9–11.1)
SERVICE CMNT-IMP: ABNORMAL
SODIUM SERPL-SCNC: 136 MMOL/L (ref 136–145)

## 2019-06-15 PROCEDURE — 36415 COLL VENOUS BLD VENIPUNCTURE: CPT

## 2019-06-15 PROCEDURE — 80053 COMPREHEN METABOLIC PANEL: CPT

## 2019-06-15 PROCEDURE — 74011250637 HC RX REV CODE- 250/637: Performed by: INTERNAL MEDICINE

## 2019-06-15 PROCEDURE — 74011250636 HC RX REV CODE- 250/636: Performed by: INTERNAL MEDICINE

## 2019-06-15 PROCEDURE — 65660000000 HC RM CCU STEPDOWN

## 2019-06-15 PROCEDURE — 51798 US URINE CAPACITY MEASURE: CPT

## 2019-06-15 PROCEDURE — 77030011943

## 2019-06-15 PROCEDURE — 74011636637 HC RX REV CODE- 636/637: Performed by: INTERNAL MEDICINE

## 2019-06-15 PROCEDURE — 85610 PROTHROMBIN TIME: CPT

## 2019-06-15 PROCEDURE — 82962 GLUCOSE BLOOD TEST: CPT

## 2019-06-15 RX ORDER — INSULIN GLARGINE 100 [IU]/ML
25 INJECTION, SOLUTION SUBCUTANEOUS DAILY
Status: DISCONTINUED | OUTPATIENT
Start: 2019-06-15 | End: 2019-06-15

## 2019-06-15 RX ORDER — INSULIN LISPRO 100 [IU]/ML
12 INJECTION, SOLUTION INTRAVENOUS; SUBCUTANEOUS ONCE
Status: COMPLETED | OUTPATIENT
Start: 2019-06-15 | End: 2019-06-15

## 2019-06-15 RX ORDER — FLUCONAZOLE 2 MG/ML
200 INJECTION, SOLUTION INTRAVENOUS ONCE
Status: COMPLETED | OUTPATIENT
Start: 2019-06-15 | End: 2019-06-15

## 2019-06-15 RX ORDER — NYSTATIN 100000 [USP'U]/G
POWDER TOPICAL 2 TIMES DAILY
Status: DISCONTINUED | OUTPATIENT
Start: 2019-06-15 | End: 2019-06-17 | Stop reason: HOSPADM

## 2019-06-15 RX ORDER — INSULIN GLARGINE 100 [IU]/ML
40 INJECTION, SOLUTION SUBCUTANEOUS DAILY
Status: DISCONTINUED | OUTPATIENT
Start: 2019-06-16 | End: 2019-06-17 | Stop reason: HOSPADM

## 2019-06-15 RX ADMIN — HYDRALAZINE HYDROCHLORIDE 10 MG: 20 INJECTION INTRAMUSCULAR; INTRAVENOUS at 12:38

## 2019-06-15 RX ADMIN — INSULIN LISPRO 3 UNITS: 100 INJECTION, SOLUTION INTRAVENOUS; SUBCUTANEOUS at 23:31

## 2019-06-15 RX ADMIN — NITROGLYCERIN 1 INCH: 20 OINTMENT TOPICAL at 05:07

## 2019-06-15 RX ADMIN — Medication 10 ML: at 05:07

## 2019-06-15 RX ADMIN — ENOXAPARIN SODIUM 90 MG: 100 INJECTION SUBCUTANEOUS at 23:31

## 2019-06-15 RX ADMIN — Medication 10 ML: at 23:32

## 2019-06-15 RX ADMIN — NITROGLYCERIN 1 INCH: 20 OINTMENT TOPICAL at 23:32

## 2019-06-15 RX ADMIN — ENOXAPARIN SODIUM 90 MG: 100 INJECTION SUBCUTANEOUS at 12:03

## 2019-06-15 RX ADMIN — FLUCONAZOLE 200 MG: 2 INJECTION, SOLUTION INTRAVENOUS at 12:04

## 2019-06-15 RX ADMIN — INSULIN LISPRO 12 UNITS: 100 INJECTION, SOLUTION INTRAVENOUS; SUBCUTANEOUS at 15:23

## 2019-06-15 RX ADMIN — NYSTATIN: 100000 POWDER TOPICAL at 18:47

## 2019-06-15 RX ADMIN — Medication 10 ML: at 14:00

## 2019-06-15 RX ADMIN — NITROGLYCERIN 1 INCH: 20 OINTMENT TOPICAL at 12:13

## 2019-06-15 RX ADMIN — INSULIN LISPRO 10 UNITS: 100 INJECTION, SOLUTION INTRAVENOUS; SUBCUTANEOUS at 12:04

## 2019-06-15 RX ADMIN — NYSTATIN: 100000 POWDER TOPICAL at 12:38

## 2019-06-15 RX ADMIN — INSULIN LISPRO 7 UNITS: 100 INJECTION, SOLUTION INTRAVENOUS; SUBCUTANEOUS at 06:02

## 2019-06-15 RX ADMIN — INSULIN GLARGINE 25 UNITS: 100 INJECTION, SOLUTION SUBCUTANEOUS at 09:30

## 2019-06-15 RX ADMIN — INSULIN LISPRO 5 UNITS: 100 INJECTION, SOLUTION INTRAVENOUS; SUBCUTANEOUS at 18:58

## 2019-06-15 NOTE — PROGRESS NOTES
I notified dr. Grayson Tijerina via the telephone that the patient presented with moderate amts of white discharge resembling a yeast infection. Dr. Toshia Dimas stated to give the patient a one time dose of 150 mg oral diflucan.  HARLEY

## 2019-06-15 NOTE — PROGRESS NOTES
I notified Dr. Joselito Villaseñor via the telephone that the patient's /68 and that the [facility] stated to give the 10 mg of prn apresoline for systolic greater than 684 after metoprolol was given. I notified Dr. Joselito Villaseñor via the phone that the patient did not have metoprolol on her emar. He stated to just give the apresoline.

## 2019-06-15 NOTE — PROGRESS NOTES
To Whom It may concern,      This is to attest that Mrs Aasf Israel has been admitted to [facility] on 6/9/2019. Mrs Lacy Corona is still in the hospital at the present time. She has had a stroke and will transfer to a rehabilitation facility after she is discharged from the hospital.  Due to the severity of her stroke,Mrs Lacy Corona will need an extended period for rehabilitation. I have been informed that Mrs Lacy Corona has a scheduled trip to [facility] from August 25th through September 6th,2019. Due to Mrs Lacy Corona current condition,I will advise that she cancel her trip to [facility] until she is fully recovered. I am writing this note,being one of the doctors taking care of Mrs Lacy Corona here at [facility] at [place].       Sincerely,      Lulu [id number]  Internal Medicine/Hospitalist  [facility]:[phone]

## 2019-06-15 NOTE — PROGRESS NOTES
I notified Dr. Cristian Lopez via the phone that the pt's left nare was bleeding a small amount of burgundy blood onto her gown. Dr. Carly Espinosa asked what the pt's platelets were and I stated 200. Dr. Cristian Lopez stated to collect a PT and INR on the patient. He also stated to give 12 units of Lispro for corection.  HARLEY

## 2019-06-15 NOTE — PROGRESS NOTES
Problem: Pressure Injury - Risk of  Goal: *Prevention of pressure injury  Description  Document William Scale and appropriate interventions in the flowsheet. Outcome: Progressing Towards Goal     Problem: Patient Education: Go to Patient Education Activity  Goal: Patient/Family Education  Outcome: Progressing Towards Goal     Problem: Falls - Risk of  Goal: *Absence of Falls  Description  No injury during hospitalization. Outcome: Progressing Towards Goal     Problem: Patient Education: Go to Patient Education Activity  Goal: Patient/Family Education  Outcome: Progressing Towards Goal     Problem: Diabetes Self-Management  Goal: *Disease process and treatment process  Description  Define diabetes and identify own type of diabetes; list 3 options for treating diabetes. Outcome: Progressing Towards Goal  Goal: *Incorporating nutritional management into lifestyle  Description  Describe effect of type, amount and timing of food on blood glucose; list 3 methods for planning meals. Outcome: Progressing Towards Goal  Goal: *Incorporating physical activity into lifestyle  Description  State effect of exercise on blood glucose levels. Outcome: Progressing Towards Goal  Goal: *Developing strategies to promote health/change behavior  Description  Define the ABC's of diabetes; identify appropriate screenings, schedule and personal plan for screenings. Outcome: Progressing Towards Goal  Goal: *Using medications safely  Description  State effect of diabetes medications on diabetes; name diabetes medication taking, action and side effects. Outcome: Progressing Towards Goal  Goal: *Monitoring blood glucose, interpreting and using results  Description  Identify recommended blood glucose targets  and personal targets.   Outcome: Progressing Towards Goal  Goal: *Prevention, detection, treatment of acute complications  Description  List symptoms of hyper- and hypoglycemia; describe how to treat low blood sugar and actions for lowering  high blood glucose level. Outcome: Progressing Towards Goal  Goal: *Prevention, detection and treatment of chronic complications  Description  Define the natural course of diabetes and describe the relationship of blood glucose levels to long term complications of diabetes.   Outcome: Progressing Towards Goal  Goal: *Developing strategies to address psychosocial issues  Description  Describe feelings about living with diabetes; identify support needed and support network  Outcome: Progressing Towards Goal  Goal: *Insulin pump training  Outcome: Progressing Towards Goal  Goal: *Sick day guidelines  Outcome: Progressing Towards Goal  Goal: *Patient Specific Goal (EDIT GOAL, INSERT TEXT)  Outcome: Progressing Towards Goal     Problem: Patient Education: Go to Patient Education Activity  Goal: Patient/Family Education  Outcome: Progressing Towards Goal     Problem: Patient Education: Go to Patient Education Activity  Goal: Patient/Family Education  Outcome: Progressing Towards Goal     Problem: Patient Education: Go to Patient Education Activity  Goal: Patient/Family Education  Outcome: Progressing Towards Goal     Problem: Patient Education: Go to Patient Education Activity  Goal: Patient/Family Education  Outcome: Progressing Towards Goal     Problem: Patient Education: Go to Patient Education Activity  Goal: Patient/Family Education  Outcome: Progressing Towards Goal     Problem: TIA/CVA Stroke: 0-24 hours  Goal: Off Pathway (Use only if patient is Off Pathway)  Outcome: Progressing Towards Goal  Goal: Activity/Safety  Outcome: Progressing Towards Goal  Goal: Consults, if ordered  Outcome: Progressing Towards Goal  Goal: Diagnostic Test/Procedures  Outcome: Progressing Towards Goal  Goal: Nutrition/Diet  Outcome: Progressing Towards Goal  Goal: Discharge Planning  Outcome: Progressing Towards Goal  Goal: Medications  Outcome: Progressing Towards Goal  Goal: Respiratory  Outcome: Progressing Towards Goal  Goal: Treatments/Interventions/Procedures  Outcome: Progressing Towards Goal  Goal: Minimize risk of bleeding post-thrombolytic infusion  Outcome: Progressing Towards Goal  Goal: Monitor for complications post-thrombolytic infusion  Outcome: Progressing Towards Goal  Goal: Psychosocial  Outcome: Progressing Towards Goal  Goal: *Hemodynamically stable  Outcome: Progressing Towards Goal  Goal: *Neurologically stable  Description  Absence of additional neurological deficits    Outcome: Progressing Towards Goal  Goal: *Verbalizes anxiety and depression are reduced or absent  Outcome: Progressing Towards Goal  Goal: *Absence of Signs of Aspiration on Current Diet  Outcome: Progressing Towards Goal  Goal: *Absence of deep venous thrombosis signs and symptoms(Stroke Metric)  Outcome: Progressing Towards Goal  Goal: *Ability to perform ADLs and demonstrates progressive mobility and function  Outcome: Progressing Towards Goal  Goal: *Stroke education started(Stroke Metric)  Outcome: Progressing Towards Goal  Goal: *Dysphagia screen performed(Stroke Metric)  Outcome: Progressing Towards Goal  Goal: *Rehab consulted(Stroke Metric)  Outcome: Progressing Towards Goal     Problem: TIA/CVA Stroke: Day 2 Until Discharge  Goal: Off Pathway (Use only if patient is Off Pathway)  Outcome: Progressing Towards Goal  Goal: Activity/Safety  Outcome: Progressing Towards Goal  Goal: Diagnostic Test/Procedures  Outcome: Progressing Towards Goal  Goal: Nutrition/Diet  Outcome: Progressing Towards Goal  Goal: Discharge Planning  Outcome: Progressing Towards Goal  Goal: Medications  Outcome: Progressing Towards Goal  Goal: Respiratory  Outcome: Progressing Towards Goal  Goal: Treatments/Interventions/Procedures  Outcome: Progressing Towards Goal  Goal: Psychosocial  Outcome: Progressing Towards Goal  Goal: *Verbalizes anxiety and depression are reduced or absent  Outcome: Progressing Towards Goal  Goal: *Absence of aspiration  Outcome: Progressing Towards Goal  Goal: *Absence of deep venous thrombosis signs and symptoms(Stroke Metric)  Outcome: Progressing Towards Goal  Goal: *Optimal pain control at patient's stated goal  Outcome: Progressing Towards Goal  Goal: *Tolerating diet  Outcome: Progressing Towards Goal  Goal: *Ability to perform ADLs and demonstrates progressive mobility and function  Outcome: Progressing Towards Goal  Goal: *Stroke education continued(Stroke Metric)  Outcome: Progressing Towards Goal     Problem: Ischemic Stroke: Discharge Outcomes  Goal: *Verbalizes anxiety and depression are reduced or absent  Outcome: Progressing Towards Goal  Goal: *Verbalize understanding of risk factor modification(Stroke Metric)  Outcome: Progressing Towards Goal  Goal: *Hemodynamically stable  Outcome: Progressing Towards Goal  Goal: *Absence of aspiration pneumonia  Outcome: Progressing Towards Goal  Goal: *Aware of needed dietary changes  Outcome: Progressing Towards Goal  Goal: *Verbalize understanding of prescribed medications including anti-coagulants, anti-lipid, and/or anti-platelets(Stroke Metric)  Outcome: Progressing Towards Goal  Goal: *Tolerating diet  Outcome: Progressing Towards Goal  Goal: *Aware of follow-up diagnostics related to anticoagulants  Outcome: Progressing Towards Goal  Goal: *Ability to perform ADLs and demonstrates progressive mobility and function  Outcome: Progressing Towards Goal  Goal: *Absence of DVT(Stroke Metric)  Outcome: Progressing Towards Goal  Goal: *Absence of aspiration  Outcome: Progressing Towards Goal  Goal: *Optimal pain control at patient's stated goal  Outcome: Progressing Towards Goal  Goal: *Home safety concerns addressed  Outcome: Progressing Towards Goal  Goal: *Describes available resources and support systems  Outcome: Progressing Towards Goal  Goal: *Verbalizes understanding of activation of EMS(911) for stroke symptoms(Stroke Metric)  Outcome: Progressing Towards Goal  Goal: *Understands and describes signs and symptoms to report to providers(Stroke Metric)  Outcome: Progressing Towards Goal  Goal: *Neurolgocially stable (absence of additional neurological deficits)  Outcome: Progressing Towards Goal  Goal: *Verbalizes importance of follow-up with primary care physician(Stroke Metric)  Outcome: Progressing Towards Goal  Goal: *Smoking cessation discussed,if applicable(Stroke Metric)  Outcome: Progressing Towards Goal  Goal: *Depression screening completed(Stroke Metric)  Outcome: Progressing Towards Goal

## 2019-06-15 NOTE — PROGRESS NOTES
Pt's  wishes to hold the nitrobid that is scheduled as the pt does not have a history of MI or stemis. Will clarify with the physician as the physician's note states that it is to be given prn.

## 2019-06-15 NOTE — PROGRESS NOTES
Dictated: Probable new CVA left hemisphere   Await MRI and continue asa and eliquis  Consult  REFERRED BY:  Shakir Reynaga MD    CHIEF COMPLAINT: Altered mental status and confusion      Subjective:     Amna Knapp is a 77 y.o. right-handed  female seen at the request of Dr. Gordy Noyola for new problem of trying to decide whether the patient was taking her Eliquis or not, but does turn out that the patient was probably not taking the medicine because her last prescription was filled the end of March should have been up and of April she has not taken any of the pills because you are still pills in the bottle. So she is not an anticoagulation failure. She continues to slowly improve much more awake and conversant, but still almost cortically blind though she does  some hand motion in the left visual field. She will be a great rehab candidate. Repeat CT scan showed no stroke that was new to account for her visual changes. . Discussed with the hospitalist and cardiology and the family in detail. Because of swallowing problem patient now on heparin. I reviewed her MRI scan personally on the PACS system, and I do agree that she has had embolic strokes now to the left hemisphere causing the right field cut. She presented with sudden onset of confusion and altered mental status that began Saturday night, when the patient was not able to write a check, but can still get around, this persisted yesterday morning, and the patient seemed more confused, had difficulty doing things and her  brought her into the hospital for further evaluation. Patient did not appear to have any new focal weakness or sensory loss noted, and no complaint of headache, meningismus, fever, trauma, or toxin exposure or failure to take her medication correctly.   Patient does have a known history of intermittent atrial fibrillation and is on Eliquis already, and had a loop monitor inserted after her stroke in January of this year that was thought to be embolic, and a loop monitor picked up several bursts of A. fib and the patient was anticoagulated with Eliquis 5 mg twice a day. [facility], and will continue heparin until she can swallow and take her Eliquis  . Past Medical History:   Diagnosis Date    Diabetes ([facility]     Hypertension     Murmur     Osteopenia     Psoriasis     Rosacea     [facility]     Vitamin D deficiency       Past Surgical History:   Procedure Laterality Date    COLONOSCOPY N/A 2018    COLONOSCOPY   [facility] HX  SECTION      HX COLONOSCOPY  2018    3 polyps    HX TONSILLECTOMY       Family History   Problem Relation Age of Onset    Diabetes Mother     Heart Disease Father     No Known Problems Child       Social History     Tobacco Use    Smoking status: Never Smoker    Smokeless tobacco: Never Used   Substance Use Topics    Alcohol use: Yes     Alcohol/week: 0.0 oz     Comment: 1/couple months         Current Facility-Administered Medications:     lidocaine (XYLOCAINE) 2 % jelly, , Mucous Membrane, PRN, Holger PACHECO MD    insulin lispro (HUMALOG) injection, , SubCUTAneous, Q6H, Marian Perez MD, 7 Units at 19 1745    enoxaparin (LOVENOX) injection 90 mg, 1 mg/kg, SubCUTAneous, Q12H, Marian Perez MD, 90 mg at 19 1210    cloNIDine (CATAPRES) 0.3 mg/24 hr patch 1 Patch, 1 Patch, TransDERmal, Q7D, Marian Perez MD, 1 Patch at 19 1811    nitroglycerin (NITROBID) 2 % ointment 1 Inch, 1 Inch, Topical, Q6H, Marian Perez MD, 1 Inch at 19 1745    hydrALAZINE (APRESOLINE) 20 mg/mL injection 10 mg, 10 mg, IntraVENous, Q6H PRN, Marian Perez MD, 10 mg at 19 1745    insulin glargine (LANTUS) injection 20 Units, 20 Units, SubCUTAneous, DAILY, Marian Perez MD, 20 Units at 19 0813    . PHARMACY TO SUBSTITUTE PER PROTOCOL (Reordered from: etanercept (ENBREL) 50 mg/mL (0.98 mL) injection), , , Per Protocol, Ugo Zimmer MD    acetaminophen (TYLENOL) tablet 650 mg, 650 mg, Oral, Q4H PRN, Jorge Jose MD    ondansetron [facility]) injection 4 mg, 4 mg, IntraVENous, Q6H PRN, Jorge Jose MD    glucose chewable tablet 16 g, 4 Tab, Oral, PRN, Jorge Jose MD    glucagon (GLUCAGEN) injection 1 mg, 1 mg, IntraMUSCular, PRN, Jorge Jose MD    dextrose 10% infusion 250 mL, 250 mL, IntraVENous, PRN, Jorge Jose MD    sodium chloride (NS) flush 5-40 mL, 5-40 mL, IntraVENous, Q8H, Jorge Jose MD, 10 mL at 06/14/19 1442    sodium chloride (NS) flush 5-40 mL, 5-40 mL, IntraVENous, PRN, Jorge Jose MD, 10 mL at 06/14/19 0450        Allergies   Allergen Reactions    Pcn [Penicillins] Rash    Sulfa (Sulfonamide Antibiotics) Unknown (comments)      MRI Results (most recent):  Results from [place] encounter on 06/09/19   MRA BRAIN WO CONT    Narrative EXAM:  MRI BRAIN WO CONT, MRA BRAIN WO CONT    INDICATION:  Altered mental status and confusion. COMPARISON:  MRI brain 1/18/2019, CT head 6/9/2019. CONTRAST: None. TECHNIQUE:    MRI Brain:  Multiplanar multisequence acquisition without contrast of the brain. MRA Head:  3-D time-of-flight MRA of the head was performed. Multiplanar and MIP  reconstructions were obtained. FINDINGS:  MRI Brain:  Multiple small acute cortical/subcortical infarcts in the left parietal lobe. No  evidence of acute hemorrhage. Unchanged generalized parenchymal volume loss with commensurate dilation of the  sulci and ventricular system. Periventricular deep white matter T2/FLAIR  hyperintensities, as was patchy T2/FLAIR hyperintensity in the diane, consistent  with mild chronic microvascular ischemic disease. Chronic large right MCA  territory infarct involving the right temporoparietal lobe and insula. Additional unchanged chronic infarcts in the right occipital lobe and bilateral  cerebellum. There is no cerebellar tonsillar herniation. Expected arterial  flow-voids are present. The paranasal sinuses, mastoid air cells, and middle ears are clear. The orbital  contents are within normal limits. No significant osseous or scalp lesions are  identified. MRA Head:  There is no evidence of large vessel occlusion or flow-limiting stenosis of the  intracranial internal carotid, anterior cerebral, and middle cerebral arteries. The anterior communicating artery is patent. There is no evidence of large vessel occlusion or flow-limiting stenosis of the  intracranial vertebral arteries, basilar artery, or posterior cerebral arteries. The right posterior communicating artery is diminutive but patent, the left is  not seen. There is no evidence of aneurysm or vascular malformation. Impression IMPRESSION:   MRI Brain:  1. Multiple small acute cortical/subcortical infarcts in the left parietal lobe. 2. Unchanged generalized parenchymal volume loss and mild chronic microvascular  ischemic disease. 3. Unchanged chronic large right MCA territory infarct involving the right  temporoparietal lobe and insula. Unchanged additional chronic infarcts in the  right occipital lobe and bilateral cerebellum. MRA Head:  1. No evidence of significant stenosis or aneurysm. 23X               Results from [place] encounter on 06/09/19   MRA BRAIN WO CONT    Narrative EXAM:  MRI BRAIN WO CONT, MRA BRAIN WO CONT    INDICATION:  Altered mental status and confusion. COMPARISON:  MRI brain 1/18/2019, CT head 6/9/2019. CONTRAST: None. TECHNIQUE:    MRI Brain:  Multiplanar multisequence acquisition without contrast of the brain. MRA Head:  3-D time-of-flight MRA of the head was performed. Multiplanar and MIP  reconstructions were obtained. FINDINGS:  MRI Brain:  Multiple small acute cortical/subcortical infarcts in the left parietal lobe. No  evidence of acute hemorrhage.     Unchanged generalized parenchymal volume loss with commensurate dilation of the  sulci and ventricular system. Periventricular deep white matter T2/FLAIR  hyperintensities, as was patchy T2/FLAIR hyperintensity in the diane, consistent  with mild chronic microvascular ischemic disease. Chronic large right MCA  territory infarct involving the right temporoparietal lobe and insula. Additional unchanged chronic infarcts in the right occipital lobe and bilateral  cerebellum. There is no cerebellar tonsillar herniation. Expected arterial  flow-voids are present. The paranasal sinuses, mastoid air cells, and middle ears are clear. The orbital  contents are within normal limits. No significant osseous or scalp lesions are  identified. MRA Head:  There is no evidence of large vessel occlusion or flow-limiting stenosis of the  intracranial internal carotid, anterior cerebral, and middle cerebral arteries. The anterior communicating artery is patent. There is no evidence of large vessel occlusion or flow-limiting stenosis of the  intracranial vertebral arteries, basilar artery, or posterior cerebral arteries. The right posterior communicating artery is diminutive but patent, the left is  not seen. There is no evidence of aneurysm or vascular malformation. Impression IMPRESSION:   MRI Brain:  1. Multiple small acute cortical/subcortical infarcts in the left parietal lobe. 2. Unchanged generalized parenchymal volume loss and mild chronic microvascular  ischemic disease. 3. Unchanged chronic large right MCA territory infarct involving the right  temporoparietal lobe and insula. Unchanged additional chronic infarcts in the  right occipital lobe and bilateral cerebellum. MRA Head:  1. No evidence of significant stenosis or aneurysm. 23X             Review of Systems:  Review of systems not obtained due to patient factors.    Vitals:    06/14/19 0753 06/14/19 1323 06/14/19 1612 06/14/19 1955   BP: 165/65 181/83 173/77 161/64   Pulse: 91 (!) 109 89 98   Resp: 18 18 20 18   Temp: 97.7 °F (36.5 °C) 97.3 °F (36.3 °C) 97.5 °F (36.4 °C) 97.2 °F (36.2 °C)   SpO2: 96% 94% 98% 97%   Weight:       Height:         Objective:     I      NEUROLOGICAL EXAM:    Appearance: The patient is well developed, well nourished, provides a poor history and is in no acute distress. Mental Status: Oriented to place and person, and the not the date or president, cognitive function is mildly abnormal and speech is mildly aphasic I think, but no real dysarthria and mood and affect appropriate, but a little confused. 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 Rosie Falling are benign, disc are flat, no lesions seen on funduscopy. ROSALIND, EOM's full, no nystagmus, no ptosis. Facial sensation is normal. Corneal reflexes are not tested. Facial movement is asymmetric with mild central facial on the left. Hearing is abnormal bilaterally. Palate is midline with normal sternocleidomastoid and trapezius muscles are normal. Tongue is midline. Neck without meningismus or bruits  Temporal arteries are not tender or enlarged  TMJ areas are not tender on palpation   Motor:  5/5 strength in upper and lower proximal and distal muscles, with a very mild left hemiparesis. Normal bulk and tone on the right and slight increase in the left. No fasciculations. Rapid alternating movement is slightly reduced on the left   Reflexes:   Deep tendon reflexes 2+/4 and asymmetrical, with perhaps a slight increase on the left. No babinski or clonus present   Sensory:   Abnormal to touch, pinprick and vibration and temperature and  DSS on the left, and slight decreased sensation in both feet to ankle level. Gait:  Not tested. Tremor:   No tremor noted.    Cerebellar:  No abnormal cerebellar signs present on finger-nose-finger exam.  Romberg and tandem not tested   Neurovascular:  Normal heart sounds and regular rhythm, peripheral pulses decreased, and no carotid bruits. Assessment:   Active Problems:    Essential hypertension, benign (8/11/2014)      Obesity, morbid ([address] (12/26/2017)      Type 2 diabetes mellitus with nephropathy ([address] (12/26/2017)      Acute ischemic cerebrovascular accident (CVA) involving right middle cerebral artery territory [facility]) (1/17/2019)      Cerebrovascular accident (CVA) due to thrombosis of right middle cerebral artery ([address] (1/18/2019)      Paroxysmal atrial fibrillation ([address] (4/16/2019)      CVA (cerebral vascular accident) ([address] (6/9/2019)      Diabetic peripheral neuropathy associated with type 2 diabetes mellitus ([address] (6/10/2019)      Bilateral carotid artery stenosis (6/10/2019)      Altered mental status, unspecified (5/04/5761)      Embolic stroke involving left middle cerebral artery ([address] (6/10/2019)        Plan:     Patient apparently was not taking her anticoagulation so she is not an anticoagulation failure. Patient now started on heparin because she cannot swallow and tolerating that well. Patient still cortically blind, but more awake more interactive and keeping eyes open more. Patient also seems to appreciate some hand movement in the left visual field  Discussed with cardiology and the hospitalist and nursing staff and the family all in detail today. Patient appears to have new deficits involving some difficulty with speech, and a field cut on the right side that seem to be new, in addition to her old findings of old left hemiparesis. Carotid Dopplers were reviewed and they do not show anything new or any significant stenosis. Her MRI showed the embolic strokes, and MRA of the brain was negative. Discussed with the patient and her  in detail, we will follow carefully with you, and PT OT and speech therapy have all been ordered and are seen the patient now. Repeat CT scan was negative for new stroke last evening and patient seems stable now.   Very difficult case, patient very high risk for recurrent strokes and major neurologic deficit and complications  We will follow as needed for now, continue excellent medical care as you are, call if we can help. Signed By: Dario Huffman MD     June 14, 2019       CC:  Moose Villalpando MD  FAX: [phone]

## 2019-06-15 NOTE — PROGRESS NOTES
Hospitalist Progress Note    NAME: Patel Munoz   :  1950   MRN:  [id number]       Assessment / Plan:  Acute embolic CVA with expressive aphasia, dysphagia, visual field defects in setting of afib:   - CT head  with a chronic right MCA territory infarction. No acute findings.  - repeat CT head with no acute findings and no significant change since 2019. No infarct extension or hemorrhage.  - MRI/MRA brain with multiple small acute cortical/subcortical infarcts in the left parietal lobe. Unchanged generalized parenchymal volume loss and mild chronic microvascular ischemic disease. Unchanged chronic large right MCA territory involving the right temporoparietal lobe and insula. Unchanged chronic infarcts in the right occipital lobe and bilateral cerebellum. No evidence of significant stenosis or aneurysm. [place] with [place] - 60%. Mild (grade 1) left ventricular diastolic dysfunction.  - LDL 54, restarting statin today  - change lovenox back to xarelto today  - neuro consult appreciated  - NG tube placed in XR today, starting tube feeds  - PT/OT/speech recommending inpatient rehab, Pt has been accepted to [facility] when medically stable    Nose bleed:  - No significant bleed noted,now stopped. -PT/INR ordered. Paroxysmal atrial fibrillation, chronic diastolic CHF and essential HTN:  - xarelto as above  - restart metoprolol, norvasc per NG. Start clonidine patch. - still holding home accupril   - prn nitrobid    Insulin dependent DM2 with hyperglycemia:  - Will increase lantus since patient is hyperglycemic with tube feeding - 40 inuts daily. She used to be on levemir 60 units daily at home  -[address] holding metformin, trulicity  - lispro sliding scale    Acute kidney failure, resolved  Psoriasis: holding enbrel  Obesity (Body mass index is 31.99 kg/m²)     Code status Full ( is decision maker if needed)  DVT prophylaxis: lovenox  Disposition:once cleared by neurology     Subjective:     Chief Complaint / Reason for Physician Visit  Feels fine. Discussed with RN events overnight. Nurse reporting nasal bleed right nostril. Review of Systems:  Symptom Y/N Comments  Symptom Y/N Comments   Fever/Chills    Chest Pain     Poor Appetite    Edema     Cough    Abdominal Pain     Sputum    Joint Pain     SOB/MALIN    Pruritis/Rash     Nausea/vomit    Tolerating PT/OT     Diarrhea    Tolerating Diet     Constipation    Other       Could NOT obtain due to: stroke     Objective:     VITALS:   Last 24hrs VS reviewed since prior progress note. Most recent are:  Patient Vitals for the past 24 hrs:   Temp Pulse Resp BP SpO2   06/15/19 1443 98.1 °F (36.7 °C) 98 18 162/71    06/15/19 1154 98.2 °F (36.8 °C) 72 18 162/68 99 %   06/15/19 0714 96.5 °F (35.8 °C) 93 18 145/76 95 %   06/15/19 0342 98.9 °F (37.2 °C) 89 18 155/67 95 %   06/14/19 2315 98.5 °F (36.9 °C) 98 18 152/60 98 %   06/14/19 1955 97.2 °F (36.2 °C) 98 18 161/64 97 %   06/14/19 1612 97.5 °F (36.4 °C) 89 20 173/77 98 %       Intake/Output Summary (Last 24 hours) at 6/15/2019 1552  Last data filed at 6/15/2019 1003  Gross per 24 hour   Intake 835 ml   Output 1175 ml   Net -340 ml        PHYSICAL EXAM:  General: WD, WN. Alert, not able to be cooperative, no acute distress    EENT:  EOMI. Anicteric sclerae. MMM  Resp:  CTA bilaterally, no wheezing or rales. No accessory muscle use  CV:  Regular rhythm,  No edema  GI:  Soft, moderately distended, Non tender.  +Bowel sounds  Neurologic:  Alert and oriented X 1, normal but limited speech,   Psych:   Poor insight. Not anxious nor agitated  Skin:  No rashes.   No jaundice    Reviewed most current lab test results and cultures  YES  Reviewed most current radiology test results   YES  Review and summation of old records today    NO  Reviewed patient's current orders and MAR    YES  PMH/SH reviewed - no change compared to H&P  ________________________________________________________________________  Care Plan discussed with:    Comments   Patient x    Family  x , dtr at bedside   RN x    Care Manager     Consultant                        Multidiciplinary team rounds were held today with , nursing, pharmacist and clinical coordinator. Patient's plan of care was discussed; medications were reviewed and discharge planning was addressed. ________________________________________________________________________  Total NON critical care TIME:  30 Minutes    Total CRITICAL CARE TIME Spent:   Minutes non procedure based      Comments   >50% of visit spent in counseling and coordination of care x    ________________________________________________________________________  Vidya Robison MD     Procedures: see electronic medical records for all procedures/Xrays and details which were not copied into this note but were reviewed prior to creation of Plan. LABS:  I reviewed today's most current labs and imaging studies.   Pertinent labs include:  Recent Labs     06/14/19  0448 06/13/19  0518   WBC 10.4 13.5*   HGB 12.9 13.2   HCT 39.2 38.7    203     Recent Labs     06/15/19  0508 06/14/19 0448 06/13/19  0518    140 142   K 3.8 3.7 3.4*    107 109*   CO2 30 26 27   * 197* 75   BUN 33* 25* 22*   CREA 1.33* 1.30* 1.20*   CA 8.3* 8.3* 8.4*   MG  --  1.9 1.8   PHOS  --  3.9  --    ALB 2.2*  --   --    TBILI 0.4  --   --    SGOT 19  --   --    ALT 22  --   --        Signed: Vidya Robison MD

## 2019-06-15 NOTE — PROGRESS NOTES
Problem: Pressure Injury - Risk of  Goal: *Prevention of pressure injury  Description  Document William Scale and appropriate interventions in the flowsheet. Outcome: Progressing Towards Goal     Problem: Patient Education: Go to Patient Education Activity  Goal: Patient/Family Education  Outcome: Progressing Towards Goal     Problem: Falls - Risk of  Goal: *Absence of Falls  Description  No injury during hospitalization.    Outcome: Progressing Towards Goal     Problem: Patient Education: Go to Patient Education Activity  Goal: Patient/Family Education  Outcome: Progressing Towards Goal     Problem: Ischemic Stroke: Discharge Outcomes  Goal: *Verbalizes anxiety and depression are reduced or absent  Outcome: Progressing Towards Goal  Goal: *Hemodynamically stable  Outcome: Progressing Towards Goal  Goal: *Absence of aspiration pneumonia  Outcome: Progressing Towards Goal  Goal: *Aware of needed dietary changes  Outcome: Progressing Towards Goal  Goal: *Tolerating diet  Outcome: Progressing Towards Goal  Goal: *Aware of follow-up diagnostics related to anticoagulants  Outcome: Progressing Towards Goal  Goal: *Ability to perform ADLs and demonstrates progressive mobility and function  Outcome: Progressing Towards Goal  Goal: *Absence of DVT(Stroke Metric)  Outcome: Progressing Towards Goal  Goal: *Absence of aspiration  Outcome: Progressing Towards Goal  Goal: *Optimal pain control at patient's stated goal  Outcome: Progressing Towards Goal  Goal: *Home safety concerns addressed  Outcome: Progressing Towards Goal  Goal: *Describes available resources and support systems  Outcome: Progressing Towards Goal  Goal: *Verbalizes understanding of activation of EMS(911) for stroke symptoms(Stroke Metric)  Outcome: Progressing Towards Goal  Goal: *Understands and describes signs and symptoms to report to providers(Stroke Metric)  Outcome: Progressing Towards Goal  Goal: *Neurolgocially stable (absence of additional neurological deficits)  Outcome: Progressing Towards Goal  Goal: *Verbalizes importance of follow-up with primary care physician(Stroke Metric)  Outcome: Progressing Towards Goal  Goal: *Smoking cessation discussed,if applicable(Stroke Metric)  Outcome: Progressing Towards Goal  Goal: *Depression screening completed(Stroke Metric)  Outcome: Progressing Towards Goal

## 2019-06-15 NOTE — PROGRESS NOTES
I notified Dr. Kai Pathak via the telephone that the pt's blood sugar was 372 and per the protocol I notified the physican. Dr. Kai Pathak stated to give ten 10 units of Lispro.  TORB

## 2019-06-15 NOTE — PROGRESS NOTES
Bedside and Verbal shift change report given to Leila/Arcadio MILLER (oncoming nurse) by Hair (offgoing nurse). Report included the following information SBAR, Kardex, Recent Results, Med Rec Status and Cardiac Rhythm SR.      Zone Phone:   [phone]          Significant changes during shift:  Pt retaining urine. Straight cath x 1 with 700 cc output. Appears that pt may be developing yeast infection. Will relay message to day nurse to inform hospitalist. Patient Information      77 yr old, admitted on 6/9/19, patient of Dr. Patito Longoria, admitted from home.      Problem List           Past Medical History:   Diagnosis Date    Diabetes ([facility]      Hypertension      Murmur      Osteopenia      Psoriasis      Rosacea      Stroke ([facility]      Vitamin D deficiency                     Past Surgical History:   Procedure Laterality Date    COLONOSCOPY N/A 4/4/2018     COLONOSCOPY   DIABETIC performed by Daphnie Castillo MD at [facility] [address] COLONOSCOPY   2018     3 polyps    HX TONSILLECTOMY             Social History                Tobacco Use    Smoking status: Never Smoker    Smokeless tobacco: Never Used   Substance Use Topics    Alcohol use:  Yes       Alcohol/week: 0.0 oz       Comment: 1/couple months                   Family History   Problem Relation Age of Onset    Diabetes Mother      Heart Disease Father                Allergies   Allergen Reactions    Pcn [Penicillins] Rash    Sulfa (Sulfonamide Antibiotics) Unknown (comments)                Core Measures:      CVA: Y  CHF: N  PNA: N           Activity Status:      OOB to Chair:  No  Ambulated this shift No  Bed Rest No      Supplemental Y0: (GLEN Applicable)      Room air          LINES AND DRAINS:      PIV      DVT prophylaxis:      DVT prophylaxis Med- Yes; Lovenox  DVT prophylaxis SCD or OSNIA- No       Wounds: (If Applicable)      Wounds- None    Location      Patient Safety:      Falls Score Total Score:  4  Safety Level_______  Bed Alarm On? Yes  Sitter?  N      Plan for upcoming shift:  TF Jevity 1.5 at 25 ml; goal 45 ml.      Discharge Plan: TBD      Active Consults:  Neuro

## 2019-06-16 LAB
GLUCOSE BLD STRIP.AUTO-MCNC: 285 MG/DL (ref 65–100)
GLUCOSE BLD STRIP.AUTO-MCNC: 300 MG/DL (ref 65–100)
GLUCOSE BLD STRIP.AUTO-MCNC: 320 MG/DL (ref 65–100)
GLUCOSE BLD STRIP.AUTO-MCNC: 342 MG/DL (ref 65–100)
SERVICE CMNT-IMP: ABNORMAL

## 2019-06-16 PROCEDURE — 74011250637 HC RX REV CODE- 250/637: Performed by: INTERNAL MEDICINE

## 2019-06-16 PROCEDURE — 51798 US URINE CAPACITY MEASURE: CPT

## 2019-06-16 PROCEDURE — 74011636637 HC RX REV CODE- 636/637: Performed by: INTERNAL MEDICINE

## 2019-06-16 PROCEDURE — 77030011943

## 2019-06-16 PROCEDURE — 65660000000 HC RM CCU STEPDOWN

## 2019-06-16 PROCEDURE — 82962 GLUCOSE BLOOD TEST: CPT

## 2019-06-16 PROCEDURE — 74011000250 HC RX REV CODE- 250: Performed by: INTERNAL MEDICINE

## 2019-06-16 PROCEDURE — 74011250636 HC RX REV CODE- 250/636: Performed by: INTERNAL MEDICINE

## 2019-06-16 PROCEDURE — 77030018798 HC PMP KT ENTRL FED COVD -A

## 2019-06-16 RX ORDER — LORAZEPAM 1 MG/1
0.5 TABLET ORAL
Status: DISCONTINUED | OUTPATIENT
Start: 2019-06-16 | End: 2019-06-17 | Stop reason: HOSPADM

## 2019-06-16 RX ORDER — LABETALOL HYDROCHLORIDE 5 MG/ML
5 INJECTION, SOLUTION INTRAVENOUS
Status: DISCONTINUED | OUTPATIENT
Start: 2019-06-16 | End: 2019-06-17

## 2019-06-16 RX ORDER — LABETALOL HYDROCHLORIDE 5 MG/ML
5 INJECTION, SOLUTION INTRAVENOUS ONCE
Status: COMPLETED | OUTPATIENT
Start: 2019-06-16 | End: 2019-06-16

## 2019-06-16 RX ADMIN — ENOXAPARIN SODIUM 90 MG: 100 INJECTION SUBCUTANEOUS at 12:42

## 2019-06-16 RX ADMIN — INSULIN GLARGINE 40 UNITS: 100 INJECTION, SOLUTION SUBCUTANEOUS at 08:55

## 2019-06-16 RX ADMIN — ENOXAPARIN SODIUM 90 MG: 100 INJECTION SUBCUTANEOUS at 23:29

## 2019-06-16 RX ADMIN — NYSTATIN: 100000 POWDER TOPICAL at 18:47

## 2019-06-16 RX ADMIN — INSULIN LISPRO 5 UNITS: 100 INJECTION, SOLUTION INTRAVENOUS; SUBCUTANEOUS at 06:24

## 2019-06-16 RX ADMIN — NITROGLYCERIN 1 INCH: 20 OINTMENT TOPICAL at 23:25

## 2019-06-16 RX ADMIN — NITROGLYCERIN 1 INCH: 20 OINTMENT TOPICAL at 12:43

## 2019-06-16 RX ADMIN — Medication 10 ML: at 15:15

## 2019-06-16 RX ADMIN — INSULIN LISPRO 7 UNITS: 100 INJECTION, SOLUTION INTRAVENOUS; SUBCUTANEOUS at 18:00

## 2019-06-16 RX ADMIN — INSULIN LISPRO 7 UNITS: 100 INJECTION, SOLUTION INTRAVENOUS; SUBCUTANEOUS at 12:00

## 2019-06-16 RX ADMIN — HYDRALAZINE HYDROCHLORIDE 10 MG: 20 INJECTION INTRAMUSCULAR; INTRAVENOUS at 03:49

## 2019-06-16 RX ADMIN — HYDRALAZINE HYDROCHLORIDE 10 MG: 20 INJECTION INTRAMUSCULAR; INTRAVENOUS at 15:05

## 2019-06-16 RX ADMIN — INSULIN LISPRO 7 UNITS: 100 INJECTION, SOLUTION INTRAVENOUS; SUBCUTANEOUS at 23:25

## 2019-06-16 RX ADMIN — LABETALOL HYDROCHLORIDE 5 MG: 5 INJECTION INTRAVENOUS at 17:31

## 2019-06-16 RX ADMIN — LORAZEPAM 0.5 MG: 1 TABLET ORAL at 15:05

## 2019-06-16 RX ADMIN — Medication 10 ML: at 23:30

## 2019-06-16 RX ADMIN — LABETALOL HYDROCHLORIDE 5 MG: 5 INJECTION INTRAVENOUS at 12:43

## 2019-06-16 RX ADMIN — NITROGLYCERIN 1 INCH: 20 OINTMENT TOPICAL at 17:31

## 2019-06-16 RX ADMIN — NYSTATIN: 100000 POWDER TOPICAL at 08:54

## 2019-06-16 RX ADMIN — NITROGLYCERIN 1 INCH: 20 OINTMENT TOPICAL at 06:24

## 2019-06-16 RX ADMIN — Medication 10 ML: at 16:08

## 2019-06-16 RX ADMIN — Medication 10 ML: at 03:45

## 2019-06-16 RX ADMIN — HYDRALAZINE HYDROCHLORIDE 10 MG: 20 INJECTION INTRAMUSCULAR; INTRAVENOUS at 08:56

## 2019-06-16 NOTE — PROGRESS NOTES
Bedside and Verbal shift change report given to Maria R/Arcadio MILLER (oncoming nurse) by Hair (offgoing nurse). Report included the following information SBAR, Kardex, Recent Results, Med Rec Status and Cardiac Rhythm SR.      Zone Phone:   [phone]          Significant changes during shift:  Pt retaining urine. Straight cath x 1 with 700 cc output. Appears that pt may be developing yeast infection. Will relay message to day nurse to inform hospitalist. Patient Information      77 yr old, admitted on 6/9/19, patient of Dr. Hector Lynne, admitted from home.      Problem List              Past Medical History:   Diagnosis Date    Diabetes ([facility] [address]      Hypertension      Murmur      Osteopenia      Psoriasis      Rosacea      Stroke ([facility] [facility]      Vitamin D deficiency                     Past Surgical History:   Procedure Laterality Date    COLONOSCOPY N/A 4/4/2018     COLONOSCOPY   DIABETIC performed by Ramos Pierce MD at [facility] [address] COLONOSCOPY   2018     3 polyps    HX TONSILLECTOMY             Social History                Tobacco Use    Smoking status: Never Smoker    Smokeless tobacco: Never Used   Substance Use Topics    Alcohol use:  Yes       Alcohol/week: 0.0 oz       Comment: 1/couple months                   Family History   Problem Relation Age of Onset    Diabetes Mother      Heart Disease Father                Allergies   Allergen Reactions    Pcn [Penicillins] Rash    Sulfa (Sulfonamide Antibiotics) Unknown (comments)                Core Measures:      CVA: Y  CHF: N  PNA: N           Activity Status:      OOB to Chair:  No  Ambulated this shift No  Bed Rest No      Supplemental Q5: (WX Applicable)      Room air          LINES AND DRAINS:      PIV      DVT prophylaxis:      DVT prophylaxis Med- Yes; Lovenox  DVT prophylaxis SCD or SONIA- No       Wounds: (If Applicable)      Wounds- None    Location      Patient Safety:      Falls Score Total Score:  4  Safety Level_______  Bed Alarm On? Yes  Sitter?  N      Plan for upcoming shift:  TF Jevity 1.5 at 25 ml; goal 45 ml.      Discharge Plan: TBD      Active Consults:  Neuro

## 2019-06-16 NOTE — PROGRESS NOTES
I notified Dr. Nikki Felder via the phone that the pt's bp was 192/79. I advised that I last gave apresoline at 0900 and another dose can't be given until 1500. Dr. Nikki Felder stated he would come see the patient.

## 2019-06-16 NOTE — PROGRESS NOTES
I advised Dr. Sveta Weber via the phone that the pt's emotional state had improved since giving the sedative and that perhaps the psych consult may not be necessary at this time. Dr Alyssia Chau stated it was okay to cancel the consult since the pt's mood had improved. I also advised dr Alyssia Chau that her bp was 188 sys at 1600 after giving the prn hydralazine. Dr Sveta Weber stated it was okay to give the labetalol now even though I last gave it at [phone]. No further treatment advised.

## 2019-06-16 NOTE — PROGRESS NOTES
KATRINA: [facility] tomorrow pending medical stability     [facility] Liaison (Bernice Donis) contacted CM to confirm that they are able to accept pt at discharge. Per chart review, anticipated discharge by MD tomorrow 6/17. CM to follow-up with [facility] in the AM - likely would be early afternoon transfer pending bed availability. UPDATE 4:15 PM  CM contacted Bernice Donis ([phone]) to confirm that they are able to accept pt with NG tube. [facility] is aware - still ready to accept.      Keenan Lema, MSW Supervisee in Social Work, [employer]  [phone]

## 2019-06-16 NOTE — PROGRESS NOTES
Spiritual Care Assessment/Progress Note  [facility]      NAME: Charlene Zambrano      MRN: [id number]  AGE: 77 y.o. SEX: female  Orthodox Affiliation: Cheondoism   Language: English     6/16/2019     Total Time (in minutes): 20     Spiritual Assessment begun in [facility]UROSCIENCE TELE[facility] through conversation with:         [x]Patient        [x] Family    [] Friend(s)        Reason for Consult: Request by staff     Spiritual beliefs: (Please include comment if needed)     [x] Identifies with a fely tradition:         [] Supported by a fely community:            [] Claims no spiritual orientation:           [] Seeking spiritual identity:                [] Adheres to an individual form of spirituality:           [] Not able to assess:                           Identified resources for coping:      [x] Prayer                               [] Music                  [] Guided Imagery     [x] Family/friends                 [] Pet visits     [] Devotional reading                         [] Unknown     [] Other:                                             Interventions offered during this visit: (See comments for more details)    Patient Interventions: Affirmation of emotions/emotional suffering, Normalization of emotional/spiritual concerns, Prayer (assurance of), Initial/Spiritual assessment, patient floor     Family/Friend(s):  Affirmation of fely, Affirmation of emotions/emotional suffering, Catharsis/review of pertinent events in supportive environment, Iconic (affirming the presence of God/Higher Power), End of life issues discussed, Normalization of emotional/spiritual concerns, Prayer (assurance of), Orthodox beliefs/image of God discussed     Plan of Care:     [x] Support spiritual and/or cultural needs    [] Support AMD and/or advance care planning process      [x] Support grieving process   [] Coordinate Rites and/or Rituals    [] Coordination with community clergy   [] No spiritual needs identified at this time   [] Detailed Plan of Care below (See Comments)  [] Make referral to Music Therapy  [] Make referral to Pet Therapy     [] Make referral to Addiction services  [] Make referral to [facility]  [] Make referral to Spiritual Care Partner  [] No future visits requested        [x] Follow up visits as needed     Comments:   Responded to staff request to provide pastoral support to patient and  on Neuro unit. No other visitors present. Patient was tearful, kept her eyes closed throughout the visit, and repeatedly called out to her  \"I love you, I love you\". Intermittently, she would cry \"I'm sorry\". Mr. Rochelle Campo expressed that he had never seen his wife in this condition. He shared that he feels she wants him to give her permission to die and he cannot do that at this time. Consulted with patient's nurse who acknowledged there seems to be a change in patient's demeanor since yesterday. Mr. Rochelle Campo shared that he is the Sabianist person in the family. They identify as [employer].  Their Confucianist is on the [employer]. Offered to contact a [employer] , but Mr. Rochelle Campo declined offer. Provided pastoral presence, empathic listening and assurance of prayer. Advised of  availability should needs change.     RASTA Gonsalez, [facility], [address]    [address] Paging Service  [phone])

## 2019-06-16 NOTE — PROGRESS NOTES
Hospitalist Progress Note    NAME: Alexandria Peterson   :  1950   MRN:  [id number]       Assessment / Plan:  Acute embolic CVA with expressive aphasia, dysphagia, visual field defects in setting of afib:   - CT head  with a chronic right MCA territory infarction. No acute findings.  - repeat CT head with no acute findings and no significant change since 2019. No infarct extension or hemorrhage.  - MRI/MRA brain with multiple small acute cortical/subcortical infarcts in the left parietal lobe. Unchanged generalized parenchymal volume loss and mild chronic microvascular ischemic disease. Unchanged chronic large right MCA territory involving the right temporoparietal lobe and insula. Unchanged chronic infarcts in the right occipital lobe and bilateral cerebellum. No evidence of significant stenosis or aneurysm. Verdis [place] with [place] - 60%. Mild (grade 1) left ventricular diastolic dysfunction.  - LDL 54, on statin  - still on lovenox with plan to change back to [address] [place] [address] before discharge  - neuro consult appreciated  - NG tube in place, starting tube feeds  - Pt making progress with po  - PT/OT/speech recommending inpatient rehab, Pt has been accepted to [facility] when medically stable    Nose bleed on 6/15  - No significant bleed noted,now stopped. -INR 1.0    Paroxysmal atrial fibrillation, chronic diastolic CHF and essential HTN:  - Pt currently on lovenox but will switch to xarelto. - restart metoprolol, norvasc per NG if tolerated or po. Started clonidine patch. - still holding home accupril   - prn nitrobid  - hydralazine prn. Insulin dependent DM2 with hyperglycemia:  -BS >300   - Will increase lantus since patient is hyperglycemic with tube feeding - 45 units daily. She used to be on levemir 60 units daily at home  -[address] holding metformin, trulicity  - lispro sliding scale    Anxiety  -Ativan prn  -Nurse reporting that patient now anxious.  -Will consult psych    Acute kidney failure, resolved  Psoriasis: holding enbrel  Obesity (Body mass index is 31.99 kg/m²)     Code status Full ( is decision maker if needed)  DVT prophylaxis: lovenox  Disposition:[facility] - possibly 6/17     Subjective:     Chief Complaint / Reason for Physician Visit  Feels fine. Discussed with RN events overnight. Nurse reporting nasal bleed right nostril. Review of Systems:  Symptom Y/N Comments  Symptom Y/N Comments   Fever/Chills    Chest Pain     Poor Appetite    Edema     Cough    Abdominal Pain     Sputum    Joint Pain     SOB/MALIN    Pruritis/Rash     Nausea/vomit    Tolerating PT/OT     Diarrhea    Tolerating Diet     Constipation    Other       Could NOT obtain due to: stroke     Objective:     VITALS:   Last 24hrs VS reviewed since prior progress note. Most recent are:  Patient Vitals for the past 24 hrs:   Temp Pulse Resp BP SpO2   06/16/19 1345    169/71    06/16/19 1244 98.6 °F (37 °C)       06/16/19 1155 97.4 °F (36.3 °C) 95 16 192/79 95 %   06/16/19 1026  97 20 174/62 96 %   06/16/19 0802  94 16 183/76 92 %   06/16/19 0545  95  184/70    06/16/19 0345 98.2 °F (36.8 °C) 80 16 173/65 98 %   06/15/19 2323 99.2 °F (37.3 °C) 85 16 174/73 97 %   06/15/19 1958 98.1 °F (36.7 °C) 88 18 173/73 97 %   06/15/19 1443 98.1 °F (36.7 °C) 98 18 162/71        Intake/Output Summary (Last 24 hours) at 6/16/2019 1411  Last data filed at 6/16/2019 0600  Gross per 24 hour   Intake 1210 ml   Output 1050 ml   Net 160 ml        PHYSICAL EXAM:  General: WD, WN. Alert, not able to be cooperative, no acute distress    EENT:  EOMI. Anicteric sclerae. MMM  Resp:  CTA bilaterally, no wheezing or rales. No accessory muscle use  CV:  Regular rhythm,  No edema  GI:  Soft, moderately distended, Non tender.  +Bowel sounds  Neurologic:  Alert and oriented X 1, normal but limited speech,   Psych:   Poor insight. Not anxious nor agitated  Skin:  No rashes.   No jaundice    Reviewed most current lab test results and cultures YES  Reviewed most current radiology test results   YES  Review and summation of old records today    NO  Reviewed patient's current orders and MAR    YES  PMH/SH reviewed - no change compared to H&P  ________________________________________________________________________  Care Plan discussed with:    Comments   Patient x    Family  x , dtr at bedside   RN x    Care Manager     Consultant                        Multidiciplinary team rounds were held today with , nursing, pharmacist and clinical coordinator. Patient's plan of care was discussed; medications were reviewed and discharge planning was addressed. ________________________________________________________________________  Total NON critical care TIME:  30 Minutes    Total CRITICAL CARE TIME Spent:   Minutes non procedure based      Comments   >50% of visit spent in counseling and coordination of care x    ________________________________________________________________________  Lizbeth Pedraza MD     Procedures: see electronic medical records for all procedures/Xrays and details which were not copied into this note but were reviewed prior to creation of Plan. LABS:  I reviewed today's most current labs and imaging studies.   Pertinent labs include:  Recent Labs     06/14/19  0448   WBC 10.4   HGB 12.9   HCT 39.2        Recent Labs     06/15/19  1539 06/15/19  0508 06/14/19  0448   NA  --  136 140   K  --  3.8 3.7   CL  --  104 107   CO2  --  30 26   GLU  --  353* 197*   BUN  --  33* 25*   CREA  --  1.33* 1.30*   CA  --  8.3* 8.3*   MG  --   --  1.9   PHOS  --   --  3.9   ALB  --  2.2*  --    TBILI  --  0.4  --    SGOT  --  19  --    ALT  --  22  --    INR 1.0  --   --        Signed: Lizbeth Pedraza MD

## 2019-06-17 ENCOUNTER — HOSPITAL ENCOUNTER (OUTPATIENT)
Dept: REHABILITATION | Age: 69
End: 2019-07-02
Attending: FAMILY MEDICINE | Admitting: PHYSICAL MEDICINE & REHABILITATION

## 2019-06-17 VITALS
DIASTOLIC BLOOD PRESSURE: 60 MMHG | BODY MASS INDEX: 31.85 KG/M2 | HEART RATE: 88 BPM | WEIGHT: 198.19 LBS | TEMPERATURE: 99 F | RESPIRATION RATE: 18 BRPM | HEIGHT: 66 IN | SYSTOLIC BLOOD PRESSURE: 177 MMHG | OXYGEN SATURATION: 95 %

## 2019-06-17 DIAGNOSIS — R13.10 DYSPHAGIA: ICD-10-CM

## 2019-06-17 PROBLEM — R41.82 ALTERED MENTAL STATUS, UNSPECIFIED: Status: RESOLVED | Noted: 2019-06-10 | Resolved: 2019-06-17

## 2019-06-17 LAB
ANION GAP SERPL CALC-SCNC: 4 MMOL/L (ref 5–15)
BASOPHILS # BLD: 0.1 K/UL (ref 0–0.1)
BASOPHILS NFR BLD: 1 % (ref 0–1)
BUN SERPL-MCNC: 28 MG/DL (ref 6–20)
BUN/CREAT SERPL: 23 (ref 12–20)
CALCIUM SERPL-MCNC: 8.1 MG/DL (ref 8.5–10.1)
CHLORIDE SERPL-SCNC: 103 MMOL/L (ref 97–108)
CO2 SERPL-SCNC: 31 MMOL/L (ref 21–32)
CREAT SERPL-MCNC: 1.2 MG/DL (ref 0.55–1.02)
DIFFERENTIAL METHOD BLD: ABNORMAL
EOSINOPHIL # BLD: 0.7 K/UL (ref 0–0.4)
EOSINOPHIL NFR BLD: 7 % (ref 0–7)
ERYTHROCYTE [DISTWIDTH] IN BLOOD BY AUTOMATED COUNT: 13.3 % (ref 11.5–14.5)
GLUCOSE BLD STRIP.AUTO-MCNC: 283 MG/DL (ref 65–100)
GLUCOSE BLD STRIP.AUTO-MCNC: 345 MG/DL (ref 65–100)
GLUCOSE SERPL-MCNC: 264 MG/DL (ref 65–100)
HCT VFR BLD AUTO: 34.5 % (ref 35–47)
HGB BLD-MCNC: 11.2 G/DL (ref 11.5–16)
IMM GRANULOCYTES # BLD AUTO: 0.1 K/UL (ref 0–0.04)
IMM GRANULOCYTES NFR BLD AUTO: 1 % (ref 0–0.5)
LYMPHOCYTES # BLD: 2.2 K/UL (ref 0.8–3.5)
LYMPHOCYTES NFR BLD: 20 % (ref 12–49)
MCH RBC QN AUTO: 30.1 PG (ref 26–34)
MCHC RBC AUTO-ENTMCNC: 32.5 G/DL (ref 30–36.5)
MCV RBC AUTO: 92.7 FL (ref 80–99)
MONOCYTES # BLD: 1.3 K/UL (ref 0–1)
MONOCYTES NFR BLD: 12 % (ref 5–13)
NEUTS SEG # BLD: 6.3 K/UL (ref 1.8–8)
NEUTS SEG NFR BLD: 59 % (ref 32–75)
NRBC # BLD: 0 K/UL (ref 0–0.01)
NRBC BLD-RTO: 0 PER 100 WBC
PLATELET # BLD AUTO: 188 K/UL (ref 150–400)
PMV BLD AUTO: 11 FL (ref 8.9–12.9)
POTASSIUM SERPL-SCNC: 3.9 MMOL/L (ref 3.5–5.1)
RBC # BLD AUTO: 3.72 M/UL (ref 3.8–5.2)
SERVICE CMNT-IMP: ABNORMAL
SERVICE CMNT-IMP: ABNORMAL
SODIUM SERPL-SCNC: 138 MMOL/L (ref 136–145)
WBC # BLD AUTO: 10.7 K/UL (ref 3.6–11)

## 2019-06-17 PROCEDURE — 36415 COLL VENOUS BLD VENIPUNCTURE: CPT

## 2019-06-17 PROCEDURE — 85025 COMPLETE CBC W/AUTO DIFF WBC: CPT

## 2019-06-17 PROCEDURE — 51798 US URINE CAPACITY MEASURE: CPT

## 2019-06-17 PROCEDURE — 74011636637 HC RX REV CODE- 636/637: Performed by: INTERNAL MEDICINE

## 2019-06-17 PROCEDURE — 80048 BASIC METABOLIC PNL TOTAL CA: CPT

## 2019-06-17 PROCEDURE — 74011250636 HC RX REV CODE- 250/636: Performed by: INTERNAL MEDICINE

## 2019-06-17 PROCEDURE — 74011250637 HC RX REV CODE- 250/637: Performed by: INTERNAL MEDICINE

## 2019-06-17 PROCEDURE — 82962 GLUCOSE BLOOD TEST: CPT

## 2019-06-17 RX ORDER — AMLODIPINE BESYLATE 5 MG/1
5 TABLET ORAL DAILY
Status: DISCONTINUED | OUTPATIENT
Start: 2019-06-17 | End: 2019-06-17 | Stop reason: HOSPADM

## 2019-06-17 RX ORDER — LISINOPRIL 20 MG/1
20 TABLET ORAL DAILY
Status: DISCONTINUED | OUTPATIENT
Start: 2019-06-17 | End: 2019-06-17 | Stop reason: HOSPADM

## 2019-06-17 RX ORDER — HYDRALAZINE HYDROCHLORIDE 20 MG/ML
10 INJECTION INTRAMUSCULAR; INTRAVENOUS
Status: DISCONTINUED | OUTPATIENT
Start: 2019-06-17 | End: 2019-06-17 | Stop reason: HOSPADM

## 2019-06-17 RX ORDER — NYSTATIN 100000 [USP'U]/G
POWDER TOPICAL 2 TIMES DAILY
Qty: 1 BOTTLE | Refills: 0 | Status: SHIPPED
Start: 2019-06-17 | End: 2019-09-09

## 2019-06-17 RX ADMIN — LISINOPRIL 20 MG: 20 TABLET ORAL at 12:53

## 2019-06-17 RX ADMIN — INSULIN GLARGINE 40 UNITS: 100 INJECTION, SOLUTION SUBCUTANEOUS at 09:08

## 2019-06-17 RX ADMIN — INSULIN LISPRO 5 UNITS: 100 INJECTION, SOLUTION INTRAVENOUS; SUBCUTANEOUS at 06:07

## 2019-06-17 RX ADMIN — ENOXAPARIN SODIUM 90 MG: 100 INJECTION SUBCUTANEOUS at 12:53

## 2019-06-17 RX ADMIN — NITROGLYCERIN 1 INCH: 20 OINTMENT TOPICAL at 06:08

## 2019-06-17 RX ADMIN — NYSTATIN: 100000 POWDER TOPICAL at 09:09

## 2019-06-17 RX ADMIN — AMLODIPINE BESYLATE 5 MG: 5 TABLET ORAL at 12:53

## 2019-06-17 RX ADMIN — Medication 10 ML: at 04:14

## 2019-06-17 RX ADMIN — INSULIN LISPRO 7 UNITS: 100 INJECTION, SOLUTION INTRAVENOUS; SUBCUTANEOUS at 12:54

## 2019-06-17 NOTE — PROGRESS NOTES
KATRINA Plan--1) [facility]     Spoke with carlos at [address] [place] [address] to see if patient has a bed today if medically stable. Per Jacqueline Pagan will need to check to see if patient needs to have insurance precert again and she will let me know.

## 2019-06-17 NOTE — PROGRESS NOTES
Nutrition Assessment:    INTERVENTIONS/RECOMMENDATIONS:   · Continue current TF regimen  · If BG remains elevated despite further insulin changes, consider switching formula to Glucerna 1.2 @ goal rate of 60 mL/hr + 75 mL water flushes q 4 hours (1728 kcal, 86g protein, 1609 mL water)  · Consider upgrading to pureed diet per last SLP recommendation     ASSESSMENT:   Chart reviewed; patient with NGT in place and Jevity 1.5 infusing at goal rate. Current TF regimen adequate to meet 100% of estimated kcal needs and 96% of protein needs. Patient only consumed a little bit of yogurt this morning. Has ensure enlive ordered but multiple bottles at bedside; will discontinue for now. Minimal PO intake reported over the past few days per family. Doesn't typically eat/drink anything on breakfast tray. Explained menu and room service. Per last SLP note, recommendation was for a pureed diet while allowing crackers and cookies but diet was never changed. Pureed diet would allow more options and potentially help increase PO intake. Elevated BG noted, insulin changes being made. Family notes patient is not on home insulin regimen at this time. If BG remains elevated despite insulin changes, recommendations provided above with Glucerna 1.2 to meet >100% of estimated kcal/protein needs. Noted potential plans for transfer to [facility] today. Could also consider switching to a nocturnal TF regimen to allow for PO intake during the day.      Consider Jevity 1.5 @ 75 mL/hr x 14 hours + 125 mL water flushes q 4 hours - 1575 kcal, 67g protein, 1548 mL water (98% of estimated kcal needs, 93% protein needs)  Or Glucerna 1.2 @ 90 mL/hr x 14 hours + 100 mL water flushes q 4 hours - 1512 kcal, 76g protein, 1614 mL water (94% of estimated kcal needs, 105% protein needs)    Diet Order: Full liquids(ensure enlive BID; NGT Jevity 1.5 @ 45 mL/hr + 125 mL flushes q 4; 1620 kcal, 69g protein, 1571 mL water)  % Eaten:    Patient Vitals for the past 72 hrs: % Diet Eaten   06/14/19 1323 50 %     Pertinent Medications: [x] Reviewed []Other: Lantus, Humalog  Pertinent Labs: [x]Reviewed  []Other: -[phone]  Food Allergies: [x]None []Yes:     Last BM: 6/16   [x]Active     []Hyperactive  []Hypoactive       [] Absent  BS  Skin:    [x] Intact   [] Incision  [] Breakdown   []Edema   []Other:    Anthropometrics: Height: 5' 6\" (167.6 cm) Weight: 89.9 kg (198 lb 3.1 oz)    IBW (%IBW):   ( ) UBW (%UBW):   (  %)    BMI: Body mass index is 31.99 kg/m². This BMI is indicative of:  []Underweight   []Normal   []Overweight   [x] Obesity   [] Extreme Obesity (BMI>40)  Last Weight Metrics:  Weight Loss Metrics 6/14/2019 6/9/2019 6/9/2019 6/9/2019 4/16/2019 4/16/2019 3/4/2019   Today's Wt 198 lb 3.1 oz - 205 lb 4 oz - 212 lb 215 lb 220 lb 2 oz   BMI - 31.99 kg/m2 - 33.13 kg/m2 35.28 kg/m2 35.78 kg/m2 36.63 kg/m2       Estimated Nutrition Needs (Based on): 1612 Kcals/day(BMR (1437) x 1. 3AF -250kcal) , 72 g(0.8 g/kg bw) Protein  Carbohydrate: At Least 130 g/day  Fluids:1600 mL/day     Pt expected to meet estimated nutrient needs: [x]Yes []No    NUTRITION DIAGNOSES:   Problem:  Swallowing difficulty      Etiology: related to embolic stroke     Signs/Symptoms: as evidenced by dysphagia, inconsistent PO intake, need for NGT feedings      NUTRITION INTERVENTIONS:    Enteral/Parenteral Nutrition: Other                GOAL:   TF tolerated at goal with residual <250 mL and BG <200 next 1-3 days    NUTRITION MONITORING AND EVALUATION   Food/Nutrient Intake Outcomes:  Total energy intake, Enteral/parenteral nutrition intake  Physical Signs/Symptoms Outcomes: Weight/weight change, Electrolyte and renal profile, Glucose profile, GI profile    Previous Goal Met:   [x] Met              [] Progressing Towards Goal              [] Not Progressing Towards Goal   Previous Recommendations:   [x] Implemented          [] Not Implemented          [] Not Applicable    LEARNING NEEDS (Diet, Food/Nutrient-Drug Interaction):    [x] None Identified   [] Identified and Education Provided/Documented   [] Identified and Pt declined/was not appropriate     Cultural, Mu-ism, OR Ethnic Dietary Needs:    [x] None Identified   [] Identified and Addressed     [x] Interdisciplinary Care Plan Reviewed/Documented    [x] Discharge Planning: TBD, see recommendations above    [] Participated in Interdisciplinary Rounds    NUTRITION RISK:    [x] Patient At Nutritional Risk             [] Patient Not At [address]  [phone]  Pager [phone]    Weekend Pager [phone]

## 2019-06-17 NOTE — PROGRESS NOTES
Bedside interdisciplinary rounds were held today to discuss patient plan of care and outcomes. The following members were present: Physician, Nurse, Clinical Care Leader, Pharmacy, Physical Therapy, and Case Management.     Plan:    Per CM, [facility] ready to receive patient when they are stable

## 2019-06-17 NOTE — PROGRESS NOTES
TRANSFER - OUT REPORT:    Verbal report given to [address] [place] [address] Street (name) on Dave Roberts  being transferred to [facility](unit) for routine progression of care       Report consisted of patients Situation, Background, Assessment and   Recommendations(SBAR). Information from the following report(s) SBAR and Kardex was reviewed with the receiving nurse. Lines:   Peripheral IV 06/16/19 Left Hand (Active)   Site Assessment Clean, dry, & intact 6/17/2019  8:37 AM   Phlebitis Assessment 0 6/17/2019  8:37 AM   Infiltration Assessment 0 6/17/2019  8:37 AM   Dressing Status Clean, dry, & intact 6/17/2019  8:37 AM   Dressing Type Transparent 6/17/2019  8:37 AM   Hub Color/Line Status Blue 6/17/2019  8:37 AM   Alcohol Cap Used Yes 6/16/2019  3:01 PM        Opportunity for questions and clarification was provided.       Patient transported with:   [employer]

## 2019-06-17 NOTE — DISCHARGE SUMMARY
Hospitalist Discharge Summary     Patient ID:  Valentino So  [id number]  77 y.o.  1950    PCP on record: Hugo Hebert MD    Admit date: 6/9/2019  Discharge date and time: 6/17/2019      DISCHARGE DIAGNOSIS:  Acute embolic CVA with expressive aphasia, dysphagia, visual field defects in setting of afib   Epistaxis 6/15   Paroxysmal atrial fibrillation, chronic diastolic CHF and essential HTN:  Insulin dependent DM2 with hyperglycemia:  Anxiety  Acute kidney failure  Psoriasis  Obesity      CONSULTATIONS:  IP CONSULT TO NEUROLOGY  IP CONSULT TO CARDIOLOGY    ______________________________________________________________________  DISCHARGE SUMMARY/HOSPITAL COURSE:  for full details see H&P, daily progress notes, labs, consult notes. 77 y.o.  female who presents with PMH of CVA, HTN, DM, Psoriasis who presented with AMS. Acute embolic CVA with expressive aphasia, dysphagia, visual field defects in setting of afib:   - CT head 6/9 with a chronic right MCA territory infarction. No acute findings.  - repeat CT head with no acute findings and no significant change since 6/9/2019. No infarct extension or hemorrhage.  - MRI/MRA brain with multiple small acute cortical/subcortical infarcts in the left parietal lobe. Unchanged generalized parenchymal volume loss and mild chronic microvascular ischemic disease. Unchanged chronic large right MCA territory involving the right temporoparietal lobe and insula. Unchanged chronic infarcts in the right occipital lobe and bilateral cerebellum. No evidence of significant stenosis or aneurysm. Sabino Citrin with Ponlok.Maxcy - 60%. Mild (grade 1) left ventricular diastolic dysfunction.  - LDL 54, on statin  - neurology team consulted. Patient stopped taking Eliquis before event, On lovenox while inpatient, then transitioned to Eliquis at discharge when tolerating tube feedings. - tube feedings thru NG tube.      Epistaxis 6/15 resolved     Paroxysmal atrial fibrillation, chronic diastolic CHF and essential HTN:  On clonidine and nitropaste patch while NPO. Then transitioned to home meds amlodipine, metoprolol and Acei when NG tube in place.     Insulin dependent DM2 with hyperglycemia:  -On Lantus. Strict glucose monitor. Goal BS< 180  - holding metformin, trulicity  - lispro sliding scale       PLAN:   - BP monitor and adjust BP for goal of SBP < 130  - weight loss program  - Sliding scale insulin per SNF  _______________________________________________________________________  Patient seen and examined by me on discharge day. Pertinent Findings:  Gen:    Not in acute pain or distress. She is sleeping. No events overnight. Tolerating tube feedings well. HEENT: NG tube in place. Chest: Clear to auscultation bilaterally  CVS:   Regular rate. No edema  Abd:  Soft, not distended, not tender. Neuro:  Alert, Ox3. Moves extremities. _______________________________________________________________________  DISCHARGE MEDICATIONS:   Current Discharge Medication List      START taking these medications    Details   nystatin (MYCOSTATIN) powder Apply  to affected area two (2) times a day. Qty: 1 Bottle, Refills: 0         CONTINUE these medications which have CHANGED    Details   insulin detemir U-100 (LEVEMIR FLEXTOUCH) 100 unit/mL (3 mL) inpn 40 Units by SubCUTAneous route daily.   Qty: 20 Adjustable Dose Pre-filled Pen Syringe, Refills: 1         CONTINUE these medications which have NOT CHANGED    Details   metoprolol succinate (TOPROL-XL) 50 mg XL tablet TAKE 1 TABLET DAILY  Qty: 90 Tab, Refills: 3    Associated Diagnoses: Essential hypertension, benign      quinapril (ACCUPRIL) 40 mg tablet TAKE 1 TABLET TWICE A DAY  Qty: 180 Tab, Refills: 3    Associated Diagnoses: Essential hypertension, benign      apixaban (ELIQUIS) 5 mg tablet TAKE 1 TABLET BY MOUTH TWICE A DAY  Qty: 180 Tab, Refills: 3      Insulin Needles, Disposable, (EASY COMFORT PEN NEEDLES) 31 gauge x 5/16\" ndle Check BS BID  Qty: 100 Pen Needle, Refills: 5      glucose blood VI test strips (ASCENSIA AUTODISC VI, ONE TOUCH ULTRA TEST VI) strip Check glucose twice daily. Qty: 100 Strip, Refills: 5    Comments: Needs compatible test strips for her new Accu Check Guide Plus meter  Associated Diagnoses: Type 2 diabetes mellitus with hyperglycemia, with long-term current use of insulin ([facility])      Blood-Glucose Meter monitoring kit Check blood sugar twice daily. Qty: 1 Kit, Refills: 0    Comments: Pt needs ACCU-CHECK GUIDE PLUS meter for insurance coverage with compatible test strips/. .  Associated Diagnoses: Type 2 diabetes mellitus with hyperglycemia, with long-term current use of insulin ([facility])      atorvastatin (LIPITOR) 80 mg tablet Take 1 Tab by mouth daily. Qty: 90 Tab, Refills: 1      amLODIPine (NORVASC) 5 mg tablet Take 1 Tab by mouth daily. Qty: 90 Tab, Refills: 1      dulaglutide (TRULICITY) 1.5 ME/4.9 mL sub-q pen 0.5 mL by SubCUTAneous route every seven (7) days. Qty: 12 Pen, Refills: 1      magnesium oxide (MAG-OX) 400 mg tablet Take 1 Tab by mouth two (2) times a day. Qty: 30 Tab, Refills: 0      etanercept (ENBREL) 50 mg/mL (0.98 mL) injection 50 mg by SubCUTAneous route every seven (7) days. STOP taking these medications       metFORMIN (GLUCOPHAGE) 850 mg tablet Comments:   Reason for Stopping:               My Recommended Diet, Activity, Wound Care, and follow-up labs are listed in the patient's Discharge Insturctions which I have personally completed and reviewed.     ______________________________________________________________________    Risk of deterioration: Moderate    Condition at Discharge:  Stable  ______________________________________________________________________    Disposition  SNF/LTC  ______________________________________________________________________    Care Plan discussed with:   Patient, Family, RN, Care Manager    ______________________________________________________________________    Code Status: Full Code  ______________________________________________________________________      Follow up with:   PCP : Gaby Rivera MD  Follow-up Information     Follow up With Specialties Details Why Contact Info    [facility] for rehab   Patient will be going to [facility] for rehab.  [phone]    Gaby Rivera MD [address]  [phone]                Total time in minutes spent coordinating this discharge (includes going over instructions, follow-up, prescriptions, and preparing report for sign off to her PCP) :  40 minutes    Signed:  Ilana Norwood MD

## 2019-06-17 NOTE — PROGRESS NOTES
KATRINA Plan--[facility]. Per Mojgan Johnson with [facility] states patient's precert is still good and they have a bed for the patient today if medically stable. Nursing will call md and inform him of bed available if patient is medically stable. Nursing to call report to [phone] . Care Management Interventions  PCP Verified by CM: Yes(Dr. Shivani Mohr)  Mode of Transport at Discharge: Other (see comment)(Patient will be going to [facility]. )  Transition of Care Consult (CM Consult):  Other([facility] for rehab. )  Discharge Durable Medical Equipment: No(grab bars, cane, rw and shower chair )  Physical Therapy Consult: Yes  Occupational Therapy Consult: Yes  Speech Therapy Consult: Yes  Current Support Network: Lives with Spouse, Own Home(lives with  in a 1 story home with 2 steps to the entrance)  Confirm Follow Up Transport: Family  Plan discussed with Pt/Family/Caregiver: Yes  Freedom of Choice Offered: Yes  Discharge Location  Discharge Placement: Rehab hospital/unit acute

## 2019-06-17 NOTE — DISCHARGE INSTRUCTIONS
HOSPITALIST DISCHARGE INSTRUCTIONS    NAME: Kendell Nieves   :  1950   MRN:  [id number]     Date/Time:  2019 11:45 AM    ADMIT DATE: 2019   DISCHARGE DATE: 2019     Attending Physician: Ernesto Graves MD    DISCHARGE DIAGNOSIS:  Acute embolic CVA with expressive aphasia, dysphagia, visual field defects in setting of afib   Epistaxis 6/15   Paroxysmal atrial fibrillation, chronic diastolic CHF and essential HTN:  Insulin dependent DM2 with hyperglycemia:  Anxiety  Acute kidney failure  Psoriasis  Obesity      Medications: Per above medication reconciliation. Pain Management: per above medications    Recommended diet: Cardiac Diet and Diabetic Diet    Recommended activity: Activity as tolerated    Wound care: PEG tube care    Indwelling devices:  None    Required Lab work: BMP in 1 week    Glucose management:  Accucheck ACHS with sliding scale per SNF protocol    Code status: Full      CHECK Blood pressure twice daily. Goal is SBP < 130    Outside physician follow up: Follow-up Information     Follow up With Specialties Details Why Contact Info    [facility]   Patient will be going to [address] [place] [address] for rehab.  [phone]    Mikey Cordero MD [address]  [phone]              Information obtained by :  I understand that if any problems occur once I am at home I am to contact my physician. I understand and acknowledge receipt of the instructions indicated above.                                                                                                                                            Physician's or R.N.'s Signature                                                                  Date/Time                                                                                                                                              Patient or Repres

## 2019-06-17 NOTE — PROGRESS NOTES
**Consult Information**  Member Facility: [facility] Alicia Guzmán MRN: [id number]  Consult ID: [id number]  Facility Time Zone: ET  Date and Time of Consult: 06/16/2019 08:50:24 PM  Requesting Clinician: Swati Alvarenga  Patient Name: Chan Elizabeth  Date of Birth: [birth year]  Gender: Female    **Clinical Note**  Clinical Note: 77 yr old with acute embolic CVA. - BP noted to be elevated. Also, h/o paroxysmal A fib. - continue telemetry. **Attestation**  Interaction Mode: Electronic Interaction  Interaction Attestation: Interprofessional internet consultation was delivered through telephonic and/ or electronic communication upon the request of the patients treating physician, while the requesting and the rendering provider were not in the same physical location. Written report was provided to the requesting provider.   Evaluation Duration (mins): 5

## 2019-06-17 NOTE — PROGRESS NOTES
Medicare pt has received, reviewed, and signed 2nd IM letter informing them of their right to appeal the discharge. Signed copy has been placed on pt bedside chart.                       Avi Keen  [phone]

## 2019-06-17 NOTE — PROGRESS NOTES
Bedside and Verbal shift change report given to Maria R/ PAUL (oncoming nurse) by Hair (offgoing nurse). Report included the following information SBAR, Kardex, Recent Results, Med Rec Status and Cardiac Rhythm SR.      Zone Phone:   [phone]          Significant changes during shift:  Pt voiding, more active, participating in patient care.         Patient Information      77 yr old, admitted on 6/9/19, patient of Dr. Dk Cerna, admitted from home.      Problem List              Past Medical History:   Diagnosis Date    Diabetes ([facility] [address]      Hypertension      Murmur      Osteopenia      Psoriasis      Rosacea      Stroke ([facility] [address]      Vitamin D deficiency                     Past Surgical History:   Procedure Laterality Date    COLONOSCOPY N/A 4/4/2018     COLONOSCOPY   DIABETIC performed by Lona Griffith MD at [facility] [address] COLONOSCOPY   2018     3 polyps    HX TONSILLECTOMY             Social History                Tobacco Use    Smoking status: Never Smoker    Smokeless tobacco: Never Used   Substance Use Topics    Alcohol use: Yes       Alcohol/week: 0.0 oz       Comment: 1/couple months                   Family History   Problem Relation Age of Onset    Diabetes Mother      Heart Disease Father                Allergies   Allergen Reactions    Pcn [Penicillins] Rash    Sulfa (Sulfonamide Antibiotics) Unknown (comments)                Core Measures:      CVA: Y  CHF: N  PNA: N           Activity Status:      OOB to Chair:  No  Ambulated this shift No  Bed Rest No      Supplemental P1: ([place] Applicable)      Room air          LINES AND DRAINS:      PIV      DVT prophylaxis:      DVT prophylaxis Med- Yes; Lovenox  DVT prophylaxis SCD or SONIA- No       Wounds: (If Applicable)      Wounds- None    Location      Patient Safety:      Falls Score Total Score:  4  Safety Level_______  Bed Alarm On? Yes  Sitter?  N      Plan for upcoming shift:  TF Jevity 1.5 at 25 ml; goal 45 ml.      Discharge Plan: TBD      Active Consults:  Neuro

## 2019-06-19 ENCOUNTER — APPOINTMENT (OUTPATIENT)
Dept: GENERAL RADIOLOGY | Age: 69
End: 2019-06-19
Attending: PHYSICAL MEDICINE & REHABILITATION

## 2019-06-19 LAB
25(OH)D3 SERPL-MCNC: 10.9 NG/ML (ref 30–100)
ANION GAP SERPL CALC-SCNC: 5 MMOL/L (ref 5–15)
APPEARANCE UR: ABNORMAL
BACTERIA URNS QL MICRO: ABNORMAL /HPF
BILIRUB UR QL: NEGATIVE
BUN SERPL-MCNC: 29 MG/DL (ref 6–20)
BUN/CREAT SERPL: 24 (ref 12–20)
CALCIUM SERPL-MCNC: 8.7 MG/DL (ref 8.5–10.1)
CHLORIDE SERPL-SCNC: 104 MMOL/L (ref 97–108)
CO2 SERPL-SCNC: 30 MMOL/L (ref 21–32)
COLOR UR: ABNORMAL
CREAT SERPL-MCNC: 1.22 MG/DL (ref 0.55–1.02)
EPITH CASTS URNS QL MICRO: ABNORMAL /LPF
ERYTHROCYTE [DISTWIDTH] IN BLOOD BY AUTOMATED COUNT: 13.2 % (ref 11.5–14.5)
GLUCOSE SERPL-MCNC: 164 MG/DL (ref 65–100)
GLUCOSE UR STRIP.AUTO-MCNC: 250 MG/DL
HCT VFR BLD AUTO: 35.5 % (ref 35–47)
HGB BLD-MCNC: 11.6 G/DL (ref 11.5–16)
HGB UR QL STRIP: ABNORMAL
HYALINE CASTS URNS QL MICRO: ABNORMAL /LPF (ref 0–5)
KETONES UR QL STRIP.AUTO: NEGATIVE MG/DL
LEUKOCYTE ESTERASE UR QL STRIP.AUTO: ABNORMAL
MAGNESIUM SERPL-MCNC: 2.2 MG/DL (ref 1.6–2.4)
MCH RBC QN AUTO: 29.8 PG (ref 26–34)
MCHC RBC AUTO-ENTMCNC: 32.7 G/DL (ref 30–36.5)
MCV RBC AUTO: 91.3 FL (ref 80–99)
MUCOUS THREADS URNS QL MICRO: ABNORMAL /LPF
NITRITE UR QL STRIP.AUTO: POSITIVE
NRBC # BLD: 0 K/UL (ref 0–0.01)
NRBC BLD-RTO: 0 PER 100 WBC
PH UR STRIP: 7.5 [PH] (ref 5–8)
PLATELET # BLD AUTO: 221 K/UL (ref 150–400)
PMV BLD AUTO: 11.2 FL (ref 8.9–12.9)
POTASSIUM SERPL-SCNC: 3.9 MMOL/L (ref 3.5–5.1)
PROT UR STRIP-MCNC: 300 MG/DL
RBC # BLD AUTO: 3.89 M/UL (ref 3.8–5.2)
RBC #/AREA URNS HPF: ABNORMAL /HPF (ref 0–5)
SODIUM SERPL-SCNC: 139 MMOL/L (ref 136–145)
SP GR UR REFRACTOMETRY: 1.02 (ref 1–1.03)
UA: UC IF INDICATED,UAUC: ABNORMAL
UROBILINOGEN UR QL STRIP.AUTO: 0.2 EU/DL (ref 0.2–1)
WBC # BLD AUTO: 13.3 K/UL (ref 3.6–11)
WBC URNS QL MICRO: ABNORMAL /HPF (ref 0–4)

## 2019-06-19 PROCEDURE — 36415 COLL VENOUS BLD VENIPUNCTURE: CPT

## 2019-06-19 PROCEDURE — 87186 SC STD MICRODIL/AGAR DIL: CPT

## 2019-06-19 PROCEDURE — 83735 ASSAY OF MAGNESIUM: CPT

## 2019-06-19 PROCEDURE — 85027 COMPLETE CBC AUTOMATED: CPT

## 2019-06-19 PROCEDURE — 82306 VITAMIN D 25 HYDROXY: CPT

## 2019-06-19 PROCEDURE — 87077 CULTURE AEROBIC IDENTIFY: CPT

## 2019-06-19 PROCEDURE — 87086 URINE CULTURE/COLONY COUNT: CPT

## 2019-06-19 PROCEDURE — 81001 URINALYSIS AUTO W/SCOPE: CPT

## 2019-06-19 PROCEDURE — 80048 BASIC METABOLIC PNL TOTAL CA: CPT

## 2019-06-19 PROCEDURE — 74018 RADEX ABDOMEN 1 VIEW: CPT

## 2019-06-22 LAB
BACTERIA SPEC CULT: ABNORMAL
BACTERIA SPEC CULT: ABNORMAL
CC UR VC: ABNORMAL
SERVICE CMNT-IMP: ABNORMAL

## 2019-06-23 LAB
ANION GAP SERPL CALC-SCNC: 6 MMOL/L (ref 5–15)
BUN SERPL-MCNC: 25 MG/DL (ref 6–20)
BUN/CREAT SERPL: 19 (ref 12–20)
CALCIUM SERPL-MCNC: 8.4 MG/DL (ref 8.5–10.1)
CHLORIDE SERPL-SCNC: 105 MMOL/L (ref 97–108)
CO2 SERPL-SCNC: 27 MMOL/L (ref 21–32)
CREAT SERPL-MCNC: 1.29 MG/DL (ref 0.55–1.02)
GLUCOSE SERPL-MCNC: 105 MG/DL (ref 65–100)
POTASSIUM SERPL-SCNC: 4.4 MMOL/L (ref 3.5–5.1)
SODIUM SERPL-SCNC: 138 MMOL/L (ref 136–145)

## 2019-06-23 PROCEDURE — 80048 BASIC METABOLIC PNL TOTAL CA: CPT

## 2019-06-23 PROCEDURE — 36415 COLL VENOUS BLD VENIPUNCTURE: CPT

## 2019-06-25 LAB
ANION GAP SERPL CALC-SCNC: 5 MMOL/L (ref 5–15)
BUN SERPL-MCNC: 23 MG/DL (ref 6–20)
BUN/CREAT SERPL: 19 (ref 12–20)
CALCIUM SERPL-MCNC: 8.9 MG/DL (ref 8.5–10.1)
CHLORIDE SERPL-SCNC: 105 MMOL/L (ref 97–108)
CO2 SERPL-SCNC: 28 MMOL/L (ref 21–32)
CREAT SERPL-MCNC: 1.23 MG/DL (ref 0.55–1.02)
ERYTHROCYTE [DISTWIDTH] IN BLOOD BY AUTOMATED COUNT: 13.2 % (ref 11.5–14.5)
GLUCOSE SERPL-MCNC: 97 MG/DL (ref 65–100)
HCT VFR BLD AUTO: 37.3 % (ref 35–47)
HGB BLD-MCNC: 12.1 G/DL (ref 11.5–16)
MCH RBC QN AUTO: 29.6 PG (ref 26–34)
MCHC RBC AUTO-ENTMCNC: 32.4 G/DL (ref 30–36.5)
MCV RBC AUTO: 91.2 FL (ref 80–99)
NRBC # BLD: 0 K/UL (ref 0–0.01)
NRBC BLD-RTO: 0 PER 100 WBC
PLATELET # BLD AUTO: 282 K/UL (ref 150–400)
PMV BLD AUTO: 10.8 FL (ref 8.9–12.9)
POTASSIUM SERPL-SCNC: 4.1 MMOL/L (ref 3.5–5.1)
RBC # BLD AUTO: 4.09 M/UL (ref 3.8–5.2)
SODIUM SERPL-SCNC: 138 MMOL/L (ref 136–145)
WBC # BLD AUTO: 8.8 K/UL (ref 3.6–11)

## 2019-06-25 PROCEDURE — 36415 COLL VENOUS BLD VENIPUNCTURE: CPT

## 2019-06-25 PROCEDURE — 80048 BASIC METABOLIC PNL TOTAL CA: CPT

## 2019-06-25 PROCEDURE — 85027 COMPLETE CBC AUTOMATED: CPT

## 2019-07-13 DIAGNOSIS — I10 ESSENTIAL HYPERTENSION, BENIGN: ICD-10-CM

## 2019-07-13 RX ORDER — HYDROCHLOROTHIAZIDE 25 MG/1
TABLET ORAL
Qty: 90 TAB | Refills: 3 | Status: SHIPPED | OUTPATIENT
Start: 2019-07-13 | End: 2019-09-09

## 2019-09-09 ENCOUNTER — OFFICE VISIT (OUTPATIENT)
Dept: NEUROLOGY | Age: 69
End: 2019-09-09

## 2019-09-09 VITALS
SYSTOLIC BLOOD PRESSURE: 153 MMHG | DIASTOLIC BLOOD PRESSURE: 69 MMHG | HEIGHT: 66 IN | OXYGEN SATURATION: 98 % | HEART RATE: 78 BPM | BODY MASS INDEX: 30.59 KG/M2

## 2019-09-09 DIAGNOSIS — I69.922: Primary | ICD-10-CM

## 2019-09-09 DIAGNOSIS — I69.354 HEMIPARESIS AFFECTING LEFT SIDE AS LATE EFFECT OF STROKE (HCC): ICD-10-CM

## 2019-09-09 RX ORDER — LANOLIN ALCOHOL/MO/W.PET/CERES
1000 CREAM (GRAM) TOPICAL
COMMUNITY
End: 2019-10-15

## 2019-09-09 RX ORDER — BISMUTH SUBSALICYLATE 262 MG
1 TABLET,CHEWABLE ORAL DAILY
COMMUNITY

## 2019-09-09 RX ORDER — METFORMIN HYDROCHLORIDE 850 MG/1
TABLET ORAL 2 TIMES DAILY WITH MEALS
COMMUNITY
End: 2020-01-15 | Stop reason: ALTCHOICE

## 2019-09-09 NOTE — PROGRESS NOTES
[address] [facility],[address]Th [address]  [address]   [place] [address] [place] [address] [address]   [phone] Office   [phone] Fax           Date:  19     Name:  Sally Earl  :  1950  MRN:  [id number]     PCP:  Kathleen Pickard MD    Chief Complaint   Patient presents with    Follow-up     cva        HISTORY OF PRESENT ILLNESS: Follow up s/p CVA. This is a 65yo, woman who has a significant PMH of HTN, Dyslipidemia, and DM. She was admitted for a second stroke in  and was followed by neurology, Dr. Moe Stewart. Discharge diagnosis was acute embolic CVA with expressive aphasia, dysphagia, visual field defects in setting of afib on . She was discharged to [address] [place] [address] for two weeks and then was in outpatient therapy for OT/PT/ST. Her first event caused left sided weakness. For the most she has expressive aphasia. Her  indicates that she has a lot short term memory loss since the CVA. It is not getting worse. She does need some help with dressing but is now otherwise largely independent with ADL. MRI/MRA brain with multiple small acute cortical/subcortical infarcts in the left parietal lobe. Unchanged chronic large right MCA territory involving the right temporoparietal lobe and insula. Carotid Doppler: <50% stenosis bilaterally    2DEcho: Left Ventricle: Estimated left ventricular ejection fraction is 56 - 60%. Mild (grade 1) left ventricular diastolic dysfunction. Labs in :  HgBA1C 7.6%  LDL 54.8        Current Outpatient Medications   Medication Sig    multivitamin (ONE A DAY) tablet Take 1 Tab by mouth daily.  metFORMIN (GLUCOPHAGE) 850 mg tablet Take  by mouth two (2) times daily (with meals).  cyanocobalamin (VITAMIN B-12) 1,000 mcg tablet Take 1,000 mcg by mouth every Monday and Thursday.  atorvastatin (LIPITOR) 80 mg tablet Take 1 Tab by mouth nightly.     amLODIPine (NORVASC) 10 mg tablet Take 1 Tab by mouth daily.  TRULICITY 1.5 SA/8.0 mL sub-q pen 0.5 mL by SubCUTAneous route every seven (7) days.  insulin detemir U-100 (LEVEMIR U-100 INSULIN) 100 unit/mL injection 44 Units by SubCUTAneous route nightly.  aspirin 81 mg chewable tablet Take 81 mg by mouth daily.  metoprolol succinate (TOPROL-XL) 50 mg XL tablet TAKE 1 TABLET DAILY    quinapril (ACCUPRIL) 40 mg tablet TAKE 1 TABLET TWICE A DAY    apixaban (ELIQUIS) 5 mg tablet TAKE 1 TABLET BY MOUTH TWICE A DAY    Insulin Needles, Disposable, (EASY COMFORT PEN NEEDLES) 31 gauge x 5/16\" ndle Check BS BID    glucose blood VI test strips (ASCENSIA AUTODISC VI, ONE TOUCH ULTRA TEST VI) strip Check glucose twice daily.  Blood-Glucose Meter monitoring kit Check blood sugar twice daily. No current facility-administered medications for this visit.       Allergies   Allergen Reactions    Pcn [Penicillins] Rash    Sulfa (Sulfonamide Antibiotics) Unknown (comments)     Past Medical History:   Diagnosis Date    Diabetes ([facility] [address]     Hypertension     Murmur     Osteopenia     Psoriasis     Rosacea     Stroke ([facility] [address]     Vitamin D deficiency      Past Surgical History:   Procedure Laterality Date    COLONOSCOPY N/A 2018    COLONOSCOPY   DIABETIC performed by Efren Sidhu MD at [facility] MAIN OR    HX  SECTION      HX COLONOSCOPY  2018    3 polyps    HX TONSILLECTOMY       Social History     Socioeconomic History    Marital status:      Spouse name: Not on file    Number of children: Not on file    Years of education: Not on file    Highest education level: Not on file   Occupational History    Not on file   Social Needs    Financial resource strain: Not on file    Food insecurity:     Worry: Not on file     Inability: Not on file    Transportation needs:     Medical: Not on file     Non-medical: Not on file   Tobacco Use    Smoking status: Never Smoker    Smokeless tobacco: Never Used Substance and Sexual Activity    Alcohol use: Yes     Alcohol/week: 0.0 standard drinks     Comment: 1/couple months    Drug use: Not on file    Sexual activity: Not on file   Lifestyle    Physical activity:     Days per week: Not on file     Minutes per session: Not on file    Stress: Not on file   Relationships    Social connections:     Talks on phone: Not on file     Gets together: Not on file     Attends Christian service: Not on file     Active member of club or organization: Not on file     Attends meetings of clubs or organizations: Not on file     Relationship status: Not on file    Intimate partner violence:     Fear of current or ex partner: Not on file     Emotionally abused: Not on file     Physically abused: Not on file     Forced sexual activity: Not on file   Other Topics Concern    Not on file   Social History Narrative    Not on file     Family History   Problem Relation Age of Onset    Diabetes Mother     Heart Disease Father     No Known Problems Child        PHYSICAL EXAMINATION:    Visit Vitals  /69   Pulse 78   Ht 5' 6\" (1.676 m)   SpO2 98%   BMI 30.59 kg/m²     General:  Well defined, nourished, and groomed individual in no acute distress. Neck: Supple, nontender, no bruits, no pain with resistance to active range of motion. Heart: Regular rate and rhythm, no murmurs, rub, or gallop. Normal S1S2. Lungs:  Clear to auscultation bilaterally with equal chest expansion, no cough, no wheeze  Musculoskeletal:  Extremities revealed no edema and had full range of motion of joints. Psych:  Good mood and bright affect    NEUROLOGICAL EXAMINATION:     Mental Status:   Alert and oriented to person, place, and time with recent and remote memory intact. Attention span and concentration are normal. Speech is fluent with a full fund of knowledge.       Cranial Nerves:  I: smell Not tested   II: visual fields Full to confrontation   II: pupils Equal, round, reactive to light II: optic disc No papilledema   III,VII: ptosis none   III,IV,VI: extraocular muscles  Full ROM   V: mastication normal   V: facial light touch sensation  normal   VII: facial muscle function   symmetric   VIII: hearing symmetric   IX: soft palate elevation  normal   XI: trapezius strength  5/5   XI: sternocleidomastoid strength 5/5   XI: neck flexion strength  5/5   XII: tongue  midline     Motor Examination: Normal tone, bulk, and strength, 4+/5 muscle strength throughout except in the left hand  and mild weakness noted in the left leg. Sensory exam:  Normal throughout to pinprick, temperature, and vibration sense. Normal proprioception. Coordination:  Heel-to-shin was smooth and symmetrical bilaterally. Finger to nose and rapid arm movement testing was normal.   No resting or intention tremor    Gait and Station:  Steady while walking on toes, heels, and with tandem walking. Normal arm swing. No Rhomberg or pronator drift. No muscle wasting or fasiculations noted. Reflexes:  DTRs 2+ except the left upper extremity where there is some mild spasticity    ASSESSMENT AND PLAN    ICD-10-CM ICD-9-CM    1. Late effects of cerebrovascular disease, dysarthria I69.922 438.13    2. Hemiparesis affecting left side as late effect of stroke ([facility]) Z80.413 438.20      Late effects of CVA, expressive aphasia, short term memory loss, and mild left hemiparesis with spasticity. Encouraged to continue to participate in OT/ST, and HEP. Discussed short term memory loss. Recommended that they return to clinic if this worsens or does not improve as only three months since CVA. Discussed secondary stroke prevention as well as signs and symptoms of stroke, BE-FAST, and when to call for EMS. Stressed importance of recognition and early intervention. LDL goal less than 70 per secondary stroke guidelines. Continue with Eliquis for secondary stroke prevention.  Discussed lifestyle modification and importance of exercise and appropriate diet, weight management, and management of comorbid disease with appropriate follow up visits with primary care and other healthcare providers. I would recommend holding off on any surgical procedure for at least six month post CVA. Discussed increased risk of recurrent CVA off of the Eliquis. Radha Berg

## 2019-09-09 NOTE — PATIENT INSTRUCTIONS
A Healthy Lifestyle: Care Instructions  Your Care Instructions    A healthy lifestyle can help you feel good, stay at a healthy weight, and have plenty of energy for both work and play. A healthy lifestyle is something you can share with your whole family. A healthy lifestyle also can lower your risk for serious health problems, such as high blood pressure, heart disease, and diabetes. You can follow a few steps listed below to improve your health and the health of your family. Follow-up care is a key part of your treatment and safety. Be sure to make and go to all appointments, and call your doctor if you are having problems. It's also a good idea to know your test results and keep a list of the medicines you take. How can you care for yourself at home? · Do not eat too much sugar, fat, or fast foods. You can still have dessert and treats now and then. The goal is moderation. · Start small to improve your eating habits. Pay attention to portion sizes, drink less juice and soda pop, and eat more fruits and vegetables. ? Eat a healthy amount of food. A 3-ounce serving of meat, for example, is about the size of a deck of cards. Fill the rest of your plate with vegetables and whole grains. ? Limit the amount of soda and sports drinks you have every day. Drink more water when you are thirsty. ? Eat at least 5 servings of fruits and vegetables every day. It may seem like a lot, but it is not hard to reach this goal. A serving or helping is 1 piece of fruit, 1 cup of vegetables, or 2 cups of leafy, raw vegetables. Have an apple or some carrot sticks as an afternoon snack instead of a candy bar. Try to have fruits and/or vegetables at every meal.  · Make exercise part of your daily routine. You may want to start with simple activities, such as walking, bicycling, or slow swimming. Try to be active 30 to 60 minutes every day. You do not need to do all 30 to 60 minutes all at once.  For example, you can exercise 3 times a day for 10 or 20 minutes. Moderate exercise is safe for most people, but it is always a good idea to talk to your doctor before starting an exercise program.  · Keep moving. Reggie Minor the lawn, work in the garden, or [place]. Take the stairs instead of the elevator at work. · If you smoke, quit. People who smoke have an increased risk for heart attack, stroke, cancer, and other lung illnesses. Quitting is hard, but there are ways to boost your chance of quitting tobacco for good. ? Use nicotine gum, patches, or lozenges. ? Ask your doctor about stop-smoking programs and medicines. ? Keep trying. In addition to reducing your risk of diseases in the future, you will notice some benefits soon after you stop using tobacco. If you have shortness of breath or asthma symptoms, they will likely get better within a few weeks after you quit. · Limit how much alcohol you drink. Moderate amounts of alcohol (up to 2 drinks a day for men, 1 drink a day for women) are okay. But drinking too much can lead to liver problems, high blood pressure, and other health problems. Family health  If you have a family, there are many things you can do together to improve your health. · Eat meals together as a family as often as possible. · Eat healthy foods. This includes fruits, vegetables, lean meats and dairy, and whole grains. · Include your family in your fitness plan. Most people think of activities such as jogging or tennis as the way to fitness, but there are many ways you and your family can be more active. Anything that makes you breathe hard and gets your heart pumping is exercise. Here are some tips:  ? Walk to do errands or to take your child to school or the bus.  ? Go for a family bike ride after dinner instead of watching TV. Where can you learn more? Go to http://francie-go.info/. Enter I842 in the search box to learn more about \"A Healthy Lifestyle: Care Instructions. \"  Current as of: September 11, 2018  Content Version: 12.1  © 6439-4665 [employer]. Care instructions adapted under license by [employer] (which disclaims liability or warranty for this information). If you have questions about a medical condition or this instruction, always ask your healthcare professional. [address] any warranty or liability for your use of this information.

## 2019-10-15 ENCOUNTER — PATIENT MESSAGE (OUTPATIENT)
Dept: FAMILY MEDICINE CLINIC | Age: 69
End: 2019-10-15

## 2019-10-15 RX ORDER — ERGOCALCIFEROL 1.25 MG/1
50000 CAPSULE ORAL
Qty: 8 CAP | Refills: 2
Start: 2019-10-17 | End: 2020-02-17 | Stop reason: SDUPTHER

## 2019-10-23 ENCOUNTER — OFFICE VISIT (OUTPATIENT)
Dept: CARDIOLOGY CLINIC | Age: 69
End: 2019-10-23

## 2019-10-23 VITALS
HEIGHT: 66 IN | SYSTOLIC BLOOD PRESSURE: 144 MMHG | WEIGHT: 180 LBS | OXYGEN SATURATION: 98 % | HEART RATE: 90 BPM | DIASTOLIC BLOOD PRESSURE: 70 MMHG | BODY MASS INDEX: 28.93 KG/M2 | RESPIRATION RATE: 14 BRPM

## 2019-10-23 DIAGNOSIS — I63.411 CEREBROVASCULAR ACCIDENT (CVA) DUE TO EMBOLISM OF RIGHT MIDDLE CEREBRAL ARTERY (HCC): ICD-10-CM

## 2019-10-23 DIAGNOSIS — E78.5 DYSLIPIDEMIA: ICD-10-CM

## 2019-10-23 DIAGNOSIS — I48.0 PAROXYSMAL ATRIAL FIBRILLATION (HCC): Primary | ICD-10-CM

## 2019-10-23 DIAGNOSIS — E11.21 TYPE 2 DIABETES MELLITUS WITH NEPHROPATHY (HCC): ICD-10-CM

## 2019-10-23 DIAGNOSIS — I10 ESSENTIAL HYPERTENSION, BENIGN: ICD-10-CM

## 2019-10-23 NOTE — PROGRESS NOTES
Verified patient with two patient identifiers. Medications reviewed/approved by Dr. Andie Laurent. A verbal order from Dr. Andie Laurent was received with [facility] to remove any medications that were deleted during the visit. Medication(s) removed:  None    Chief Complaint   Patient presents with    Irregular Heart Beat     6 month follow up    Cerebrovascular Accident    Hypertension    Cholesterol Problem     1. Have you been to the ER, urgent care clinic since your last visit? Hospitalized since your last visit? Yes, 6/2019 [facility] er cva ,[facility] cva transfer    2. Have you seen or consulted any other health care providers outside of the [address] [place] [address] since your last visit? Include any pap smears or colon screening.  no

## 2019-10-23 NOTE — PROGRESS NOTES
Nargis Luo is a 77 y.o. female is here for routine f/u. Hx DM II, diabetic nephropathy, hypertension, dyslipidemia, CKD 3, obesity, with acute R MCA CVA 1/19. W/u with Echo/doppler 1/1/8/19 with mild to mod LVH, LVEF 65-70, normal atrial sizes, mild MR. Head CT/MRI/MRA with multiple sites of infarct R temporo-occipital, R parietal, R cerebellar, coronoradiatal  C/w embolic events. Subsequent Loop/Event monitor x 2 weeks showed NSR with episodes of paroxysmal afib/RVR. Seen in f/u by Dr. Roxie Davila and started on Eliquis 5mg bid  (CHADs2-VASC = 6--DM, hypertension, female, >65, prior CVA (CVA 4.8%/NM, embolic/CVA/TIA 48.9%/KL). Hospitalized in June 2019 with acute CVA. Unfortunately, the confusion with the patients medications by pt/ which stopped Eliquis vs stopping ASA likely culprit of current CVA. This is not considered a failure of treatment as patient was not actually on Eliquis since 4/16/19. On BB and amlodipine  The patient denies chest pain/ shortness of breath, orthopnea, PND, LE edema, palpitations, syncope, presyncope or fatigue.        Patient Active Problem List    Diagnosis Date Noted    Diabetic peripheral neuropathy associated with type 2 diabetes mellitus ([address] 06/10/2019    Bilateral carotid artery stenosis 31/17/4450    Embolic stroke involving left middle cerebral artery ([address] 06/10/2019    CVA (cerebral vascular accident) ([address] 06/09/2019    Paroxysmal atrial fibrillation ([address] 04/16/2019    Dyslipidemia 04/16/2019    Cerebrovascular accident (CVA) due to thrombosis of right middle cerebral artery ([address] 01/18/2019    Acute ischemic cerebrovascular accident (CVA) involving right middle cerebral artery territory ([address] 01/17/2019    Obesity, morbid ([address] 12/26/2017    Type 2 diabetes mellitus with nephropathy ([address] 12/26/2017    Type 2 diabetes mellitus with hyperglycemia, with long-term current use of insulin ([address] 04/03/2017    Psoriasis 08/11/2014    Essential hypertension, benign 08/11/2014    Rosacea 08/11/2014    Heart murmur 08/11/2014      Duy Reynaga MD  Past Medical History:   Diagnosis Date    Diabetes [facility]     Hypertension     Murmur     Osteopenia     Psoriasis     Rosacea     Stroke ([facility]     Vitamin D deficiency       Past Surgical History:   Procedure Laterality Date    COLONOSCOPY N/A 4/4/2018    COLONOSCOPY   DIABETIC performed by Prakash C[facility]denas MD [facility] [place] [facility]      HX COLONOSCOPY  2018    3 polyps    HX TONSILLECTOMY       Allergies   Allergen Reactions    Pcn [Penicillins] Rash    Sulfa (Sulfonamide Antibiotics) Unknown (comments)      Family History   Problem Relation Age of Onset    Diabetes Mother     Heart Disease Father     No Known Problems Child       Social History     Socioeconomic History    Marital status:      Spouse name: Not on file    Number of children: Not on file    Years of education: Not on file    Highest education level: Not on file   Occupational History    Not on file   Social Needs    Financial resource strain: Not on file    Food insecurity:     Worry: Not on file     Inability: Not on file    Transportation needs:     Medical: Not on file     Non-medical: Not on file   Tobacco Use    Smoking status: Never Smoker    Smokeless tobacco: Never Used   Substance and Sexual Activity    Alcohol use:  Yes     Alcohol/week: 0.0 standard drinks     Comment: 1/couple months    Drug use: Not on file    Sexual activity: Not on file   Lifestyle    Physical activity:     Days per week: Not on file     Minutes per session: Not on file    Stress: Not on file   Relationships    Social connections:     Talks on phone: Not on file     Gets together: Not on file     Attends Yazidi service: Not on file     Active member of club or organization: Not on file     Attends meetings of clubs or organizations: Not on file     Relationship status: Not on file   Shaista Intimate partner violence:     Fear of current or ex partner: Not on file     Emotionally abused: Not on file     Physically abused: Not on file     Forced sexual activity: Not on file   Other Topics Concern    Not on file   Social History Narrative    Not on file      Current Outpatient Medications   Medication Sig    ergocalciferol (ERGOCALCIFEROL) 50,000 unit capsule Take 1 Cap by mouth every Monday and Thursday.  apixaban (ELIQUIS) 5 mg tablet TAKE 1 TABLET BY MOUTH TWICE A DAY    multivitamin (ONE A DAY) tablet Take 1 Tab by mouth daily.  metFORMIN (GLUCOPHAGE) 850 mg tablet Take  by mouth two (2) times daily (with meals).  atorvastatin (LIPITOR) 80 mg tablet Take 1 Tab by mouth nightly.  amLODIPine (NORVASC) 10 mg tablet Take 1 Tab by mouth daily.  TRULICITY 1.5 AA/0.1 mL sub-q pen 0.5 mL by SubCUTAneous route every seven (7) days.  insulin detemir U-100 (LEVEMIR U-100 INSULIN) 100 unit/mL injection 44 Units by SubCUTAneous route nightly.  aspirin 81 mg chewable tablet Take 81 mg by mouth daily.  metoprolol succinate (TOPROL-XL) 50 mg XL tablet TAKE 1 TABLET DAILY    quinapril (ACCUPRIL) 40 mg tablet TAKE 1 TABLET TWICE A DAY    Insulin Needles, Disposable, (EASY COMFORT PEN NEEDLES) 31 gauge x 5/16\" ndle Check BS BID    glucose blood VI test strips (ASCENSIA AUTODISC VI, ONE TOUCH ULTRA TEST VI) strip Check glucose twice daily.  Blood-Glucose Meter monitoring kit Check blood sugar twice daily. No current facility-administered medications for this visit. Review of Symptoms:    CONST  No weight change. No fever, chills, sweats    ENT No visual changes, URI sx, sore throat    CV  See HPI   RESP  No cough, or sputum, wheezing. Also see HPI   GI  No abdominal pain or change in bowel habits. No heartburn or dysphagia. No melena or rectal bleeding.   No dysuria, urgency, frequency, hematuria   MSKEL  No joint pain, swelling. No muscle pain.     SKIN  No rash or lesions. NEURO  No headache, syncope, or seizure. No weakness, loss of sensation, or paresthesias. PSYCH  No low mood or depression  No anxiety. HE/LYMPH  No easy bruising, abnormal bleeding, or enlarged glands.         Physical ExamPhysical Exam:    Visit Vitals  Ht 5' 6\" (1.676 m)   BMI 29.05 kg/m²     Gen: NAD  HEENT:  PERRL, throat clear  Neck: no adenopathy, no thyromegaly, no JVD   Heart:  Regular,Nl S1S2,  no murmur, gallop or rub.   Lungs:  clear  Abdomen:   Soft, non-tender, bowel sounds are active.   Extremities:  No edema  Pulse: symmetric  Neuro: A&O times 3, No focal neuro deficits    Cardiographics    ECG:     Labs:   Lab Results   Component Value Date/Time    Sodium 142 10/14/2019 09:38 AM    Sodium 140 07/08/2019 03:52 PM    Sodium 138 06/25/2019 07:02 AM    Sodium 138 06/23/2019 06:40 AM    Sodium 139 06/19/2019 05:13 AM    Potassium 4.6 10/14/2019 09:38 AM    Potassium 4.3 07/08/2019 03:52 PM    Potassium 4.1 06/25/2019 07:02 AM    Potassium 4.4 06/23/2019 06:40 AM    Potassium 3.9 06/19/2019 05:13 AM    Chloride 99 10/14/2019 09:38 AM    Chloride 102 07/08/2019 03:52 PM    Chloride 105 06/25/2019 07:02 AM    Chloride 105 06/23/2019 06:40 AM    Chloride 104 06/19/2019 05:13 AM    CO2 25 10/14/2019 09:38 AM    CO2 26 07/08/2019 03:52 PM    CO2 28 06/25/2019 07:02 AM    CO2 27 06/23/2019 06:40 AM    CO2 30 06/19/2019 05:13 AM    Anion gap 5 06/25/2019 07:02 AM    Anion gap 6 06/23/2019 06:40 AM    Anion gap 5 06/19/2019 05:13 AM    Anion gap 4 (L) 06/17/2019 04:13 AM    Anion gap 2 (L) 06/15/2019 05:08 AM    Glucose 129 (H) 10/14/2019 09:38 AM    Glucose 186 (H) 07/08/2019 03:52 PM    Glucose 97 06/25/2019 07:02 AM    Glucose 105 (H) 06/23/2019 06:40 AM    Glucose 164 (H) 06/19/2019 05:13 AM    BUN 34 (H) 10/14/2019 09:38 AM    BUN 24 07/08/2019 03:52 PM    BUN 23 (H) 06/25/2019 07:02 AM    BUN 25 (H) 06/23/2019 06:40 AM    BUN 29 (H) 06/19/2019 05:13 AM    Creatinine 1.30 (H) 10/14/2019 09:38 AM    Creatinine 1.00 07/08/2019 03:52 PM    Creatinine 1.23 (H) 06/25/2019 07:02 AM    Creatinine 1.29 (H) 06/23/2019 06:40 AM    Creatinine 1.22 (H) 06/19/2019 05:13 AM    BUN/Creatinine ratio 26 10/14/2019 09:38 AM    BUN/Creatinine ratio 24 07/08/2019 03:52 PM    BUN/Creatinine ratio 19 06/25/2019 07:02 AM    BUN/Creatinine ratio 19 06/23/2019 06:40 AM    BUN/Creatinine ratio 24 (H) 06/19/2019 05:13 AM    GFR est AA 48 (L) 10/14/2019 09:38 AM    GFR est AA 66 07/08/2019 03:52 PM    GFR est AA 52 (L) 06/25/2019 07:02 AM    GFR est AA 50 (L) 06/23/2019 06:40 AM    GFR est AA 53 (L) 06/19/2019 05:13 AM    GFR est non-AA 42 (L) 10/14/2019 09:38 AM    GFR est non-AA 57 (L) 07/08/2019 03:52 PM    GFR est non-AA 43 (L) 06/25/2019 07:02 AM    GFR est non-AA 41 (L) 06/23/2019 06:40 AM    GFR est non-AA 44 (L) 06/19/2019 05:13 AM    Calcium 9.9 10/14/2019 09:38 AM    Calcium 9.2 07/08/2019 03:52 PM    Calcium 8.9 06/25/2019 07:02 AM    Calcium 8.4 (L) 06/23/2019 06:40 AM    Calcium 8.7 06/19/2019 05:13 AM    Bilirubin, total 0.2 10/14/2019 09:38 AM    Bilirubin, total 0.2 07/08/2019 03:52 PM    Bilirubin, total 0.4 06/15/2019 05:08 AM    Bilirubin, total 0.4 06/10/2019 05:02 AM    Bilirubin, total 0.4 06/09/2019 02:46 PM    AST (SGOT) 28 10/14/2019 09:38 AM    AST (SGOT) 24 07/08/2019 03:52 PM    AST (SGOT) 19 06/15/2019 05:08 AM    AST (SGOT) 26 06/10/2019 05:02 AM    AST (SGOT) 30 06/09/2019 02:46 PM    Alk. phosphatase 88 10/14/2019 09:38 AM    Alk. phosphatase 83 07/08/2019 03:52 PM    Alk. phosphatase 105 06/15/2019 05:08 AM    Alk. phosphatase 132 (H) 06/10/2019 05:02 AM    Alk.  phosphatase 132 (H) 06/09/2019 02:46 PM    Protein, total 6.8 10/14/2019 09:38 AM    Protein, total 6.5 07/08/2019 03:52 PM    Protein, total 6.2 (L) 06/15/2019 05:08 AM    Protein, total 7.2 06/10/2019 05:02 AM    Protein, total 7.0 06/09/2019 02:46 PM    Albumin 3.5 10/14/2019 09:38 AM    Albumin 3.2 (L) 07/08/2019 03:52 PM Albumin 2.2 (L) 06/15/2019 05:08 AM    Albumin 2.6 (L) 06/10/2019 05:02 AM    Albumin 2.5 (L) 06/09/2019 02:46 PM    Globulin 4.0 06/15/2019 05:08 AM    Globulin 4.6 (H) 06/10/2019 05:02 AM    Globulin 4.5 (H) 06/09/2019 02:46 PM    Globulin 4.8 (H) 01/17/2019 10:25 PM    A-G Ratio 1.1 (L) 10/14/2019 09:38 AM    A-G Ratio 1.0 (L) 07/08/2019 03:52 PM    A-G Ratio 0.6 (L) 06/15/2019 05:08 AM    A-G Ratio 0.6 (L) 06/10/2019 05:02 AM    A-G Ratio 0.6 (L) 06/09/2019 02:46 PM    ALT (SGPT) 32 10/14/2019 09:38 AM    ALT (SGPT) 24 07/08/2019 03:52 PM    ALT (SGPT) 22 06/15/2019 05:08 AM    ALT (SGPT) 35 06/10/2019 05:02 AM    ALT (SGPT) 46 06/09/2019 02:46 PM     Lab Results   Component Value Date/Time    CK 51 01/17/2019 10:25 PM     Lab Results   Component Value Date/Time    Cholesterol, total 141 10/14/2019 09:38 AM    Cholesterol, total 136 06/10/2019 05:02 AM    Cholesterol, total 119 03/01/2019 08:41 AM    Cholesterol, total 204 (H) 01/18/2019 06:00 AM    Cholesterol, total 196 11/30/2018 10:38 AM    HDL Cholesterol 52 10/14/2019 09:38 AM    HDL Cholesterol 59 06/10/2019 05:02 AM    HDL Cholesterol 62 03/01/2019 08:41 AM    HDL Cholesterol 57 01/18/2019 06:00 AM    HDL Cholesterol 51 11/30/2018 10:38 AM    LDL, calculated 59 10/14/2019 09:38 AM    LDL, calculated 54.8 06/10/2019 05:02 AM    LDL, calculated 38 03/01/2019 08:41 AM    LDL, calculated 108.2 (H) 01/18/2019 06:00 AM    LDL, calculated 105 (H) 11/30/2018 10:38 AM    Triglyceride 149 10/14/2019 09:38 AM    Triglyceride 111 06/10/2019 05:02 AM    Triglyceride 95 03/01/2019 08:41 AM    Triglyceride 194 (H) 01/18/2019 06:00 AM    Triglyceride 199 (H) 11/30/2018 10:38 AM    CHOL/HDL Ratio 2.3 06/10/2019 05:02 AM    CHOL/HDL Ratio 3.6 01/18/2019 06:00 AM     No results found for this or any previous visit.     Assessment:         Patient Active Problem List    Diagnosis Date Noted    Diabetic peripheral neuropathy associated with type 2 diabetes mellitus ([facility] [facility] 06/10/2019    Bilateral carotid artery stenosis 18/21/1448    Embolic stroke involving left middle cerebral artery ([facility] 06/10/2019    [facility] 06/09/2019    Paroxysmal atrial fibrillation ([facility] [address] [facility] 04/16/2019    Dyslipidemia 04/16/2019    Cerebrovascular accident (CVA) due to thrombosis of right middle cerebral artery ([facility] [facility] 01/18/2019    Acute ischemic cerebrovascular accident (CVA) involving right middle cerebral artery territory ([facility] [facility] 01/17/2019    Obesity, morbid ([address] 12/26/2017    Type 2 diabetes mellitus with nephropathy ([facility] 12/26/2017    Type 2 diabetes mellitus with hyperglycemia, with long-term current use of insulin ([facility] 04/03/2017    Psoriasis 08/11/2014    Essential hypertension, benign 08/11/2014    Rosacea 08/11/2014    Heart murmur 08/11/2014      Hx DM II, diabetic nephropathy, hypertension, dyslipidemia, CKD 3, obesity, with acute R MCA CVA 1/19. W/u with Echo/doppler 1/1/8/19 with mild to mod LVH, LVEF 65-70, normal atrial sizes, mild MR. Head CT/MRI/MRA with multiple sites of infarct R temporo-occipital, R parietal, R cerebellar, coronoradiatal  C/w embolic events. Subsequent Loop/Event monitor x 2 weeks showed NSR with episodes of paroxysmal afib/RVR. Seen in f/u by Dr. Doug Fernández and started on Eliquis 5mg bid  (CHADs2-VASC = 6--DM, hypertension, female, >65, prior CVA (CVA 6.9%/IE, embolic/CVA/TIA 45.1%/PK). Hospitalized in June 2019 with acute CVA. Unfortunately, the confusion with the patients medications by pt/ which stopped Eliquis vs stopping ASA likely culprit of current CVA. This is not considered a failure of treatment as patient was not actually on Eliquis since 4/16/19. On BB and amlodipine       Plan:     PAF with recurrent CVA, unfortunately had stopped eliquis due to confusion (actually told to stop ASA previously and continue Eliquis)--will need long term anticoagulation, and given recurrent CVA, ASA 81 in addition. Lipids and labs followed by PCP. Continue current care and f/u in 6 months.     Flora Alvarez MD

## 2020-01-13 PROBLEM — N18.30 CKD (CHRONIC KIDNEY DISEASE) STAGE 3, GFR 30-59 ML/MIN (HCC): Status: ACTIVE | Noted: 2020-01-13

## 2020-03-11 DIAGNOSIS — I10 ESSENTIAL HYPERTENSION, BENIGN: ICD-10-CM

## 2020-03-11 RX ORDER — METOPROLOL SUCCINATE 50 MG/1
50 TABLET, EXTENDED RELEASE ORAL 2 TIMES DAILY
Qty: 180 TAB | Refills: 3 | Status: SHIPPED | OUTPATIENT
Start: 2020-03-11 | End: 2021-05-25 | Stop reason: SDUPTHER

## 2020-04-28 ENCOUNTER — VIRTUAL VISIT (OUTPATIENT)
Dept: CARDIOLOGY CLINIC | Age: 70
End: 2020-04-28

## 2020-04-28 DIAGNOSIS — E11.21 TYPE 2 DIABETES MELLITUS WITH NEPHROPATHY (HCC): ICD-10-CM

## 2020-04-28 DIAGNOSIS — I63.412 EMBOLIC STROKE INVOLVING LEFT MIDDLE CEREBRAL ARTERY (HCC): ICD-10-CM

## 2020-04-28 DIAGNOSIS — I65.23 BILATERAL CAROTID ARTERY STENOSIS: ICD-10-CM

## 2020-04-28 DIAGNOSIS — I48.0 PAROXYSMAL ATRIAL FIBRILLATION (HCC): Primary | ICD-10-CM

## 2020-04-28 DIAGNOSIS — N18.30 CKD (CHRONIC KIDNEY DISEASE) STAGE 3, GFR 30-59 ML/MIN (HCC): ICD-10-CM

## 2020-04-28 DIAGNOSIS — E78.5 DYSLIPIDEMIA: ICD-10-CM

## 2020-04-28 DIAGNOSIS — I10 ESSENTIAL HYPERTENSION, BENIGN: ICD-10-CM

## 2020-04-28 RX ORDER — INSULIN GLARGINE 100 [IU]/ML
40 INJECTION, SOLUTION SUBCUTANEOUS
COMMUNITY
Start: 2020-03-18 | End: 2021-11-08 | Stop reason: SDUPTHER

## 2020-04-28 NOTE — PROGRESS NOTES
The patient was seen by me today by synchronous (real-time) audio-video technology. Sarah Kilgore is a 68 y.o. female is here for routine f/u.    Hx DM II, diabetic nephropathy, hypertension, dyslipidemia, CKD 3, obesity, with acute R MCA CVA 1/19.  W/u with Echo/doppler 1/1/8/19 with mild to mod LVH, LVEF 65-70, normal atrial sizes, mild MR. Head CT/MRI/MRA with multiple sites of infarct R temporo-occipital, R parietal, R cerebellar, coronoradiatal  C/w embolic events.  Subsequent Loop/Event monitor x 2 weeks showed NSR with episodes of paroxysmal afib/RVR.  Seen in f/u by Dr. Rosalie Warren and started on Eliquis 5mg bid  (CHADs2-VASC = 6--DM, hypertension, female, >65, prior CVA (CVA 3.5%/MD, embolic/CVA/TIA 94.0%/QP). Hospitalized in June 2019 with acute CVA. Unfortunately, the confusion with the patients medications by pt/ which stopped Eliquis vs stopping ASA likely culprit of current CVA.  This is not considered a failure of treatment as patient was not actually on Eliquis since 4/16/19. On BB and amlodipine. Continues to see PCP--last OV 3/4/20 with EKG 3/9/20 (NSR, PRWP, NST, HR 61). The patient denies chest pain/ shortness of breath, orthopnea, PND, LE edema, palpitations, syncope, presyncope or fatigue.        Patient Active Problem List    Diagnosis Date Noted    CKD (chronic kidney disease) stage 3, GFR 30-59 ml/min ([address] 01/13/2020    Diabetic peripheral neuropathy associated with type 2 diabetes mellitus ([address] 06/10/2019    Bilateral carotid artery stenosis 81/05/8760    Embolic stroke involving left middle cerebral artery ([address] 06/10/2019    CVA (cerebral vascular accident) ([address] 06/09/2019    Paroxysmal atrial fibrillation ([address] 04/16/2019    Dyslipidemia 04/16/2019    Cerebrovascular accident (CVA) due to thrombosis of right middle cerebral artery ([address] 01/18/2019    Acute ischemic cerebrovascular accident (CVA) involving right middle cerebral artery territory ([facility] 01/17/2019    Obesity, morbid ([facility] 12/26/2017    Type 2 diabetes mellitus with nephropathy ([facility] 12/26/2017    Type 2 diabetes mellitus with hyperglycemia, with long-term current use of insulin ([facility] 04/03/2017    Psoriasis 08/11/2014    Essential hypertension, benign 08/11/2014    Rosacea 08/11/2014    Heart murmur 08/11/2014      Carlin De La Garza NP  Past Medical History:   Diagnosis Date    Diabetes ([facility]     Hypertension     Murmur     Osteopenia     Psoriasis     Rosacea     Stroke ([facility]     Vitamin D deficiency       Past Surgical History:   Procedure Laterality Date    COLONOSCOPY N/A 4/4/2018    COLONOSCOPY   DIABETIC performed by Mars Faustin MD at [facility] [address] HX COLONOSCOPY  2018    3 polyps    HX TONSILLECTOMY       Allergies   Allergen Reactions    Pcn [Penicillins] Rash    Sulfa (Sulfonamide Antibiotics) Unknown (comments)      Family History   Problem Relation Age of Onset    Diabetes Mother     Heart Disease Father     No Known Problems Child       Social History     Socioeconomic History    Marital status:      Spouse name: Not on file    Number of children: Not on file    Years of education: Not on file    Highest education level: Not on file   Occupational History    Not on file   Social Needs    Financial resource strain: Not on file    Food insecurity     Worry: Not on file     Inability: Not on file    Transportation needs     Medical: Not on file     Non-medical: Not on file   Tobacco Use    Smoking status: Never Smoker    Smokeless tobacco: Never Used   Substance and Sexual Activity    Alcohol use:  Yes     Alcohol/week: 0.0 standard drinks     Comment: 1/couple months    Drug use: Not on file    Sexual activity: Not on file   Lifestyle    Physical activity     Days per week: Not on file     Minutes per session: Not on file    Stress: Not on file   Relationships    Social connections     Talks on phone: Not on file     Gets together: Not on file     Attends Islam service: Not on file     Active member of club or organization: Not on file     Attends meetings of clubs or organizations: Not on file     Relationship status: Not on file    Intimate partner violence     Fear of current or ex partner: Not on file     Emotionally abused: Not on file     Physically abused: Not on file     Forced sexual activity: Not on file   Other Topics Concern    Not on file   Social History Narrative    Not on file      Current Outpatient Medications   Medication Sig    Basaglar KwikPen U-100 Insulin 100 unit/mL (3 mL) inpn 40 Units by SubCUTAneous route every morning.  glucose blood VI test strips (ASCENSIA AUTODISC VI, ONE TOUCH ULTRA TEST VI) strip Check glucose twice daily.  apixaban (Eliquis) 5 mg tablet TAKE 1 TABLET BY MOUTH TWICE A DAY    metoprolol succinate (TOPROL-XL) 50 mg XL tablet Take 1 Tab by mouth two (2) times a day.  metFORMIN (GLUCOPHAGE) 850 mg tablet TAKE 1 TABLET ONCE DAILY  WITH MEAL    hydroCHLOROthiazide (HYDRODIURIL) 12.5 mg tablet Take 2 Tabs by mouth daily.  ergocalciferol (ERGOCALCIFEROL) 1,250 mcg (50,000 unit) capsule Take 1 Cap by mouth every Monday and Thursday.  atorvastatin (LIPITOR) 80 mg tablet TAKE 1 TABLET NIGHTLY    TRULICITY 1.5 HY/3.5 mL sub-q pen INJECT 0.5ML (=1.5 MG)     SUBCUTANEOUSLY EVERY 7     DAYS    multivitamin (ONE A DAY) tablet Take 1 Tab by mouth daily.  amLODIPine (NORVASC) 10 mg tablet Take 1 Tab by mouth daily.  aspirin 81 mg chewable tablet Take 81 mg by mouth daily.  quinapril (ACCUPRIL) 40 mg tablet TAKE 1 TABLET TWICE A DAY (Patient taking differently: Take 40 mg by mouth daily. TAKE 1 TABLET TWICE A DAY)    Insulin Needles, Disposable, (EASY COMFORT PEN NEEDLES) 31 gauge x 5/16\" ndle Check BS BID    Blood-Glucose Meter monitoring kit Check blood sugar twice daily.     tobramycin-dexamethasone (TOBRADEX) ophthalmic suspension INSTILL 1 DROP IN BOTH EYES 4 TIMES A DAY STARTING (03/15/2020)    prednisoLONE acetate (PRED FORTE) 1 % ophthalmic suspension INSTILL 1 DROP IN BOTH EYES 4 TIMES A DAY (STARTING 03/15/2020)    insulin detemir U-100 (Levemir U-100 Insulin) 100 unit/mL injection 40 Units by SubCUTAneous route daily. No current facility-administered medications for this visit. Review of Symptoms:    CONST  No weight change. No fever, chills, sweats    ENT No visual changes, URI sx, sore throat    CV  See HPI   RESP  No cough, or sputum, wheezing. Also see HPI   GI  No abdominal pain or change in bowel habits. No heartburn or dysphagia. No melena or rectal bleeding.   No dysuria, urgency, frequency, hematuria   MSKEL  No joint pain, swelling. No muscle pain. SKIN  No rash or lesions. NEURO  No headache, syncope, or seizure. No weakness, loss of sensation, or paresthesias. PSYCH  No low mood or depression  No anxiety. HE/LYMPH  No easy bruising, abnormal bleeding, or enlarged glands. General PE  Gen:  NAD  Mental Status - Alert. General Appearance - Not in acute distress. HEENT:  PER, EOM, no JVD  Chest and Lung Exam   Inspection: Accessory muscles - No use of accessory muscles in breathing. No audible wheezing. Normal effort in breathing. Symmetric excursion. Cardiovascular:  No JVD  Upper Extremity: Inspection - Bilateral - No Cyanotic nailbeds or Digital clubbing. Lower Extremity:   Palpation: Edema - Bilateral - No edema. Neuro: A&O times 3, CN and motor grossly WNL  Skin: no rashes or lesions on exposed skin, dry, intact  Psych: normal mood, affect. Speech clear.       Labs:   Lab Results   Component Value Date/Time    Sodium 139 03/04/2020 08:23 AM    Sodium 141 01/13/2020 11:07 AM    Sodium 142 10/14/2019 09:38 AM    Sodium 140 07/08/2019 03:52 PM    Sodium 138 06/25/2019 07:02 AM    Potassium 4.5 03/04/2020 08:23 AM    Potassium 4.3 01/13/2020 11:07 AM    Potassium 4.6 10/14/2019 09:38 AM Potassium 4.3 07/08/2019 03:52 PM    Potassium 4.1 06/25/2019 07:02 AM    Chloride 99 03/04/2020 08:23 AM    Chloride 103 01/13/2020 11:07 AM    Chloride 99 10/14/2019 09:38 AM    Chloride 102 07/08/2019 03:52 PM    Chloride 105 06/25/2019 07:02 AM    CO2 25 03/04/2020 08:23 AM    CO2 22 01/13/2020 11:07 AM    CO2 25 10/14/2019 09:38 AM    CO2 26 07/08/2019 03:52 PM    CO2 28 06/25/2019 07:02 AM    Anion gap 5 06/25/2019 07:02 AM    Anion gap 6 06/23/2019 06:40 AM    Anion gap 5 06/19/2019 05:13 AM    Anion gap 4 (L) 06/17/2019 04:13 AM    Anion gap 2 (L) 06/15/2019 05:08 AM    Glucose 217 (H) 03/04/2020 08:23 AM    Glucose 88 01/13/2020 11:07 AM    Glucose 129 (H) 10/14/2019 09:38 AM    Glucose 186 (H) 07/08/2019 03:52 PM    Glucose 97 06/25/2019 07:02 AM    BUN 26 03/04/2020 08:23 AM    BUN 22 01/13/2020 11:07 AM    BUN 34 (H) 10/14/2019 09:38 AM    BUN 24 07/08/2019 03:52 PM    BUN 23 (H) 06/25/2019 07:02 AM    Creatinine 1.28 (H) 03/04/2020 08:23 AM    Creatinine 1.07 (H) 01/13/2020 11:07 AM    Creatinine 1.30 (H) 10/14/2019 09:38 AM    Creatinine 1.00 07/08/2019 03:52 PM    Creatinine 1.23 (H) 06/25/2019 07:02 AM    BUN/Creatinine ratio 20 03/04/2020 08:23 AM    BUN/Creatinine ratio 21 01/13/2020 11:07 AM    BUN/Creatinine ratio 26 10/14/2019 09:38 AM    BUN/Creatinine ratio 24 07/08/2019 03:52 PM    BUN/Creatinine ratio 19 06/25/2019 07:02 AM    GFR est AA 49 (L) 03/04/2020 08:23 AM    GFR est AA 60 01/13/2020 11:07 AM    GFR est AA 48 (L) 10/14/2019 09:38 AM    GFR est AA 66 07/08/2019 03:52 PM    GFR est AA 52 (L) 06/25/2019 07:02 AM    GFR est non-AA 42 (L) 03/04/2020 08:23 AM    GFR est non-AA 52 (L) 01/13/2020 11:07 AM    GFR est non-AA 42 (L) 10/14/2019 09:38 AM    GFR est non-AA 57 (L) 07/08/2019 03:52 PM    GFR est non-AA 43 (L) 06/25/2019 07:02 AM    Calcium 9.4 03/04/2020 08:23 AM    Calcium 9.4 01/13/2020 11:07 AM    Calcium 9.9 10/14/2019 09:38 AM    Calcium 9.2 07/08/2019 03:52 PM    Calcium 8.9 06/25/2019 07:02 AM    Bilirubin, total 0.3 03/04/2020 08:23 AM    Bilirubin, total 0.3 01/13/2020 11:07 AM    Bilirubin, total 0.2 10/14/2019 09:38 AM    Bilirubin, total 0.2 07/08/2019 03:52 PM    Bilirubin, total 0.4 06/15/2019 05:08 AM    AST (SGOT) 25 03/04/2020 08:23 AM    AST (SGOT) 28 01/13/2020 11:07 AM    AST (SGOT) 28 10/14/2019 09:38 AM    AST (SGOT) 24 07/08/2019 03:52 PM    AST (SGOT) 19 06/15/2019 05:08 AM    Alk. phosphatase 103 03/04/2020 08:23 AM    Alk. phosphatase 88 01/13/2020 11:07 AM    Alk. phosphatase 88 10/14/2019 09:38 AM    Alk. phosphatase 83 07/08/2019 03:52 PM    Alk.  phosphatase 105 06/15/2019 05:08 AM    Protein, total 6.8 03/04/2020 08:23 AM    Protein, total 6.6 01/13/2020 11:07 AM    Protein, total 6.8 10/14/2019 09:38 AM    Protein, total 6.5 07/08/2019 03:52 PM    Protein, total 6.2 (L) 06/15/2019 05:08 AM    Albumin 3.1 (L) 03/04/2020 08:23 AM    Albumin 3.4 (L) 01/13/2020 11:07 AM    Albumin 3.5 10/14/2019 09:38 AM    Albumin 3.2 (L) 07/08/2019 03:52 PM    Albumin 2.2 (L) 06/15/2019 05:08 AM    Globulin 4.0 06/15/2019 05:08 AM    Globulin 4.6 (H) 06/10/2019 05:02 AM    Globulin 4.5 (H) 06/09/2019 02:46 PM    Globulin 4.8 (H) 01/17/2019 10:25 PM    A-G Ratio 0.8 (L) 03/04/2020 08:23 AM    A-G Ratio 1.1 (L) 01/13/2020 11:07 AM    A-G Ratio 1.1 (L) 10/14/2019 09:38 AM    A-G Ratio 1.0 (L) 07/08/2019 03:52 PM    A-G Ratio 0.6 (L) 06/15/2019 05:08 AM    ALT (SGPT) 22 03/04/2020 08:23 AM    ALT (SGPT) 26 01/13/2020 11:07 AM    ALT (SGPT) 32 10/14/2019 09:38 AM    ALT (SGPT) 24 07/08/2019 03:52 PM    ALT (SGPT) 22 06/15/2019 05:08 AM     Lab Results   Component Value Date/Time    CK 51 01/17/2019 10:25 PM     Lab Results   Component Value Date/Time    Cholesterol, total 131 01/13/2020 11:07 AM    Cholesterol, total 141 10/14/2019 09:38 AM    Cholesterol, total 136 06/10/2019 05:02 AM    Cholesterol, total 119 03/01/2019 08:41 AM    Cholesterol, total 204 (H) 01/18/2019 06:00 AM HDL Cholesterol 59 01/13/2020 11:07 AM    HDL Cholesterol 52 10/14/2019 09:38 AM    HDL Cholesterol 59 06/10/2019 05:02 AM    HDL Cholesterol 62 03/01/2019 08:41 AM    HDL Cholesterol 57 01/18/2019 06:00 AM    LDL, calculated 53 01/13/2020 11:07 AM    LDL, calculated 59 10/14/2019 09:38 AM    LDL, calculated 54.8 06/10/2019 05:02 AM    LDL, calculated 38 03/01/2019 08:41 AM    LDL, calculated 108.2 (H) 01/18/2019 06:00 AM    Triglyceride 94 01/13/2020 11:07 AM    Triglyceride 149 10/14/2019 09:38 AM    Triglyceride 111 06/10/2019 05:02 AM    Triglyceride 95 03/01/2019 08:41 AM    Triglyceride 194 (H) 01/18/2019 06:00 AM    CHOL/HDL Ratio 2.3 06/10/2019 05:02 AM    CHOL/HDL Ratio 3.6 01/18/2019 06:00 AM     No results found for this or any previous visit.     Assessment:         Patient Active Problem List    Diagnosis Date Noted    CKD (chronic kidney disease) stage 3, GFR 30-59 ml/min ([address] 01/13/2020    Diabetic peripheral neuropathy associated with type 2 diabetes mellitus ([address] 06/10/2019    Bilateral carotid artery stenosis 53/22/3766    Embolic stroke involving left middle cerebral artery ([address] 06/10/2019    CVA (cerebral vascular accident) ([address] 06/09/2019    Paroxysmal atrial fibrillation ([address] 04/16/2019    Dyslipidemia 04/16/2019    Cerebrovascular accident (CVA) due to thrombosis of right middle cerebral artery ([address] 01/18/2019    Acute ischemic cerebrovascular accident (CVA) involving right middle cerebral artery territory ([address] 01/17/2019    Obesity, morbid ([address] 12/26/2017    Type 2 diabetes mellitus with nephropathy ([address] 12/26/2017    Type 2 diabetes mellitus with hyperglycemia, with long-term current use of insulin ([address] 04/03/2017    Psoriasis 08/11/2014    Essential hypertension, benign 08/11/2014    Rosacea 08/11/2014    Heart murmur 08/11/2014      Hx DM II, diabetic nephropathy, hypertension, dyslipidemia, CKD 3, obesity, with acute R MCA CVA 1/19.  W/u with Echo/doppler 1/1/8/19 with mild to mod LVH, LVEF 65-70, normal atrial sizes, mild MR. Head CT/MRI/MRA with multiple sites of infarct R temporo-occipital, R parietal, R cerebellar, coronoradiatal  C/w embolic events.  Subsequent Loop/Event monitor x 2 weeks showed NSR with episodes of paroxysmal afib/RVR.  Seen in f/u by Dr. Nayan Muse and started on Eliquis 5mg bid  (CHADs2-VASC = 6--DM, hypertension, female, >65, prior CVA (CVA 5.6%/JJ, embolic/CVA/TIA 67.5%/SM). Hospitalized in June 2019 with acute CVA. Unfortunately, the confusion with the patients medications by pt/ which stopped Eliquis vs stopping ASA likely culprit of current CVA.  This is not considered a failure of treatment as patient was not actually on Eliquis since 4/16/19. On BB and amlodipine. Continues to see PCP--last OV 3/4/20 with EKG 3/9/20 (NSR, PRWP, NST, HR 61). Plan:     Doing well with no adverse cardiac symptoms. Lipids and labs followed by PCP. Continue current care and f/u in 6 months. Kat Plata MD    Pursuant to the emergency declaration under the [facility] waiver authority and the [employer] Act, this Virtual Visit was conducted, with patient's consent, to reduce the patient's risk of exposure to COVID-19 and provide continuity of care for an established patient. Services were provided through a video synchronous discussion virtually to substitute for in-person visit.

## 2020-04-28 NOTE — PROGRESS NOTES
Verified patient with two patient identifiers. Medications reviewed/approved by Dr. Myrna Joseph. 1. Have you been to the ER, urgent care clinic since your last visit? Hospitalized since your last visit?no    2. Have you seen or consulted any other health care providers outside of the [address] [place] [address] since your last visit? Include any pap smears or colon screening. No    Chief Complaint   Patient presents with    Irregular Heart Beat     Follow up    Stroke    Hypertension    Cholesterol Problem     no vs equipment but scale (172 LB reported by pt). No pain - reported by the pt.

## 2020-05-12 PROBLEM — H25.12 AGE-RELATED NUCLEAR CATARACT, LEFT EYE: Chronic | Status: ACTIVE | Noted: 2020-05-12

## 2020-05-13 PROBLEM — H25.12 AGE-RELATED NUCLEAR CATARACT, LEFT EYE: Chronic | Status: RESOLVED | Noted: 2020-05-12 | Resolved: 2020-05-13

## 2020-05-16 ENCOUNTER — PATIENT MESSAGE (OUTPATIENT)
Dept: FAMILY MEDICINE CLINIC | Age: 70
End: 2020-05-16

## 2020-05-18 RX ORDER — ERGOCALCIFEROL 1.25 MG/1
50000 CAPSULE ORAL
Qty: 24 CAP | Refills: 2 | Status: SHIPPED | OUTPATIENT
Start: 2020-05-18 | End: 2020-08-10 | Stop reason: SDUPTHER

## 2020-05-18 RX ORDER — AMLODIPINE BESYLATE 10 MG/1
10 TABLET ORAL DAILY
Qty: 90 TAB | Refills: 1 | Status: SHIPPED | OUTPATIENT
Start: 2020-05-18 | End: 2020-11-02 | Stop reason: SDUPTHER

## 2020-05-18 NOTE — TELEPHONE ENCOUNTER
From: Nic Amaro  To: Denys [address] [place] [address]  Sent: 5/16/2020 4:46 PM EDT  Subject: Prescription Question    I just filled Marifer Louis pill container for this coming week (17 May thru 23 May), and she's good for this coming week. However . . .     She takes takes one (1) 10 mg tablet of Amlodipine per day, and she only has a total of seven (7) tablets left, or 1 week's supply. Also, she takes one (1) 1.25 mg capsule of Vitamine D2 per week (on Mon & Thr), and she has only ONE (1) capsule left or not enough for a whole week. Could you either call [employer] or [employer] [facility] to get us a resupply?      Thanks,  Lisandra Peraza (her )

## 2020-05-19 PROBLEM — H25.11 AGE-RELATED NUCLEAR CATARACT, RIGHT EYE: Chronic | Status: ACTIVE | Noted: 2020-05-19

## 2020-05-20 PROBLEM — H25.11 AGE-RELATED NUCLEAR CATARACT, RIGHT EYE: Chronic | Status: RESOLVED | Noted: 2020-05-19 | Resolved: 2020-05-20

## 2020-05-27 ENCOUNTER — PATIENT MESSAGE (OUTPATIENT)
Dept: FAMILY MEDICINE CLINIC | Age: 70
End: 2020-05-27

## 2020-05-27 NOTE — TELEPHONE ENCOUNTER
Called and s/w patient's  and discuss this issue. Please see his chart for further details. Mode Keys   MRN: [id number]    Department:  [facility] Emergency Department   Date of Visit:  10/1/2017           Disclosure     Insurance plans vary and the physician(s) referred by the ER may not be covered by your plan.  Please contact you If you have been prescribed any medication(s), please fill your prescription right away and begin taking the medication(s) as directed    If the emergency physician has read X-rays, these will be re-interpreted by a radiologist.  If there is a significant

## 2020-05-27 NOTE — TELEPHONE ENCOUNTER
From: Ramonita Leyva  To: [facility] [address] [place] [address]  Sent: 5/27/2020 1:16 PM EDT  Subject: Non-Urgent Medical Question    I am Gina Epley,  of Diane Cutler. Both of us are patients, but this question concerns me. I telephoned a bit ago, but after sitting on hold for about 10 minutes, I gave up. We had a \"Virtual Health Assessment Meeting\" from a doctor with [facility] this morning. One of the things that he mentioned was that your records indicate that I am being shown as diabetic, based upon a blood test on 10 Apr 2019. This is certainly news to me. I've NEVER been told that I am diabetic. Would you please sort that out? Also, I tried to sign myself up for [employer] this morning, but after two tries resulting in  problems, I gave up. Could you please report that to someone? Thanks so much,   Gina Epley, [phone].

## 2020-07-11 ENCOUNTER — PATIENT MESSAGE (OUTPATIENT)
Dept: FAMILY MEDICINE CLINIC | Age: 70
End: 2020-07-11

## 2020-07-13 NOTE — TELEPHONE ENCOUNTER
From: Delilah Farias  To: Denys [address] [place] [address]  Sent: 7/11/2020 4:23 PM EDT  Subject: Non-Urgent Medical Question    I am Chris Bachles . She takes two 5mg tablets of Eliquis a day, one in the morning and one at night. I just filled up her pillbox for the week starting tomorrow (Sun 12 Jul). She has enough Eliquis tablets for this coming week, and the following week (Sun 19 Jul - 25 Jul), but that's all.  She will need a resupply after 25 Jul.     Thank you,  Bay Craig

## 2020-08-08 ENCOUNTER — PATIENT MESSAGE (OUTPATIENT)
Dept: FAMILY MEDICINE CLINIC | Age: 70
End: 2020-08-08

## 2020-08-10 NOTE — TELEPHONE ENCOUNTER
From: Germaine Marie  To: Denys [address] [place] [address]  Sent: 8/8/2020 4:16 PM EDT  Subject: Prescription Question    Citlalli Salas takes 2 capsules of Vitamin D2, 40589 units, per week, one on Monday and one Thursday. She has enough to get her through the coming week (9 Aug - 15 Aug), but only has one capsule after that - NOT enough to get her through the following week. Could you please contact the pharmacy ([facility]) to get us another supply of capsules of Vitamin D2, 34176 units, or tell me if there is some OTC replacement.      Thank Marilee Alcocer  and caretaker

## 2020-08-11 RX ORDER — ERGOCALCIFEROL 1.25 MG/1
50000 CAPSULE ORAL
Qty: 24 CAP | Refills: 2 | Status: SHIPPED | OUTPATIENT
Start: 2020-08-13 | End: 2020-11-02 | Stop reason: SDUPTHER

## 2020-09-17 PROBLEM — R79.89 LOW TSH LEVEL: Status: ACTIVE | Noted: 2020-09-17

## 2020-10-31 ENCOUNTER — PATIENT MESSAGE (OUTPATIENT)
Dept: FAMILY MEDICINE CLINIC | Age: 70
End: 2020-10-31

## 2020-11-02 NOTE — TELEPHONE ENCOUNTER
From: Brandon Bui  To: Denys [address] [place] [address]  Sent: 10/31/2020 5:07 PM EDT  Subject: Prescription Question    I am Bette Briscoe  and caregiver. Chemo Aldana takes Vitamin D2 capsules, 50,000 units twice a week. She has enough to last this coming week, but not enough for more than that. She also takes Amlodipine Besylate tablets, 10 mg, every day. She has enough for this coming week, and enough for the following two weeks, but none beyond that. Could you please get with [facility] to have these prescriptions refilled?      Thank you very much,  Charlette Jimenez

## 2020-11-03 RX ORDER — ERGOCALCIFEROL 1.25 MG/1
50000 CAPSULE ORAL
Qty: 24 CAP | Refills: 2 | Status: SHIPPED | OUTPATIENT
Start: 2020-11-05 | End: 2021-01-18 | Stop reason: SDUPTHER

## 2020-11-03 RX ORDER — AMLODIPINE BESYLATE 10 MG/1
10 TABLET ORAL DAILY
Qty: 90 TAB | Refills: 1 | Status: SHIPPED | OUTPATIENT
Start: 2020-11-03 | End: 2021-05-17 | Stop reason: SDUPTHER

## 2021-01-09 ENCOUNTER — PATIENT MESSAGE (OUTPATIENT)
Dept: FAMILY MEDICINE CLINIC | Age: 71
End: 2021-01-09

## 2021-01-11 NOTE — TELEPHONE ENCOUNTER
From: Sandra Andrews  To: Denys [address] [place] [address]  Sent: 1/9/2021 3:54 PM EST  Subject: Prescription Question    Tonja Spurling takes two (2) Eliquis 5mg tablets per day, one in the morning, and one in the evening. Currently, she has enough for this week (11 Jan through 16 Jan), plus next week (17 Jan - 23 Jan). She does NOT have enough for the week after that (24 Jan - 30 Jan). Could you please notify [facility] that we will need a new shipment of Eliquis 5mg tablets within the next two weeks?      Thanks so much,  Laura Levine Gisela's  and caregiver

## 2021-01-13 ENCOUNTER — VIRTUAL VISIT (OUTPATIENT)
Dept: ENDOCRINOLOGY | Age: 71
End: 2021-01-13
Payer: MEDICARE

## 2021-01-13 DIAGNOSIS — R79.89 LOW TSH LEVEL: Primary | ICD-10-CM

## 2021-01-13 DIAGNOSIS — E05.90 HYPERTHYROIDISM: ICD-10-CM

## 2021-01-13 PROCEDURE — G8432 DEP SCR NOT DOC, RNG: HCPCS | Performed by: INTERNAL MEDICINE

## 2021-01-13 PROCEDURE — G8756 NO BP MEASURE DOC: HCPCS | Performed by: INTERNAL MEDICINE

## 2021-01-13 PROCEDURE — 1090F PRES/ABSN URINE INCON ASSESS: CPT | Performed by: INTERNAL MEDICINE

## 2021-01-13 PROCEDURE — G8399 PT W/DXA RESULTS DOCUMENT: HCPCS | Performed by: INTERNAL MEDICINE

## 2021-01-13 PROCEDURE — 1101F PT FALLS ASSESS-DOCD LE1/YR: CPT | Performed by: INTERNAL MEDICINE

## 2021-01-13 PROCEDURE — 99204 OFFICE O/P NEW MOD 45 MIN: CPT | Performed by: INTERNAL MEDICINE

## 2021-01-13 PROCEDURE — G8427 DOCREV CUR MEDS BY ELIG CLIN: HCPCS | Performed by: INTERNAL MEDICINE

## 2021-01-13 PROCEDURE — 3017F COLORECTAL CA SCREEN DOC REV: CPT | Performed by: INTERNAL MEDICINE

## 2021-01-13 NOTE — PROGRESS NOTES
**DUE TO PANDEMIC AND HEALTH CONCERNS IN THE COMMUNITY, THIS PATIENT WAS EITHER ILL OR FOUND TO BE HIGH RISK FOR IN-PERSON EVALUATION WITHIN THE CLINIC. THE FOLLOWING IS A VIRTUAL TELEMEDICINE VIDEO ENCOUNTER VIA [employer], TO WHICH THE PATIENT AGREED. THE PURPOSE IS TO LIMIT INTERRUPTIONS IN HEALTHCARE AND TO PROVIDE FOR ONGOING URGENT NEEDS UNDER THE CURRENT CONDITIONS. CONSULTATION REQUESTED BY: Teresa Lou NP     REASON FOR CONSULT: Evaluation of hyperthyroidism    CHIEF COMPLAINT: Hyperthyroidism / low TSH    HISTORY OF PRESENT ILLNESS:   Yola Martinez is a 65 y.o. female with a PMHx as noted below who was referred to our endocrinology clinic for evaluation of Hyperthyroidism / Low TSH    Hx also provided by her son who is present with her today,  Over the past year, patients TSH has trended down gradually,   See lab trend as listed below,  Patient has no personal history of prior thyroid disorders. Notably family history for thyroid disorders is negative per her,  Patient denies recent illness. She did use prednisone in the past, long ago, not used again,  No opioid use,  Patient denies palpitations, tremors. Note that she is on metoprolol,   * Admits to hx of atrial fibrillation  She denies weight loss. Instead reports weight gain,  Blood pressure and heart rate reportedly on the higher side for years,  Patient has no further concerns and is hoping to learn what is causing this thyroid dysfunction.     Review of most recent thyroid function:  Lab Results   Component Value Date    TSH 0.236 (L) 12/17/2020    TSH 0.315 (L) 09/15/2020    TSH 0.508 07/08/2019    FT4 1.25 10/08/2020    FT4 1.19 09/15/2020    FT4 1.1 06/10/2019      Thyroid Lab Key:  TSILT = Thyroid stimulating antibodies  TRALT = TSH Receptor Antibodies  TMCLT = TPO antibodies  T3LT = Total T3 levels  364985 = Direct FT4  [id number] = Free T3    PAST MEDICAL/SURGICAL HISTORY:   Past Medical History:   Diagnosis Date    Diabetes ([address]     Hypertension     Murmur     Osteopenia     Psoriasis     Rosacea     Stroke ([facility]     Vitamin D deficiency      Past Surgical History:   Procedure Laterality Date    COLONOSCOPY N/A 4/4/2018    COLONOSCOPY   DIABET[facility] T[facility]  [facility]      HX COLONOSCOPY  2018    3 polyps    HX TONSILLECTOMY         ALLERGIES:   Allergies   Allergen Reactions    Pcn [Penicillins] Rash    Sulfa (Sulfonamide Antibiotics) Unknown (comments)       MEDICATIONS ON ADMISSION:     Current Outpatient Medications:     apixaban (Eliquis) 5 mg tablet, TAKE 1 TABLET BY MOUTH TWICE A DAY, Disp: 180 Tab, Rfl: 1    metFORMIN (GLUCOPHAGE) 1,000 mg tablet, TAKE 1/2 TABLET TWICE DAILY  WITH MEALS, Disp: 180 Tab, Rfl: 0    dulaglutide (Trulicity) 1.5 OD/0.5 mL sub-q pen, INJECT 0.5ML (=1.5MG)      SUBCUTANEOUSLY EVERY 7 DAYS, Disp: 6 mL, Rfl: 0    quinapriL (ACCUPRIL) 40 mg tablet, TAKE 1 TABLET TWICE A DAY (Patient taking differently: daily.), Disp: 180 Tab, Rfl: 1    ergocalciferol (ERGOCALCIFEROL) 1,250 mcg (50,000 unit) capsule, Take 1 Cap by mouth every Monday and Thursday. , Disp: 24 Cap, Rfl: 2    amLODIPine (NORVASC) 10 mg tablet, Take 1 Tab by mouth daily. , Disp: 90 Tab, Rfl: 1    hydroCHLOROthiazide (HYDRODIURIL) 25 mg tablet, Take 1 Tab by mouth daily. , Disp: 90 Tab, Rfl: 1    atorvastatin (LIPITOR) 80 mg tablet, TAKE 1 TABLET NIGHTLY, Disp: 90 Tab, Rfl: 2    Basaglar KwikPen U-100 Insulin 100 unit/mL (3 mL) inpn, 40 Units by SubCUTAneous route every morning., Disp: , Rfl:     metoprolol succinate (TOPROL-XL) 50 mg XL tablet, Take 1 Tab by mouth two (2) times a day., Disp: 180 Tab, Rfl: 3    multivitamin (ONE A DAY) tablet, Take 1 Tab by mouth daily. , Disp: , Rfl:     aspirin 81 mg chewable tablet, Take 81 mg by mouth daily. , Disp: , Rfl:     Insulin Needles, Disposable, 31 gauge x 5/16\" ndle, Use as directed, Disp: 100 Pen Needle, Rfl: 5    glucose blood VI test strips (ASCENSIA AUTODISC VI, ONE TOUCH ULTRA TEST VI) strip, Check glucose twice daily. , Disp: 180 Strip, Rfl: 3    Blood-Glucose Meter monitoring kit, Check blood sugar twice daily. , Disp: 1 Kit, Rfl: 0    SOCIAL HISTORY:   Social History     Socioeconomic History    Marital status:      Spouse name: Not on file    Number of children: Not on file    Years of education: Not on file    Highest education level: Not on file   Occupational History    Not on file   Social Needs    Financial resource strain: Not on file    Food insecurity     Worry: Not on file     Inability: Not on file    Transportation needs     Medical: Not on file     Non-medical: Not on file   Tobacco Use    Smoking status: Never Smoker    Smokeless tobacco: Never Used   Substance and Sexual Activity    Alcohol use: Yes     Alcohol/week: 0.0 standard drinks     Comment: 1/couple months    Drug use: Not on file    Sexual activity: Not on file   Lifestyle    Physical activity     Days per week: Not on file     Minutes per session: Not on file    Stress: Not on file   Relationships    Social connections     Talks on phone: Not on file     Gets together: Not on file     Attends Spiritism service: Not on file     Active member of club or organization: Not on file     Attends meetings of clubs or organizations: Not on file     Relationship status: Not on file    Intimate partner violence     Fear of current or ex partner: Not on file     Emotionally abused: Not on file     Physically abused: Not on file     Forced sexual activity: Not on file   Other Topics Concern    Not on file   Social History Narrative    Not on file       FAMILY HISTORY:  Family History   Problem Relation Age of Onset    Diabetes Mother     Heart Disease Father     No Known Problems Child        REVIEW OF SYSTEMS: Complete ROS assessed and noted for that which is described above, all else are negative.   Eyes: normal  ENT: normal  CVS: normal  Resp: normal  GI: normal  : normal  GYN: normal  Endocrine: normal  Integument: normal  Musculoskeletal: normal  Neuro: normal  Psych: normal    PHYSICAL EXAMINATION:  Telemedicine Visit    GENERAL: NCAT, Appears well nourished  EYES: EOMI, non-icteric, no proptosis  Ear/Nose/Throat: NCAT, no visible inflammation or masses  CARDIOVASCULAR: no cyanosis, no visible JVD  RESPIRATORY: comfortable respirations observed, no cyanosis  MUSCULOSKELETAL: Normal ROM of upper extremities observed  SKIN: No edema, rash, or other significant changes observed  NEUROLOGIC:  AAOx3  PSYCHIATRIC: Normal affect, Normal insight and judgement      REVIEW OF LABORATORY AND RADIOLOGY DATA:   Labs and documentation have been reviewed as described above. ASSESSMENT AND PLAN:   Mojgan Barfield is a 65 y.o. female with a PMHx as noted above who was referred to our endocrinology clinic for evaluation of hyperthyroidism. Hyperthyroidism    Based on the patients symptoms and thyroid function which I have reviewed, it does suggest hyperthyroidism. We have reviewed the general causes of hyperthyroidism together which include subacute thyroiditis, graves disease, and toxic nodules. We also discussed the general workup which is may involve confirming the abnormal labs, checking for antibodies, +/- a thyroid uptake and scan, which would only be performed as needed. Additionally we discussed the different treatments for hyperthyroidism which include methimazole, GUNN treatment, and surgery, in addition to the appropriateness of each of these therapies depending on the cause with respect to achieving permanent remission of disease. * In their particular case, she has evidence for hyperthyroidism which is complicated by the presence of atrial fibrillation. She does not have symptoms however this may be due to her use of a beta blocker which would dampen or mask any associated symptoms of hyperthyroidism.  The best approach is to obtain an NM thyroid uptake/scan to better evaluate the underlying cause, and then we would subsequently consider the best treatment option for her. I spent time providing the telephone numbers needed for scheduling and I advised that they reach out to me if there are any concerns, and they can best reach me through [employer]. Today we will obtain:  TSH, FT4, TT3, Thyroid receptor antibodies  NM Thyroid uptake/scan  Continue with her metoprolol as per her provider   Will review findings and advise, and initiate therapy as appropriate     RTC April 30 at 3:10 PM,     45 minutes spent toward telemedicine visit today of which >50% of this time was spent in counseling and coordination of care. Dominick Locke.  [address] [facility]  [address] [place] [address]

## 2021-01-16 LAB
T3FREE SERPL-MCNC: 3.1 PG/ML (ref 2–4.4)
T4 FREE SERPL-MCNC: 1.38 NG/DL (ref 0.82–1.77)
THYROPEROXIDASE AB SERPL-ACNC: 14 IU/ML (ref 0–34)
TSH RECEP AB SER-ACNC: <1.1 IU/L (ref 0–1.75)
TSH SERPL DL<=0.005 MIU/L-ACNC: 0.28 UIU/ML (ref 0.45–4.5)
TSI SER-ACNC: <0.1 IU/L (ref 0–0.55)

## 2021-01-27 DIAGNOSIS — E05.90 HYPERTHYROIDISM: Primary | ICD-10-CM

## 2021-01-27 RX ORDER — METHIMAZOLE 5 MG/1
5 TABLET ORAL DAILY
Qty: 90 TAB | Refills: 3 | Status: SHIPPED | OUTPATIENT
Start: 2021-01-27 | End: 2021-03-27 | Stop reason: SDUPTHER

## 2021-01-29 ENCOUNTER — IMMUNIZATION (OUTPATIENT)
Dept: PEDIATRICS CLINIC | Age: 71
End: 2021-01-29
Payer: MEDICARE

## 2021-01-29 DIAGNOSIS — Z23 ENCOUNTER FOR IMMUNIZATION: Primary | ICD-10-CM

## 2021-01-29 PROCEDURE — 91301 COVID-19, MRNA, LNP-S, PF, 100MCG/0.5ML DOSE(MODERNA): CPT | Performed by: FAMILY MEDICINE

## 2021-01-29 PROCEDURE — 0011A PR IMM ADMN SARSCOV2 100 MCG/0.5 ML 1ST DOSE: CPT | Performed by: FAMILY MEDICINE

## 2021-02-01 ENCOUNTER — TELEPHONE (OUTPATIENT)
Dept: ENDOCRINOLOGY | Age: 71
End: 2021-02-01

## 2021-02-01 NOTE — TELEPHONE ENCOUNTER
----- Message from Lacie Oliveros sent at 2/1/2021  3:23 PM EST -----  Regarding: Dr. Jameel Munguia General Message/Vendor Calls    Caller's first and last name: Juan Phillip ([employer] [facility])      Reason for call: Regarding Rx methimazole 5mg interaction with metoprolol 50mg.        Call back required yes/no and why: Yes       Best contact number(s):  [phone] Opt 2 Ref# [id number]      Details to clarify the request:      Lacie Oliveros

## 2021-02-02 NOTE — TELEPHONE ENCOUNTER
Please advise patient we use this combination of medicine very frequently to control thyroid condition. Very unlike to have any issues.  Thank you

## 2021-02-26 ENCOUNTER — IMMUNIZATION (OUTPATIENT)
Dept: PEDIATRICS CLINIC | Age: 71
End: 2021-02-26
Payer: MEDICARE

## 2021-02-26 DIAGNOSIS — Z23 ENCOUNTER FOR IMMUNIZATION: Primary | ICD-10-CM

## 2021-02-26 PROCEDURE — 0012A COVID-19, MRNA, LNP-S, PF, 100MCG/0.5ML DOSE(MODERNA): CPT | Performed by: FAMILY MEDICINE

## 2021-02-26 PROCEDURE — 91301 COVID-19, MRNA, LNP-S, PF, 100MCG/0.5ML DOSE(MODERNA): CPT | Performed by: FAMILY MEDICINE

## 2021-03-16 ENCOUNTER — OFFICE VISIT (OUTPATIENT)
Dept: FAMILY MEDICINE CLINIC | Age: 71
End: 2021-03-16
Payer: MEDICARE

## 2021-03-16 VITALS
WEIGHT: 203.5 LBS | TEMPERATURE: 96.5 F | OXYGEN SATURATION: 98 % | HEART RATE: 74 BPM | HEIGHT: 66 IN | RESPIRATION RATE: 18 BRPM | BODY MASS INDEX: 32.71 KG/M2 | DIASTOLIC BLOOD PRESSURE: 71 MMHG | SYSTOLIC BLOOD PRESSURE: 136 MMHG

## 2021-03-16 DIAGNOSIS — N30.01 ACUTE CYSTITIS WITH HEMATURIA: ICD-10-CM

## 2021-03-16 DIAGNOSIS — Z86.73 HISTORY OF CVA (CEREBROVASCULAR ACCIDENT): ICD-10-CM

## 2021-03-16 DIAGNOSIS — I10 ESSENTIAL HYPERTENSION, BENIGN: ICD-10-CM

## 2021-03-16 DIAGNOSIS — I48.0 PAROXYSMAL ATRIAL FIBRILLATION (HCC): ICD-10-CM

## 2021-03-16 DIAGNOSIS — E78.5 DYSLIPIDEMIA: ICD-10-CM

## 2021-03-16 DIAGNOSIS — N18.30 STAGE 3 CHRONIC KIDNEY DISEASE, UNSPECIFIED WHETHER STAGE 3A OR 3B CKD (HCC): ICD-10-CM

## 2021-03-16 DIAGNOSIS — R82.90 FOUL SMELLING URINE: ICD-10-CM

## 2021-03-16 DIAGNOSIS — E11.65 TYPE 2 DIABETES MELLITUS WITH HYPERGLYCEMIA, WITH LONG-TERM CURRENT USE OF INSULIN (HCC): Primary | ICD-10-CM

## 2021-03-16 DIAGNOSIS — Z79.4 TYPE 2 DIABETES MELLITUS WITH HYPERGLYCEMIA, WITH LONG-TERM CURRENT USE OF INSULIN (HCC): Primary | ICD-10-CM

## 2021-03-16 DIAGNOSIS — E55.9 VITAMIN D DEFICIENCY: ICD-10-CM

## 2021-03-16 LAB
BILIRUB UR QL STRIP: NEGATIVE
GLUCOSE UR-MCNC: NORMAL MG/DL
KETONES P FAST UR STRIP-MCNC: NEGATIVE MG/DL
PH UR STRIP: 6 [PH] (ref 4.6–8)
PROT UR QL STRIP: NORMAL
SP GR UR STRIP: 1.02 (ref 1–1.03)
UA UROBILINOGEN AMB POC: NORMAL (ref 0.2–1)
URINALYSIS CLARITY POC: NORMAL
URINALYSIS COLOR POC: YELLOW
URINE BLOOD POC: NORMAL
URINE LEUKOCYTES POC: NORMAL
URINE NITRITES POC: NEGATIVE

## 2021-03-16 PROCEDURE — G8432 DEP SCR NOT DOC, RNG: HCPCS | Performed by: NURSE PRACTITIONER

## 2021-03-16 PROCEDURE — 3017F COLORECTAL CA SCREEN DOC REV: CPT | Performed by: NURSE PRACTITIONER

## 2021-03-16 PROCEDURE — G8427 DOCREV CUR MEDS BY ELIG CLIN: HCPCS | Performed by: NURSE PRACTITIONER

## 2021-03-16 PROCEDURE — 81003 URINALYSIS AUTO W/O SCOPE: CPT | Performed by: NURSE PRACTITIONER

## 2021-03-16 PROCEDURE — 1090F PRES/ABSN URINE INCON ASSESS: CPT | Performed by: NURSE PRACTITIONER

## 2021-03-16 PROCEDURE — G8536 NO DOC ELDER MAL SCRN: HCPCS | Performed by: NURSE PRACTITIONER

## 2021-03-16 PROCEDURE — 99214 OFFICE O/P EST MOD 30 MIN: CPT | Performed by: NURSE PRACTITIONER

## 2021-03-16 PROCEDURE — 3046F HEMOGLOBIN A1C LEVEL >9.0%: CPT | Performed by: NURSE PRACTITIONER

## 2021-03-16 PROCEDURE — G8752 SYS BP LESS 140: HCPCS | Performed by: NURSE PRACTITIONER

## 2021-03-16 PROCEDURE — G8754 DIAS BP LESS 90: HCPCS | Performed by: NURSE PRACTITIONER

## 2021-03-16 PROCEDURE — G8399 PT W/DXA RESULTS DOCUMENT: HCPCS | Performed by: NURSE PRACTITIONER

## 2021-03-16 PROCEDURE — 2022F DILAT RTA XM EVC RTNOPTHY: CPT | Performed by: NURSE PRACTITIONER

## 2021-03-16 PROCEDURE — 36415 COLL VENOUS BLD VENIPUNCTURE: CPT | Performed by: NURSE PRACTITIONER

## 2021-03-16 PROCEDURE — G8417 CALC BMI ABV UP PARAM F/U: HCPCS | Performed by: NURSE PRACTITIONER

## 2021-03-16 PROCEDURE — 1101F PT FALLS ASSESS-DOCD LE1/YR: CPT | Performed by: NURSE PRACTITIONER

## 2021-03-16 RX ORDER — CIPROFLOXACIN 500 MG/1
500 TABLET ORAL 2 TIMES DAILY
Qty: 10 TAB | Refills: 0 | Status: SHIPPED | OUTPATIENT
Start: 2021-03-16 | End: 2021-03-21

## 2021-03-16 RX ORDER — METFORMIN HYDROCHLORIDE 500 MG/1
500 TABLET ORAL 2 TIMES DAILY WITH MEALS
Qty: 180 TAB | Refills: 1 | Status: SHIPPED | OUTPATIENT
Start: 2021-03-16 | End: 2021-06-07 | Stop reason: SDUPTHER

## 2021-03-16 NOTE — PROGRESS NOTES
1. Have you been to the ER, urgent care clinic since your last visit? Hospitalized since your last visit? No    2. Have you seen or consulted any other health care providers outside of the [address] [place] [address] since your last visit? Include any pap smears or colon screening.  Yes When: Dr Hamilton Huffman 1-13-21

## 2021-03-16 NOTE — PROGRESS NOTES
Subjective:     CC: HTN, diabetes, HLD    Anders Roca is a 65 y.o. female, accompanied by her , who presents today for a 3 month follow up for type 2 diabetes. New issues:  states her urine has a \"pungent\" smell to it lately. Pt denies any dysuria, urinary frequency, or urgency. No abd pain or fever or chills. Lab Results   Component Value Date/Time    Hemoglobin A1c 8.1 (H) 12/17/2020 01:39 PM     She is on Trulicity and Levemir 85 ISPTO daily. At her last appt  stated morning fasting BS were at goal in the very low 100's but afternoon BS were higher, sometimes over 200. She was not having any hypoglycemic episodes so her Metformin was increased to 500mg BID. Since then  reports her late afternoon sugars have been better. She is also not eating as much candy. She has had no hypoglycemic episodes. He has seen a few sugars in the 80's but she felt fine. CKD, stage 3  Lab Results   Component Value Date/Time    Creatinine 1.57 (H) 12/17/2020 01:39 PM     Creatinine increased with the A1C to 1.57 three months ago, up from 1.19 in 6/2020, down from 1.28 in March. She does not take NSAIDS. She is on an ACEi. Hyperthyroidism  She was diagnosed and referred to Endo Dr Mihir Stevenson in 1/2021. She had no thyroid antibodies. Her thyroid nuclear uptake scan showed a dominant nodule in the right lobe with normal uptake of tracer. She was prescribed Methimazole 5mg daily and will follow up in about 3 months. She was told to continue her metoprolol for her A-fib. H/O CVA   She was admitted to [facility] in June of 2019 for CVA and then spent 2 weeks in [facility]. This was her second CVA. The first caused weakness and the last one affected her speech and mental status.  helps with ADLs, meals, and medication management. She is on a statin daily and takes Eliquis for A-fib. A-fib  On Eliquis for stroke prophylaxis. She did have one nosebleed the other day.  Denies any bloody or black stools. Sees Dr Dianelys Fam. HTN  BP is stable today. She has gained some weight this year- 16 pounds since June. BP not checked at home. She is on HCTZ 25mg daily, Amlodipine 10mg daily, Quinapril 40mg BID, and Metoprolol XR 50mg BID. Denies CP, SOB, dizziness, or swelling. Vitamin D deficiency  She takes Vit D 50,000 BIW    Lab Results   Component Value Date/Time    Vitamin D 25-Hydroxy 10.9 (L) 06/19/2019 05:13 AM    VITAMIN D, 25-HYDROXY 84.1 09/15/2020 12:22 PM           Health maintenance  Eye exam is up to date- had cataract removal surgery over the summer  Microalbuminuria test ordered today  She is due for a mammogram but would like to post pone due to Covid.    PNA vaccines up to date  Flu shot up to date  Shingrix- not addressed today  Covid 19 vaccine- she had both Moderna vaccines    Patient Active Problem List   Diagnosis Code    Psoriasis L40.9    Essential hypertension, benign I10    Rosacea L71.9    Heart murmur R01.1    Type 2 diabetes mellitus with hyperglycemia, with long-term current use of insulin ([facility]) E11.65, Z79.4    Obesity, morbid ([facility]) E66.01    Type 2 diabetes mellitus with nephropathy ([facility]) E11.21    Acute ischemic cerebrovascular accident (CVA) involving right middle cerebral artery [facility]) I63.511    Cerebrovascular accident (CVA) due to thrombosis of right middle cerebral artery ([facility]) I63.311    Paroxysmal atrial fibrillation ([facility]) I48.0    Dyslipidemia E78.5    CVA (cerebral vascular accident) ([facility] I63.9    Diabetic peripheral neuropathy associated with type 2 diabetes mellitus ([facility] E11.42    Bilateral carotid artery stenosis R85.83    Embolic stroke involving left middle cerebral artery ([facility]) A23.261    CKD (chronic kidney disease) stage 3, GFR 30-59 ml/min N18.30    Low TSH level R79.89       Past Medical History:   Diagnosis Date    Diabetes ([facility]     Hypertension     Murmur     Osteopenia     Psoriasis     Rosacea     Stroke ([facility]     Vitamin D deficiency          Current Outpatient Medications:     Trulicity 1.5 KD/6.8 mL sub-q pen, INJECT 0.5ML (=1.5MG)      SUBCUTANEOUSLY EVERY 7 DAYS, Disp: 6 mL, Rfl: 5    atorvastatin (LIPITOR) 80 mg tablet, TAKE 1 TABLET NIGHTLY, Disp: 90 Tab, Rfl: 2    methIMAzole (TAPAZOLE) 5 mg tablet, Take 1 Tab by mouth daily. , Disp: 90 Tab, Rfl: 3    ergocalciferol (ERGOCALCIFEROL) 1,250 mcg (50,000 unit) capsule, Take 1 Cap by mouth every Monday and Thursday. , Disp: 24 Cap, Rfl: 2    apixaban (Eliquis) 5 mg tablet, TAKE 1 TABLET BY MOUTH TWICE A DAY, Disp: 180 Tab, Rfl: 1    metFORMIN (GLUCOPHAGE) 1,000 mg tablet, TAKE 1/2 TABLET TWICE DAILY  WITH MEALS, Disp: 180 Tab, Rfl: 0    Insulin Needles, Disposable, 31 gauge x 5/16\" ndle, Use as directed, Disp: 100 Pen Needle, Rfl: 5    quinapriL (ACCUPRIL) 40 mg tablet, TAKE 1 TABLET TWICE A DAY (Patient taking differently: daily.), Disp: 180 Tab, Rfl: 1    amLODIPine (NORVASC) 10 mg tablet, Take 1 Tab by mouth daily. , Disp: 90 Tab, Rfl: 1    hydroCHLOROthiazide (HYDRODIURIL) 25 mg tablet, Take 1 Tab by mouth daily. , Disp: 90 Tab, Rfl: 1    Basaglar KwikPen U-100 Insulin 100 unit/mL (3 mL) inpn, 40 Units by SubCUTAneous route every morning., Disp: , Rfl:     glucose blood VI test strips (ASCENSIA AUTODISC VI, ONE TOUCH ULTRA TEST VI) strip, Check glucose twice daily. , Disp: 180 Strip, Rfl: 3    metoprolol succinate (TOPROL-XL) 50 mg XL tablet, Take 1 Tab by mouth two (2) times a day., Disp: 180 Tab, Rfl: 3    multivitamin (ONE A DAY) tablet, Take 1 Tab by mouth daily. , Disp: , Rfl:     aspirin 81 mg chewable tablet, Take 81 mg by mouth daily. , Disp: , Rfl:     Blood-Glucose Meter monitoring kit, Check blood sugar twice daily. , Disp: 1 Kit, Rfl: 0    Allergies   Allergen Reactions    Pcn [Penicillins] Rash    Sulfa (Sulfonamide Antibiotics) Unknown (comments)       Past Surgical History:   Procedure Laterality Date    COLONOSCOPY N/A 4/4/2018    COLONOSCOPY DIABETIC performed by Nicolás [facility] at [facility]      HX COLONOSCOPY  2018    3 polyps    HX TONSILLECTOMY         Social History     Tobacco Use   Smoking Status Never Smoker   Smokeless Tobacco Never Used       Social History     Socioeconomic History    Marital status:      Spouse name: Not on file    Number of children: Not on file    Years of education: Not on file    Highest education level: Not on file   Tobacco Use    Smoking status: Never Smoker    Smokeless tobacco: Never Used   Substance and Sexual Activity    Alcohol use: Yes     Alcohol/week: 0.0 standard drinks     Comment: 1/couple months       Family History   Problem Relation Age of Onset    Diabetes Mother     Heart Disease Father     No Known Problems Child      ROS:  Gen: denies fever, chills, or fatigue, +weight gain  HEENT:denies H/A or bleeding gums, +nosebleed x 1  Resp: denies dyspnea, cough, or wheezing  CV: denies chest pain, pressure, or palpitations  : +foul smelling urine Denies dysuria, hematuria, or urinary frequency/urgency  Extremeties: +mild edema in ankles  Musculoskeletal: Denies joint pain, stiffness, or muscle cramps  Neuro: denies numbness/tingling or dizziness  Endo: denies polyuria or polydipsia  Skin: Warm dry and intact  Psych: denies anxiety or depression    Objective:     Visit Vitals  /71 (BP 1 Location: Left upper arm)   Pulse 74   Temp (!) 96.5 °F (35.8 °C) (Temporal)   Resp 18   Ht 5' 6\" (1.676 m)   Wt 203 lb 8 oz (92.3 kg)   SpO2 98%   BMI 32.85 kg/m²       General: Alert and oriented. No acute distress. Well nourished. Eyes: Sclera white, conjunctiva clear. PERRLA. Neck: Supple with FROM. No carotid bruits  Lungs: Breathing even and unlabored.  All lobes clear to auscultation bilaterally   Heart :RRR, S1 and S2 normal intensity, no extra heart sounds  Extremities: +mid edema to both ankles, pedal pulses 2+  Neuro: Cranial nerves grossly normal. Musculoskeletal:Joints are without swelling, erythema, or heat. Psych: Mood and thought content appropriate for situation. Dressed appropriately and with good hygiene. Skin: Warm and dry and intact      Assessment/ Plan:     HTN  BP stable  Cont current meds  Low-sodium diet  Exercise  RTO or go to ER for any CP, SOB, dizziness, or swelling. F/U: 3 months    DM  BS better  Cont Metformin 500mg BID  Cont Basaglar insulin 40 units every morning  Cont to check glucose BID - notify provider for any hypoglycemic episodes or blood sugars <80  F/U 3 months    CKD, stage 3  Avoid NSAIDS. Stay hydrated   F/U 3 months    A-fib  Stable  Cont Eliquis for stroke prophylaxis  Report any recurrent nosebleeds or other s/s of abnormal bleeding  F/U with cardiologist as scheduled  F/U 3 months. Foul smelling urine  UA done- pos for blood and LE    Acute cystitis with hematuria  Start Cipro 500mg BID x 5 days  Send urine off for C/S- will call with results    Vit D level  Cont supp    I had meant to check an A1C, CMP, CBC, and Vit D level but patient and her  left the office before nurse could draw blood. Will recheck at next visit. Health maintenance  Eye exam is up to date- had cataract removal surgery over the summer  She is due for a mammogram but would like to post pone due to Covid. PNA vaccines up to date  Flu shot up to date  Shingrix- not addressed today  She has had both Moderna vaccines    Verbal and written instructions (see AVS) provided.  Patient expresses understanding of diagnosis and treatment plan. Health Maintenance Due   Topic Date Due    Eye Exam Retinal or Dilated  Never done    Breast Cancer Screen Mammogram  11/03/2018    GLAUCOMA SCREENING Q2Y  12/17/2020               Nila Horne, [place]

## 2021-03-19 LAB
BACTERIA SPEC CULT: ABNORMAL
CC UR VC: ABNORMAL
SERVICE CMNT-IMP: ABNORMAL

## 2021-03-22 ENCOUNTER — OFFICE VISIT (OUTPATIENT)
Dept: CARDIOLOGY CLINIC | Age: 71
End: 2021-03-22
Payer: MEDICARE

## 2021-03-22 VITALS
SYSTOLIC BLOOD PRESSURE: 110 MMHG | RESPIRATION RATE: 16 BRPM | DIASTOLIC BLOOD PRESSURE: 60 MMHG | BODY MASS INDEX: 31.18 KG/M2 | WEIGHT: 194 LBS | TEMPERATURE: 97.1 F | HEART RATE: 69 BPM | HEIGHT: 66 IN | OXYGEN SATURATION: 98 %

## 2021-03-22 DIAGNOSIS — E78.5 DYSLIPIDEMIA: ICD-10-CM

## 2021-03-22 DIAGNOSIS — I48.0 PAROXYSMAL ATRIAL FIBRILLATION (HCC): Primary | ICD-10-CM

## 2021-03-22 DIAGNOSIS — I63.89 CEREBROVASCULAR ACCIDENT (CVA) DUE TO OTHER MECHANISM (HCC): ICD-10-CM

## 2021-03-22 DIAGNOSIS — I10 ESSENTIAL HYPERTENSION, BENIGN: ICD-10-CM

## 2021-03-22 DIAGNOSIS — E11.21 TYPE 2 DIABETES MELLITUS WITH NEPHROPATHY (HCC): ICD-10-CM

## 2021-03-22 PROCEDURE — 3046F HEMOGLOBIN A1C LEVEL >9.0%: CPT | Performed by: INTERNAL MEDICINE

## 2021-03-22 PROCEDURE — G8417 CALC BMI ABV UP PARAM F/U: HCPCS | Performed by: INTERNAL MEDICINE

## 2021-03-22 PROCEDURE — G8536 NO DOC ELDER MAL SCRN: HCPCS | Performed by: INTERNAL MEDICINE

## 2021-03-22 PROCEDURE — G8752 SYS BP LESS 140: HCPCS | Performed by: INTERNAL MEDICINE

## 2021-03-22 PROCEDURE — G8427 DOCREV CUR MEDS BY ELIG CLIN: HCPCS | Performed by: INTERNAL MEDICINE

## 2021-03-22 PROCEDURE — G8432 DEP SCR NOT DOC, RNG: HCPCS | Performed by: INTERNAL MEDICINE

## 2021-03-22 PROCEDURE — G8754 DIAS BP LESS 90: HCPCS | Performed by: INTERNAL MEDICINE

## 2021-03-22 PROCEDURE — 3017F COLORECTAL CA SCREEN DOC REV: CPT | Performed by: INTERNAL MEDICINE

## 2021-03-22 PROCEDURE — G8399 PT W/DXA RESULTS DOCUMENT: HCPCS | Performed by: INTERNAL MEDICINE

## 2021-03-22 PROCEDURE — 93000 ELECTROCARDIOGRAM COMPLETE: CPT | Performed by: INTERNAL MEDICINE

## 2021-03-22 PROCEDURE — 1090F PRES/ABSN URINE INCON ASSESS: CPT | Performed by: INTERNAL MEDICINE

## 2021-03-22 PROCEDURE — 2022F DILAT RTA XM EVC RTNOPTHY: CPT | Performed by: INTERNAL MEDICINE

## 2021-03-22 PROCEDURE — 99214 OFFICE O/P EST MOD 30 MIN: CPT | Performed by: INTERNAL MEDICINE

## 2021-03-22 PROCEDURE — 1101F PT FALLS ASSESS-DOCD LE1/YR: CPT | Performed by: INTERNAL MEDICINE

## 2021-03-22 NOTE — PROGRESS NOTES
Hamilton Frankel is a 65 y.o. female is here for routine f/u. Hx DM II, diabetic nephropathy, hypertension, dyslipidemia, CKD 3, obesity, with acute R MCA CVA 1/19.  W/u with Echo/doppler 1/1/8/19 with mild to mod LVH, LVEF 65-70, normal atrial sizes, mild MR. Head CT/MRI/MRA with multiple sites of infarct R temporo-occipital, R parietal, R cerebellar, coronoradiatal  C/w embolic events.  Subsequent Loop/Event monitor x 2 weeks showed NSR with episodes of paroxysmal afib/RVR.  Seen in f/u by Dr. Pamella Strauss and started on Eliquis 5mg bid  (CHADs2-VASC = 6--DM, hypertension, female, >65, prior CVA (CVA 6.9%/JQ, embolic/CVA/TIA 73.3%/RH).   Hospitalized in June 2019 with acute CVA. Unfortunately, the confusion with the patients medications by pt/ which stopped Eliquis vs stopping ASA likely culprit of current CVA.  This is not considered a failure of treatment as patient was not actually on Eliquis since 4/16/19.  On BB and amlodipine. Continues to see PCP--last OV 3/4/20 with EKG 3/9/20 (NSR, PRWP, NST, HR 61). The patient denies chest pain/ shortness of breath, orthopnea, PND, LE edema, palpitations, syncope, presyncope or fatigue.        Patient Active Problem List    Diagnosis Date Noted    Low TSH level 09/17/2020    CKD (chronic kidney disease) stage 3, GFR 30-59 ml/min 01/13/2020    Diabetic peripheral neuropathy associated with type 2 diabetes mellitus ([address] 06/10/2019    Bilateral carotid artery stenosis 06/40/0156    Embolic stroke involving left middle cerebral artery ([address] 06/10/2019    CVA (cerebral vascular accident) ([address] 06/09/2019    Paroxysmal atrial fibrillation ([address] 04/16/2019    Dyslipidemia 04/16/2019    Cerebrovascular accident (CVA) due to thrombosis of right middle cerebral artery ([address] 01/18/2019    Acute ischemic cerebrovascular accident (CVA) involving right middle cerebral artery territory ([facility] 01/17/2019    Obesity, morbid ([facility] 12/26/2017    Type 2 diabetes mellitus with nephropathy ([facility] 12/26/2017    Type 2 diabetes mellitus with hyperglycemia, with long-term current use of insulin ([facility] 04/03/2017    Psoriasis 08/11/2014    Essential hypertension, benign 08/11/2014    Rosacea 08/11/2014    Heart murmur 08/11/2014      Serafin Mcgrath NP  Past Medical History:   Diagnosis Date    Diabetes ([facility]     Hypertension     Murmur     Osteopenia     Psoriasis     Rosacea     Stroke ([facility]     Vitamin D deficiency       Past Surgical History:   Procedure Laterality Date    COLONOSCOPY N/A 4/4/2018    COLONOSCOPY   DIABETIC performed by Saul Bee MD at [facility] [address] HX COLONOSCOPY  2018    3 polyps    HX TONSILLECTOMY       Allergies   Allergen Reactions    Pcn [Penicillins] Rash    Sulfa (Sulfonamide Antibiotics) Unknown (comments)      Family History   Problem Relation Age of Onset    Diabetes Mother     Heart Disease Father     No Known Problems Child       Social History     Socioeconomic History    Marital status:      Spouse name: Not on file    Number of children: Not on file    Years of education: Not on file    Highest education level: Not on file   Occupational History    Not on file   Social Needs    Financial resource strain: Not on file    Food insecurity     Worry: Not on file     Inability: Not on file    Transportation needs     Medical: Not on file     Non-medical: Not on file   Tobacco Use    Smoking status: Never Smoker    Smokeless tobacco: Never Used   Substance and Sexual Activity    Alcohol use:  Yes     Alcohol/week: 0.0 standard drinks     Comment: 1/couple months    Drug use: Not on file    Sexual activity: Not on file   Lifestyle    Physical activity     Days per week: Not on file     Minutes per session: Not on file    Stress: Not on file   Relationships    Social connections     Talks on phone: Not on file     Gets together: Not on file     Attends Yazidism service: Not on file     Active member of club or organization: Not on file     Attends meetings of clubs or organizations: Not on file     Relationship status: Not on file    Intimate partner violence     Fear of current or ex partner: Not on file     Emotionally abused: Not on file     Physically abused: Not on file     Forced sexual activity: Not on file   Other Topics Concern    Not on file   Social History Narrative    Not on file      Current Outpatient Medications   Medication Sig    metFORMIN (GLUCOPHAGE) 500 mg tablet Take 1 Tab by mouth two (2) times daily (with meals).  Trulicity 1.5 MJ/8.6 mL sub-q pen INJECT 0.5ML (=1.5MG)      SUBCUTANEOUSLY EVERY 7 DAYS    atorvastatin (LIPITOR) 80 mg tablet TAKE 1 TABLET NIGHTLY    methIMAzole (TAPAZOLE) 5 mg tablet Take 1 Tab by mouth daily.  ergocalciferol (ERGOCALCIFEROL) 1,250 mcg (50,000 unit) capsule Take 1 Cap by mouth every Monday and Thursday.  apixaban (Eliquis) 5 mg tablet TAKE 1 TABLET BY MOUTH TWICE A DAY    Insulin Needles, Disposable, 31 gauge x 5/16\" ndle Use as directed    quinapriL (ACCUPRIL) 40 mg tablet TAKE 1 TABLET TWICE A DAY (Patient taking differently: daily.)    amLODIPine (NORVASC) 10 mg tablet Take 1 Tab by mouth daily.  hydroCHLOROthiazide (HYDRODIURIL) 25 mg tablet Take 1 Tab by mouth daily.  Basaglar KwikPen U-100 Insulin 100 unit/mL (3 mL) inpn 40 Units by SubCUTAneous route every morning.  glucose blood VI test strips (ASCENSIA AUTODISC VI, ONE TOUCH ULTRA TEST VI) strip Check glucose twice daily.  metoprolol succinate (TOPROL-XL) 50 mg XL tablet Take 1 Tab by mouth two (2) times a day.  multivitamin (ONE A DAY) tablet Take 1 Tab by mouth daily.  aspirin 81 mg chewable tablet Take 81 mg by mouth daily.  Blood-Glucose Meter monitoring kit Check blood sugar twice daily. No current facility-administered medications for this visit. Review of Symptoms:    CONST  No weight change.  No fever, chills, sweats ENT No visual changes, URI sx, sore throat    CV  See HPI   RESP  No cough, or sputum, wheezing. Also see HPI   GI  No abdominal pain or change in bowel habits. No heartburn or dysphagia. No melena or rectal bleeding.   No dysuria, urgency, frequency, hematuria   MSKEL  No joint pain, swelling. No muscle pain. SKIN  No rash or lesions. NEURO  No headache, syncope, or seizure. No weakness, loss of sensation, or paresthesias. PSYCH  No low mood or depression  No anxiety. HE/LYMPH  No easy bruising, abnormal bleeding, or enlarged glands. Physical ExamPhysical Exam:    Visit Vitals  Temp 97.1 °F (36.2 °C) (Temporal)   Ht 5' 6\" (1.676 m)   BMI 32.85 kg/m²     Gen: NAD  HEENT:  PERRL, throat clear  Neck: no adenopathy, no thyromegaly, no JVD   Heart:  Regular,Nl S1S2,  no murmur, gallop or rub. Lungs:  clear  Abdomen:   Soft, non-tender, bowel sounds are active.    Extremities:  No edema  Pulse: symmetric  Neuro: A&O times 3, No focal neuro deficits    Cardiographics    ECG: NSR, low voltage, no acute changes    Labs:   Lab Results   Component Value Date/Time    Sodium 138 12/17/2020 01:39 PM    Sodium 137 09/15/2020 12:22 PM    Sodium 140 06/15/2020 12:02 PM    Sodium 139 03/04/2020 08:23 AM    Sodium 141 01/13/2020 11:07 AM    Potassium 5.0 12/17/2020 01:39 PM    Potassium 5.2 09/15/2020 12:22 PM    Potassium 4.6 06/15/2020 12:02 PM    Potassium 4.5 03/04/2020 08:23 AM    Potassium 4.3 01/13/2020 11:07 AM    Chloride 102 12/17/2020 01:39 PM    Chloride 102 09/15/2020 12:22 PM    Chloride 100 06/15/2020 12:02 PM    Chloride 99 03/04/2020 08:23 AM    Chloride 103 01/13/2020 11:07 AM    CO2 24 12/17/2020 01:39 PM    CO2 22 09/15/2020 12:22 PM    CO2 24 06/15/2020 12:02 PM    CO2 25 03/04/2020 08:23 AM    CO2 22 01/13/2020 11:07 AM    Anion gap 5 06/25/2019 07:02 AM    Anion gap 6 06/23/2019 06:40 AM    Anion gap 5 06/19/2019 05:13 AM    Anion gap 4 (L) 06/17/2019 04:13 AM    Anion gap 2 (L) 06/15/2019 05:08 AM    Glucose 200 (H) 12/17/2020 01:39 PM    Glucose 148 (H) 09/15/2020 12:22 PM    Glucose 154 (H) 06/15/2020 12:02 PM    Glucose 217 (H) 03/04/2020 08:23 AM    Glucose 88 01/13/2020 11:07 AM    BUN 39 (H) 12/17/2020 01:39 PM    BUN 34 (H) 09/15/2020 12:22 PM    BUN 34 (H) 06/15/2020 12:02 PM    BUN 26 03/04/2020 08:23 AM    BUN 22 01/13/2020 11:07 AM    Creatinine 1.57 (H) 12/17/2020 01:39 PM    Creatinine 1.23 (H) 09/15/2020 12:22 PM    Creatinine 1.19 (H) 06/15/2020 12:02 PM    Creatinine 1.28 (H) 03/04/2020 08:23 AM    Creatinine 1.07 (H) 01/13/2020 11:07 AM    BUN/Creatinine ratio 25 12/17/2020 01:39 PM    BUN/Creatinine ratio 28 09/15/2020 12:22 PM    BUN/Creatinine ratio 29 (H) 06/15/2020 12:02 PM    BUN/Creatinine ratio 20 03/04/2020 08:23 AM    BUN/Creatinine ratio 21 01/13/2020 11:07 AM    GFR est AA 38 (L) 12/17/2020 01:39 PM    GFR est AA 51 (L) 09/15/2020 12:22 PM    GFR est AA 53 (L) 06/15/2020 12:02 PM    GFR est AA 49 (L) 03/04/2020 08:23 AM    GFR est AA 60 01/13/2020 11:07 AM    GFR est non-AA 33 (L) 12/17/2020 01:39 PM    GFR est non-AA 44 (L) 09/15/2020 12:22 PM    GFR est non-AA 46 (L) 06/15/2020 12:02 PM    GFR est non-AA 42 (L) 03/04/2020 08:23 AM    GFR est non-AA 52 (L) 01/13/2020 11:07 AM    Calcium 10.0 12/17/2020 01:39 PM    Calcium 9.9 09/15/2020 12:22 PM    Calcium 9.9 06/15/2020 12:02 PM    Calcium 9.4 03/04/2020 08:23 AM    Calcium 9.4 01/13/2020 11:07 AM    Bilirubin, total 0.3 09/15/2020 12:22 PM    Bilirubin, total 0.3 06/15/2020 12:02 PM    Bilirubin, total 0.3 03/04/2020 08:23 AM    Bilirubin, total 0.3 01/13/2020 11:07 AM    Bilirubin, total 0.2 10/14/2019 09:38 AM    Alk. phosphatase 97 09/15/2020 12:22 PM    Alk. phosphatase 96 06/15/2020 12:02 PM    Alk. phosphatase 103 03/04/2020 08:23 AM    Alk. phosphatase 88 01/13/2020 11:07 AM    Alk.  phosphatase 88 10/14/2019 09:38 AM    Protein, total 6.7 09/15/2020 12:22 PM    Protein, total 7.2 06/15/2020 12:02 PM    Protein, total 6.8 03/04/2020 08:23 AM    Protein, total 6.6 01/13/2020 11:07 AM    Protein, total 6.8 10/14/2019 09:38 AM    Albumin 3.6 (L) 09/15/2020 12:22 PM    Albumin 3.6 (L) 06/15/2020 12:02 PM    Albumin 3.1 (L) 03/04/2020 08:23 AM    Albumin 3.4 (L) 01/13/2020 11:07 AM    Albumin 3.5 10/14/2019 09:38 AM    Globulin 4.0 06/15/2019 05:08 AM    Globulin 4.6 (H) 06/10/2019 05:02 AM    Globulin 4.5 (H) 06/09/2019 02:46 PM    Globulin 4.8 (H) 01/17/2019 10:25 PM    A-G Ratio 1.2 09/15/2020 12:22 PM    A-G Ratio 1.0 (L) 06/15/2020 12:02 PM    A-G Ratio 0.8 (L) 03/04/2020 08:23 AM    A-G Ratio 1.1 (L) 01/13/2020 11:07 AM    A-G Ratio 1.1 (L) 10/14/2019 09:38 AM    ALT (SGPT) 21 09/15/2020 12:22 PM    ALT (SGPT) 20 06/15/2020 12:02 PM    ALT (SGPT) 22 03/04/2020 08:23 AM    ALT (SGPT) 26 01/13/2020 11:07 AM    ALT (SGPT) 32 10/14/2019 09:38 AM     Lab Results   Component Value Date/Time    CK 51 01/17/2019 10:25 PM     Lab Results   Component Value Date/Time    Cholesterol, total 130 06/15/2020 12:02 PM    Cholesterol, total 131 01/13/2020 11:07 AM    Cholesterol, total 141 10/14/2019 09:38 AM    Cholesterol, total 136 06/10/2019 05:02 AM    Cholesterol, total 119 03/01/2019 08:41 AM    HDL Cholesterol 58 06/15/2020 12:02 PM    HDL Cholesterol 59 01/13/2020 11:07 AM    HDL Cholesterol 52 10/14/2019 09:38 AM    HDL Cholesterol 59 06/10/2019 05:02 AM    HDL Cholesterol 62 03/01/2019 08:41 AM    LDL, calculated 53 06/15/2020 12:02 PM    LDL, calculated 53 01/13/2020 11:07 AM    LDL, calculated 59 10/14/2019 09:38 AM    LDL, calculated 54.8 06/10/2019 05:02 AM    LDL, calculated 38 03/01/2019 08:41 AM    Triglyceride 93 06/15/2020 12:02 PM    Triglyceride 94 01/13/2020 11:07 AM    Triglyceride 149 10/14/2019 09:38 AM    Triglyceride 111 06/10/2019 05:02 AM    Triglyceride 95 03/01/2019 08:41 AM    CHOL/HDL Ratio 2.3 06/10/2019 05:02 AM    CHOL/HDL Ratio 3.6 01/18/2019 06:00 AM     No results found for this or any previous visit. Assessment:         Patient Active Problem List    Diagnosis Date Noted    Low TSH level 09/17/2020    CKD (chronic kidney disease) stage 3, GFR 30-59 ml/min 01/13/2020    Diabetic peripheral neuropathy associated with type 2 diabetes mellitus ([facility] 06/10/2019    Bilateral carotid artery stenosis 95/21/7322    Embolic stroke involving left middle cerebral artery ([address] 06/10/2019    CVA (cerebral vascular accident) ([address] 06/09/2019    Paroxysmal atrial fibrillation ([address] 04/16/2019    Dyslipidemia 04/16/2019    Cerebrovascular accident (CVA) due to thrombosis of right middle cerebral artery ([address] 01/18/2019    Acute ischemic cerebrovascular accident (CVA) involving right middle cerebral artery territory ([address] 01/17/2019    Obesity, morbid ([address] 12/26/2017    Type 2 diabetes mellitus with nephropathy ([facility] 12/26/2017    Type 2 diabetes mellitus with hyperglycemia, with long-term current use of insulin ([facility] 04/03/2017    Psoriasis 08/11/2014    Essential hypertension, benign 08/11/2014    Rosacea 08/11/2014    Heart murmur 08/11/2014     Hx DM II, diabetic nephropathy, hypertension, dyslipidemia, CKD 3, obesity, with acute R MCA CVA 1/19.  W/u with Echo/doppler 1/1/8/19 with mild to mod LVH, LVEF 65-70, normal atrial sizes, mild MR. Head CT/MRI/MRA with multiple sites of infarct R temporo-occipital, R parietal, R cerebellar, coronoradiatal  C/w embolic events.  Subsequent Loop/Event monitor x 2 weeks showed NSR with episodes of paroxysmal afib/RVR.  Seen in f/u by Dr. Radha Molina and started on Eliquis 5mg bid  (CHADs2-VASC = 6--DM, hypertension, female, >65, prior CVA (CVA 0.4%/RH, embolic/CVA/TIA 72.0%/HX).   Hospitalized in June 2019 with acute CVA. Unfortunately, the confusion with the patients medications by pt/ which stopped Eliquis vs stopping ASA likely culprit of current CVA.  This is not considered a failure of treatment as patient was not actually on Eliquis since 4/16/19.  On BB and amlodipine. Continues to see PCP--last OV 3/4/20 with EKG 3/9/20 (NSR, PRWP, NST, HR 61). Plan:     Doing well with no adverse cardiac symptoms. DM, Lipids and labs followed by PCP. Continue current care and f/u in 6 months.     Shelbi Son MD

## 2021-03-22 NOTE — PROGRESS NOTES
Verified patient with two patient identifiers. Medications reviewed/approved by Dr. Rosibel Whitaker. Chief Complaint   Patient presents with    Stroke     Overdue follow up    Irregular Heart Beat    Carotid Artery Stenosis    Hypertension    Cholesterol Problem     1. Have you been to the ER, urgent care clinic since your last visit? Hospitalized since your last visit? Yes, [facility] for cataract surgery R&L in May 2020.     2. Have you seen or consulted any other health care providers outside of the [address] [place] [address] since your last visit? Include any pap smears or colon screening.  no Atypical chest pain with ACS and CAD risk factors  1) serial EKGs/CXR/ASA/O2/cardiac monitor/LABS/nitro prn CP   2) reassess  3) Admit to telemetry

## 2021-03-31 RX ORDER — METHIMAZOLE 5 MG/1
5 TABLET ORAL DAILY
Qty: 90 TAB | Refills: 3 | Status: SHIPPED | OUTPATIENT
Start: 2021-03-31 | End: 2022-05-03 | Stop reason: SDUPTHER

## 2021-04-17 ENCOUNTER — PATIENT MESSAGE (OUTPATIENT)
Dept: FAMILY MEDICINE CLINIC | Age: 71
End: 2021-04-17

## 2021-04-19 NOTE — TELEPHONE ENCOUNTER
From: Zahida Hidalgo  To: Denys [address] [place] [address]  Sent: 4/17/2021 4:08 PM EDT  Subject: Prescription Question    I am Chau Redd,  and caregiver of Laz Lopez. Dianne Silveira takes two (2) tablets of Eliquis, 5mg, per day. We have enough tablets to last through this next week, but not enough for the week thereafter. Please have [employer] send us some more before we run out.      Thank you, Chau Rainey,  and caregiver of Laz Lopez

## 2021-04-28 LAB
T4 FREE SERPL-MCNC: 0.93 NG/DL (ref 0.82–1.77)
TSH SERPL DL<=0.005 MIU/L-ACNC: 6.77 UIU/ML (ref 0.45–4.5)

## 2021-04-30 ENCOUNTER — VIRTUAL VISIT (OUTPATIENT)
Dept: ENDOCRINOLOGY | Age: 71
End: 2021-04-30
Payer: MEDICARE

## 2021-04-30 DIAGNOSIS — E05.90 HYPERTHYROIDISM: Primary | ICD-10-CM

## 2021-04-30 PROCEDURE — 3017F COLORECTAL CA SCREEN DOC REV: CPT | Performed by: INTERNAL MEDICINE

## 2021-04-30 PROCEDURE — 1090F PRES/ABSN URINE INCON ASSESS: CPT | Performed by: INTERNAL MEDICINE

## 2021-04-30 PROCEDURE — G8427 DOCREV CUR MEDS BY ELIG CLIN: HCPCS | Performed by: INTERNAL MEDICINE

## 2021-04-30 PROCEDURE — G8399 PT W/DXA RESULTS DOCUMENT: HCPCS | Performed by: INTERNAL MEDICINE

## 2021-04-30 PROCEDURE — 99214 OFFICE O/P EST MOD 30 MIN: CPT | Performed by: INTERNAL MEDICINE

## 2021-04-30 PROCEDURE — G8756 NO BP MEASURE DOC: HCPCS | Performed by: INTERNAL MEDICINE

## 2021-04-30 PROCEDURE — G8417 CALC BMI ABV UP PARAM F/U: HCPCS | Performed by: INTERNAL MEDICINE

## 2021-04-30 PROCEDURE — G8536 NO DOC ELDER MAL SCRN: HCPCS | Performed by: INTERNAL MEDICINE

## 2021-04-30 PROCEDURE — 1101F PT FALLS ASSESS-DOCD LE1/YR: CPT | Performed by: INTERNAL MEDICINE

## 2021-04-30 PROCEDURE — G8432 DEP SCR NOT DOC, RNG: HCPCS | Performed by: INTERNAL MEDICINE

## 2021-04-30 NOTE — PROGRESS NOTES
**DUE TO PANDEMIC AND HEALTH CONCERNS IN THE COMMUNITY, THIS PATIENT WAS EITHER ILL OR FOUND TO BE HIGH RISK FOR IN-PERSON EVALUATION WITHIN THE CLINIC. THE FOLLOWING IS A VIRTUAL TELEMEDICINE VIDEO ENCOUNTER VIA [employer], TO WHICH THE PATIENT AGREED. THE PURPOSE IS TO LIMIT INTERRUPTIONS IN HEALTHCARE AND TO PROVIDE FOR ONGOING URGENT NEEDS UNDER THE CURRENT CONDITIONS. CHIEF COMPLAINT: Hyperthyroidism / low TSH    HISTORY OF PRESENT ILLNESS:   Gracie Youssef is a 65 y.o. female with a PMHx as noted below who presents for f/u of Hyperthyroidism / Low TSH    Hx also provided by her son who is present with her today,  Over the past year, patients TSH has trended down gradually,   Patient has no personal history of prior thyroid disorders.   Notably family history for thyroid disorders is negative per her,  Hx of atrial fibrillation on a beta blocker,  1/25/21: NM thyroid uptake/scan: Right side toxic thyroid nodule,  Started on methimazole 5mg tab once daily,   Taking the tab each day,  TSH now 6.77, normal FT4 level as noted below    Review of most recent thyroid function:  Lab Results   Component Value Date    TSH 6.770 (H) 04/27/2021    TSH 0.277 (L) 01/14/2021    TSH 0.236 (L) 12/17/2020    FT4 0.93 04/27/2021    FT4 1.38 01/14/2021    FT4 1.25 10/08/2020    TSILT <0.10 01/14/2021    TRALT <1.10 01/14/2021    TMCLT 14 01/14/2021      Thyroid Lab Key:  TSILT = Thyroid stimulating antibodies  TRALT = TSH Receptor Antibodies  TMCLT = TPO antibodies  T3LT = Total T3 levels  740148 = Direct FT4  690309 = Free T3    PAST MEDICAL/SURGICAL HISTORY:   Past Medical History:   Diagnosis Date    Diabetes ([address]     Hypertension     Murmur     Osteopenia     Psoriasis     Rosacea     [facility]     Vitamin D deficiency      Past Surgical History:   Procedure Laterality Date    COLONOSCOPY N/A 4/4/2018    COLONOSCOPY   DIABETIC performed by Stephanie James MD at [facility] [address]      HX COLONOSCOPY  2018    3 polyps    HX TONSILLECTOMY         ALLERGIES:   Allergies   Allergen Reactions    Pcn [Penicillins] Rash    Sulfa (Sulfonamide Antibiotics) Unknown (comments)       MEDICATIONS ON ADMISSION:     Current Outpatient Medications:     apixaban (Eliquis) 5 mg tablet, TAKE 1 TABLET BY MOUTH TWICE A DAY, Disp: 180 Tab, Rfl: 1    ergocalciferol (ERGOCALCIFEROL) 1,250 mcg (50,000 unit) capsule, Take 1 Cap by mouth every Monday and Thursday. , Disp: 24 Cap, Rfl: 2    methIMAzole (TAPAZOLE) 5 mg tablet, Take 1 Tab by mouth daily. , Disp: 90 Tab, Rfl: 3    metFORMIN (GLUCOPHAGE) 500 mg tablet, Take 1 Tab by mouth two (2) times daily (with meals). , Disp: 180 Tab, Rfl: 1    Trulicity 1.5 MJ/3.8 mL sub-q pen, INJECT 0.5ML (=1.5MG)      SUBCUTANEOUSLY EVERY 7 DAYS, Disp: 6 mL, Rfl: 5    atorvastatin (LIPITOR) 80 mg tablet, TAKE 1 TABLET NIGHTLY, Disp: 90 Tab, Rfl: 2    quinapriL (ACCUPRIL) 40 mg tablet, TAKE 1 TABLET TWICE A DAY (Patient taking differently: daily.), Disp: 180 Tab, Rfl: 1    amLODIPine (NORVASC) 10 mg tablet, Take 1 Tab by mouth daily. , Disp: 90 Tab, Rfl: 1    hydroCHLOROthiazide (HYDRODIURIL) 25 mg tablet, Take 1 Tab by mouth daily. , Disp: 90 Tab, Rfl: 1    Basaglar KwikPen U-100 Insulin 100 unit/mL (3 mL) inpn, 40 Units by SubCUTAneous route every morning., Disp: , Rfl:     metoprolol succinate (TOPROL-XL) 50 mg XL tablet, Take 1 Tab by mouth two (2) times a day., Disp: 180 Tab, Rfl: 3    multivitamin (ONE A DAY) tablet, Take 1 Tab by mouth daily. , Disp: , Rfl:     aspirin 81 mg chewable tablet, Take 81 mg by mouth daily. , Disp: , Rfl:     Insulin Needles, Disposable, 31 gauge x 5/16\" ndle, Use as directed, Disp: 100 Pen Needle, Rfl: 5    glucose blood VI test strips (ASCENSIA AUTODISC VI, ONE TOUCH ULTRA TEST VI) strip, Check glucose twice daily. , Disp: 180 Strip, Rfl: 3    Blood-Glucose Meter monitoring kit, Check blood sugar twice daily. , Disp: 1 Kit, Rfl: 0    SOCIAL HISTORY:   Social History     Socioeconomic History    Marital status:      Spouse name: Not on file    Number of children: Not on file    Years of education: Not on file    Highest education level: Not on file   Occupational History    Not on file   Social Needs    Financial resource strain: Not on file    Food insecurity     Worry: Not on file     Inability: Not on file    Transportation needs     Medical: Not on file     Non-medical: Not on file   Tobacco Use    Smoking status: Never Smoker    Smokeless tobacco: Never Used   Substance and Sexual Activity    Alcohol use: Yes     Alcohol/week: 0.0 standard drinks     Comment: 1/couple months    Drug use: Not on file    Sexual activity: Not on file   Lifestyle    Physical activity     Days per week: Not on file     Minutes per session: Not on file    Stress: Not on file   Relationships    Social connections     Talks on phone: Not on file     Gets together: Not on file     Attends [employer] service: Not on file     Active member of club or organization: Not on file     Attends meetings of clubs or organizations: Not on file     Relationship status: Not on file    Intimate partner violence     Fear of current or ex partner: Not on file     Emotionally abused: Not on file     Physically abused: Not on file     Forced sexual activity: Not on file   Other Topics Concern    Not on file   Social History Narrative    Not on file       FAMILY HISTORY:  Family History   Problem Relation Age of Onset    Diabetes Mother     Heart Disease Father     No Known Problems Child        REVIEW OF SYSTEMS: Complete ROS assessed and noted for that which is described above, all else are negative.   Eyes: normal  ENT: normal  CVS: normal  Resp: normal  GI: normal  : normal  GYN: normal  Endocrine: normal  Integument: normal  Musculoskeletal: normal  Neuro: normal  Psych: normal    PHYSICAL EXAMINATION:  Telemedicine Visit    GENERAL: NCAT, Appears well nourished  EYES: EOMI, non-icteric, no proptosis  Ear/Nose/Throat: NCAT, no visible inflammation or masses  CARDIOVASCULAR: no cyanosis, no visible JVD  RESPIRATORY: comfortable respirations observed, no cyanosis  MUSCULOSKELETAL: Normal ROM of upper extremities observed  SKIN: No edema, rash, or other significant changes observed  NEUROLOGIC:  AAOx3  PSYCHIATRIC: Normal affect, Normal insight and judgement    REVIEW OF LABORATORY AND RADIOLOGY DATA:   Labs and documentation have been reviewed as described above. ASSESSMENT AND PLAN:   Nabil Gan is a 65 y.o. female with a PMHx as noted above who presents for f/u of hyperthyroidism. 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 In stead of surgery or radioactive iodine, we proceeded with a small dose of methimazole once daily which has proven to help. It is likely that her toxic thyroid nodule has contributed to her afib. On 5mg her TSH is now slightly above goal so I will go ahead and have her reduce this by 1/2 tab which will likely provide good results. Reduce methimazole to 1/2 tab (2.5mg) once daily,  Continue beta blocker and routine evaluation with cardiology,    Patient and family are advised that I will not be present at this clinic after a few weeks and so she will need to initially repeat her TSH level with Analy Pickett after 3-4 months, while transitioning at some point to another endocrine provider. They demonstrated their understanding. Phillip Lewis.  [facility]  [address] [place] [address]

## 2021-05-02 ENCOUNTER — PATIENT MESSAGE (OUTPATIENT)
Dept: FAMILY MEDICINE CLINIC | Age: 71
End: 2021-05-02

## 2021-05-03 RX ORDER — HYDROCHLOROTHIAZIDE 25 MG/1
25 TABLET ORAL DAILY
Qty: 90 TAB | Refills: 1 | Status: SHIPPED | OUTPATIENT
Start: 2021-05-03 | End: 2021-11-13 | Stop reason: SDUPTHER

## 2021-05-03 NOTE — TELEPHONE ENCOUNTER
From: Cassidyland November  To: Denys [address] [place] [address]  Sent: 5/2/2021 8:21 AM EDT  Subject: Prescription Question    I am Gail Barksdale,  and caretaker of Thierry Townsend. She takes 1 tablet of hydroCHLOROthiazide 25 mg tablet per day. She only has enough of these pills for this week and next week, but that's it. After next week, she's out. I note that I am unable to order any more of these from [facility] directly through [address] Could you perhaps \"put a bug in their ear\"?      Thanks so much    Gail Barksdale,  and caretaker of Thierry Townsend

## 2021-05-15 ENCOUNTER — PATIENT MESSAGE (OUTPATIENT)
Dept: FAMILY MEDICINE CLINIC | Age: 71
End: 2021-05-15

## 2021-05-17 RX ORDER — AMLODIPINE BESYLATE 10 MG/1
10 TABLET ORAL DAILY
Qty: 90 TAB | Refills: 1 | Status: SHIPPED | OUTPATIENT
Start: 2021-05-17 | End: 2021-11-08 | Stop reason: SDUPTHER

## 2021-05-17 NOTE — TELEPHONE ENCOUNTER
From: Damian Concepcion  To: Denys [address] [place] [address]  Sent: 5/15/2021 4:01 PM EDT  Subject: Prescription Question    I am Kassandra Hernandez,  and caretaker of Nolvia Cruz. She is prescribed one (1) Amlodipine tablet (10 mg) per day. While we have sufficient tablets for this next week (16-22 May), we only have one (1) tablet after that. It seems that I am unable to order more Amlodopine from the MyChart Prescription, so, yet again, I am requesting you guys to intercede on our behalf. Given the current slowness of the [employer] now, [facility]'s \"just-in-time\" shipping protocol leaves a lot to be desired.      Thanks,  Kassandra Hernandez

## 2021-05-24 DIAGNOSIS — E11.65 TYPE 2 DIABETES MELLITUS WITH HYPERGLYCEMIA, WITH LONG-TERM CURRENT USE OF INSULIN (HCC): ICD-10-CM

## 2021-05-24 DIAGNOSIS — Z79.4 TYPE 2 DIABETES MELLITUS WITH HYPERGLYCEMIA, WITH LONG-TERM CURRENT USE OF INSULIN (HCC): ICD-10-CM

## 2021-05-24 RX ORDER — PEN NEEDLE, DIABETIC 30 GX3/16"
NEEDLE, DISPOSABLE MISCELLANEOUS
Qty: 100 PEN NEEDLE | Refills: 5 | Status: SHIPPED | OUTPATIENT
Start: 2021-05-24

## 2021-05-25 DIAGNOSIS — I10 ESSENTIAL HYPERTENSION, BENIGN: ICD-10-CM

## 2021-05-25 RX ORDER — METOPROLOL SUCCINATE 100 MG/1
100 TABLET, EXTENDED RELEASE ORAL DAILY
Qty: 90 TABLET | Refills: 3 | Status: SHIPPED | OUTPATIENT
Start: 2021-05-25 | End: 2021-06-17 | Stop reason: SDUPTHER

## 2021-05-25 RX ORDER — METOPROLOL SUCCINATE 100 MG/1
100 TABLET, EXTENDED RELEASE ORAL DAILY
Qty: 30 TABLET | Refills: 0 | Status: SHIPPED | OUTPATIENT
Start: 2021-05-25 | End: 2021-06-08

## 2021-05-25 RX ORDER — METOPROLOL SUCCINATE 100 MG/1
50 TABLET, EXTENDED RELEASE ORAL DAILY
Qty: 90 TABLET | Refills: 3 | Status: SHIPPED | OUTPATIENT
Start: 2021-05-25 | End: 2021-05-25 | Stop reason: SDUPTHER

## 2021-06-05 ENCOUNTER — PATIENT MESSAGE (OUTPATIENT)
Dept: FAMILY MEDICINE CLINIC | Age: 71
End: 2021-06-05

## 2021-06-07 RX ORDER — DULAGLUTIDE 1.5 MG/.5ML
INJECTION, SOLUTION SUBCUTANEOUS
Qty: 6 ML | Refills: 5 | Status: SHIPPED | OUTPATIENT
Start: 2021-06-07 | End: 2022-07-05

## 2021-06-07 RX ORDER — ERGOCALCIFEROL 1.25 MG/1
50000 CAPSULE ORAL
Qty: 24 CAPSULE | Refills: 5 | Status: SHIPPED | OUTPATIENT
Start: 2021-06-07 | End: 2021-06-20 | Stop reason: ALTCHOICE

## 2021-06-07 RX ORDER — METFORMIN HYDROCHLORIDE 500 MG/1
500 TABLET ORAL 2 TIMES DAILY WITH MEALS
Qty: 180 TABLET | Refills: 5 | Status: SHIPPED | OUTPATIENT
Start: 2021-06-07 | End: 2022-08-12

## 2021-06-07 RX ORDER — ERGOCALCIFEROL 1.25 MG/1
50000 CAPSULE ORAL
Qty: 6 CAPSULE | Refills: 0 | Status: SHIPPED | OUTPATIENT
Start: 2021-06-08 | End: 2021-06-20 | Stop reason: ALTCHOICE

## 2021-06-07 NOTE — TELEPHONE ENCOUNTER
From: Erica Valdes  To: Denys [address] [place] [address]  Sent: 6/5/2021 3:27 PM EDT  Subject: Prescription Question    I am Yimi Cordova  and care-giver. She is prescribed to take two (2) Vitamin D2 caps 50,000 units a week, one (1) each Monday and one (1) each Thursday . I only have one (1) for her THIS week, and need another one (1) to give her on Thursday. Obviously , I have NONE to give her next week. [facility] has seemed to be having problems providing this medication in a timely manner so that I don't have to keep coming back to you time after time. Would you please (a) get us locally enough to cover us for the next few weeks, and (b) make [facility] understand what it is that we need so that we don't keep running short. I really hate to be a pest, but this is getting old.      Thank you,  Jem Paz

## 2021-06-08 RX ORDER — METOPROLOL SUCCINATE 100 MG/1
TABLET, EXTENDED RELEASE ORAL
Qty: 30 TABLET | Refills: 0 | Status: SHIPPED | OUTPATIENT
Start: 2021-06-08 | End: 2021-06-17 | Stop reason: SDUPTHER

## 2021-06-13 ENCOUNTER — HOSPITAL ENCOUNTER (EMERGENCY)
Age: 71
Discharge: HOME | End: 2021-06-13
Attending: EMERGENCY MEDICINE
Payer: MEDICARE

## 2021-06-13 VITALS
SYSTOLIC BLOOD PRESSURE: 181 MMHG | HEART RATE: 77 BPM | DIASTOLIC BLOOD PRESSURE: 95 MMHG | HEIGHT: 65 IN | WEIGHT: 200 LBS | OXYGEN SATURATION: 97 % | BODY MASS INDEX: 33.32 KG/M2 | RESPIRATION RATE: 16 BRPM

## 2021-06-13 DIAGNOSIS — K64.4 EXTERNAL HEMORRHOID, BLEEDING: Primary | ICD-10-CM

## 2021-06-13 LAB — HEMOCCULT STL QL: NEGATIVE

## 2021-06-13 PROCEDURE — 99281 EMR DPT VST MAYX REQ PHY/QHP: CPT

## 2021-06-13 PROCEDURE — 82272 OCCULT BLD FECES 1-3 TESTS: CPT

## 2021-06-13 NOTE — ED PROVIDER NOTES
[facility]  EMERGENCY DEPARTMENT HISTORY AND PHYSICAL EXAM         Date of Service: 2021   Patient Name: Ranjit Montoya   YOB: 1950  Medical Record Number: [id number]    History of Presenting Illness     Chief Complaint   Patient presents with    Rectal Bleeding        History Provided By:  patient    Additional History:   Ranjit Montoya is a 65 y.o. female who presents ambulatory to the ED with cc of blood on toilet tissue when she wiped this morning. This occurred once. Denies H/O hemorrhoids. She denies N/V/D, constipation or straining, abdominal or rectal pain, F/C. She is on Eliquis. There are no other complaints, changes or physical findings at this time. Primary Care Provider: Fred Hernandez NP   Specialist:    Past History     Past Medical History:   Past Medical History:   Diagnosis Date    Diabetes ([facility]     Hypertension     Murmur     Osteopenia     Psoriasis     Rosacea     Stroke ([facility]     Vitamin D deficiency         Past Surgical History:   Past Surgical History:   Procedure Laterality Date    COLONOSCOPY N/A 2018    COLONOSCOPY   DIABETIC performed by Yari Nguyễn MD at [facility] [address]  SECTION      HX COLONOSCOPY  2018    3 polyps    HX TONSILLECTOMY          Family History:   Family History   Problem Relation Age of Onset    Diabetes Mother     Heart Disease Father     No Known Problems Child         Social History:   Social History     Tobacco Use    Smoking status: Never Smoker    Smokeless tobacco: Never Used   Substance Use Topics    Alcohol use: Yes     Alcohol/week: 0.0 standard drinks     Comment: 1/couple months    Drug use: Not on file        Allergies: Allergies   Allergen Reactions    Pcn [Penicillins] Rash    Sulfa (Sulfonamide Antibiotics) Unknown (comments)        Review of Systems   Review of Systems   Constitutional: Negative for appetite change, chills and fever.    HENT: Negative for congestion. Eyes: Negative for visual disturbance. Respiratory: Negative for cough, shortness of breath and wheezing. Cardiovascular: Negative for chest pain, palpitations and leg swelling. Gastrointestinal: Positive for anal bleeding. Negative for abdominal pain, constipation, diarrhea, nausea and vomiting. Genitourinary: Negative for dysuria, frequency and urgency. Musculoskeletal: Negative for back pain, joint swelling, myalgias and neck stiffness. Skin: Negative for rash. Neurological: Negative for dizziness, syncope, weakness and headaches. Hematological: Negative for adenopathy. Psychiatric/Behavioral: Negative for behavioral problems and dysphoric mood. Physical Exam  Physical Exam  Vitals and nursing note reviewed. Constitutional:       General: She is not in acute distress. Appearance: She is well-developed. She is obese. HENT:      Head: Normocephalic and atraumatic. Eyes:      General: No scleral icterus. Conjunctiva/sclera: Conjunctivae normal.      Pupils: Pupils are equal, round, and reactive to light. Cardiovascular:      Rate and Rhythm: Normal rate and regular rhythm. Heart sounds: No murmur heard. No gallop. Pulmonary:      Effort: Pulmonary effort is normal. No respiratory distress. Breath sounds: No stridor. No wheezing or rales. Abdominal:      General: Bowel sounds are normal. There is no distension. Palpations: Abdomen is soft. There is no mass. Tenderness: There is no abdominal tenderness. There is no guarding or rebound. Genitourinary:     Comments: Abraded external hemorrhoid at 6:00  Brown stool. Musculoskeletal:         General: Normal range of motion. Cervical back: Normal range of motion and neck supple. Lymphadenopathy:      Cervical: No cervical adenopathy. Skin:     General: Skin is warm and dry. Findings: No erythema or rash.    Neurological:      Mental Status: She is alert and oriented to person, place, and time. Cranial Nerves: No cranial nerve deficit. Coordination: Coordination normal.         Medical Decision Making   I am the first provider for this patient. I reviewed the vital signs, available nursing notes, past medical history, past surgical history, family history and social history. Old Medical Records: none relevant     Provider Notes:   DDX: Hemorrhoid, diverticulosis, internal hemorrhoid     ED Course:  9:47 AM   Initial assessment performed. The patients presenting problems have been discussed, and they are in agreement with the care plan formulated and outlined with them. I have encouraged them to ask questions as they arise throughout their visit. Progress Notes:  10:48 AM  Hemoccult negative. There is an abraded external hemorrhoid that is not bleeding, which was the likely source. D/C home with OTC treatment. Miley Godoy MD      Procedures:   Procedures    Diagnostic Study Results   Labs -      Recent Results (from the past 12 hour(s))   OCCULT BLOOD, STOOL    Collection Time: 06/13/21 10:30 AM   Result Value Ref Range    Occult blood, stool Negative NEG         Radiologic Studies -  The following have been ordered and reviewed:  No orders to display     CT Results  (Last 48 hours)    None        CXR Results  (Last 48 hours)    None            Vital Signs-Reviewed the patient's vital signs. Patient Vitals for the past 12 hrs:   Pulse Resp BP SpO2   06/13/21 0947 77 16 (!) 181/95 97 %       Medications Given in the ED:  Medications - No data to display    Diagnosis:  Clinical Impression:   1. External hemorrhoid, bleeding         Plan:  1:   Follow-up Information     Follow up With Specialties Details Why Contact Roselyn Parekh NP Nurse Practitioner  If symptoms worsen [address] [facility]  [phone]            2:   Current Discharge Medication List        Return to ED if worse. Disposition:  Home  _______________________________   Attestations: This note was performed by Mirta Jones MD in its entirety.   _______________________________

## 2021-06-17 ENCOUNTER — OFFICE VISIT (OUTPATIENT)
Dept: FAMILY MEDICINE CLINIC | Age: 71
End: 2021-06-17
Payer: MEDICARE

## 2021-06-17 VITALS
OXYGEN SATURATION: 94 % | TEMPERATURE: 97.4 F | HEART RATE: 59 BPM | HEIGHT: 65 IN | SYSTOLIC BLOOD PRESSURE: 150 MMHG | WEIGHT: 206.6 LBS | BODY MASS INDEX: 34.42 KG/M2 | DIASTOLIC BLOOD PRESSURE: 77 MMHG | RESPIRATION RATE: 16 BRPM

## 2021-06-17 DIAGNOSIS — E11.65 TYPE 2 DIABETES MELLITUS WITH HYPERGLYCEMIA, WITH LONG-TERM CURRENT USE OF INSULIN (HCC): Primary | ICD-10-CM

## 2021-06-17 DIAGNOSIS — Z79.4 TYPE 2 DIABETES MELLITUS WITH HYPERGLYCEMIA, WITH LONG-TERM CURRENT USE OF INSULIN (HCC): Primary | ICD-10-CM

## 2021-06-17 DIAGNOSIS — N18.30 STAGE 3 CHRONIC KIDNEY DISEASE, UNSPECIFIED WHETHER STAGE 3A OR 3B CKD (HCC): ICD-10-CM

## 2021-06-17 DIAGNOSIS — I48.0 PAROXYSMAL ATRIAL FIBRILLATION (HCC): ICD-10-CM

## 2021-06-17 DIAGNOSIS — E05.90 HYPERTHYROIDISM: ICD-10-CM

## 2021-06-17 DIAGNOSIS — Z86.73 HISTORY OF CVA (CEREBROVASCULAR ACCIDENT): ICD-10-CM

## 2021-06-17 DIAGNOSIS — I10 ESSENTIAL HYPERTENSION, BENIGN: ICD-10-CM

## 2021-06-17 DIAGNOSIS — E55.9 VITAMIN D DEFICIENCY: ICD-10-CM

## 2021-06-17 DIAGNOSIS — Z12.31 ENCOUNTER FOR SCREENING MAMMOGRAM FOR MALIGNANT NEOPLASM OF BREAST: ICD-10-CM

## 2021-06-17 DIAGNOSIS — E78.5 DYSLIPIDEMIA: ICD-10-CM

## 2021-06-17 PROCEDURE — G9899 SCRN MAM PERF RSLTS DOC: HCPCS | Performed by: NURSE PRACTITIONER

## 2021-06-17 PROCEDURE — G8536 NO DOC ELDER MAL SCRN: HCPCS | Performed by: NURSE PRACTITIONER

## 2021-06-17 PROCEDURE — 3017F COLORECTAL CA SCREEN DOC REV: CPT | Performed by: NURSE PRACTITIONER

## 2021-06-17 PROCEDURE — G8432 DEP SCR NOT DOC, RNG: HCPCS | Performed by: NURSE PRACTITIONER

## 2021-06-17 PROCEDURE — 36415 COLL VENOUS BLD VENIPUNCTURE: CPT | Performed by: NURSE PRACTITIONER

## 2021-06-17 PROCEDURE — 2022F DILAT RTA XM EVC RTNOPTHY: CPT | Performed by: NURSE PRACTITIONER

## 2021-06-17 PROCEDURE — G8427 DOCREV CUR MEDS BY ELIG CLIN: HCPCS | Performed by: NURSE PRACTITIONER

## 2021-06-17 PROCEDURE — G8754 DIAS BP LESS 90: HCPCS | Performed by: NURSE PRACTITIONER

## 2021-06-17 PROCEDURE — 1101F PT FALLS ASSESS-DOCD LE1/YR: CPT | Performed by: NURSE PRACTITIONER

## 2021-06-17 PROCEDURE — G8417 CALC BMI ABV UP PARAM F/U: HCPCS | Performed by: NURSE PRACTITIONER

## 2021-06-17 PROCEDURE — 3046F HEMOGLOBIN A1C LEVEL >9.0%: CPT | Performed by: NURSE PRACTITIONER

## 2021-06-17 PROCEDURE — G8399 PT W/DXA RESULTS DOCUMENT: HCPCS | Performed by: NURSE PRACTITIONER

## 2021-06-17 PROCEDURE — G8753 SYS BP > OR = 140: HCPCS | Performed by: NURSE PRACTITIONER

## 2021-06-17 PROCEDURE — 1090F PRES/ABSN URINE INCON ASSESS: CPT | Performed by: NURSE PRACTITIONER

## 2021-06-17 PROCEDURE — 99214 OFFICE O/P EST MOD 30 MIN: CPT | Performed by: NURSE PRACTITIONER

## 2021-06-17 RX ORDER — METOPROLOL SUCCINATE 100 MG/1
TABLET, EXTENDED RELEASE ORAL
Qty: 30 TABLET | Refills: 0 | Status: SHIPPED | OUTPATIENT
Start: 2021-06-17 | End: 2021-09-23 | Stop reason: SDUPTHER

## 2021-06-17 RX ORDER — METOPROLOL SUCCINATE 100 MG/1
100 TABLET, EXTENDED RELEASE ORAL DAILY
Qty: 90 TABLET | Refills: 3 | Status: SHIPPED | OUTPATIENT
Start: 2021-06-17 | End: 2022-10-16 | Stop reason: SDUPTHER

## 2021-06-17 RX ORDER — RIBOFLAVIN (VITAMIN B2) 100 MG
5000 TABLET ORAL DAILY
COMMUNITY
End: 2021-09-23

## 2021-06-17 NOTE — PROGRESS NOTES
Subjective:     CC: diabetes    Maurice Blackwell is a 65 y.o. female, accompanied by her , who presents today for a 3 month follow up for type 2 diabetes. New issues: Rectal bleeding  She went to the ER on 6-13-21 with cc of blood on toilet tissue when she wiped that morning. This occurred once. Denied H/O hemorrhoids. She denied N/V/D, constipation or straining, abdominal or rectal pain, F/C. She is on Eliquis. On physical exam there was an abraded external hemorrhoid that was not bleeding. Hemoccult negative. She was D/C'd home with OTC treatment and states she has not seen any more blood since. Type 2 diabetes  Lab Results   Component Value Date/Time    Hemoglobin A1c 8.1 (H) 12/17/2020 01:39 PM     She is on Metformin 689AG BID, Trulicity, and Levemir 33 KXAAV daily. Morning fasting BS run around 100-110. In the afternoon it increases to the 200's. She is not having any hypoglycemic episodes. Peripheral neuropathy: none. Has upcoming eye exam next month (7-8-21). CKD, stage 3  Lab Results   Component Value Date/Time    Creatinine 1.57 (H) 12/17/2020 01:39 PM     Creatinine increased with the A1C to 1.57 three months ago, up from 1.19 in 6/2020, down from 1.28 in March. She does not take NSAIDS. She is on an ACEi. Hyperthyroidism  She was diagnosed in 1/2021 and referred to Endo Dr Glen Martin. She had no thyroid antibodies. Her thyroid nuclear uptake scan showed a dominant nodule in the right lobe with normal uptake of tracer. She was prescribed Methimazole 5mg daily She was last seen in 4/2021 and her TSH was slightly elevated so her Methimazole dose was reduced to 2.5mg daily. She will needs labs rechecked in 3-4 months (July or August) here at this office since Dr Glen Martin is leaving his practice.  She was advised to transition to another endocrinologist.       Lab Results   Component Value Date/Time    TSH 6.770 (H) 04/27/2021 10:02 AM     H/O CVA   She was admitted to [facility] in June of 2019 for CVA and then spent 2 weeks in [facility]. This was her second CVA. The first caused weakness and the last one affected her speech and mental status.  helps with ADLs, meals, and medication management. She is on a statin daily and takes Eliquis for A-fib. A-fib  On Eliquis for stroke prophylaxis. Followed by cardiologist Dr Daniela Bautista. HTN  BP is high today. She has gained 25 pounds over the past year, 10 of which over the past 3 months. BP not checked at home. She has been eating potato chips and eating at the [employer] once a week. She is on HCTZ 25mg daily, Amlodipine 10mg daily, Quinapril 40mg BID, and Metoprolol XR 100mg daily. Denies CP, SOB, or dizziness, +mild swelling in ankles.     Vitamin D deficiency  She takes Vit D 50,000 BIW    Lab Results   Component Value Date/Time    Vitamin D 25-Hydroxy 10.9 (L) 06/19/2019 05:13 AM    VITAMIN D, 25-HYDROXY 84.1 09/15/2020 12:22 PM       Health maintenance  Eye exam: upcoming next month  She is due for a mammogram- ordered today  PNA vaccines up to date  Flu shot up to date  Shingrix- she is not a candidate, never had the chicken pox   Covid 19 vaccine- she had both Moderna vaccines    Patient Active Problem List   Diagnosis Code    Psoriasis L40.9    Essential hypertension, benign I10    Rosacea L71.9    Heart murmur R01.1    Type 2 diabetes mellitus with hyperglycemia, with long-term current use of insulin ([facility]) E11.65, Z79.4    Obesity, morbid ([address] E66.01    Type 2 diabetes mellitus with nephropathy ([address] E11.21    Acute ischemic cerebrovascular accident (CVA) involving right middle cerebral artery [facility]) I63.511    Cerebrovascular accident (CVA) due to thrombosis of right middle cerebral artery ([address] I63.311    Paroxysmal atrial fibrillation (HCC) I48.0    Dyslipidemia E78.5    CVA (cerebral vascular accident) ([address] I63.9    Diabetic peripheral neuropathy associated with type 2 diabetes mellitus ([address] E11.42    Bilateral carotid artery stenosis I11.76    Embolic stroke involving left middle cerebral artery ([facility]) I63.412    CKD (chronic kidney disease) stage 3, GFR 30-59 ml/min ([facility]) N18.30    Low TSH level R79.89       Past Medical History:   Diagnosis Date    Diabetes ([facility]     Hypertension     Murmur     Osteopenia     Psoriasis     Rosacea     Stroke ([facility] [facility]     Vitamin D deficiency          Current Outpatient Medications:     metoprolol succinate (TOPROL-XL) 100 mg tablet, TAKE 1 TABLET BY MOUTH EVERY DAY, Disp: 30 Tablet, Rfl: 0    ergocalciferol (ERGOCALCIFEROL) 1,250 mcg (50,000 unit) capsule, Take 1 Capsule by mouth every Monday and Thursday. , Disp: 24 Capsule, Rfl: 5    ergocalciferol (ERGOCALCIFEROL) 1,250 mcg (50,000 unit) capsule, Take 1 Capsule by mouth every Tuesday and Friday., Disp: 6 Capsule, Rfl: 0    metFORMIN (GLUCOPHAGE) 500 mg tablet, Take 1 Tablet by mouth two (2) times daily (with meals). , Disp: 180 Tablet, Rfl: 5    dulaglutide (Trulicity) 1.5 PZ/3.7 mL sub-q pen, Inject 1.5mg SQ every 7 days, Disp: 6 mL, Rfl: 5    metoprolol succinate (TOPROL-XL) 100 mg tablet, Take 1 Tablet by mouth daily. , Disp: 90 Tablet, Rfl: 3    Insulin Needles, Disposable, 31 gauge x 5/16\" ndle, Use as directed, Disp: 100 Pen Needle, Rfl: 5    glucose blood VI test strips (ASCENSIA AUTODISC VI, ONE TOUCH ULTRA TEST VI) strip, Check glucose twice daily. , Disp: 180 Strip, Rfl: 3    amLODIPine (NORVASC) 10 mg tablet, Take 1 Tab by mouth daily. , Disp: 90 Tab, Rfl: 1    quinapriL (ACCUPRIL) 40 mg tablet, TAKE 1 TABLET TWICE A DAY, Disp: 180 Tab, Rfl: 1    hydroCHLOROthiazide (HYDRODIURIL) 25 mg tablet, Take 1 Tab by mouth daily. , Disp: 90 Tab, Rfl: 1    apixaban (Eliquis) 5 mg tablet, TAKE 1 TABLET BY MOUTH TWICE A DAY, Disp: 180 Tab, Rfl: 1    methIMAzole (TAPAZOLE) 5 mg tablet, Take 1 Tab by mouth daily. , Disp: 90 Tab, Rfl: 3    atorvastatin (LIPITOR) 80 mg tablet, TAKE 1 TABLET NIGHTLY, Disp: 90 Tab, Rfl: 2    Basaglar KwikPen U-100 Insulin 100 unit/mL (3 mL) inpn, 40 Units by SubCUTAneous route every morning., Disp: , Rfl:     multivitamin (ONE A DAY) tablet, Take 1 Tab by mouth daily. , Disp: , Rfl:     aspirin 81 mg chewable tablet, Take 81 mg by mouth daily. , Disp: , Rfl:     Blood-Glucose Meter monitoring kit, Check blood sugar twice daily. , Disp: 1 Kit, Rfl: 0    Allergies   Allergen Reactions    Pcn [Penicillins] Rash    Sulfa (Sulfonamide Antibiotics) Unknown (comments)       Past Surgical History:   Procedure Laterality Date    COLONOSCOPY N/A 4/4/2018    COLONOSCOPY   DIABETIC performed by Mary Tate MD at [place] [id number]  [facility] COLONOSCOPY  2018    3 polyps    HX TONSILLECTOMY         Social History     Tobacco Use   Smoking Status Never Smoker   Smokeless Tobacco Never Used       Social History     Socioeconomic History    Marital status:      Spouse name: Not on file    Number of children: Not on file    Years of education: Not on file    Highest education level: Not on file   Tobacco Use    Smoking status: Never Smoker    Smokeless tobacco: Never Used   Substance and Sexual Activity    Alcohol use: Yes     Alcohol/week: 0.0 standard drinks     Comment: 1/couple months     Social Determinants of Health     Financial Resource Strain:     Difficulty of Paying Living Expenses:    Food Insecurity:     Worried About Running Out of Food in the Last Year:     [address] in the Last Year:    Transportation Needs:     Lack of Transportation (Medical):      Lack of Transportation (Non-Medical):    Physical Activity:     Days of Exercise per Week:     Minutes of Exercise per Session:    Stress:     Feeling of Stress :    Social Connections:     Frequency of Communication with Friends and Family:     Frequency of Social Gatherings with Friends and Family:     Attends [employer] Services:     Active Member of Clubs or Organizations:     Attends [employer] or Organization Meetings:     Marital Status:        Family History   Problem Relation Age of Onset    Diabetes Mother     Heart Disease Father     No Known Problems Child      ROS:  Gen: denies fever, chills, or fatigue, +weight gain  HEENT:denies H/A, nosebleeds, or bleeding gums  Resp: denies dyspnea, cough, or wheezing  CV: denies chest pain, pressure, or palpitations  : Denies dysuria, hematuria, or urinary frequency/urgency  Extremeties: +mild edema in ankles  Musculoskeletal: Denies joint pain, stiffness, or muscle cramps  Neuro: denies numbness/tingling or dizziness  Endo: denies polyuria or polydipsia  Skin: denies rashes or new lesions  Psych: denies anxiety or depression    Objective:     Visit Vitals  BP (!) 150/77   Pulse (!) 59   Temp 97.4 °F (36.3 °C) (Oral)   Resp 16   Ht 5' 5\" (1.651 m)   Wt 206 lb 9.6 oz (93.7 kg)   SpO2 94%   BMI 34.38 kg/m²       General: Alert and oriented. No acute distress. Well nourished. Eyes: Sclera white, conjunctiva clear. PERRLA. Neck: Supple with FROM. No carotid bruits  Lungs: Breathing even and unlabored. All lobes clear to auscultation bilaterally   Heart :RRR, S1 and S2 normal intensity, no extra heart sounds  Extremities: +mild edema to both ankles, pedal pulses 2+  Neuro: Cranial nerves grossly normal.   Musculoskeletal: Joints are without swelling, erythema, or heat. Psych: Mood and thought content appropriate for situation. Dressed appropriately and with good hygiene. Skin: Warm and dry and intact      Assessment/ Plan:     HTN  BP elevated, she has gained 10 pounds in the past 3 months  Advised to cut back on sodium and restaurant food   Check CMP  Cont current meds  Low-sodium diet  Exercise  RTO or go to ER for any CP, SOB, dizziness, or swelling.   F/U:1 month for BP recheck if still elevated will increase HCTZ if kidney function allows    DM  Check A1C  Cont Metformin 309WC BID  Cont Trulicity  Cont Basaglar insulin 40 units every morning  Cont to check glucose BID - notify provider for any hypoglycemic episodes or blood sugars <80  F/U 3 months    CKD, stage 3  Check creatinine  Avoid NSAIDS. Stay hydrated   F/U 3 months    A-fib  Stable  Cont Eliquis for stroke prophylaxis  Report any recurrent nosebleeds or other s/s of abnormal bleeding  Check CBC  F/U with cardiologist as scheduled  F/U 3 months. Hyperthyroidism  Cont methimazole 2.5mg daily  RTO in 6 weeks to recheck TSH and Free T4  Will refer to Dr Rebeka Chou since Dr Neftaly Rice is leaving the practice  I will call pt's  with appt info    Vit D level  Cont supp    Health maintenance  Eye exam: upcoming next month  She is due for a mammogram- ordered today  PNA vaccines up to date  Flu shot up to date  Shingrix- she is not a candidate, never had the chicken pox   Covid 19 vaccine- she had both Moderna vaccines      Verbal and written instructions (see AVS) provided.  Patient expresses understanding of diagnosis and treatment plan. Health Maintenance Due   Topic Date Due    Eye Exam Retinal or Dilated  Never done    Shingrix Vaccine Age 50> (1 of 2) Never done    Breast Cancer Screen Mammogram  11/03/2018    Lipid Screen  06/15/2021               Unique Rodriguez, NP

## 2021-06-17 NOTE — ACP (ADVANCE CARE PLANNING)
Non-Provider Advance Care Planning (ACP) Note    Date of ACP Conversation: 6/17/2021  Persons included in Conversation: patient and family  Length of ACP Conversation in minutes: <16 minutes (Non-Billable)    Conversation requested by:   Provider    Authorized Decision Maker (if patient is incapable of making informed decisions): This person is:  Named in Advance Directive or [address] [facility] [address] for ALL Patients with Decision Making Capacity:    Advance Directive Conversation with Patients who have not yet planned:  N/A    Review of Existing Advance Directive: (Select questions covered)  Does this advance directive still reflect your preferences?   Yes    Interventions Provided:  N/A

## 2021-06-17 NOTE — PROGRESS NOTES
1. Have you been to the ER, urgent care clinic since your last visit? Hospitalized since your last visit? Yes Where: [facility] for rectal bleeding/Hemmoroid    2. Have you seen or consulted any other health care providers outside of the [address] [place] [address] since your last visit? Include any pap smears or colon screening.  No

## 2021-06-18 ENCOUNTER — DOCUMENTATION ONLY (OUTPATIENT)
Dept: FAMILY MEDICINE CLINIC | Age: 71
End: 2021-06-18

## 2021-06-18 ENCOUNTER — PATIENT MESSAGE (OUTPATIENT)
Dept: FAMILY MEDICINE CLINIC | Age: 71
End: 2021-06-18

## 2021-06-18 DIAGNOSIS — E05.90 HYPERTHYROIDISM: Primary | ICD-10-CM

## 2021-06-18 LAB
25(OH)D3 SERPL-MCNC: >150 NG/ML (ref 30–100)
ALBUMIN SERPL-MCNC: 3.1 G/DL (ref 3.5–5)
ALBUMIN/GLOB SERPL: 0.7 {RATIO} (ref 1.1–2.2)
ALP SERPL-CCNC: 113 U/L (ref 45–117)
ALT SERPL-CCNC: 21 U/L (ref 12–78)
ANION GAP SERPL CALC-SCNC: 5 MMOL/L (ref 5–15)
AST SERPL-CCNC: 20 U/L (ref 15–37)
BASOPHILS # BLD: 0.1 K/UL (ref 0–0.1)
BASOPHILS NFR BLD: 1 % (ref 0–1)
BILIRUB SERPL-MCNC: 0.3 MG/DL (ref 0.2–1)
BUN SERPL-MCNC: 32 MG/DL (ref 6–20)
BUN/CREAT SERPL: 22 (ref 12–20)
CALCIUM SERPL-MCNC: 10.1 MG/DL (ref 8.5–10.1)
CHLORIDE SERPL-SCNC: 104 MMOL/L (ref 97–108)
CO2 SERPL-SCNC: 27 MMOL/L (ref 21–32)
CREAT SERPL-MCNC: 1.45 MG/DL (ref 0.55–1.02)
DIFFERENTIAL METHOD BLD: ABNORMAL
EOSINOPHIL # BLD: 0.6 K/UL (ref 0–0.4)
EOSINOPHIL NFR BLD: 5 % (ref 0–7)
ERYTHROCYTE [DISTWIDTH] IN BLOOD BY AUTOMATED COUNT: 12.9 % (ref 11.5–14.5)
EST. AVERAGE GLUCOSE BLD GHB EST-MCNC: 157 MG/DL
GLOBULIN SER CALC-MCNC: 4.5 G/DL (ref 2–4)
GLUCOSE SERPL-MCNC: 101 MG/DL (ref 65–100)
HBA1C MFR BLD: 7.1 % (ref 4–5.6)
HCT VFR BLD AUTO: 39.6 % (ref 35–47)
HGB BLD-MCNC: 12.7 G/DL (ref 11.5–16)
IMM GRANULOCYTES # BLD AUTO: 0.1 K/UL (ref 0–0.04)
IMM GRANULOCYTES NFR BLD AUTO: 1 % (ref 0–0.5)
LYMPHOCYTES # BLD: 3.1 K/UL (ref 0.8–3.5)
LYMPHOCYTES NFR BLD: 26 % (ref 12–49)
MCH RBC QN AUTO: 30 PG (ref 26–34)
MCHC RBC AUTO-ENTMCNC: 32.1 G/DL (ref 30–36.5)
MCV RBC AUTO: 93.6 FL (ref 80–99)
MONOCYTES # BLD: 0.8 K/UL (ref 0–1)
MONOCYTES NFR BLD: 7 % (ref 5–13)
NEUTS SEG # BLD: 7.6 K/UL (ref 1.8–8)
NEUTS SEG NFR BLD: 60 % (ref 32–75)
NRBC # BLD: 0 K/UL (ref 0–0.01)
NRBC BLD-RTO: 0 PER 100 WBC
PLATELET # BLD AUTO: 283 K/UL (ref 150–400)
PMV BLD AUTO: 10.7 FL (ref 8.9–12.9)
POTASSIUM SERPL-SCNC: 4.6 MMOL/L (ref 3.5–5.1)
PROT SERPL-MCNC: 7.6 G/DL (ref 6.4–8.2)
RBC # BLD AUTO: 4.23 M/UL (ref 3.8–5.2)
SODIUM SERPL-SCNC: 136 MMOL/L (ref 136–145)
WBC # BLD AUTO: 12.2 K/UL (ref 3.6–11)

## 2021-06-20 NOTE — PROGRESS NOTES
Vit D level is too high, please have her stop the Vit D pill and let's recheck level at her next lou in 3 months. A1C looks great, kidney function is stable. WBC just slightly elevated, not sure why, we will recheck in 3 months.  RTO for any s/s of infection

## 2021-07-28 ENCOUNTER — LAB ONLY (OUTPATIENT)
Dept: FAMILY MEDICINE CLINIC | Age: 71
End: 2021-07-28

## 2021-07-28 DIAGNOSIS — E05.90 HYPERTHYROIDISM: ICD-10-CM

## 2021-08-28 DIAGNOSIS — Z79.4 TYPE 2 DIABETES MELLITUS WITH HYPERGLYCEMIA, WITH LONG-TERM CURRENT USE OF INSULIN (HCC): ICD-10-CM

## 2021-08-28 DIAGNOSIS — E11.65 TYPE 2 DIABETES MELLITUS WITH HYPERGLYCEMIA, WITH LONG-TERM CURRENT USE OF INSULIN (HCC): ICD-10-CM

## 2021-08-30 DIAGNOSIS — E11.65 TYPE 2 DIABETES MELLITUS WITH HYPERGLYCEMIA, WITH LONG-TERM CURRENT USE OF INSULIN (HCC): ICD-10-CM

## 2021-08-30 DIAGNOSIS — Z79.4 TYPE 2 DIABETES MELLITUS WITH HYPERGLYCEMIA, WITH LONG-TERM CURRENT USE OF INSULIN (HCC): ICD-10-CM

## 2021-09-14 ENCOUNTER — OFFICE VISIT (OUTPATIENT)
Dept: FAMILY MEDICINE CLINIC | Age: 71
End: 2021-09-14
Payer: MEDICARE

## 2021-09-14 VITALS
TEMPERATURE: 97.5 F | WEIGHT: 212.5 LBS | HEART RATE: 74 BPM | OXYGEN SATURATION: 98 % | SYSTOLIC BLOOD PRESSURE: 119 MMHG | RESPIRATION RATE: 18 BRPM | BODY MASS INDEX: 35.4 KG/M2 | DIASTOLIC BLOOD PRESSURE: 70 MMHG | HEIGHT: 65 IN

## 2021-09-14 DIAGNOSIS — I48.0 PAROXYSMAL ATRIAL FIBRILLATION (HCC): ICD-10-CM

## 2021-09-14 DIAGNOSIS — I69.354 HEMIPARESIS AFFECTING LEFT SIDE AS LATE EFFECT OF STROKE (HCC): ICD-10-CM

## 2021-09-14 DIAGNOSIS — E78.5 DYSLIPIDEMIA: ICD-10-CM

## 2021-09-14 DIAGNOSIS — N30.01 ACUTE CYSTITIS WITH HEMATURIA: ICD-10-CM

## 2021-09-14 DIAGNOSIS — R79.89 HIGH SERUM VITAMIN D: ICD-10-CM

## 2021-09-14 DIAGNOSIS — R82.90 FOUL SMELLING URINE: ICD-10-CM

## 2021-09-14 DIAGNOSIS — E11.9 WELL CONTROLLED TYPE 2 DIABETES MELLITUS (HCC): Primary | ICD-10-CM

## 2021-09-14 DIAGNOSIS — E05.90 HYPERTHYROIDISM: ICD-10-CM

## 2021-09-14 DIAGNOSIS — N18.30 STAGE 3 CHRONIC KIDNEY DISEASE, UNSPECIFIED WHETHER STAGE 3A OR 3B CKD (HCC): ICD-10-CM

## 2021-09-14 DIAGNOSIS — I10 ESSENTIAL HYPERTENSION, BENIGN: ICD-10-CM

## 2021-09-14 PROBLEM — E11.22 TYPE 2 DIABETES MELLITUS WITH CHRONIC KIDNEY DISEASE (HCC): Status: ACTIVE | Noted: 2017-12-26

## 2021-09-14 LAB
BILIRUB UR QL STRIP: NEGATIVE
GLUCOSE UR-MCNC: NEGATIVE MG/DL
KETONES P FAST UR STRIP-MCNC: NEGATIVE MG/DL
PH UR STRIP: 6 [PH] (ref 4.6–8)
PROT UR QL STRIP: NORMAL
SP GR UR STRIP: 1.02 (ref 1–1.03)
UA UROBILINOGEN AMB POC: NORMAL (ref 0.2–1)
URINALYSIS CLARITY POC: NORMAL
URINALYSIS COLOR POC: YELLOW
URINE BLOOD POC: NORMAL
URINE LEUKOCYTES POC: NORMAL
URINE NITRITES POC: NEGATIVE

## 2021-09-14 PROCEDURE — 3017F COLORECTAL CA SCREEN DOC REV: CPT | Performed by: NURSE PRACTITIONER

## 2021-09-14 PROCEDURE — 1101F PT FALLS ASSESS-DOCD LE1/YR: CPT | Performed by: NURSE PRACTITIONER

## 2021-09-14 PROCEDURE — 99214 OFFICE O/P EST MOD 30 MIN: CPT | Performed by: NURSE PRACTITIONER

## 2021-09-14 PROCEDURE — 1090F PRES/ABSN URINE INCON ASSESS: CPT | Performed by: NURSE PRACTITIONER

## 2021-09-14 PROCEDURE — G8754 DIAS BP LESS 90: HCPCS | Performed by: NURSE PRACTITIONER

## 2021-09-14 PROCEDURE — G8432 DEP SCR NOT DOC, RNG: HCPCS | Performed by: NURSE PRACTITIONER

## 2021-09-14 PROCEDURE — G8752 SYS BP LESS 140: HCPCS | Performed by: NURSE PRACTITIONER

## 2021-09-14 PROCEDURE — 3051F HG A1C>EQUAL 7.0%<8.0%: CPT | Performed by: NURSE PRACTITIONER

## 2021-09-14 PROCEDURE — 81003 URINALYSIS AUTO W/O SCOPE: CPT | Performed by: NURSE PRACTITIONER

## 2021-09-14 PROCEDURE — G9899 SCRN MAM PERF RSLTS DOC: HCPCS | Performed by: NURSE PRACTITIONER

## 2021-09-14 PROCEDURE — G8417 CALC BMI ABV UP PARAM F/U: HCPCS | Performed by: NURSE PRACTITIONER

## 2021-09-14 PROCEDURE — G8399 PT W/DXA RESULTS DOCUMENT: HCPCS | Performed by: NURSE PRACTITIONER

## 2021-09-14 PROCEDURE — G8427 DOCREV CUR MEDS BY ELIG CLIN: HCPCS | Performed by: NURSE PRACTITIONER

## 2021-09-14 PROCEDURE — 2022F DILAT RTA XM EVC RTNOPTHY: CPT | Performed by: NURSE PRACTITIONER

## 2021-09-14 PROCEDURE — G8536 NO DOC ELDER MAL SCRN: HCPCS | Performed by: NURSE PRACTITIONER

## 2021-09-14 RX ORDER — CEPHALEXIN 500 MG/1
500 CAPSULE ORAL 3 TIMES DAILY
Qty: 30 CAPSULE | Refills: 0 | Status: SHIPPED | OUTPATIENT
Start: 2021-09-14 | End: 2021-09-14 | Stop reason: SDUPTHER

## 2021-09-14 RX ORDER — CEPHALEXIN 500 MG/1
500 CAPSULE ORAL 3 TIMES DAILY
Qty: 30 CAPSULE | Refills: 0 | Status: SHIPPED | OUTPATIENT
Start: 2021-09-14 | End: 2021-09-23 | Stop reason: ALTCHOICE

## 2021-09-14 NOTE — PROGRESS NOTES
Subjective:     CC: diabetes    Madeleine Irene is a 65 y.o. female, accompanied by her , who presents today for a 3 month follow up for type 2 diabetes. Type 2 diabetes  Lab Results   Component Value Date/Time    Hemoglobin A1c 7.1 (H) 06/17/2021 11:00 PM     She is on Metformin 998GW BID, Trulicity, and Levemir 39 XOMPW daily. Morning fasting BS run around 100-110. In the afternoon it increases to the 200's. She is not having any hypoglycemic episodes. Peripheral neuropathy: none. Had eye exam 7-8-21. HTN  BP was elevated in the 140's today but came down to 119/70 when rechecked. She has gained 30 pounds over the past year, half of which over the past 6 months. BP not checked at home. She is on HCTZ 25mg daily, Amlodipine 10mg daily, Quinapril 40mg BID, and Metoprolol XR 100mg daily. Denies CP, SOB, or dizziness, +She now has moderate  swelling in the ankles. CKD, stage 3  Creatinine 1.45 in 6/2021, down from 1.57 in 12/2020, up from 1.19 in 6/2020, down from 1.28 in March. She does not take NSAIDS. She is on an ACEi. Hyperthyroidism  She was diagnosed in 1/2021 and referred to Endo Dr Irlanda Meyers. She had no thyroid antibodies. Her thyroid nuclear uptake scan showed a dominant nodule in the right lobe with normal uptake of tracer. She was prescribed Methimazole 5mg daily She was last seen in 4/2021 and her TSH was slightly elevated so her Methimazole dose was reduced to 2.5mg daily. She is due for thyroid labs to be rechecked. She has an upcoming appt with Dr Evita Berrios on 9-29-21       Lab Results   Component Value Date/Time    TSH 6.770 (H) 04/27/2021 10:02 AM     H/O CVA with hemiparesis   She was admitted to [address] [place] [address] in June of 2019 for CVA and then spent 2 weeks in [facility]. This was her second CVA. The first caused weakness and the last one affected her speech and mental status.  helps with ADLs, meals, and medication management.   She is on a statin daily and takes Eliquis for A-fib. Lab Results   Component Value Date/Time    Cholesterol, total 130 06/15/2020 12:02 PM    HDL Cholesterol 58 06/15/2020 12:02 PM    LDL, calculated 53 06/15/2020 12:02 PM    VLDL, calculated 19 06/15/2020 12:02 PM    Triglyceride 93 06/15/2020 12:02 PM    CHOL/HDL Ratio 2.3 06/10/2019 05:02 AM       A-fib  On Eliquis for stroke prophylaxis. Denies black or bloody stools. Followed by cardiologist Dr Trino Pickett. Vitamin D deficiency  She used to take Vit D 50,000 BIW but was told to stop at last OV due to high levels. Lab Results   Component Value Date/Time    Vitamin D 25-Hydroxy >150.0 (H) 06/17/2021 11:00 PM        still reports foul smelling urine. She had a UTI back in March. Health maintenance  Eye exam: done 7/2021  She is due for a mammogram- ordered back in June but never completed.    PNA vaccines up to date  Flu shot up to date  Shingrix- she is not a candidate, never had the chicken pox   Covid 19 vaccine- she had both Moderna vaccines    Patient Active Problem List   Diagnosis Code    Psoriasis L40.9    Essential hypertension, benign I10    Rosacea L71.9    Heart murmur R01.1    Type 2 diabetes mellitus with hyperglycemia, with long-term current use of insulin ([facility]) E11.65, Z79.4    Obesity, morbid ([facility]) E66.01    Type 2 diabetes mellitus with nephropathy ([facility] E11.21    Acute ischemic cerebrovascular accident (CVA) involving right middle cerebral artery [facility]) I63.511    Cerebrovascular accident (CVA) due to thrombosis of right middle cerebral artery ([facility]) I63.311    Paroxysmal atrial fibrillation ([facility]) I48.0    Dyslipidemia E78.5    CVA (cerebral vascular accident) ([facility] I63.9    Diabetic peripheral neuropathy associated with type 2 diabetes mellitus [facility] E11.42    Bilateral carotid artery stenosis F30.31    Embolic stroke involving left middle cerebral artery ([facility]) X75.641    CKD (chronic kidney disease) stage 3, GFR 30-59 ml/min ([facility]) N18.30    Low TSH level R79.89       Past Medical History:   Diagnosis Date    Diabetes ([facility]     Hypertension     Murmur     Osteopenia     Psoriasis     Rosacea     Stroke ([facility]     Vitamin D deficiency          Current Outpatient Medications:     glucose blood VI test strips (ASCENSIA AUTODISC VI, ONE TOUCH ULTRA TEST VI) strip, Check glucose twice daily. , Disp: 180 Strip, Rfl: 3    apixaban (Eliquis) 5 mg tablet, TAKE 1 TABLET BY MOUTH TWICE A DAY, Disp: 30 Tablet, Rfl: 0    riboflavin, vitamin B2, (Vitamin B-2) 100 mg tablet, Take 5,000 mg by mouth daily. , Disp: , Rfl:     metoprolol succinate (TOPROL-XL) 100 mg tablet, TAKE 1 TABLET BY MOUTH EVERY DAY, Disp: 30 Tablet, Rfl: 0    metoprolol succinate (TOPROL-XL) 100 mg tablet, Take 1 Tablet by mouth daily. , Disp: 90 Tablet, Rfl: 3    metFORMIN (GLUCOPHAGE) 500 mg tablet, Take 1 Tablet by mouth two (2) times daily (with meals). , Disp: 180 Tablet, Rfl: 5    dulaglutide (Trulicity) 1.5 YW/0.7 mL sub-q pen, Inject 1.5mg SQ every 7 days, Disp: 6 mL, Rfl: 5    Insulin Needles, Disposable, 31 gauge x 5/16\" ndle, Use as directed, Disp: 100 Pen Needle, Rfl: 5    amLODIPine (NORVASC) 10 mg tablet, Take 1 Tab by mouth daily. , Disp: 90 Tab, Rfl: 1    quinapriL (ACCUPRIL) 40 mg tablet, TAKE 1 TABLET TWICE A DAY, Disp: 180 Tab, Rfl: 1    hydroCHLOROthiazide (HYDRODIURIL) 25 mg tablet, Take 1 Tab by mouth daily. , Disp: 90 Tab, Rfl: 1    methIMAzole (TAPAZOLE) 5 mg tablet, Take 1 Tab by mouth daily. , Disp: 90 Tab, Rfl: 3    atorvastatin (LIPITOR) 80 mg tablet, TAKE 1 TABLET NIGHTLY, Disp: 90 Tab, Rfl: 2    Basaglar KwikPen U-100 Insulin 100 unit/mL (3 mL) inpn, 40 Units by SubCUTAneous route every morning., Disp: , Rfl:     multivitamin (ONE A DAY) tablet, Take 1 Tab by mouth daily. , Disp: , Rfl:     aspirin 81 mg chewable tablet, Take 81 mg by mouth daily. , Disp: , Rfl:     Blood-Glucose Meter monitoring kit, Check blood sugar twice daily. , Disp: 1 Kit, Rfl: 0    Allergies   Allergen Reactions    Pcn [Penicillins] Rash    Sulfa (Sulfonamide Antibiotics) Unknown (comments)       Past Surgical History:   Procedure Laterality Date    COLONOSCOPY N/A 4/4/2018    COLONOSCOPY   DIABETIC performed by Sonu Quinonez MD at [place] [id number]  [facility]      HX COLONOSCOPY  2018    3 polyps    HX TONSILLECTOMY         Social History     Tobacco Use   Smoking Status Never Smoker   Smokeless Tobacco Never Used       Social History     Socioeconomic History    Marital status:      Spouse name: Not on file    Number of children: Not on file    Years of education: Not on file    Highest education level: Not on file   Tobacco Use    Smoking status: Never Smoker    Smokeless tobacco: Never Used   Substance and Sexual Activity    Alcohol use: Yes     Alcohol/week: 0.0 standard drinks     Comment: 1/couple months     Social Determinants of Health     Financial Resource Strain:     Difficulty of Paying Living Expenses:    Food Insecurity:     Worried About Running Out of Food in the Last Year:     [address] in the Last Year:    Transportation Needs:     Lack of Transportation (Medical):      Lack of Transportation (Non-Medical):    Physical Activity:     Days of Exercise per Week:     Minutes of Exercise per Session:    Stress:     Feeling of Stress :    Social Connections:     Frequency of Communication with Friends and Family:     Frequency of Social Gatherings with Friends and Family:     Attends [employer] Services:     Active Member of Clubs or Organizations:     Attends Club or Organization Meetings:     Marital Status:        Family History   Problem Relation Age of Onset    Diabetes Mother     Heart Disease Father     No Known Problems Child      ROS:  Gen: denies fever, chills, or fatigue, +weight gain  HEENT:denies H/A, nosebleeds, or bleeding gums  Resp: denies dyspnea, cough, or wheezing  CV: denies chest pain, pressure, or palpitations  GI: denies abd pain, N/V, melena or hematochezia  : +foul smelling urine, Denies dysuria, hematuria, or urinary frequency/urgency  Extremeties: +moderate edema in ankles  Musculoskeletal: Denies joint pain, stiffness, or muscle cramps  Neuro: denies numbness/tingling or dizziness  Endo: denies polyuria or polydipsia  Skin: denies rashes or new lesions  Psych: denies anxiety or depression    Objective:     Visit Vitals  /70   Pulse 74   Temp 97.5 °F (36.4 °C) (Temporal)   Resp 18   Ht 5' 5\" (1.651 m)   Wt 212 lb 8 oz (96.4 kg)   SpO2 98%   BMI 35.36 kg/m²         General: Alert and oriented. No acute distress. +Obese. Eyes: Sclera white, conjunctiva clear. PERRLA. Neck: Supple with FROM. No carotid bruits  Lungs: Breathing even and unlabored. All lobes clear to auscultation bilaterally   Heart :RRR, S1 and S2 normal intensity, no extra heart sounds  Extremities: +moderate edema to both ankles, pedal pulses 2+  Neuro: Cranial nerves grossly normal.   Musculoskeletal: Joints are without swelling, erythema, or heat. Psych: Mood and thought content appropriate for situation. Dressed appropriately and with good hygiene. Skin: Warm and dry and intact      Assessment/ Plan:     HTN  BP at goal when rechecked  Advised to limit sodium and restaurant food   Check CMP  Cont current meds  Low-sodium diet  Exercise  RTO or go to ER for any CP, SOB, dizziness, or swelling. F/U:3 months     DM  Check A1C  Cont Metformin 820ZC BID  Cont Trulicity  Cont Basaglar insulin 40 units every morning  Cont to check glucose BID - notify provider for any hypoglycemic episodes or blood sugars <80  F/U 3 months    CKD, stage 3  Check creatinine  Avoid NSAIDS. Stay hydrated   F/U 3 months    A-fib  Stable  Cont Eliquis for stroke prophylaxis  Report any recurrent nosebleeds or other s/s of abnormal bleeding  Check CBC  F/U with cardiologist as scheduled later this month  F/U 3 months.     Hyperthyroidism  Cont methimazole 5mg daily  Recheck TSH, Free T3 and Free T4  F/U with Dr Kanika Horner this month as scheduled    Hx of CVA with hemiparesis  Cont statin and asa  Keep BP and BS under god control  Go to ER for sudden unilateral numbness/tingling, weakness, facial drooping, slurred speech, confusion, AMS, or dizziness    Elevated serum Vit D   Recheck Vit D level    Foul smelling urine  Check UA= pos for blood and LE  Send out for C/S  Script sent for Keflex 500mg TID x 10 days  Drink more water      Health maintenance  Eye exam: done 7/2021  She is due for a mammogram- ordered back in June but never completed.  given scheduling dept phone number to call and schedule  PNA vaccines up to date  Flu shot up to date  Shingrix- she is not a candidate, never had the chicken pox   Covid 19 vaccine- she had both Moderna vaccines, advised to get booster next month as soon as available    Verbal and written instructions (see AVS) provided.  Patient expresses understanding of diagnosis and treatment plan. Health Maintenance Due   Topic Date Due    Breast Cancer Screen Mammogram  11/03/2018    Lipid Screen  06/15/2021    Flu Vaccine (1) 09/01/2021               Austin Dominguez NP

## 2021-09-14 NOTE — PROGRESS NOTES
1. Have you been to the ER, urgent care clinic since your last visit? Hospitalized since your last visit? No    2. Have you seen or consulted any other health care providers outside of the [address] [place] [address] since your last visit? Include any pap smears or colon screening.  No

## 2021-09-15 LAB
25(OH)D3 SERPL-MCNC: 78.7 NG/ML (ref 30–100)
ALBUMIN SERPL-MCNC: 2.8 G/DL (ref 3.5–5)
ALBUMIN/GLOB SERPL: 0.6 {RATIO} (ref 1.1–2.2)
ALP SERPL-CCNC: 108 U/L (ref 45–117)
ALT SERPL-CCNC: 20 U/L (ref 12–78)
ANION GAP SERPL CALC-SCNC: 8 MMOL/L (ref 5–15)
AST SERPL-CCNC: 17 U/L (ref 15–37)
BILIRUB SERPL-MCNC: 0.2 MG/DL (ref 0.2–1)
BUN SERPL-MCNC: 25 MG/DL (ref 6–20)
BUN/CREAT SERPL: 16 (ref 12–20)
CALCIUM SERPL-MCNC: 8.9 MG/DL (ref 8.5–10.1)
CHLORIDE SERPL-SCNC: 107 MMOL/L (ref 97–108)
CHOLEST SERPL-MCNC: 110 MG/DL
CO2 SERPL-SCNC: 24 MMOL/L (ref 21–32)
CREAT SERPL-MCNC: 1.56 MG/DL (ref 0.55–1.02)
ERYTHROCYTE [DISTWIDTH] IN BLOOD BY AUTOMATED COUNT: 13.4 % (ref 11.5–14.5)
EST. AVERAGE GLUCOSE BLD GHB EST-MCNC: 148 MG/DL
GLOBULIN SER CALC-MCNC: 4.4 G/DL (ref 2–4)
GLUCOSE SERPL-MCNC: 233 MG/DL (ref 65–100)
HBA1C MFR BLD: 6.8 % (ref 4–5.6)
HCT VFR BLD AUTO: 39.1 % (ref 35–47)
HDLC SERPL-MCNC: 53 MG/DL
HDLC SERPL: 2.1 {RATIO} (ref 0–5)
HGB BLD-MCNC: 12.2 G/DL (ref 11.5–16)
LDLC SERPL CALC-MCNC: 29 MG/DL (ref 0–100)
MCH RBC QN AUTO: 30.1 PG (ref 26–34)
MCHC RBC AUTO-ENTMCNC: 31.2 G/DL (ref 30–36.5)
MCV RBC AUTO: 96.5 FL (ref 80–99)
NRBC # BLD: 0 K/UL (ref 0–0.01)
NRBC BLD-RTO: 0 PER 100 WBC
PLATELET # BLD AUTO: 260 K/UL (ref 150–400)
PMV BLD AUTO: 11.8 FL (ref 8.9–12.9)
POTASSIUM SERPL-SCNC: 4.7 MMOL/L (ref 3.5–5.1)
PROT SERPL-MCNC: 7.2 G/DL (ref 6.4–8.2)
RBC # BLD AUTO: 4.05 M/UL (ref 3.8–5.2)
SODIUM SERPL-SCNC: 139 MMOL/L (ref 136–145)
T3FREE SERPL-MCNC: 2.4 PG/ML (ref 2.2–4)
T4 SERPL-MCNC: 7.2 UG/DL (ref 4.8–13.9)
TRIGL SERPL-MCNC: 140 MG/DL (ref ?–150)
VLDLC SERPL CALC-MCNC: 28 MG/DL
WBC # BLD AUTO: 12.8 K/UL (ref 3.6–11)

## 2021-09-15 NOTE — PROGRESS NOTES
Cholesterol and A1C look great. WBC elevated, could be related to UTI, kidney function is still lower than normal. Albumin level down- she needs to eat more protein.  Vit D level normal range

## 2021-09-17 LAB
BACTERIA SPEC CULT: ABNORMAL
CC UR VC: ABNORMAL
SERVICE CMNT-IMP: ABNORMAL

## 2021-09-23 ENCOUNTER — OFFICE VISIT (OUTPATIENT)
Dept: CARDIOLOGY CLINIC | Age: 71
End: 2021-09-23
Payer: MEDICARE

## 2021-09-23 VITALS
RESPIRATION RATE: 20 BRPM | SYSTOLIC BLOOD PRESSURE: 110 MMHG | WEIGHT: 213 LBS | TEMPERATURE: 98.3 F | HEIGHT: 65 IN | HEART RATE: 70 BPM | DIASTOLIC BLOOD PRESSURE: 60 MMHG | OXYGEN SATURATION: 98 % | BODY MASS INDEX: 35.49 KG/M2

## 2021-09-23 DIAGNOSIS — I48.0 PAROXYSMAL ATRIAL FIBRILLATION (HCC): Primary | ICD-10-CM

## 2021-09-23 DIAGNOSIS — N18.30 STAGE 3 CHRONIC KIDNEY DISEASE, UNSPECIFIED WHETHER STAGE 3A OR 3B CKD (HCC): ICD-10-CM

## 2021-09-23 DIAGNOSIS — E11.21 TYPE 2 DIABETES MELLITUS WITH NEPHROPATHY (HCC): ICD-10-CM

## 2021-09-23 DIAGNOSIS — I10 ESSENTIAL HYPERTENSION, BENIGN: ICD-10-CM

## 2021-09-23 DIAGNOSIS — I63.412 EMBOLIC STROKE INVOLVING LEFT MIDDLE CEREBRAL ARTERY (HCC): ICD-10-CM

## 2021-09-23 DIAGNOSIS — E66.01 SEVERE OBESITY (BMI 35.0-35.9 WITH COMORBIDITY) (HCC): ICD-10-CM

## 2021-09-23 DIAGNOSIS — I65.23 BILATERAL CAROTID ARTERY STENOSIS: ICD-10-CM

## 2021-09-23 DIAGNOSIS — E78.5 DYSLIPIDEMIA: ICD-10-CM

## 2021-09-23 PROCEDURE — 2022F DILAT RTA XM EVC RTNOPTHY: CPT | Performed by: INTERNAL MEDICINE

## 2021-09-23 PROCEDURE — G8752 SYS BP LESS 140: HCPCS | Performed by: INTERNAL MEDICINE

## 2021-09-23 PROCEDURE — G8510 SCR DEP NEG, NO PLAN REQD: HCPCS | Performed by: INTERNAL MEDICINE

## 2021-09-23 PROCEDURE — G8417 CALC BMI ABV UP PARAM F/U: HCPCS | Performed by: INTERNAL MEDICINE

## 2021-09-23 PROCEDURE — G8399 PT W/DXA RESULTS DOCUMENT: HCPCS | Performed by: INTERNAL MEDICINE

## 2021-09-23 PROCEDURE — G9899 SCRN MAM PERF RSLTS DOC: HCPCS | Performed by: INTERNAL MEDICINE

## 2021-09-23 PROCEDURE — 1090F PRES/ABSN URINE INCON ASSESS: CPT | Performed by: INTERNAL MEDICINE

## 2021-09-23 PROCEDURE — G8427 DOCREV CUR MEDS BY ELIG CLIN: HCPCS | Performed by: INTERNAL MEDICINE

## 2021-09-23 PROCEDURE — 99214 OFFICE O/P EST MOD 30 MIN: CPT | Performed by: INTERNAL MEDICINE

## 2021-09-23 PROCEDURE — 1101F PT FALLS ASSESS-DOCD LE1/YR: CPT | Performed by: INTERNAL MEDICINE

## 2021-09-23 PROCEDURE — 3044F HG A1C LEVEL LT 7.0%: CPT | Performed by: INTERNAL MEDICINE

## 2021-09-23 PROCEDURE — G8536 NO DOC ELDER MAL SCRN: HCPCS | Performed by: INTERNAL MEDICINE

## 2021-09-23 PROCEDURE — G8754 DIAS BP LESS 90: HCPCS | Performed by: INTERNAL MEDICINE

## 2021-09-23 PROCEDURE — 93000 ELECTROCARDIOGRAM COMPLETE: CPT | Performed by: INTERNAL MEDICINE

## 2021-09-23 PROCEDURE — 3017F COLORECTAL CA SCREEN DOC REV: CPT | Performed by: INTERNAL MEDICINE

## 2021-09-23 NOTE — PROGRESS NOTES
Identified pt with two pt identifiers(name and ). Reviewed record in preparation for visit and have obtained necessary documentation. Chief Complaint   Patient presents with    Stroke     6 month follow up    Irregular Heart Beat    Carotid Artery Stenosis    Hypertension    Cholesterol Problem      Vitals:    21 1050   BP: 110/60   Pulse: 70   Resp: 20   Temp: 98.3 °F (36.8 °C)   TempSrc: Temporal   SpO2: 98%   Weight: 213 lb (96.6 kg)   Height: 5' 5\" (1.651 m)   PainSc:   0 - No pain       Medications reviewed/approved by Dr. Vance Garcia. Health Maintenance Review: Patient reminded of \"due or due soon\" health maintenance. I have asked the patient to contact his/her primary care provider (PCP) for follow-up on his/her health maintenance. Coordination of Care Questionnaire:  :   1) Have you been to an emergency room, urgent care, or hospitalized since your last visit? If yes, where when, and reason for visit? Yes 2021 [facility] er External hemorrhoid, bleeding. 2. Have seen or consulted any other health care provider since your last visit? If yes, where when, and reason for visit? NO      Patient is accompanied by spouse I have received verbal consent from Laurence Bernard to discuss any/all medical information while they are present in the room.

## 2021-09-23 NOTE — PROGRESS NOTES
Demian Baker is a 65 y.o. female is here for routine f/u. Hx DM II, diabetic nephropathy, hypertension, dyslipidemia, CKD 3, obesity, with acute R MCA CVA 1/19.  W/u with Echo/doppler 1/1/8/19 with mild to mod LVH, LVEF 65-70, normal atrial sizes, mild MR. Head CT/MRI/MRA with multiple sites of infarct R temporo-occipital, R parietal, R cerebellar, coronoradiatal  C/w embolic events.  Subsequent Loop/Event monitor x 2 weeks showed NSR with episodes of paroxysmal afib/RVR.  Seen in f/u by Dr. Gilberto Engel and started on Eliquis 5mg bid  (CHADs2-VASC = 6--DM, hypertension, female, >65, prior CVA (CVA 4.3%/SD, embolic/CVA/TIA 01.1%/WB).   Hospitalized in June 2019 with acute CVA. Unfortunately, the confusion with the patients medications by pt/ which stopped Eliquis vs stopping ASA likely culprit of current CVA.  This is not considered a failure of treatment as patient was not actually on Eliquis since 4/16/19.  On BB and amlodipine.  Continues to see PCP with recent OV/labs. The patient denies chest pain/ shortness of breath, orthopnea, PND, LE edema, palpitations, syncope, presyncope or fatigue.        Patient Active Problem List    Diagnosis Date Noted    Low TSH level 09/17/2020    CKD (chronic kidney disease) stage 3, GFR 30-59 ml/min ([address] 01/13/2020    Diabetic peripheral neuropathy associated with type 2 diabetes mellitus ([address] 06/10/2019    Bilateral carotid artery stenosis 25/48/7859    Embolic stroke involving left middle cerebral artery ([address] 06/10/2019    CVA (cerebral vascular accident) ([address] 06/09/2019    Paroxysmal atrial fibrillation ([address] 04/16/2019    Dyslipidemia 04/16/2019    Cerebrovascular accident (CVA) due to thrombosis of right middle cerebral artery ([address] 01/18/2019    Acute ischemic cerebrovascular accident (CVA) involving right middle cerebral artery territory ([address] 01/17/2019    Obesity, morbid ([address] 12/26/2017    Type 2 diabetes mellitus with chronic kidney disease ([facility] 12/26/2017    Type 2 diabetes mellitus with hyperglycemia, with long-term current use of insulin ([facility] 04/03/2017    Psoriasis 08/11/2014    Essential hypertension, benign 08/11/2014    Rosacea 08/11/2014    Heart murmur 08/11/2014      Franci Queen NP  Past Medical History:   Diagnosis Date    Diabetes ([facility]     Hypertension     Murmur     Osteopenia     Psoriasis     Rosacea     Stroke ([facility]     Vitamin D deficiency       Past Surgical History:   Procedure Laterality Date    COLONOSCOPY N/A 4/4/2018    COLONOSCOPY   DIABETIC performed by Leslie King MD at [facility] [address] HX COLONOSCOPY  2018    3 polyps    HX TONSILLECTOMY       Allergies   Allergen Reactions    Pcn [Penicillins] Rash    Sulfa (Sulfonamide Antibiotics) Unknown (comments)      Family History   Problem Relation Age of Onset    Diabetes Mother     Heart Disease Father     No Known Problems Child       Social History     Socioeconomic History    Marital status:      Spouse name: Not on file    Number of children: Not on file    Years of education: Not on file    Highest education level: Not on file   Occupational History    Not on file   Tobacco Use    Smoking status: Never Smoker    Smokeless tobacco: Never Used   Substance and Sexual Activity    Alcohol use: Yes     Alcohol/week: 0.0 standard drinks     Comment: 1/couple months    Drug use: Not on file    Sexual activity: Not on file   Other Topics Concern    Not on file   Social History Narrative    Not on file     Social Determinants of Health     Financial Resource Strain:     Difficulty of Paying Living Expenses:    Food Insecurity:     Worried About Running Out of Food in the Last Year:     [address] in the Last Year:    Transportation Needs:     Lack of Transportation (Medical):      Lack of Transportation (Non-Medical):    Physical Activity:     Days of Exercise per Week:     Minutes of Exercise per Session:    Stress:     Feeling of Stress :    Social Connections:     Frequency of Communication with Friends and Family:     Frequency of Social Gatherings with Friends and Family:     Attends Hindu Services:     Active Member of Clubs or Organizations:     Attends Club or Organization Meetings:     Marital Status:    Intimate Partner Violence:     Fear of Current or Ex-Partner:     Emotionally Abused:     Physically Abused:     Sexually Abused:       Current Outpatient Medications   Medication Sig    glucose blood VI test strips (ASCENSIA AUTODISC VI, ONE TOUCH ULTRA TEST VI) strip Check glucose twice daily.  apixaban (Eliquis) 5 mg tablet TAKE 1 TABLET BY MOUTH TWICE A DAY    metoprolol succinate (TOPROL-XL) 100 mg tablet Take 1 Tablet by mouth daily.  metFORMIN (GLUCOPHAGE) 500 mg tablet Take 1 Tablet by mouth two (2) times daily (with meals).  dulaglutide (Trulicity) 1.5 BJ/0.9 mL sub-q pen Inject 1.5mg SQ every 7 days    Insulin Needles, Disposable, 31 gauge x 5/16\" ndle Use as directed    amLODIPine (NORVASC) 10 mg tablet Take 1 Tab by mouth daily.  quinapriL (ACCUPRIL) 40 mg tablet TAKE 1 TABLET TWICE A DAY    hydroCHLOROthiazide (HYDRODIURIL) 25 mg tablet Take 1 Tab by mouth daily.  methIMAzole (TAPAZOLE) 5 mg tablet Take 1 Tab by mouth daily.  atorvastatin (LIPITOR) 80 mg tablet TAKE 1 TABLET NIGHTLY    Basaglar KwikPen U-100 Insulin 100 unit/mL (3 mL) inpn 40 Units by SubCUTAneous route every morning.  multivitamin (ONE A DAY) tablet Take 1 Tab by mouth daily.  aspirin 81 mg chewable tablet Take 81 mg by mouth daily.  Blood-Glucose Meter monitoring kit Check blood sugar twice daily. No current facility-administered medications for this visit. Review of Symptoms:    CONST  No weight change. No fever, chills, sweats    ENT No visual changes, URI sx, sore throat    CV  See HPI   RESP  No cough, or sputum, wheezing.  Also see HPI   GI No abdominal pain or change in bowel habits. No heartburn or dysphagia. No melena or rectal bleeding.   No dysuria, urgency, frequency, hematuria   MSKEL  No joint pain, swelling. No muscle pain. SKIN  No rash or lesions. NEURO  No headache, syncope, or seizure. No weakness, loss of sensation, or paresthesias. PSYCH  No low mood or depression  No anxiety. HE/LYMPH  No easy bruising, abnormal bleeding, or enlarged glands. Physical ExamPhysical Exam:    Visit Vitals  /60 (BP 1 Location: Left upper arm, BP Patient Position: Sitting, BP Cuff Size: Large adult)   Pulse 70   Temp 98.3 °F (36.8 °C) (Temporal)   Resp 20   Ht 5' 5\" (1.651 m)   Wt 213 lb (96.6 kg)   SpO2 98% Comment: RA   BMI 35.45 kg/m²     Gen: NAD  HEENT:  PERRL, throat clear  Neck: no adenopathy, no thyromegaly, no JVD   Heart:  Regular,Nl S1S2,  no murmur, gallop or rub. Lungs:  clear  Abdomen:   Soft, non-tender, bowel sounds are active.    Extremities:  No edema  Pulse: symmetric  Neuro: A&O times 3, +speech deficit, R hemiparesis    Cardiographics    ECG: NSR, low voltage, no acute changes    Labs:   Lab Results   Component Value Date/Time    Sodium 139 09/14/2021 02:10 PM    Sodium 136 06/17/2021 11:00 PM    Sodium 138 12/17/2020 01:39 PM    Sodium 137 09/15/2020 12:22 PM    Sodium 140 06/15/2020 12:02 PM    Potassium 4.7 09/14/2021 02:10 PM    Potassium 4.6 06/17/2021 11:00 PM    Potassium 5.0 12/17/2020 01:39 PM    Potassium 5.2 09/15/2020 12:22 PM    Potassium 4.6 06/15/2020 12:02 PM    Chloride 107 09/14/2021 02:10 PM    Chloride 104 06/17/2021 11:00 PM    Chloride 102 12/17/2020 01:39 PM    Chloride 102 09/15/2020 12:22 PM    Chloride 100 06/15/2020 12:02 PM    CO2 24 09/14/2021 02:10 PM    CO2 27 06/17/2021 11:00 PM    CO2 24 12/17/2020 01:39 PM    CO2 22 09/15/2020 12:22 PM    CO2 24 06/15/2020 12:02 PM    Anion gap 8 09/14/2021 02:10 PM    Anion gap 5 06/17/2021 11:00 PM    Anion gap 5 06/25/2019 07:02 AM Anion gap 6 06/23/2019 06:40 AM    Anion gap 5 06/19/2019 05:13 AM    Glucose 233 (H) 09/14/2021 02:10 PM    Glucose 101 (H) 06/17/2021 11:00 PM    Glucose 200 (H) 12/17/2020 01:39 PM    Glucose 148 (H) 09/15/2020 12:22 PM    Glucose 154 (H) 06/15/2020 12:02 PM    BUN 25 (H) 09/14/2021 02:10 PM    BUN 32 (H) 06/17/2021 11:00 PM    BUN 39 (H) 12/17/2020 01:39 PM    BUN 34 (H) 09/15/2020 12:22 PM    BUN 34 (H) 06/15/2020 12:02 PM    Creatinine 1.56 (H) 09/14/2021 02:10 PM    Creatinine 1.45 (H) 06/17/2021 11:00 PM    Creatinine 1.57 (H) 12/17/2020 01:39 PM    Creatinine 1.23 (H) 09/15/2020 12:22 PM    Creatinine 1.19 (H) 06/15/2020 12:02 PM    BUN/Creatinine ratio 16 09/14/2021 02:10 PM    BUN/Creatinine ratio 22 (H) 06/17/2021 11:00 PM    BUN/Creatinine ratio 25 12/17/2020 01:39 PM    BUN/Creatinine ratio 28 09/15/2020 12:22 PM    BUN/Creatinine ratio 29 (H) 06/15/2020 12:02 PM    GFR est AA 40 (L) 09/14/2021 02:10 PM    GFR est AA 43 (L) 06/17/2021 11:00 PM    GFR est AA 38 (L) 12/17/2020 01:39 PM    GFR est AA 51 (L) 09/15/2020 12:22 PM    GFR est AA 53 (L) 06/15/2020 12:02 PM    GFR est non-AA 33 (L) 09/14/2021 02:10 PM    GFR est non-AA 35 (L) 06/17/2021 11:00 PM    GFR est non-AA 33 (L) 12/17/2020 01:39 PM    GFR est non-AA 44 (L) 09/15/2020 12:22 PM    GFR est non-AA 46 (L) 06/15/2020 12:02 PM    Calcium 8.9 09/14/2021 02:10 PM    Calcium 10.1 06/17/2021 11:00 PM    Calcium 10.0 12/17/2020 01:39 PM    Calcium 9.9 09/15/2020 12:22 PM    Calcium 9.9 06/15/2020 12:02 PM    Bilirubin, total 0.2 09/14/2021 02:10 PM    Bilirubin, total 0.3 06/17/2021 11:00 PM    Bilirubin, total 0.3 09/15/2020 12:22 PM    Bilirubin, total 0.3 06/15/2020 12:02 PM    Bilirubin, total 0.3 03/04/2020 08:23 AM    Alk. phosphatase 108 09/14/2021 02:10 PM    Alk. phosphatase 113 06/17/2021 11:00 PM    Alk. phosphatase 97 09/15/2020 12:22 PM    Alk. phosphatase 96 06/15/2020 12:02 PM    Alk.  phosphatase 103 03/04/2020 08:23 AM    Protein, total 7.2 09/14/2021 02:10 PM    Protein, total 7.6 06/17/2021 11:00 PM    Protein, total 6.7 09/15/2020 12:22 PM    Protein, total 7.2 06/15/2020 12:02 PM    Protein, total 6.8 03/04/2020 08:23 AM    Albumin 2.8 (L) 09/14/2021 02:10 PM    Albumin 3.1 (L) 06/17/2021 11:00 PM    Albumin 3.6 (L) 09/15/2020 12:22 PM    Albumin 3.6 (L) 06/15/2020 12:02 PM    Albumin 3.1 (L) 03/04/2020 08:23 AM    Globulin 4.4 (H) 09/14/2021 02:10 PM    Globulin 4.5 (H) 06/17/2021 11:00 PM    Globulin 4.0 06/15/2019 05:08 AM    Globulin 4.6 (H) 06/10/2019 05:02 AM    Globulin 4.5 (H) 06/09/2019 02:46 PM    A-G Ratio 0.6 (L) 09/14/2021 02:10 PM    A-G Ratio 0.7 (L) 06/17/2021 11:00 PM    A-G Ratio 1.2 09/15/2020 12:22 PM    A-G Ratio 1.0 (L) 06/15/2020 12:02 PM    A-G Ratio 0.8 (L) 03/04/2020 08:23 AM    ALT (SGPT) 20 09/14/2021 02:10 PM    ALT (SGPT) 21 06/17/2021 11:00 PM    ALT (SGPT) 21 09/15/2020 12:22 PM    ALT (SGPT) 20 06/15/2020 12:02 PM    ALT (SGPT) 22 03/04/2020 08:23 AM     Lab Results   Component Value Date/Time    CK 51 01/17/2019 10:25 PM     Lab Results   Component Value Date/Time    Cholesterol, total 110 09/14/2021 02:10 PM    Cholesterol, total 130 06/15/2020 12:02 PM    Cholesterol, total 131 01/13/2020 11:07 AM    Cholesterol, total 141 10/14/2019 09:38 AM    Cholesterol, total 136 06/10/2019 05:02 AM    HDL Cholesterol 53 09/14/2021 02:10 PM    HDL Cholesterol 58 06/15/2020 12:02 PM    HDL Cholesterol 59 01/13/2020 11:07 AM    HDL Cholesterol 52 10/14/2019 09:38 AM    HDL Cholesterol 59 06/10/2019 05:02 AM    LDL, calculated 29 09/14/2021 02:10 PM    LDL, calculated 53 06/15/2020 12:02 PM    LDL, calculated 53 01/13/2020 11:07 AM    LDL, calculated 59 10/14/2019 09:38 AM    LDL, calculated 54.8 06/10/2019 05:02 AM    Triglyceride 140 09/14/2021 02:10 PM    Triglyceride 93 06/15/2020 12:02 PM    Triglyceride 94 01/13/2020 11:07 AM    Triglyceride 149 10/14/2019 09:38 AM    Triglyceride 111 06/10/2019 05:02 AM CHOL/HDL Ratio 2.1 09/14/2021 02:10 PM    CHOL/HDL Ratio 2.3 06/10/2019 05:02 AM    CHOL/HDL Ratio 3.6 01/18/2019 06:00 AM     No results found for this or any previous visit. Assessment:         Patient Active Problem List    Diagnosis Date Noted    Low TSH level 09/17/2020    CKD (chronic kidney disease) stage 3, GFR 30-59 ml/min ([facility] 01/13/2020    Diabetic peripheral neuropathy associated with type 2 diabetes mellitus ([facility] 06/10/2019    Bilateral carotid artery stenosis 58/32/7603    Embolic stroke involving left middle cerebral artery ([address] 06/10/2019    CVA (cerebral vascular accident) ([address] 06/09/2019    Paroxysmal atrial fibrillation ([facility] 04/16/2019    Dyslipidemia 04/16/2019    Cerebrovascular accident (CVA) due to thrombosis of right middle cerebral artery ([address] 01/18/2019    Acute ischemic cerebrovascular accident (CVA) involving right middle cerebral artery territory ([address] 01/17/2019    Obesity, morbid ([address] 12/26/2017    Type 2 diabetes mellitus with chronic kidney disease ([facility] 12/26/2017    Type 2 diabetes mellitus with hyperglycemia, with long-term current use of insulin ([facility] 04/03/2017    Psoriasis 08/11/2014    Essential hypertension, benign 08/11/2014    Rosacea 08/11/2014    Heart murmur 08/11/2014      Hx DM II, diabetic nephropathy, hypertension, dyslipidemia, CKD 3, obesity, with acute R MCA CVA 1/19.  W/u with Echo/doppler 1/1/8/19 with mild to mod LVH, LVEF 65-70, normal atrial sizes, mild MR. Head CT/MRI/MRA with multiple sites of infarct R temporo-occipital, R parietal, R cerebellar, coronoradiatal  C/w embolic events.  Subsequent Loop/Event monitor x 2 weeks showed NSR with episodes of paroxysmal afib/RVR.  Seen in f/u by PCP and started on Eliquis 5mg bid  (CHADs2-VASC = 6--DM, hypertension, female, >65, prior CVA (CVA 0.1%/BM, embolic/CVA/TIA 14.0%/GX).   Hospitalized in June 2019 with acute CVA. Unfortunately, the confusion with the patients medications by pt/ which stopped Eliquis vs stopping ASA likely culprit of current CVA.  This is not considered a failure of treatment as patient was not actually on Eliquis since 4/16/19.  On BB and amlodipine.  Continues to see PCP with recent OV/labs. Plan:     Doing well with no adverse cardiac symptoms. Lipids and labs followed by PCP. Continue current care and f/u in 6 months.     Luma Crump MD

## 2021-09-27 ENCOUNTER — VIRTUAL VISIT (OUTPATIENT)
Dept: FAMILY MEDICINE CLINIC | Age: 71
End: 2021-09-27
Payer: MEDICARE

## 2021-09-27 DIAGNOSIS — J30.2 SEASONAL ALLERGIC RHINITIS, UNSPECIFIED TRIGGER: Primary | ICD-10-CM

## 2021-09-27 PROCEDURE — 99441 PR PHYS/QHP TELEPHONE EVALUATION 5-10 MIN: CPT | Performed by: NURSE PRACTITIONER

## 2021-09-27 NOTE — PROGRESS NOTES
1. Have you been to the ER, urgent care clinic since your last visit? Hospitalized since your last visit? No    2. Have you seen or consulted any other health care providers outside of the [address] [place] [address] since your last visit? Include any pap smears or colon screening.  No

## 2021-09-27 NOTE — PROGRESS NOTES
Smiley Londono is a 65 y.o. female evaluated via telephone on 9/27/2021. Consent:  She and/or health care decision maker is aware that that she may receive a bill for this telephone service, depending on her insurance coverage, and has provided verbal consent to proceed: Yes    New issues:   Cough   Patient evaluated via telephone. Her  is accompanying her on the phone call today. She reports coughing that has been bothering her for 'awhile.' States she gets this cough every year around this time. She is fully vaccinated against covid-19. Denies fevers at home. Denies SOB, CP. Her  has cough syrup and would like to know if patient can use OTC cough DM for the cough. She is already taking multiple medications daily and they do not want to have a prescription at this time. Assessment/plan  Allergic Rhinitis   Start taking OTC cough DM  May take OTC Tylenol or Ibuprofen for pain or fever  Get plenty of rest and drink plenty of fluids. Educated patient and patients  to schedule an office visit for any worsening symptoms or if symptoms persist.        Documentation:  I communicated with the patient and/or health care decision maker about allergic rhinitis. Details of this discussion including any medical advice provided: see plan above      I affirm this is a Patient Initiated Episode with an Established Patient who has not had a related appointment within my department in the past 7 days or scheduled within the next 24 hours.     Total Time: minutes: 5-10 minutes    Note: not billable if this call serves to triage the patient into an appointment for the relevant concern      Merrill Bee NP

## 2021-09-29 ENCOUNTER — OFFICE VISIT (OUTPATIENT)
Dept: ENDOCRINOLOGY | Age: 71
End: 2021-09-29
Payer: MEDICARE

## 2021-09-29 VITALS
DIASTOLIC BLOOD PRESSURE: 80 MMHG | HEART RATE: 65 BPM | SYSTOLIC BLOOD PRESSURE: 143 MMHG | WEIGHT: 212 LBS | BODY MASS INDEX: 35.32 KG/M2 | HEIGHT: 65 IN

## 2021-09-29 DIAGNOSIS — E05.10 TOXIC THYROID NODULE: Primary | ICD-10-CM

## 2021-09-29 DIAGNOSIS — E05.90 HYPERTHYROIDISM: ICD-10-CM

## 2021-09-29 PROCEDURE — G8417 CALC BMI ABV UP PARAM F/U: HCPCS | Performed by: INTERNAL MEDICINE

## 2021-09-29 PROCEDURE — 3017F COLORECTAL CA SCREEN DOC REV: CPT | Performed by: INTERNAL MEDICINE

## 2021-09-29 PROCEDURE — G8399 PT W/DXA RESULTS DOCUMENT: HCPCS | Performed by: INTERNAL MEDICINE

## 2021-09-29 PROCEDURE — 1101F PT FALLS ASSESS-DOCD LE1/YR: CPT | Performed by: INTERNAL MEDICINE

## 2021-09-29 PROCEDURE — G8432 DEP SCR NOT DOC, RNG: HCPCS | Performed by: INTERNAL MEDICINE

## 2021-09-29 PROCEDURE — G8427 DOCREV CUR MEDS BY ELIG CLIN: HCPCS | Performed by: INTERNAL MEDICINE

## 2021-09-29 PROCEDURE — 99214 OFFICE O/P EST MOD 30 MIN: CPT | Performed by: INTERNAL MEDICINE

## 2021-09-29 PROCEDURE — G8754 DIAS BP LESS 90: HCPCS | Performed by: INTERNAL MEDICINE

## 2021-09-29 PROCEDURE — G8536 NO DOC ELDER MAL SCRN: HCPCS | Performed by: INTERNAL MEDICINE

## 2021-09-29 PROCEDURE — 1090F PRES/ABSN URINE INCON ASSESS: CPT | Performed by: INTERNAL MEDICINE

## 2021-09-29 PROCEDURE — G8753 SYS BP > OR = 140: HCPCS | Performed by: INTERNAL MEDICINE

## 2021-09-29 PROCEDURE — G9899 SCRN MAM PERF RSLTS DOC: HCPCS | Performed by: INTERNAL MEDICINE

## 2021-09-29 NOTE — LETTER
9/29/2021    Patient: Rafael Mena   YOB: 1950   Date of Visit: 9/29/2021     Amirah Browning NP  [address] [facility]  Via In Basket    Dear Amirah Browinng NP,      Thank you for referring Ms. Urvashi Oviedo to [facility] for evaluation. My notes for this consultation are attached. If you have questions, please do not hesitate to call me. I look forward to following your patient along with you.       Sincerely,    Britney Read MD

## 2021-09-29 NOTE — PROGRESS NOTES
Chief Complaint   Patient presents with    Thyroid Problem     History of Present Illness: Cecilia Quintero is a 65 y.o. 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 She was previously followed by Dr. Ernie Howell    She was seen for initial visit in January 2021 for low TSH. She had a thyroid nuclear scan which identified a toxic thyroid nodule in the right thyroid lobe. Ms Moose Bangura and Dr. Marleny Sarmiento agreed to proceed with low dose MMI rather than surgery or GUNN. At their last visit in April 2021 Dr. Marleny Sarmiento decreased her MMI to 2.5mg daily, because her TSH was 6.77. She had thyroid labs drawn in September and her Total T4 was 7.2 and her Free T3 was 2.4. Pt and her son notes that she is still taking the MMI 2.5mg every day. Pt has received both COVID vaccinations ([employer]). She denies issues of dysphagia or dysphonia. She denies issues of CP, SOB, palpitations, tremors, heat or cold intolerance, diarrhea or constipation. Pt denies issues of Abdominal pain, N/V or Jaundice. Current Outpatient Medications   Medication Sig    apixaban (Eliquis) 5 mg tablet TAKE 1 TABLET BY MOUTH TWICE A DAY    metoprolol succinate (TOPROL-XL) 100 mg tablet Take 1 Tablet by mouth daily.  metFORMIN (GLUCOPHAGE) 500 mg tablet Take 1 Tablet by mouth two (2) times daily (with meals).  dulaglutide (Trulicity) 1.5 GK/2.8 mL sub-q pen Inject 1.5mg SQ every 7 days    amLODIPine (NORVASC) 10 mg tablet Take 1 Tab by mouth daily.  quinapriL (ACCUPRIL) 40 mg tablet TAKE 1 TABLET TWICE A DAY    hydroCHLOROthiazide (HYDRODIURIL) 25 mg tablet Take 1 Tab by mouth daily.  methIMAzole (TAPAZOLE) 5 mg tablet Take 1 Tab by mouth daily. (Patient taking differently: Take 2.5 mg by mouth daily.)    atorvastatin (LIPITOR) 80 mg tablet TAKE 1 TABLET NIGHTLY    Basaglar KwikPen U-100 Insulin 100 unit/mL (3 mL) inpn 40 Units by SubCUTAneous route every morning.     multivitamin (ONE A DAY) tablet Take 1 Tab by mouth daily.  aspirin 81 mg chewable tablet Take 81 mg by mouth daily.  glucose blood VI test strips (ASCENSIA AUTODISC VI, ONE TOUCH ULTRA TEST VI) strip Check glucose twice daily.  Insulin Needles, Disposable, 31 gauge x 5/16\" ndle Use as directed    Blood-Glucose Meter monitoring kit Check blood sugar twice daily. No current facility-administered medications for this visit. Allergies   Allergen Reactions    Pcn [Penicillins] Rash    Sulfa (Sulfonamide Antibiotics) Unknown (comments)     Review of Systems:  - Cardiovascular: no chest pain  - Neurological: no tremors  - Integumentary: skin is normal    Physical Examination:  Blood pressure (!) 143/80, pulse 65, height 5' 5\" (1.651 m), weight 212 lb (96.2 kg). - General: pleasant, no distress, good eye contact   - Neck: no thyromegaly or thyroid bruits  - Cardiovascular: regular, normal rate, nl s1 and s2, no m/r/g   - Integumentary: skin is normal, no edema  - Neurological: reflexes 2+ at biceps, no tremors  - Psychiatric: normal mood and affect    Data Reviewed:   - none new for review    Assessment/Plan:   1) Hyperthyroidism > We discussed at length the toxic thyroid nodule and that if we stopped the [facility] [address] [place] [address] [place] she would become hyperthyroid again. We agreed to continue the MMI 2.5mg every day. Pt voices understanding and agreement with the plan. RTC 4months    Follow-up and Dispositions    · Return in about 4 months (around 1/29/2022).      Copy sent to:  Karissa Raza

## 2021-10-14 ENCOUNTER — TELEPHONE (OUTPATIENT)
Dept: CARDIOLOGY CLINIC | Age: 71
End: 2021-10-14

## 2021-10-14 NOTE — TELEPHONE ENCOUNTER
pts  sent a mychart regarding his wife's asa. Please see below:    \"Dr. Faisal Ortiz,     As you know, both Jennifer Brown and I take a single [place] aspirin\" every day. I take just a single \"baby aspirin\" and a single Atorvastatin.   Jennifer Brown, on the other hand, takes an entire pharmacy, which includes 2 Eliquis's, as well as a \"baby aspirin\".     Based upon the guidance announced yesterday by the US Preventive Services Task Force regarding the daily use of \"baby aspirin\" by those in their \"dotage\" ([facility] is there any value in reconsidering our \"baby aspirin\" usage?       I'm not trying to \"rock the boat\", I'm just asking.       Thanks so much,     Matthieu De Leon\"    Forwarded to MD.

## 2021-10-29 ENCOUNTER — PATIENT MESSAGE (OUTPATIENT)
Dept: FAMILY MEDICINE CLINIC | Age: 71
End: 2021-10-29

## 2021-10-29 NOTE — TELEPHONE ENCOUNTER
From: Robert Gipson  To: Belem Irene NP  Sent: 10/29/2021 10:18 AM EDT  Subject: Non-Urgent Medical Question    Marzena Bright,    This is Lisa Hua,  and care provider for John Blanca. Two items - #1 - Because I only have a week's supply left of Eliquis 5mg, I called [facility] to ask them to send a 90 day supply. However, the prescription had run out, so they get with you guys to renew it. Unfortunately, though, she can't do that quickly enough to get them to us before we run out. She suggested that I contact you and ask that you prescribe a 30 -day supply (60 tablets) from the local [facility] for me to . If you'd do that, it would be great. Krystyna Webster and both got our COVID Booster shots at [facility] this past Tuesday. Neither Gisela nor I suffered any reaction to it.      Thanks so much,   Lisa Hua

## 2021-11-08 RX ORDER — INSULIN GLARGINE 100 [IU]/ML
40 INJECTION, SOLUTION SUBCUTANEOUS
Qty: 1 PEN | Refills: 5 | Status: SHIPPED | OUTPATIENT
Start: 2021-11-08 | End: 2022-07-05 | Stop reason: SDUPTHER

## 2021-11-08 RX ORDER — AMLODIPINE BESYLATE 10 MG/1
10 TABLET ORAL DAILY
Qty: 90 TABLET | Refills: 1 | Status: SHIPPED | OUTPATIENT
Start: 2021-11-08 | End: 2021-11-22 | Stop reason: SDUPTHER

## 2021-11-13 RX ORDER — HYDROCHLOROTHIAZIDE 25 MG/1
25 TABLET ORAL DAILY
Qty: 14 TABLET | Refills: 0 | Status: SHIPPED | OUTPATIENT
Start: 2021-11-13 | End: 2021-11-22 | Stop reason: SDUPTHER

## 2021-11-13 RX ORDER — HYDROCHLOROTHIAZIDE 25 MG/1
25 TABLET ORAL DAILY
Qty: 90 TABLET | Refills: 1 | Status: SHIPPED | OUTPATIENT
Start: 2021-11-13 | End: 2021-11-22 | Stop reason: SDUPTHER

## 2021-11-22 ENCOUNTER — TELEPHONE (OUTPATIENT)
Dept: FAMILY MEDICINE CLINIC | Age: 71
End: 2021-11-22

## 2021-11-22 RX ORDER — AMLODIPINE BESYLATE 10 MG/1
10 TABLET ORAL DAILY
Qty: 21 TABLET | Refills: 0 | Status: SHIPPED | OUTPATIENT
Start: 2021-11-22 | End: 2021-12-10

## 2021-11-22 RX ORDER — HYDROCHLOROTHIAZIDE 25 MG/1
25 TABLET ORAL DAILY
Qty: 14 TABLET | Refills: 0 | Status: SHIPPED | OUTPATIENT
Start: 2021-11-22 | End: 2021-12-15 | Stop reason: SDUPTHER

## 2021-11-26 RX ORDER — APIXABAN 5 MG/1
TABLET, FILM COATED ORAL
Qty: 60 TABLET | Refills: 0 | Status: SHIPPED | OUTPATIENT
Start: 2021-11-26 | End: 2022-05-03 | Stop reason: SDUPTHER

## 2021-12-10 RX ORDER — AMLODIPINE BESYLATE 10 MG/1
TABLET ORAL
Qty: 21 TABLET | Refills: 0 | Status: SHIPPED | OUTPATIENT
Start: 2021-12-10 | End: 2022-05-18 | Stop reason: SDUPTHER

## 2021-12-15 ENCOUNTER — OFFICE VISIT (OUTPATIENT)
Dept: FAMILY MEDICINE CLINIC | Age: 71
End: 2021-12-15
Payer: MEDICARE

## 2021-12-15 VITALS
HEART RATE: 74 BPM | DIASTOLIC BLOOD PRESSURE: 76 MMHG | TEMPERATURE: 97.2 F | WEIGHT: 218.13 LBS | OXYGEN SATURATION: 98 % | HEIGHT: 65 IN | RESPIRATION RATE: 18 BRPM | SYSTOLIC BLOOD PRESSURE: 132 MMHG | BODY MASS INDEX: 36.34 KG/M2

## 2021-12-15 DIAGNOSIS — Z79.4 TYPE 2 DIABETES MELLITUS WITH STAGE 3 CHRONIC KIDNEY DISEASE, WITH LONG-TERM CURRENT USE OF INSULIN, UNSPECIFIED WHETHER STAGE 3A OR 3B CKD (HCC): ICD-10-CM

## 2021-12-15 DIAGNOSIS — E05.10 TOXIC THYROID NODULE: ICD-10-CM

## 2021-12-15 DIAGNOSIS — I69.328 CVA, OLD, SPEECH/LANGUAGE DEFICIT: ICD-10-CM

## 2021-12-15 DIAGNOSIS — Z00.00 MEDICARE ANNUAL WELLNESS VISIT, SUBSEQUENT: Primary | ICD-10-CM

## 2021-12-15 DIAGNOSIS — R31.29 OTHER MICROSCOPIC HEMATURIA: ICD-10-CM

## 2021-12-15 DIAGNOSIS — I48.0 PAROXYSMAL ATRIAL FIBRILLATION (HCC): ICD-10-CM

## 2021-12-15 DIAGNOSIS — E11.22 TYPE 2 DIABETES MELLITUS WITH STAGE 3 CHRONIC KIDNEY DISEASE, WITH LONG-TERM CURRENT USE OF INSULIN, UNSPECIFIED WHETHER STAGE 3A OR 3B CKD (HCC): ICD-10-CM

## 2021-12-15 DIAGNOSIS — E78.5 DYSLIPIDEMIA: ICD-10-CM

## 2021-12-15 DIAGNOSIS — E05.90 HYPERTHYROIDISM: ICD-10-CM

## 2021-12-15 DIAGNOSIS — N18.30 TYPE 2 DIABETES MELLITUS WITH STAGE 3 CHRONIC KIDNEY DISEASE, WITH LONG-TERM CURRENT USE OF INSULIN, UNSPECIFIED WHETHER STAGE 3A OR 3B CKD (HCC): ICD-10-CM

## 2021-12-15 DIAGNOSIS — I10 ESSENTIAL HYPERTENSION, BENIGN: ICD-10-CM

## 2021-12-15 LAB
BILIRUB UR QL STRIP: NEGATIVE
GLUCOSE UR-MCNC: NEGATIVE MG/DL
KETONES P FAST UR STRIP-MCNC: NEGATIVE MG/DL
PH UR STRIP: 7.5 [PH] (ref 4.6–8)
PROT UR QL STRIP: NORMAL
SP GR UR STRIP: 1.02 (ref 1–1.03)
UA UROBILINOGEN AMB POC: NORMAL (ref 0.2–1)
URINALYSIS CLARITY POC: NORMAL
URINALYSIS COLOR POC: YELLOW
URINE BLOOD POC: NORMAL
URINE LEUKOCYTES POC: NORMAL
URINE NITRITES POC: NEGATIVE

## 2021-12-15 PROCEDURE — 3017F COLORECTAL CA SCREEN DOC REV: CPT | Performed by: NURSE PRACTITIONER

## 2021-12-15 PROCEDURE — 3044F HG A1C LEVEL LT 7.0%: CPT | Performed by: NURSE PRACTITIONER

## 2021-12-15 PROCEDURE — G8427 DOCREV CUR MEDS BY ELIG CLIN: HCPCS | Performed by: NURSE PRACTITIONER

## 2021-12-15 PROCEDURE — G8754 DIAS BP LESS 90: HCPCS | Performed by: NURSE PRACTITIONER

## 2021-12-15 PROCEDURE — G8432 DEP SCR NOT DOC, RNG: HCPCS | Performed by: NURSE PRACTITIONER

## 2021-12-15 PROCEDURE — G0439 PPPS, SUBSEQ VISIT: HCPCS | Performed by: NURSE PRACTITIONER

## 2021-12-15 PROCEDURE — 1101F PT FALLS ASSESS-DOCD LE1/YR: CPT | Performed by: NURSE PRACTITIONER

## 2021-12-15 PROCEDURE — G8399 PT W/DXA RESULTS DOCUMENT: HCPCS | Performed by: NURSE PRACTITIONER

## 2021-12-15 PROCEDURE — G8417 CALC BMI ABV UP PARAM F/U: HCPCS | Performed by: NURSE PRACTITIONER

## 2021-12-15 PROCEDURE — G8536 NO DOC ELDER MAL SCRN: HCPCS | Performed by: NURSE PRACTITIONER

## 2021-12-15 PROCEDURE — G9899 SCRN MAM PERF RSLTS DOC: HCPCS | Performed by: NURSE PRACTITIONER

## 2021-12-15 PROCEDURE — 2022F DILAT RTA XM EVC RTNOPTHY: CPT | Performed by: NURSE PRACTITIONER

## 2021-12-15 PROCEDURE — G8752 SYS BP LESS 140: HCPCS | Performed by: NURSE PRACTITIONER

## 2021-12-15 RX ORDER — MELATONIN
1000 DAILY
Qty: 90 TABLET | Refills: 3 | Status: SHIPPED | OUTPATIENT
Start: 2021-12-15 | End: 2022-10-22 | Stop reason: ALTCHOICE

## 2021-12-15 RX ORDER — HYDROCHLOROTHIAZIDE 25 MG/1
25 TABLET ORAL DAILY
Qty: 90 TABLET | Refills: 1 | Status: SHIPPED | OUTPATIENT
Start: 2021-12-15 | End: 2022-03-16 | Stop reason: SDUPTHER

## 2021-12-15 NOTE — PATIENT INSTRUCTIONS

## 2021-12-15 NOTE — PROGRESS NOTES
Subjective:     CC: ALTON Roca is a 66 y.o. female, accompanied by her , who presents today for a 3 month follow up for type 2 diabetes and other chronic medical problems. Type 2 diabetes  Lab Results   Component Value Date/Time    Hemoglobin A1c 6.8 (H) 09/14/2021 02:10 PM     She is on Metformin 425QA BID, Trulicity 5.4JP q 7 days, and Levemir 40 units daily. Morning fasting BS run around 100. This am it was 77 and she felt fine. Denies any hypoglycemic episodes. In the afternoon it increases to the 200's. Peripheral neuropathy: foot exam done today reveals some decreased sensation to monofilament bilaterally. Had eye exam 7-8-21. HTN  BP at goal today. She has gained 30 pounds over the past year, half of which over the past 6 months. She is on HCTZ 25mg daily, Amlodipine 10mg daily, Quinapril 40mg BID, and Metoprolol XR 100mg daily. Denies CP, SOB, or dizziness, She does have some swelling in the ankles. CKD, stage 3  Lab Results   Component Value Date/Time    Creatinine 1.56 (H) 09/14/2021 02:10 PM     Creatinine 1.45 in 6/2021, down from 1.57 in 12/2020, up from 1.19 in 6/2020, down from 1.28 in 3/2020. She does not take NSAIDS. She is on an ACEi. Hyperthyroidism  She was diagnosed in 1/2021 and referred to Endo Dr Mihir Stevenson. She had no thyroid antibodies. Her thyroid nuclear uptake scan showed a dominant nodule in the right lobe with normal uptake of tracer. She was prescribed Methimazole 5mg daily She was seen in 4/2021 and her TSH was slightly elevated so her Methimazole dose was reduced to 2.5mg daily. She saw Dr Ramirez Le on 9-29-21 and will follow up in Feb 2022. Will need labs prior to visit but she would like them rechecked today. H/O CVA with hemiparesis   She was admitted to [address] [place] [address] in June of 2019 for CVA and then spent 2 weeks in [facility]. This was her second CVA. The first caused weakness and the last one affected her speech and mental status.   helps with ADLs, meals, and medication management. She is on a statin daily and takes Eliquis for A-fib. Lab Results   Component Value Date/Time    Cholesterol, total 110 09/14/2021 02:10 PM    HDL Cholesterol 53 09/14/2021 02:10 PM    LDL, calculated 29 09/14/2021 02:10 PM    VLDL, calculated 28 09/14/2021 02:10 PM    Triglyceride 140 09/14/2021 02:10 PM    CHOL/HDL Ratio 2.1 09/14/2021 02:10 PM       A-fib  On Eliquis for stroke prophylaxis. Denies black or bloody stools. Followed by cardiologist Dr Daniel Abacra. Lab Results   Component Value Date/Time    HGB (POC) 13.8 11/20/2015 01:36 PM    HGB 12.2 09/14/2021 02:10 PM       Vitamin D deficiency  She used to take Vit D 50,000 BIW but was told to stop in June when levels reached > 150. Levels normalized at last OV. Still off the supplement at this time. Lab Results   Component Value Date/Time    Vitamin D 25-Hydroxy 78.7 09/14/2021 02:10 PM       She has had 2 UTI's in the past 12 months.  would like urine checked today. She is asymptomatic. Health maintenance  Eye exam: done 7/2021  She is due for a mammogram- ordered back in June but never completed.   was given number to call and schedule  PNA vaccines up to date  Flu shot up to date 9-2021  Shingrix- she is not a candidate, never had the chicken pox   Covid 19 vaccine- she had both Moderna vaccines and booster in     Patient Active Problem List   Diagnosis Code    Psoriasis L40.9    Essential hypertension, benign I10    Rosacea L71.9    Heart murmur R01.1    Type 2 diabetes mellitus with hyperglycemia, with long-term current use of insulin (HCC) E11.65, Z79.4    Obesity, morbid ([facility]) E66.01    Type 2 diabetes mellitus with chronic kidney disease ([facility] E11.22    Acute ischemic cerebrovascular accident (CVA) involving right middle cerebral artery [facility]) I63.511    Cerebrovascular accident (CVA) due to thrombosis of right middle cerebral artery ([facility] I63.311    Paroxysmal atrial fibrillation (HCC) I48.0    Dyslipidemia E78.5    CVA (cerebral vascular accident) ([facility] I63.9    Diabetic peripheral neuropathy associated with type 2 diabetes mellitus ([facility] E11.42    Bilateral carotid artery stenosis V65.13    Embolic stroke involving left middle cerebral artery ([facility]) K63.484    CKD (chronic kidney disease) stage 3, GFR 30-59 ml/min ([facility]) N18.30    Low TSH level R79.89       Past Medical History:   Diagnosis Date    Diabetes ([place] [address].)     Hypertension     Murmur     Osteopenia     Psoriasis     Rosacea     Stroke ([place] [address].)     Vitamin D deficiency          Current Outpatient Medications:     amLODIPine (NORVASC) 10 mg tablet, TAKE 1 TABLET BY MOUTH EVERY DAY, Disp: 21 Tablet, Rfl: 0    Eliquis 5 mg tablet, TAKE 1 TABLET BY MOUTH TWO TIMES A DAY., Disp: 60 Tablet, Rfl: 0    hydroCHLOROthiazide (HYDRODIURIL) 25 mg tablet, Take 1 Tablet by mouth daily. , Disp: 14 Tablet, Rfl: 0    hydroCHLOROthiazide (HYDRODIURIL) 25 mg tablet, Take 1 Tablet by mouth daily. , Disp: 90 Tablet, Rfl: 1    Basaglar KwikPen U-100 Insulin 100 unit/mL (3 mL) inpn, 40 Units by SubCUTAneous route every morning., Disp: 1 Pen, Rfl: 5    quinapriL (ACCUPRIL) 40 mg tablet, TAKE 1 TABLET TWICE A DAY, Disp: 180 Tablet, Rfl: 1    apixaban (Eliquis) 5 mg tablet, TAKE 1 TABLET BY MOUTH TWICE A DAY, Disp: 180 Tablet, Rfl: 1    atorvastatin (LIPITOR) 80 mg tablet, TAKE 1 TABLET NIGHTLY, Disp: 90 Tablet, Rfl: 2    glucose blood VI test strips (ASCENSIA AUTODISC VI, ONE TOUCH ULTRA TEST VI) strip, Check glucose twice daily. , Disp: 180 Strip, Rfl: 3    metoprolol succinate (TOPROL-XL) 100 mg tablet, Take 1 Tablet by mouth daily. , Disp: 90 Tablet, Rfl: 3    metFORMIN (GLUCOPHAGE) 500 mg tablet, Take 1 Tablet by mouth two (2) times daily (with meals). , Disp: 180 Tablet, Rfl: 5    dulaglutide (Trulicity) 1.5 YW/7.7 mL sub-q pen, Inject 1.5mg SQ every 7 days, Disp: 6 mL, Rfl: 5    Insulin Needles, Disposable, 31 gauge x 5/16\" ndle, Use as directed, Disp: 100 Pen Needle, Rfl: 5    methIMAzole (TAPAZOLE) 5 mg tablet, Take 1 Tab by mouth daily. (Patient taking differently: Take 2.5 mg by mouth daily.), Disp: 90 Tab, Rfl: 3    multivitamin (ONE A DAY) tablet, Take 1 Tab by mouth daily. , Disp: , Rfl:     aspirin 81 mg chewable tablet, Take 81 mg by mouth daily. , Disp: , Rfl:     Blood-Glucose Meter monitoring kit, Check blood sugar twice daily. , Disp: 1 Kit, Rfl: 0    Allergies   Allergen Reactions    Pcn [Penicillins] Rash    Sulfa (Sulfonamide Antibiotics) Unknown (comments)       Past Surgical History:   Procedure Laterality Date    COLONOSCOPY N/A 4/4/2018    COLONOSCOPY   DIABETIC performed by Estelle Pate MD at [place] [id number]  [facility] COLONOSCOPY  2018    3 polyps    HX TONSILLECTOMY         Social History     Tobacco Use   Smoking Status Never Smoker   Smokeless Tobacco Never Used       Social History     Socioeconomic History    Marital status:    Tobacco Use    Smoking status: Never Smoker    Smokeless tobacco: Never Used   Substance and Sexual Activity    Alcohol use:  Yes     Alcohol/week: 0.0 standard drinks     Comment: 1/couple months       Family History   Problem Relation Age of Onset    Diabetes Mother     Heart Disease Father     No Known Problems Child      ROS:  Gen: denies fever, chills, or fatigue, +weight gain  HEENT:denies H/A, nosebleeds, or bleeding gums  Resp: denies dyspnea, cough, or wheezing  CV: denies chest pain, pressure, or palpitations  GI: denies abd pain, N/V, melena or hematochezia  : Denies dysuria, hematuria, or urinary frequency/urgency  Extremeties: +moderate edema in ankles  Musculoskeletal: Denies joint pain, stiffness, or muscle cramps  Neuro: denies numbness/tingling or dizziness  Endo: denies polyuria or polydipsia  Skin: denies rashes or new lesions  Psych: denies anxiety or depression    Objective:     Visit Vitals  BP 132/76 (BP 1 Location: Left upper arm)   Pulse 74   Temp 97.2 °F (36.2 °C) (Temporal)   Resp 18   Ht 5' 5\" (1.651 m)   Wt 218 lb 2 oz (98.9 kg)   SpO2 98%   BMI 36.30 kg/m²       General: Alert and oriented. No acute distress. +Obese. Eyes: Sclera white, conjunctiva clear. PERRLA. Neck: Supple with FROM. No carotid bruits  Lungs: Breathing even and unlabored. All lobes clear to auscultation bilaterally   Heart :RRR, S1 and S2 normal intensity, no extra heart sounds  Extremities: +moderate edema to both ankles, pedal pulses 2+  Neuro: Cranial nerves grossly normal.   Musculoskeletal: Joints are without swelling, erythema, or heat. Psych: Mood and thought content appropriate for situation. Dressed appropriately and with good hygiene. Skin: Warm and dry and intact    Diabetic foot exam  Bilateral feet are warm and dry. Skin intact without ulcerations  Pallor is present to toes. Mild pedal edema, bilateral pedal pulses 2+ bilaterally  Sensation to light touch intact decreased with monofilament testing bilaterally        Assessment/ Plan:     HTN  BP at goal  Advised to limit sodium and restaurant food   Check BMP  Cont current meds  Low-sodium diet  Exercise  RTO or go to ER for any CP, SOB, dizziness, or swelling. F/U:3 months     DM  Check A1C and microalbuminuria  Cont Metformin 145FP BID  Cont Trulicity  Cont Basaglar insulin 40 units every morning  Cont to check glucose BID - notify provider for any hypoglycemic episodes or blood sugars <70, may need to reduce insulin to 32 units daily  Foot exam done today  F/U 3 months    CKD, stage 3  Check creatinine  Avoid NSAIDS. Stay hydrated   F/U 3 months    A-fib  Stable  Cont Eliquis for stroke prophylaxis  Report any recurrent nosebleeds or other s/s of abnormal bleeding  Check Hgb  F/U with cardiologist as scheduled  F/U 3 months.     Hyperthyroidism  Cont methimazole 5mg daily  Recheck TSH, Free T3 and Free T4  F/U with Dr New Contreras as scheduled    Hx of CVA with hemiparesis  Cont statin and asa  Keep BP and BS under god control  Go to ER for sudden unilateral numbness/tingling, weakness, facial drooping, slurred speech, confusion, AMS, or dizziness    Elevated serum Vit D   Still off Vit D  Recheck Vit D level    Hx of UTI/ hematuria  Asymptomatic but  would like urine checked  UA rechecked- shows +blood, LE, and protein  Sent off for C/S- will call with results  Need to get renal ultrasound for persistent microscopic hematuria- although could be related to blood thinner    Health maintenance  Eye exam: done 7/2021  She is due for a mammogram- ordered back in June but never completed.  given scheduling dept phone number to call and schedule  PNA vaccines up to date  Flu shot up to date  Shingrix- she is not a candidate, never had the chicken pox   Covid 19 vaccine- she had both Moderna vaccines, advised to get booster next month as soon as available          Verbal and written instructions (see AVS) provided.  Patient expresses understanding of diagnosis and treatment plan. Health Maintenance Due   Topic Date Due    Breast Cancer Screen Mammogram  11/03/2018    Foot Exam Q1  12/17/2021    MICROALBUMIN Q1  12/17/2021    Medicare Yearly Exam  12/18/2021               Katty Anton NP

## 2021-12-16 LAB
25(OH)D3 SERPL-MCNC: 66.9 NG/ML (ref 30–100)
ANION GAP SERPL CALC-SCNC: 6 MMOL/L (ref 5–15)
BUN SERPL-MCNC: 25 MG/DL (ref 6–20)
BUN/CREAT SERPL: 18 (ref 12–20)
CALCIUM SERPL-MCNC: 9.4 MG/DL (ref 8.5–10.1)
CHLORIDE SERPL-SCNC: 109 MMOL/L (ref 97–108)
CO2 SERPL-SCNC: 25 MMOL/L (ref 21–32)
CREAT SERPL-MCNC: 1.42 MG/DL (ref 0.55–1.02)
CREAT UR-MCNC: 71.3 MG/DL
EST. AVERAGE GLUCOSE BLD GHB EST-MCNC: 160 MG/DL
GLUCOSE SERPL-MCNC: 66 MG/DL (ref 65–100)
HBA1C MFR BLD: 7.2 % (ref 4–5.6)
HGB BLD-MCNC: 12.5 G/DL (ref 11.5–16)
MICROALBUMIN UR-MCNC: 101 MG/DL
MICROALBUMIN/CREAT UR-RTO: 1417 MG/G (ref 0–30)
POTASSIUM SERPL-SCNC: 4.8 MMOL/L (ref 3.5–5.1)
SODIUM SERPL-SCNC: 140 MMOL/L (ref 136–145)

## 2021-12-17 NOTE — PROGRESS NOTES
She still has bacteria in the urine which can be normal for older women and do not have to be treated with antibx unless they are symptomatic. Kidney function is stable. A1C looks great.

## 2021-12-18 LAB
BACTERIA SPEC CULT: ABNORMAL
CC UR VC: ABNORMAL
SERVICE CMNT-IMP: ABNORMAL

## 2022-02-01 DIAGNOSIS — E05.90 HYPERTHYROIDISM: ICD-10-CM

## 2022-02-01 DIAGNOSIS — E05.10 TOXIC THYROID NODULE: ICD-10-CM

## 2022-02-04 LAB
T3 SERPL-MCNC: 89 NG/DL (ref 71–180)
T4 FREE SERPL-MCNC: 0.99 NG/DL (ref 0.82–1.77)

## 2022-02-16 ENCOUNTER — OFFICE VISIT (OUTPATIENT)
Dept: ENDOCRINOLOGY | Age: 72
End: 2022-02-16
Payer: MEDICARE

## 2022-02-16 VITALS
SYSTOLIC BLOOD PRESSURE: 136 MMHG | DIASTOLIC BLOOD PRESSURE: 68 MMHG | HEART RATE: 68 BPM | BODY MASS INDEX: 36.35 KG/M2 | HEIGHT: 65 IN | WEIGHT: 218.2 LBS

## 2022-02-16 DIAGNOSIS — E05.90 HYPERTHYROIDISM: ICD-10-CM

## 2022-02-16 DIAGNOSIS — E05.10 TOXIC THYROID NODULE: Primary | ICD-10-CM

## 2022-02-16 PROCEDURE — 3017F COLORECTAL CA SCREEN DOC REV: CPT | Performed by: INTERNAL MEDICINE

## 2022-02-16 PROCEDURE — G8432 DEP SCR NOT DOC, RNG: HCPCS | Performed by: INTERNAL MEDICINE

## 2022-02-16 PROCEDURE — G8399 PT W/DXA RESULTS DOCUMENT: HCPCS | Performed by: INTERNAL MEDICINE

## 2022-02-16 PROCEDURE — G8754 DIAS BP LESS 90: HCPCS | Performed by: INTERNAL MEDICINE

## 2022-02-16 PROCEDURE — 1101F PT FALLS ASSESS-DOCD LE1/YR: CPT | Performed by: INTERNAL MEDICINE

## 2022-02-16 PROCEDURE — G8427 DOCREV CUR MEDS BY ELIG CLIN: HCPCS | Performed by: INTERNAL MEDICINE

## 2022-02-16 PROCEDURE — G8536 NO DOC ELDER MAL SCRN: HCPCS | Performed by: INTERNAL MEDICINE

## 2022-02-16 PROCEDURE — 1090F PRES/ABSN URINE INCON ASSESS: CPT | Performed by: INTERNAL MEDICINE

## 2022-02-16 PROCEDURE — G8417 CALC BMI ABV UP PARAM F/U: HCPCS | Performed by: INTERNAL MEDICINE

## 2022-02-16 PROCEDURE — G9899 SCRN MAM PERF RSLTS DOC: HCPCS | Performed by: INTERNAL MEDICINE

## 2022-02-16 PROCEDURE — G8752 SYS BP LESS 140: HCPCS | Performed by: INTERNAL MEDICINE

## 2022-02-16 PROCEDURE — 99214 OFFICE O/P EST MOD 30 MIN: CPT | Performed by: INTERNAL MEDICINE

## 2022-02-16 NOTE — PATIENT INSTRUCTIONS
1) Take 1/2 of a Methimazole pill, every other day. 2) I want to recheck your thyroid levels in 6 weeks.

## 2022-02-16 NOTE — LETTER
2/16/2022    Patient: Mckenna Garcia   YOB: 1950   Date of Visit: 2/16/2022     Fracisco Riddle NP  [address] [facility]  Via In Basket    Dear Fracisco Riddle NP,      Thank you for referring Ms. Elizabeth Coffey to [facility] for evaluation. My notes for this consultation are attached. If you have questions, please do not hesitate to call me. I look forward to following your patient along with you.       Sincerely,    Kailyn Gil MD

## 2022-02-16 NOTE — PROGRESS NOTES
Chief Complaint   Patient presents with    Thyroid Problem     pcp and pharmacy verifieed     History of Present Illness: Torrance Gaucher is a 66 y.o. 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 She was seen for initial visit in January 2021 for low TSH. She had a thyroid nuclear scan which identified a toxic thyroid nodule in the right thyroid lobe. Ms Hayde Green and Dr. Terri Ayala agreed to proceed with low dose MMI rather than surgery or GUNN. At their last visit in April 2021 Dr. Terri Ayala decreased her MMI to 2.5mg daily, because her TSH was 6.77. At our last visit in September 2021 her TT4 was 7.2 with Free T3 of 2.4. We discussed at length the toxic thyroid nodule and that if we stopped the [address] [place] [address] [place] she would become hyperthyroid again. We agreed to continue the MMI 2.5mg every day. Pt denies any recent illnesses, injuries or hospitalizations. Pt has received both COVID vaccinations (Harle Distel). She had thyroid labs drawn last week and her FT4 of 0.99 with TT3 of 89. Pt and her son notes that she is still taking the MMI 2.5mg every day. She denies issues of dysphagia or dysphonia. She denies issues of CP, SOB, palpitations, tremors, heat or cold intolerance, diarrhea or constipation. Pt denies issues of Abdominal pain, N/V or Jaundice. Current Outpatient Medications   Medication Sig    hydroCHLOROthiazide (HYDRODIURIL) 25 mg tablet Take 1 Tablet by mouth daily.  cholecalciferol (VITAMIN D3) (1000 Units /25 mcg) tablet Take 1 Tablet by mouth daily.  amLODIPine (NORVASC) 10 mg tablet TAKE 1 TABLET BY MOUTH EVERY DAY    Eliquis 5 mg tablet TAKE 1 TABLET BY MOUTH TWO TIMES A DAY.  Basaglar KwikPen U-100 Insulin 100 unit/mL (3 mL) inpn 40 Units by SubCUTAneous route every morning.     quinapriL (ACCUPRIL) 40 mg tablet TAKE 1 TABLET TWICE A DAY    apixaban (Eliquis) 5 mg tablet TAKE 1 TABLET BY MOUTH TWICE A DAY    atorvastatin (LIPITOR) 80 mg tablet TAKE 1 TABLET NIGHTLY    glucose blood VI test strips (ASCENSIA AUTODISC VI, ONE TOUCH ULTRA TEST VI) strip Check glucose twice daily.  metoprolol succinate (TOPROL-XL) 100 mg tablet Take 1 Tablet by mouth daily.  metFORMIN (GLUCOPHAGE) 500 mg tablet Take 1 Tablet by mouth two (2) times daily (with meals). (Patient taking differently: Take 500 mg by mouth two (2) times daily (with meals). 1 in AM and 1 with lunch)    dulaglutide (Trulicity) 1.5 UO/4.7 mL sub-q pen Inject 1.5mg SQ every 7 days    Insulin Needles, Disposable, 31 gauge x 5/16\" ndle Use as directed    methIMAzole (TAPAZOLE) 5 mg tablet Take 1 Tab by mouth daily. (Patient taking differently: Take 2.5 mg by mouth daily.)    multivitamin (ONE A DAY) tablet Take 1 Tab by mouth daily.  aspirin 81 mg chewable tablet Take 81 mg by mouth daily.  Blood-Glucose Meter monitoring kit Check blood sugar twice daily. No current facility-administered medications for this visit. Allergies   Allergen Reactions    Pcn [Penicillins] Rash    Sulfa (Sulfonamide Antibiotics) Unknown (comments)     Review of Systems:  - Cardiovascular: no chest pain  - Neurological: no tremors  - Integumentary: skin is normal    Physical Examination:  Blood pressure 136/68, pulse 68, height 5' 5\" (1.651 m), weight 218 lb 3.2 oz (99 kg).   - General: pleasant, no distress, good eye contact   - Neck: no thyromegaly or thyroid bruits  - Cardiovascular: regular, normal rate, nl s1 and s2, no m/r/g   - Integumentary: skin is normal, no edema  - Neurological: reflexes 2+ at biceps, no tremors  - Psychiatric: normal mood and affect    Data Reviewed:   Component      Latest Ref Rng & Units 2/3/2022 2/3/2022          10:20 AM 10:20 AM   T4, Free      0.82 - 1.77 ng/dL  0.99   T3, total      71 - 180 ng/dL 89        Assessment/Plan:   1) Hyperthyroidism > Since her T3 and T4 are on the low normal range I will decrease her MMI to 2.5mg every other day and repeat her TFTs in 6 weeks.    Pt voices understanding and agreement with the plan.     RTC 6 months    Copy sent to:  Arlnie Ramos

## 2022-03-15 ENCOUNTER — OFFICE VISIT (OUTPATIENT)
Dept: FAMILY MEDICINE CLINIC | Age: 72
End: 2022-03-15
Payer: MEDICARE

## 2022-03-15 VITALS
OXYGEN SATURATION: 98 % | TEMPERATURE: 97.1 F | SYSTOLIC BLOOD PRESSURE: 128 MMHG | WEIGHT: 214 LBS | HEART RATE: 73 BPM | BODY MASS INDEX: 35.65 KG/M2 | RESPIRATION RATE: 18 BRPM | HEIGHT: 65 IN | DIASTOLIC BLOOD PRESSURE: 60 MMHG

## 2022-03-15 DIAGNOSIS — I48.0 PAROXYSMAL ATRIAL FIBRILLATION (HCC): ICD-10-CM

## 2022-03-15 DIAGNOSIS — Z79.4 TYPE 2 DIABETES MELLITUS WITH STAGE 3 CHRONIC KIDNEY DISEASE, WITH LONG-TERM CURRENT USE OF INSULIN, UNSPECIFIED WHETHER STAGE 3A OR 3B CKD (HCC): Primary | ICD-10-CM

## 2022-03-15 DIAGNOSIS — E78.5 DYSLIPIDEMIA: ICD-10-CM

## 2022-03-15 DIAGNOSIS — E55.9 VITAMIN D DEFICIENCY: ICD-10-CM

## 2022-03-15 DIAGNOSIS — E05.90 HYPERTHYROIDISM: ICD-10-CM

## 2022-03-15 DIAGNOSIS — E11.22 TYPE 2 DIABETES MELLITUS WITH STAGE 3 CHRONIC KIDNEY DISEASE, WITH LONG-TERM CURRENT USE OF INSULIN, UNSPECIFIED WHETHER STAGE 3A OR 3B CKD (HCC): Primary | ICD-10-CM

## 2022-03-15 DIAGNOSIS — I69.354 HEMIPARESIS AFFECTING LEFT SIDE AS LATE EFFECT OF STROKE (HCC): ICD-10-CM

## 2022-03-15 DIAGNOSIS — N18.30 TYPE 2 DIABETES MELLITUS WITH STAGE 3 CHRONIC KIDNEY DISEASE, WITH LONG-TERM CURRENT USE OF INSULIN, UNSPECIFIED WHETHER STAGE 3A OR 3B CKD (HCC): Primary | ICD-10-CM

## 2022-03-15 DIAGNOSIS — I10 ESSENTIAL HYPERTENSION, BENIGN: ICD-10-CM

## 2022-03-15 PROCEDURE — G8432 DEP SCR NOT DOC, RNG: HCPCS | Performed by: NURSE PRACTITIONER

## 2022-03-15 PROCEDURE — 99214 OFFICE O/P EST MOD 30 MIN: CPT | Performed by: NURSE PRACTITIONER

## 2022-03-15 PROCEDURE — 36415 COLL VENOUS BLD VENIPUNCTURE: CPT | Performed by: NURSE PRACTITIONER

## 2022-03-15 PROCEDURE — G8399 PT W/DXA RESULTS DOCUMENT: HCPCS | Performed by: NURSE PRACTITIONER

## 2022-03-15 PROCEDURE — G8752 SYS BP LESS 140: HCPCS | Performed by: NURSE PRACTITIONER

## 2022-03-15 PROCEDURE — 3046F HEMOGLOBIN A1C LEVEL >9.0%: CPT | Performed by: NURSE PRACTITIONER

## 2022-03-15 PROCEDURE — 2022F DILAT RTA XM EVC RTNOPTHY: CPT | Performed by: NURSE PRACTITIONER

## 2022-03-15 PROCEDURE — G8417 CALC BMI ABV UP PARAM F/U: HCPCS | Performed by: NURSE PRACTITIONER

## 2022-03-15 PROCEDURE — G8536 NO DOC ELDER MAL SCRN: HCPCS | Performed by: NURSE PRACTITIONER

## 2022-03-15 PROCEDURE — G9899 SCRN MAM PERF RSLTS DOC: HCPCS | Performed by: NURSE PRACTITIONER

## 2022-03-15 PROCEDURE — G8754 DIAS BP LESS 90: HCPCS | Performed by: NURSE PRACTITIONER

## 2022-03-15 PROCEDURE — 3017F COLORECTAL CA SCREEN DOC REV: CPT | Performed by: NURSE PRACTITIONER

## 2022-03-15 PROCEDURE — 1090F PRES/ABSN URINE INCON ASSESS: CPT | Performed by: NURSE PRACTITIONER

## 2022-03-15 PROCEDURE — 1101F PT FALLS ASSESS-DOCD LE1/YR: CPT | Performed by: NURSE PRACTITIONER

## 2022-03-15 PROCEDURE — G8427 DOCREV CUR MEDS BY ELIG CLIN: HCPCS | Performed by: NURSE PRACTITIONER

## 2022-03-15 NOTE — PROGRESS NOTES
Subjective:     CC: diabetes     Alberto Hook is a 66 y.o. female, accompanied by her , who presents today for a 3 month follow up for type 2 diabetes and other chronic medical problems. Type 2 diabetes  Lab Results   Component Value Date/Time    Hemoglobin A1c 7.2 (H) 12/15/2021 01:45 PM     She is on Metformin 463HJ BID, Trulicity 0.5UD q 7 days, and Levemir 40 units daily. Morning fasting BS run around 100. This am it was 77 and she felt fine. Denies any hypoglycemic episodes. In the afternoon it increases to the 200's. She does have some peripheral neuropathy in the feet. Last eye exam 7-8-21. HTN  BP was elevated at 161/66 but them came down to 128/60 when rechecked manually. She is on HCTZ 25mg daily, Amlodipine 10mg daily, Quinapril 40mg BID, and Metoprolol XR 100mg daily. Denies CP, SOB, or dizziness, She does have some chronic swelling in the ankles. CKD, stage 3  Lab Results   Component Value Date/Time    Creatinine 1.42 (H) 12/15/2021 01:45 PM     Creatinine has been stable. It was 1.42 in , down from 1.45 in 6/2021, down from 1.57 in 12/2020, up from 1.19 in 6/2020, down from 1.28 in 3/2020. She does not take NSAIDS. She is on an ACEi. Hyperthyroidism  She was diagnosed in 1/2021 and referred to Rahul Cohen. She had no thyroid antibodies. Her thyroid nuclear uptake scan showed a dominant nodule in the right lobe with normal uptake of tracer. She was prescribed Methimazole. She followed up with Dr Cara Moreno in Feb 2022 and he reduced her Methimazole to 2.5mg TIW. H/O CVA with hemiparesis   She was admitted to [facility] in June of 2019 for CVA and then spent 2 weeks in [facility]. This was her second CVA. The first caused weakness and the last one affected her speech and mental status.  helps with ADLs, meals, and medication management. She is on a statin daily and takes Eliquis for A-fib.     Lab Results   Component Value Date/Time    Cholesterol, total 110 09/14/2021 02:10 PM    HDL Cholesterol 53 09/14/2021 02:10 PM    LDL, calculated 29 09/14/2021 02:10 PM    VLDL, calculated 28 09/14/2021 02:10 PM    Triglyceride 140 09/14/2021 02:10 PM    CHOL/HDL Ratio 2.1 09/14/2021 02:10 PM       A-fib  On Eliquis for stroke prophylaxis. Denies black or bloody stools. Followed by cardiologist Dr Houston Trujillo who has retired. Will see NP Sandhya Duron on 3-20-22. Lab Results   Component Value Date/Time    HGB (POC) 13.8 11/20/2015 01:36 PM    HGB 12.5 12/15/2021 01:45 PM       Vitamin D deficiency  She used to take Vit D 50,000 BIW but was told to stop in June when levels reached > 150. Levels have since normalized and she is is now on the lower dose of 1000 units daily. Lab Results   Component Value Date/Time    Vitamin D 25-Hydroxy 66.9 12/15/2021 01:45 PM       Health maintenance  Eye exam: done 7/2021  She is due for a mammogram- ordered back in June but never completed.  was given scheduling dept phone number to call and schedule at the last visit but still not done. They were reminded today.    PNA vaccines up to date  Flu shot up to date  Shingrix- she is not a candidate, never had the chicken pox   Covid 19 vaccine- she had both Moderna vaccines, boosted in       Patient Active Problem List   Diagnosis Code    Psoriasis L40.9    Essential hypertension, benign I10    Rosacea L71.9    Heart murmur R01.1    Type 2 diabetes mellitus with hyperglycemia, with long-term current use of insulin (HCC) E11.65, Z79.4    Obesity, morbid ([facility]) E66.01    Type 2 diabetes mellitus with chronic kidney disease ([facility] [address] E11.22    Acute ischemic cerebrovascular accident (CVA) involving right middle cerebral artery [facility]) I63.511    Cerebrovascular accident (CVA) due to thrombosis of right middle cerebral artery (HCC) I63.311    Paroxysmal atrial fibrillation (HCC) I48.0    Dyslipidemia E78.5    CVA (cerebral vascular accident) ([facility] [address] I63.9    Diabetic peripheral neuropathy associated with type 2 diabetes mellitus (HCC) E11.42    Bilateral carotid artery stenosis I42.58    Embolic stroke involving left middle cerebral artery ([facility]) O14.274    CKD (chronic kidney disease) stage 3, GFR 30-59 ml/min ([facility]) N18.30    Low TSH level R79.89       Past Medical History:   Diagnosis Date    Diabetes ([facility] [address]     Hypertension     Murmur     Osteopenia     Psoriasis     Rosacea     Stroke ([facility]     Vitamin D deficiency          Current Outpatient Medications:     hydroCHLOROthiazide (HYDRODIURIL) 25 mg tablet, Take 1 Tablet by mouth daily. , Disp: 90 Tablet, Rfl: 1    cholecalciferol (VITAMIN D3) (1000 Units /25 mcg) tablet, Take 1 Tablet by mouth daily. , Disp: 90 Tablet, Rfl: 3    amLODIPine (NORVASC) 10 mg tablet, TAKE 1 TABLET BY MOUTH EVERY DAY, Disp: 21 Tablet, Rfl: 0    Eliquis 5 mg tablet, TAKE 1 TABLET BY MOUTH TWO TIMES A DAY., Disp: 60 Tablet, Rfl: 0    Basaglar KwikPen U-100 Insulin 100 unit/mL (3 mL) inpn, 40 Units by SubCUTAneous route every morning., Disp: 1 Pen, Rfl: 5    quinapriL (ACCUPRIL) 40 mg tablet, TAKE 1 TABLET TWICE A DAY, Disp: 180 Tablet, Rfl: 1    apixaban (Eliquis) 5 mg tablet, TAKE 1 TABLET BY MOUTH TWICE A DAY, Disp: 180 Tablet, Rfl: 1    atorvastatin (LIPITOR) 80 mg tablet, TAKE 1 TABLET NIGHTLY, Disp: 90 Tablet, Rfl: 2    glucose blood VI test strips (ASCENSIA AUTODISC VI, ONE TOUCH ULTRA TEST VI) strip, Check glucose twice daily. , Disp: 180 Strip, Rfl: 3    metoprolol succinate (TOPROL-XL) 100 mg tablet, Take 1 Tablet by mouth daily. , Disp: 90 Tablet, Rfl: 3    metFORMIN (GLUCOPHAGE) 500 mg tablet, Take 1 Tablet by mouth two (2) times daily (with meals). (Patient taking differently: Take 500 mg by mouth two (2) times daily (with meals).  1 in AM and 1 with lunch), Disp: 180 Tablet, Rfl: 5    dulaglutide (Trulicity) 1.5 GT/5.0 mL sub-q pen, Inject 1.5mg SQ every 7 days, Disp: 6 mL, Rfl: 5    Insulin Needles, Disposable, 31 gauge x 5/16\" ndle, Use as directed, Disp: 100 Pen Needle, Rfl: 5    methIMAzole (TAPAZOLE) 5 mg tablet, Take 1 Tab by mouth daily. (Patient taking differently: Take 2.5 mg by mouth daily.), Disp: 90 Tab, Rfl: 3    multivitamin (ONE A DAY) tablet, Take 1 Tab by mouth daily. , Disp: , Rfl:     aspirin 81 mg chewable tablet, Take 81 mg by mouth daily. , Disp: , Rfl:     Blood-Glucose Meter monitoring kit, Check blood sugar twice daily. , Disp: 1 Kit, Rfl: 0    Allergies   Allergen Reactions    Pcn [Penicillins] Rash    Sulfa (Sulfonamide Antibiotics) Unknown (comments)       Past Surgical History:   Procedure Laterality Date    COLONOSCOPY N/A 4/4/2018    COLONOSCOPY   DIABETIC performed by Bartolome Ramachandran MD at [place] [address] [address]X [address] [place] [address] COLONOSCOPY  2018    3 polyps    HX TONSILLECTOMY         Social History     Tobacco Use   Smoking Status Never Smoker   Smokeless Tobacco Never Used       Social History     Socioeconomic History    Marital status:    Tobacco Use    Smoking status: Never Smoker    Smokeless tobacco: Never Used   Substance and Sexual Activity    Alcohol use: Yes     Alcohol/week: 0.0 standard drinks     Comment: 1/couple months       Family History   Problem Relation Age of Onset    Diabetes Mother     Heart Disease Father     No Known Problems Child      ROS:  Gen: denies fever, chills, or fatigue, +weight gain  HEENT:denies H/A, nosebleeds, or bleeding gums  Resp: denies dyspnea, cough, or wheezing  CV: denies chest pain, pressure, or palpitations  GI: denies abd pain, N/V, melena or hematochezia  : Denies dysuria, hematuria, or urinary frequency/urgency  Extremeties: +moderate edema in ankles  Musculoskeletal: Denies joint pain, stiffness, or muscle cramps  Neuro: denies numbness/tingling or dizziness  Endo: denies polyuria or polydipsia  Skin: denies rashes or new lesions  Psych: denies anxiety or depression    Objective:      There were no vitals taken for this visit. General: Alert and oriented. No acute distress. +Obese. Eyes: Sclera white, conjunctiva clear. PERRLA. Neck: Supple with FROM. No carotid bruits  Lungs: Breathing even and unlabored. All lobes clear to auscultation bilaterally   Heart :RRR, S1 and S2 normal intensity, no extra heart sounds  Extremities: +moderate edema to both ankles, pedal pulses 2+  Neuro: Cranial nerves grossly normal.   Musculoskeletal: Joints are without swelling, erythema, or heat. Psych: Mood and thought content appropriate for situation. Dressed appropriately and with good hygiene. Skin: Warm and dry and intact    Diabetic foot exam  Bilateral feet are warm and dry. Skin intact without ulcerations  Pallor is present to toes. Mild pedal edema, bilateral pedal pulses 2+ bilaterally  Sensation to light touch intact decreased with monofilament testing bilaterally        Assessment/ Plan:     HTN  BP at goal  Advised to limit sodium and restaurant food   Check BMP  Cont current meds  Low-sodium diet  Exercise  RTO or go to ER for any CP, SOB, dizziness, or swelling. F/U:3 months     DM  Check A1C and microalbuminuria  Cont Metformin 126QW BID  Cont Trulicity  Cont Basaglar insulin 40 units every morning  Cont to check glucose BID - notify provider for any hypoglycemic episodes or blood sugars <70, may need to reduce insulin to 32 units daily  F/U 3 months    CKD, stage 3  Check creatinine  Avoid NSAIDS. Stay hydrated   F/U 3 months    A-fib  Stable  Cont Eliquis for stroke prophylaxis  Report any recurrent nosebleeds or other s/s of abnormal bleeding  Check Hgb  F/U with cardiologist as scheduled  F/U 3 months.     Hyperthyroidism  Cont methimazole 5mg daily  Recheck TSH, Free T3 and Free T4  F/U with Dr Magdalena Mora as scheduled    Hx of CVA with hemiparesis  Cont statin and asa  Keep BP and BS under god control  Go to ER for sudden unilateral numbness/tingling, weakness, facial drooping, slurred speech, confusion, AMS, or dizziness    Elevated serum Vit D   Still off Vit D  Recheck Vit D level    Lab Results   Component Value Date/Time    Vitamin D 25-Hydroxy 66.9 12/15/2021 01:45 PM           Hx of UTI/ hematuria  Asymptomatic but  would like urine checked  UA rechecked- shows +blood, LE, and protein  Sent off for C/S- will call with results  Need to get renal ultrasound for persistent microscopic hematuria- although could be related to blood thinner    Health maintenance  Eye exam: done 7/2021  She is due for a mammogram- ordered back in June but never completed.  was given scheduling dept phone number to call and schedule at the last visit but still not done  PNA vaccines up to date  Flu shot up to date  Shingrix- she is not a candidate, never had the chicken pox   Covid 19 vaccine- she had both Moderna vaccines, boosted in           Verbal and written instructions (see AVS) provided.  Patient expresses understanding of diagnosis and treatment plan. Health Maintenance Due   Topic Date Due    Breast Cancer Screen Mammogram  11/03/2018               Precious Marte.  Kristie Upton NP

## 2022-03-15 NOTE — PROGRESS NOTES
1. \"Have you been to the ER, urgent care clinic since your last visit? Hospitalized since your last visit? \" No    2. \"Have you seen or consulted any other health care providers outside of the [address] [place] [address] since your last visit? \" Dr. Mojgan Nunez     3. For patients aged 39-70: Has the patient had a colonoscopy / FIT/ Cologuard? Yes - no Care Gap present      If the patient is female:    4. For patients aged 41-77: Has the patient had a mammogram within the past 2 years? Yes - no Care Gap present      5. For patients aged 21-65: Has the patient had a pap smear?  NA - based on age or sex    Chief Complaint   Patient presents with    Hypertension    Diabetes    Thyroid Problem     Visit Vitals  BP (!) 161/66 (BP 1 Location: Left arm, BP Patient Position: Sitting)   Pulse 73   Temp 97.1 °F (36.2 °C) (Temporal)   Resp 18   Ht 5' 5\" (1.651 m)   Wt 214 lb (97.1 kg)   SpO2 98%   BMI 35.61 kg/m²

## 2022-03-16 LAB
25(OH)D3 SERPL-MCNC: 61.6 NG/ML (ref 30–100)
ANION GAP SERPL CALC-SCNC: 2 MMOL/L (ref 5–15)
BUN SERPL-MCNC: 38 MG/DL (ref 6–20)
BUN/CREAT SERPL: 22 (ref 12–20)
CALCIUM SERPL-MCNC: 9.3 MG/DL (ref 8.5–10.1)
CHLORIDE SERPL-SCNC: 107 MMOL/L (ref 97–108)
CO2 SERPL-SCNC: 29 MMOL/L (ref 21–32)
CREAT SERPL-MCNC: 1.76 MG/DL (ref 0.55–1.02)
EST. AVERAGE GLUCOSE BLD GHB EST-MCNC: 151 MG/DL
GLUCOSE SERPL-MCNC: 94 MG/DL (ref 65–100)
HBA1C MFR BLD: 6.9 % (ref 4–5.6)
POTASSIUM SERPL-SCNC: 4.8 MMOL/L (ref 3.5–5.1)
SODIUM SERPL-SCNC: 138 MMOL/L (ref 136–145)

## 2022-03-16 RX ORDER — HYDROCHLOROTHIAZIDE 12.5 MG/1
12.5 TABLET ORAL DAILY
Qty: 90 TABLET | Refills: 1 | Status: SHIPPED | OUTPATIENT
Start: 2022-03-16

## 2022-03-18 PROBLEM — Z79.4 TYPE 2 DIABETES MELLITUS WITH HYPERGLYCEMIA, WITH LONG-TERM CURRENT USE OF INSULIN (HCC): Status: ACTIVE | Noted: 2017-04-03

## 2022-03-18 PROBLEM — E11.65 TYPE 2 DIABETES MELLITUS WITH HYPERGLYCEMIA, WITH LONG-TERM CURRENT USE OF INSULIN (HCC): Status: ACTIVE | Noted: 2017-04-03

## 2022-03-19 PROBLEM — R79.89 LOW TSH LEVEL: Status: ACTIVE | Noted: 2020-09-17

## 2022-03-19 PROBLEM — E78.5 DYSLIPIDEMIA: Status: ACTIVE | Noted: 2019-04-16

## 2022-03-19 PROBLEM — I63.511 ACUTE ISCHEMIC CEREBROVASCULAR ACCIDENT (CVA) INVOLVING RIGHT MIDDLE CEREBRAL ARTERY TERRITORY (HCC): Status: ACTIVE | Noted: 2019-01-17

## 2022-03-19 PROBLEM — I63.412 EMBOLIC STROKE INVOLVING LEFT MIDDLE CEREBRAL ARTERY (HCC): Status: ACTIVE | Noted: 2019-06-10

## 2022-03-19 PROBLEM — I65.23 BILATERAL CAROTID ARTERY STENOSIS: Status: ACTIVE | Noted: 2019-06-10

## 2022-03-19 PROBLEM — I63.311 CEREBROVASCULAR ACCIDENT (CVA) DUE TO THROMBOSIS OF RIGHT MIDDLE CEREBRAL ARTERY (HCC): Status: ACTIVE | Noted: 2019-01-18

## 2022-03-19 PROBLEM — N18.30 CKD (CHRONIC KIDNEY DISEASE) STAGE 3, GFR 30-59 ML/MIN (HCC): Status: ACTIVE | Noted: 2020-01-13

## 2022-03-20 PROBLEM — E11.42 DIABETIC PERIPHERAL NEUROPATHY ASSOCIATED WITH TYPE 2 DIABETES MELLITUS (HCC): Status: ACTIVE | Noted: 2019-06-10

## 2022-03-20 PROBLEM — I63.9 CVA (CEREBRAL VASCULAR ACCIDENT) (HCC): Status: ACTIVE | Noted: 2019-06-09

## 2022-03-20 PROBLEM — E11.22 TYPE 2 DIABETES MELLITUS WITH CHRONIC KIDNEY DISEASE (HCC): Status: ACTIVE | Noted: 2017-12-26

## 2022-03-20 PROBLEM — I48.0 PAROXYSMAL ATRIAL FIBRILLATION (HCC): Status: ACTIVE | Noted: 2019-04-16

## 2022-03-20 PROBLEM — E66.01 OBESITY, MORBID (HCC): Status: ACTIVE | Noted: 2017-12-26

## 2022-03-28 NOTE — PROGRESS NOTES
Lloyd Pandey is a 66 y.o. female is here for routine f/u. Hx DM II, diabetic nephropathy, hypertension, dyslipidemia, CKD 3, obesity, with acute R MCA CVA 1/19.  W/u with Echo/doppler 1/1/8/19 with mild to mod LVH, LVEF 65-70, normal atrial sizes, mild MR. Head CT/MRI/MRA with multiple sites of infarct R temporo-occipital, R parietal, R cerebellar, coronoradiatal  C/w embolic events.  Subsequent Loop/Event monitor x 2 weeks showed NSR with episodes of paroxysmal afib/RVR.  Seen in f/u by Dr. Marnie Meigs and started on Eliquis 5mg bid  (CHADs2-VASC = 6--DM, hypertension, female, >65, prior CVA (CVA 3.2%/QE, embolic/CVA/TIA 11.3%/FO).   Hospitalized in June 2019 with acute CVA. Unfortunately, the confusion with the patients medications by pt/ which stopped Eliquis vs stopping ASA likely culprit of current CVA.  This is not considered a failure of treatment as patient was not actually on Eliquis since 4/16/19.  On BB and amlodipine.  Continues to see PCP with recent OV/labs. Last seen by Dr Danii Contreras in 9/2021. No cardiac complaints. Does not engage in exercise. She  is on [address] [place] [address] Road, reports no melena, hematuria, or obvious signs of bleeding. No falls. The patient denies chest pain/ shortness of breath, orthopnea, PND, LE edema, palpitations, syncope, presyncope or fatigue.          Patient Active Problem List    Diagnosis Date Noted    Low TSH level 09/17/2020    CKD (chronic kidney disease) stage 3, GFR 30-59 ml/min ([facility] 01/13/2020    Diabetic peripheral neuropathy associated with type 2 diabetes mellitus ([facility] 06/10/2019    Bilateral carotid artery stenosis 57/78/8164    Embolic stroke involving left middle cerebral artery ([facility] 06/10/2019    CVA (cerebral vascular accident) ([facility] 06/09/2019    Paroxysmal atrial fibrillation ([facility] 04/16/2019    Dyslipidemia 04/16/2019    Cerebrovascular accident (CVA) due to thrombosis of right middle cerebral artery ([facility] 01/18/2019    Acute ischemic cerebrovascular accident (CVA) involving right middle cerebral artery territory ([facility] 01/17/2019    Obesity, morbid ([facility] 12/26/2017    Type 2 diabetes mellitus with chronic kidney disease ([facility] 12/26/2017    Type 2 diabetes mellitus with hyperglycemia, with long-term current use of insulin ([facility] 04/03/2017    Psoriasis 08/11/2014    Essential hypertension, benign 08/11/2014    Rosacea 08/11/2014    Heart murmur 08/11/2014      Scott Tatum NP  Past Medical History:   Diagnosis Date    Diabetes [facility]     Hypertension     Murmur     Osteopenia     Psoriasis     Rosacea     Stroke ([facility]     Vitamin D deficiency       Past Surgical History:   Procedure Laterality Date    COLONOSCOPY N/A 4/4/2018    COLONOSCOPY   DIABETIC performed by Laina Callejas MD at [place] [address]  [facility]      HX COLONOSCOPY  2018    3 polyps    HX TONSILLECTOMY       Allergies   Allergen Reactions    Pcn [Penicillins] Rash    Sulfa (Sulfonamide Antibiotics) Unknown (comments)      Family History   Problem Relation Age of Onset    Diabetes Mother     Heart Disease Father     No Known Problems Child       Social History     Socioeconomic History    Marital status:      Spouse name: Not on file    Number of children: Not on file    Years of education: Not on file    Highest education level: Not on file   Occupational History    Not on file   Tobacco Use    Smoking status: Never Smoker    Smokeless tobacco: Never Used   Substance and Sexual Activity    Alcohol use:  Yes     Alcohol/week: 0.0 standard drinks     Comment: 1/couple months    Drug use: Not on file    Sexual activity: Not on file   Other Topics Concern    Not on file   Social History Narrative    Not on file     Social Determinants of Health     Financial Resource Strain:     Difficulty of Paying Living Expenses: Not on file   Food Insecurity:     Worried About Running Out of Food in the Last Year: Not on file    Ran Out of Food in the Last Year: Not on file   Transportation Needs:     Lack of Transportation (Medical): Not on file    Lack of Transportation (Non-Medical): Not on file   Physical Activity:     Days of Exercise per Week: Not on file    Minutes of Exercise per Session: Not on file   Stress:     Feeling of Stress : Not on file   Social Connections:     Frequency of Communication with Friends and Family: Not on file    Frequency of Social Gatherings with Friends and Family: Not on file    Attends Yarsani Services: Not on file    Active Member of [address] [place] [address] or Organizations: Not on file    Attends Club or Organization Meetings: Not on file    Marital Status: Not on file   Intimate Partner Violence:     Fear of Current or Ex-Partner: Not on file    Emotionally Abused: Not on file    Physically Abused: Not on file    Sexually Abused: Not on file   Housing Stability:     Unable to Pay for Housing in the Last Year: Not on file    Number of [place] in the Last Year: Not on file    Unstable Housing in the Last Year: Not on file      Current Outpatient Medications   Medication Sig    hydroCHLOROthiazide (HYDRODIURIL) 12.5 mg tablet Take 1 Tablet by mouth daily.  cholecalciferol (VITAMIN D3) (1000 Units /25 mcg) tablet Take 1 Tablet by mouth daily.  amLODIPine (NORVASC) 10 mg tablet TAKE 1 TABLET BY MOUTH EVERY DAY    Eliquis 5 mg tablet TAKE 1 TABLET BY MOUTH TWO TIMES A DAY.  Basaglar KwikPen U-100 Insulin 100 unit/mL (3 mL) inpn 40 Units by SubCUTAneous route every morning.  quinapriL (ACCUPRIL) 40 mg tablet TAKE 1 TABLET TWICE A DAY    apixaban (Eliquis) 5 mg tablet TAKE 1 TABLET BY MOUTH TWICE A DAY    atorvastatin (LIPITOR) 80 mg tablet TAKE 1 TABLET NIGHTLY    glucose blood VI test strips (ASCENSIA AUTODISC VI, ONE TOUCH ULTRA TEST VI) strip Check glucose twice daily.  metoprolol succinate (TOPROL-XL) 100 mg tablet Take 1 Tablet by mouth daily.     metFORMIN (GLUCOPHAGE) 500 mg tablet Take 1 Tablet by mouth two (2) times daily (with meals). (Patient taking differently: Take 500 mg by mouth two (2) times daily (with meals). 1 in AM and 1 with lunch)    dulaglutide (Trulicity) 1.5 HM/8.0 mL sub-q pen Inject 1.5mg SQ every 7 days    Insulin Needles, Disposable, 31 gauge x 5/16\" ndle Use as directed    methIMAzole (TAPAZOLE) 5 mg tablet Take 1 Tab by mouth daily. (Patient taking differently: Take 2.5 mg by mouth daily. Taking on Monday, Wednesday, friday)    multivitamin (ONE A DAY) tablet Take 1 Tab by mouth daily.  aspirin 81 mg chewable tablet Take 81 mg by mouth daily.  Blood-Glucose Meter monitoring kit Check blood sugar twice daily. No current facility-administered medications for this visit. Review of Symptoms:    CONST  No weight change. No fever, chills, sweats    ENT No visual changes, URI sx, sore throat    CV  See HPI   RESP  No cough, or sputum, wheezing. Also see HPI   GI  No abdominal pain or change in bowel habits. No heartburn or dysphagia. No melena or rectal bleeding.   No dysuria, urgency, frequency, hematuria   MSKEL  No joint pain, swelling. No muscle pain. SKIN  No rash or lesions. NEURO  No headache, syncope, or seizure. No weakness, loss of sensation, or paresthesias. PSYCH  No low mood or depression  No anxiety. HE/LYMPH  No easy bruising, abnormal bleeding, or enlarged glands. Physical ExamPhysical Exam:    There were no vitals taken for this visit. Gen: NAD  HEENT:  PERRL, throat clear  Neck: no adenopathy, no thyromegaly, no JVD   Heart:  Regular,Nl S1S2,  no murmur, gallop or rub. Lungs:  clear  Abdomen:   Soft, non-tender, bowel sounds are active.    Extremities:  No edema  Pulse: symmetric  Neuro: A&O times 3, +speech deficit    Cardiographics    ECG: NSR, low voltage, no acute changes    Labs:   Lab Results   Component Value Date/Time    Sodium 138 03/15/2022 01:43 PM    Sodium 140 12/15/2021 01:45 PM Sodium 139 09/14/2021 02:10 PM    Sodium 136 06/17/2021 11:00 PM    Sodium 138 12/17/2020 01:39 PM    Potassium 4.8 03/15/2022 01:43 PM    Potassium 4.8 12/15/2021 01:45 PM    Potassium 4.7 09/14/2021 02:10 PM    Potassium 4.6 06/17/2021 11:00 PM    Potassium 5.0 12/17/2020 01:39 PM    Chloride 107 03/15/2022 01:43 PM    Chloride 109 (H) 12/15/2021 01:45 PM    Chloride 107 09/14/2021 02:10 PM    Chloride 104 06/17/2021 11:00 PM    Chloride 102 12/17/2020 01:39 PM    CO2 29 03/15/2022 01:43 PM    CO2 25 12/15/2021 01:45 PM    CO2 24 09/14/2021 02:10 PM    CO2 27 06/17/2021 11:00 PM    CO2 24 12/17/2020 01:39 PM    Anion gap 2 (L) 03/15/2022 01:43 PM    Anion gap 6 12/15/2021 01:45 PM    Anion gap 8 09/14/2021 02:10 PM    Anion gap 5 06/17/2021 11:00 PM    Anion gap 5 06/25/2019 07:02 AM    Glucose 94 03/15/2022 01:43 PM    Glucose 66 12/15/2021 01:45 PM    Glucose 233 (H) 09/14/2021 02:10 PM    Glucose 101 (H) 06/17/2021 11:00 PM    Glucose 200 (H) 12/17/2020 01:39 PM    BUN 38 (H) 03/15/2022 01:43 PM    BUN 25 (H) 12/15/2021 01:45 PM    BUN 25 (H) 09/14/2021 02:10 PM    BUN 32 (H) 06/17/2021 11:00 PM    BUN 39 (H) 12/17/2020 01:39 PM    Creatinine 1.76 (H) 03/15/2022 01:43 PM    Creatinine 1.42 (H) 12/15/2021 01:45 PM    Creatinine 1.56 (H) 09/14/2021 02:10 PM    Creatinine 1.45 (H) 06/17/2021 11:00 PM    Creatinine 1.57 (H) 12/17/2020 01:39 PM    BUN/Creatinine ratio 22 (H) 03/15/2022 01:43 PM    BUN/Creatinine ratio 18 12/15/2021 01:45 PM    BUN/Creatinine ratio 16 09/14/2021 02:10 PM    BUN/Creatinine ratio 22 (H) 06/17/2021 11:00 PM    BUN/Creatinine ratio 25 12/17/2020 01:39 PM    GFR est AA 34 (L) 03/15/2022 01:43 PM    GFR est AA 44 (L) 12/15/2021 01:45 PM    GFR est AA 40 (L) 09/14/2021 02:10 PM    GFR est AA 43 (L) 06/17/2021 11:00 PM    GFR est AA 38 (L) 12/17/2020 01:39 PM    GFR est non-AA 28 (L) 03/15/2022 01:43 PM    GFR est non-AA 36 (L) 12/15/2021 01:45 PM    GFR est non-AA 33 (L) 09/14/2021 02:10 PM GFR est non-AA 35 (L) 06/17/2021 11:00 PM    GFR est non-AA 33 (L) 12/17/2020 01:39 PM    Calcium 9.3 03/15/2022 01:43 PM    Calcium 9.4 12/15/2021 01:45 PM    Calcium 8.9 09/14/2021 02:10 PM    Calcium 10.1 06/17/2021 11:00 PM    Calcium 10.0 12/17/2020 01:39 PM    Bilirubin, total 0.2 09/14/2021 02:10 PM    Bilirubin, total 0.3 06/17/2021 11:00 PM    Bilirubin, total 0.3 09/15/2020 12:22 PM    Bilirubin, total 0.3 06/15/2020 12:02 PM    Bilirubin, total 0.3 03/04/2020 08:23 AM    Alk. phosphatase 108 09/14/2021 02:10 PM    Alk. phosphatase 113 06/17/2021 11:00 PM    Alk. phosphatase 97 09/15/2020 12:22 PM    Alk. phosphatase 96 06/15/2020 12:02 PM    Alk.  phosphatase 103 03/04/2020 08:23 AM    Protein, total 7.2 09/14/2021 02:10 PM    Protein, total 7.6 06/17/2021 11:00 PM    Protein, total 6.7 09/15/2020 12:22 PM    Protein, total 7.2 06/15/2020 12:02 PM    Protein, total 6.8 03/04/2020 08:23 AM    Albumin 2.8 (L) 09/14/2021 02:10 PM    Albumin 3.1 (L) 06/17/2021 11:00 PM    Albumin 3.6 (L) 09/15/2020 12:22 PM    Albumin 3.6 (L) 06/15/2020 12:02 PM    Albumin 3.1 (L) 03/04/2020 08:23 AM    Globulin 4.4 (H) 09/14/2021 02:10 PM    Globulin 4.5 (H) 06/17/2021 11:00 PM    Globulin 4.0 06/15/2019 05:08 AM    Globulin 4.6 (H) 06/10/2019 05:02 AM    Globulin 4.5 (H) 06/09/2019 02:46 PM    A-G Ratio 0.6 (L) 09/14/2021 02:10 PM    A-G Ratio 0.7 (L) 06/17/2021 11:00 PM    A-G Ratio 1.2 09/15/2020 12:22 PM    A-G Ratio 1.0 (L) 06/15/2020 12:02 PM    A-G Ratio 0.8 (L) 03/04/2020 08:23 AM    ALT (SGPT) 20 09/14/2021 02:10 PM    ALT (SGPT) 21 06/17/2021 11:00 PM    ALT (SGPT) 21 09/15/2020 12:22 PM    ALT (SGPT) 20 06/15/2020 12:02 PM    ALT (SGPT) 22 03/04/2020 08:23 AM     Lab Results   Component Value Date/Time    CK 51 01/17/2019 10:25 PM     Lab Results   Component Value Date/Time    Cholesterol, total 110 09/14/2021 02:10 PM    Cholesterol, total 130 06/15/2020 12:02 PM    Cholesterol, total 131 01/13/2020 11:07 AM Cholesterol, total 141 10/14/2019 09:38 AM    Cholesterol, total 136 06/10/2019 05:02 AM    HDL Cholesterol 53 09/14/2021 02:10 PM    HDL Cholesterol 58 06/15/2020 12:02 PM    HDL Cholesterol 59 01/13/2020 11:07 AM    HDL Cholesterol 52 10/14/2019 09:38 AM    HDL Cholesterol 59 06/10/2019 05:02 AM    LDL, calculated 29 09/14/2021 02:10 PM    LDL, calculated 53 06/15/2020 12:02 PM    LDL, calculated 53 01/13/2020 11:07 AM    LDL, calculated 59 10/14/2019 09:38 AM    LDL, calculated 54.8 06/10/2019 05:02 AM    Triglyceride 140 09/14/2021 02:10 PM    Triglyceride 93 06/15/2020 12:02 PM    Triglyceride 94 01/13/2020 11:07 AM    Triglyceride 149 10/14/2019 09:38 AM    Triglyceride 111 06/10/2019 05:02 AM    CHOL/HDL Ratio 2.1 09/14/2021 02:10 PM    CHOL/HDL Ratio 2.3 06/10/2019 05:02 AM    CHOL/HDL Ratio 3.6 01/18/2019 06:00 AM     No results found for this or any previous visit.     Assessment:         Patient Active Problem List    Diagnosis Date Noted    Low TSH level 09/17/2020    CKD (chronic kidney disease) stage 3, GFR 30-59 ml/min ([address] 01/13/2020    Diabetic peripheral neuropathy associated with type 2 diabetes mellitus ([address] 06/10/2019    Bilateral carotid artery stenosis 05/76/1796    Embolic stroke involving left middle cerebral artery ([address] 06/10/2019    CVA (cerebral vascular accident) ([address] 06/09/2019    Paroxysmal atrial fibrillation ([address] 04/16/2019    Dyslipidemia 04/16/2019    Cerebrovascular accident (CVA) due to thrombosis of right middle cerebral artery ([address] 01/18/2019    Acute ischemic cerebrovascular accident (CVA) involving right middle cerebral artery territory ([address] 01/17/2019    Obesity, morbid ([address] 12/26/2017    Type 2 diabetes mellitus with chronic kidney disease ([address] 12/26/2017    Type 2 diabetes mellitus with hyperglycemia, with long-term current use of insulin ([facility] 04/03/2017    Psoriasis 08/11/2014    Essential hypertension, benign 08/11/2014    Rosacea 08/11/2014    Heart murmur 08/11/2014      Hx DM II, diabetic nephropathy, hypertension, dyslipidemia, CKD 3, obesity, with acute R MCA CVA 1/19.  W/u with Echo/doppler 1/1/8/19 with mild to mod LVH, LVEF 65-70, normal atrial sizes, mild MR. Head CT/MRI/MRA with multiple sites of infarct R temporo-occipital, R parietal, R cerebellar, coronoradiatal  C/w embolic events.  Subsequent Loop/Event monitor x 2 weeks showed NSR with episodes of paroxysmal afib/RVR.  Seen in f/u by PCP and started on Eliquis 5mg bid  (CHADs2-VASC = 6--DM, hypertension, female, >65, prior CVA (CVA 5.9%/QX, embolic/CVA/TIA 05.1%/BX).   Hospitalized in June 2019 with acute CVA. Unfortunately, the confusion with the patients medications by pt/ which stopped Eliquis vs stopping ASA likely culprit of current CVA.  This is not considered a failure of treatment as patient was not actually on Eliquis since 4/16/19.  On BB and amlodipine.  Continues to see PCP with recent OV/labs. Plan:     Paroxysmal atrial fibrillation ([address]  Echo in 1/1/8/19 with mild to mod LVH, LVEF 65-70, normal atrial sizes, mild MR. Subsequent Loop/Event monitor in 2/19:   x 2 weeks showed NSR with episodes of paroxysmal afib/RVR. NST in 4/2019: Fixed defect inferolateral wall at the base is compatible with infarct. No evidence of christy-infarct ischemia. Left ventricular ejection fraction is 70 %. Continue  BB  [id number] score:  7. Continue Eliquis 5 mg BID  Atrial Fibrillation CHADSVASC2 Score Stroke Risk:   66 y.o. 72 to 76  +1   female Female +1   CHF HX: No    + 0   HTN HX: Yes    +1   Stroke/TIA/Thromboembolism Yes   +2   Vascular Disease HX: Yes    +1   Diabetes Mellitus Yes    +1   CHADSVASC 2 Score 7      Annual Stroke Risk 11.2% - moderate- high       Essential hypertension, benign  Controlled with current therapy     Dyslipidemia  9/2021 LDL 29 On atorva Labs and lipids per PCP    Embolic stroke involving left middle cerebral artery (HCC)  Carotid duplex 6/19:   There is mild stenosis in the right and left ICA (<50%). Recall:   Hospitalized in June 2019 with acute CVA. Unfortunately, the confusion with the patients medications by pt/ which stopped Eliquis vs stopping ASA likely culprit of current CVA.  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     Ericka Bloom NP

## 2022-03-30 ENCOUNTER — OFFICE VISIT (OUTPATIENT)
Dept: CARDIOLOGY CLINIC | Age: 72
End: 2022-03-30
Payer: MEDICARE

## 2022-03-30 VITALS
BODY MASS INDEX: 33.82 KG/M2 | RESPIRATION RATE: 18 BRPM | HEART RATE: 64 BPM | WEIGHT: 203 LBS | HEIGHT: 65 IN | SYSTOLIC BLOOD PRESSURE: 122 MMHG | OXYGEN SATURATION: 98 % | TEMPERATURE: 97.5 F | DIASTOLIC BLOOD PRESSURE: 72 MMHG

## 2022-03-30 DIAGNOSIS — I34.0 MITRAL VALVE INSUFFICIENCY, UNSPECIFIED ETIOLOGY: ICD-10-CM

## 2022-03-30 DIAGNOSIS — E66.01 SEVERE OBESITY (BMI 35.0-39.9) WITH COMORBIDITY (HCC): ICD-10-CM

## 2022-03-30 DIAGNOSIS — I48.0 PAROXYSMAL ATRIAL FIBRILLATION (HCC): Primary | ICD-10-CM

## 2022-03-30 DIAGNOSIS — E05.10 TOXIC THYROID NODULE: ICD-10-CM

## 2022-03-30 DIAGNOSIS — N18.30 STAGE 3 CHRONIC KIDNEY DISEASE, UNSPECIFIED WHETHER STAGE 3A OR 3B CKD (HCC): ICD-10-CM

## 2022-03-30 DIAGNOSIS — E78.5 DYSLIPIDEMIA: ICD-10-CM

## 2022-03-30 DIAGNOSIS — I10 ESSENTIAL HYPERTENSION, BENIGN: ICD-10-CM

## 2022-03-30 DIAGNOSIS — I63.412 EMBOLIC STROKE INVOLVING LEFT MIDDLE CEREBRAL ARTERY (HCC): ICD-10-CM

## 2022-03-30 LAB
T3 SERPL-MCNC: 123 NG/DL (ref 71–180)
T4 FREE SERPL-MCNC: 1.13 NG/DL (ref 0.82–1.77)
TSH SERPL DL<=0.005 MIU/L-ACNC: 0.71 UIU/ML (ref 0.45–4.5)

## 2022-03-30 PROCEDURE — 1101F PT FALLS ASSESS-DOCD LE1/YR: CPT | Performed by: NURSE PRACTITIONER

## 2022-03-30 PROCEDURE — 3017F COLORECTAL CA SCREEN DOC REV: CPT | Performed by: NURSE PRACTITIONER

## 2022-03-30 PROCEDURE — 99214 OFFICE O/P EST MOD 30 MIN: CPT | Performed by: NURSE PRACTITIONER

## 2022-03-30 PROCEDURE — G8754 DIAS BP LESS 90: HCPCS | Performed by: NURSE PRACTITIONER

## 2022-03-30 PROCEDURE — G8752 SYS BP LESS 140: HCPCS | Performed by: NURSE PRACTITIONER

## 2022-03-30 PROCEDURE — G8427 DOCREV CUR MEDS BY ELIG CLIN: HCPCS | Performed by: NURSE PRACTITIONER

## 2022-03-30 PROCEDURE — G8432 DEP SCR NOT DOC, RNG: HCPCS | Performed by: NURSE PRACTITIONER

## 2022-03-30 PROCEDURE — 93000 ELECTROCARDIOGRAM COMPLETE: CPT | Performed by: NURSE PRACTITIONER

## 2022-03-30 PROCEDURE — G8417 CALC BMI ABV UP PARAM F/U: HCPCS | Performed by: NURSE PRACTITIONER

## 2022-03-30 PROCEDURE — 1090F PRES/ABSN URINE INCON ASSESS: CPT | Performed by: NURSE PRACTITIONER

## 2022-03-30 PROCEDURE — G8536 NO DOC ELDER MAL SCRN: HCPCS | Performed by: NURSE PRACTITIONER

## 2022-03-30 PROCEDURE — G9899 SCRN MAM PERF RSLTS DOC: HCPCS | Performed by: NURSE PRACTITIONER

## 2022-03-30 PROCEDURE — G8399 PT W/DXA RESULTS DOCUMENT: HCPCS | Performed by: NURSE PRACTITIONER

## 2022-03-30 NOTE — PROGRESS NOTES
Identified pt with two pt identifiers(name and ). Reviewed record in preparation for visit and have obtained necessary documentation. Chief Complaint   Patient presents with    Irregular Heart Beat     6 month follow up    Stroke    Carotid Artery Stenosis    Hypertension    Cholesterol Problem    Heart Murmur      Vitals:    22 1116   BP: 122/72   Pulse: 64   Resp: 18   Temp: 97.5 °F (36.4 °C)   TempSrc: Temporal   SpO2: 98%   Weight: 203 lb (92.1 kg)   Height: 5' 5\" (1.651 m)   PainSc:   0 - No pain       Medications reviewed/approved by provider. Health Maintenance Review: Patient reminded of \"due or due soon\" health maintenance. I have asked the patient to contact his/her primary care provider (PCP) for follow-up on his/her health maintenance. Coordination of Care Questionnaire:  :   1) Have you been to an emergency room, urgent care, or hospitalized since your last visit? If yes, where when, and reason for visit? no       2. Have seen or consulted any other health care provider since your last visit? If yes, where when, and reason for visit? NO      Patient is accompanied by self I have received verbal consent from Neri Rounds to discuss any/all medical information while they are present in the room.

## 2022-03-31 ENCOUNTER — CLINICAL SUPPORT (OUTPATIENT)
Dept: FAMILY MEDICINE CLINIC | Age: 72
End: 2022-03-31

## 2022-03-31 VITALS — HEART RATE: 65 BPM | RESPIRATION RATE: 20 BRPM | SYSTOLIC BLOOD PRESSURE: 161 MMHG | DIASTOLIC BLOOD PRESSURE: 60 MMHG

## 2022-03-31 DIAGNOSIS — R79.89 ELEVATED SERUM CREATININE: Primary | ICD-10-CM

## 2022-03-31 PROCEDURE — 36415 COLL VENOUS BLD VENIPUNCTURE: CPT | Performed by: NURSE PRACTITIONER

## 2022-03-31 NOTE — PROGRESS NOTES
BP check, patient 161/60. Patient asymptomatic. Labs drawn in left arm per Aaron Murphy NP's orders. Patient tolerated well.

## 2022-04-01 ENCOUNTER — TELEPHONE (OUTPATIENT)
Dept: FAMILY MEDICINE CLINIC | Age: 72
End: 2022-04-01

## 2022-04-01 LAB
ALBUMIN SERPL-MCNC: 2.7 G/DL (ref 3.5–5)
ALBUMIN/GLOB SERPL: 0.7 {RATIO} (ref 1.1–2.2)
ALP SERPL-CCNC: 104 U/L (ref 45–117)
ALT SERPL-CCNC: 18 U/L (ref 12–78)
ANION GAP SERPL CALC-SCNC: 4 MMOL/L (ref 5–15)
AST SERPL-CCNC: 16 U/L (ref 15–37)
BILIRUB SERPL-MCNC: 0.3 MG/DL (ref 0.2–1)
BUN SERPL-MCNC: 26 MG/DL (ref 6–20)
BUN/CREAT SERPL: 16 (ref 12–20)
CALCIUM SERPL-MCNC: 8.8 MG/DL (ref 8.5–10.1)
CHLORIDE SERPL-SCNC: 107 MMOL/L (ref 97–108)
CO2 SERPL-SCNC: 27 MMOL/L (ref 21–32)
CREAT SERPL-MCNC: 1.67 MG/DL (ref 0.55–1.02)
GLOBULIN SER CALC-MCNC: 4 G/DL (ref 2–4)
GLUCOSE SERPL-MCNC: 181 MG/DL (ref 65–100)
POTASSIUM SERPL-SCNC: 4.8 MMOL/L (ref 3.5–5.1)
PROT SERPL-MCNC: 6.7 G/DL (ref 6.4–8.2)
SODIUM SERPL-SCNC: 138 MMOL/L (ref 136–145)

## 2022-04-01 RX ORDER — PRAZOSIN HYDROCHLORIDE 1 MG/1
1 CAPSULE ORAL 2 TIMES DAILY
Qty: 28 CAPSULE | Refills: 0 | Status: SHIPPED | OUTPATIENT
Start: 2022-04-01 | End: 2022-04-11

## 2022-04-01 RX ORDER — PRAZOSIN HYDROCHLORIDE 1 MG/1
1 CAPSULE ORAL 2 TIMES DAILY
Qty: 180 CAPSULE | Refills: 3 | Status: SHIPPED | OUTPATIENT
Start: 2022-04-01 | End: 2022-04-21 | Stop reason: ALTCHOICE

## 2022-04-01 NOTE — TELEPHONE ENCOUNTER
Please let patient's  know that I would like Mrs. Joyce to start a new BP pill called Prazosin since her BP was elevated yesterday. It is to be taken BID. I sent a 2 week supply over to [facility] and a 90 day supply with 1 refill to mail order pharmacy. Please have her come back in 1 month to recheck BP.

## 2022-04-11 RX ORDER — PRAZOSIN HYDROCHLORIDE 1 MG/1
CAPSULE ORAL
Qty: 28 CAPSULE | Refills: 0 | Status: SHIPPED | OUTPATIENT
Start: 2022-04-11 | End: 2022-04-21 | Stop reason: ALTCHOICE

## 2022-04-12 RX ORDER — LISINOPRIL 40 MG/1
40 TABLET ORAL DAILY
Qty: 90 TABLET | Refills: 3 | Status: SHIPPED | OUTPATIENT
Start: 2022-04-12

## 2022-04-15 ENCOUNTER — TELEPHONE (OUTPATIENT)
Dept: FAMILY MEDICINE CLINIC | Age: 72
End: 2022-04-15

## 2022-04-15 NOTE — TELEPHONE ENCOUNTER
Patients  called and patient received a script in the mail for lisinopril and he is wanting to know if she should still be taking this?

## 2022-05-03 ENCOUNTER — CLINICAL SUPPORT (OUTPATIENT)
Dept: FAMILY MEDICINE CLINIC | Age: 72
End: 2022-05-03

## 2022-05-03 VITALS
HEART RATE: 62 BPM | RESPIRATION RATE: 16 BRPM | DIASTOLIC BLOOD PRESSURE: 77 MMHG | SYSTOLIC BLOOD PRESSURE: 137 MMHG | OXYGEN SATURATION: 98 %

## 2022-05-03 DIAGNOSIS — Z01.30 BLOOD PRESSURE CHECK: Primary | ICD-10-CM

## 2022-05-03 RX ORDER — METHIMAZOLE 5 MG/1
TABLET ORAL
Qty: 20 TABLET | Refills: 0 | Status: SHIPPED | OUTPATIENT
Start: 2022-05-03 | End: 2022-07-19

## 2022-05-03 NOTE — TELEPHONE ENCOUNTER
Pended Rx. Notified [facility] that the prescription will be sent e-scribed. Kirsten HENDERSONexpressed understanding.

## 2022-05-03 NOTE — TELEPHONE ENCOUNTER
5/3/2022  3:21 PM        Gisela from [employer] called on behalf of pt. Ana Maria Fisher stated the pt needs a refill on Tapazole 5 mg 1 tablet daily. [facility]#[id number]  Ana Maria Fisher from [facility] [address]      Thanks,  Tiffany Cárdenas

## 2022-05-17 RX ORDER — AMLODIPINE BESYLATE 10 MG/1
TABLET ORAL
Qty: 90 TABLET | Refills: 1 | OUTPATIENT
Start: 2022-05-17

## 2022-05-18 RX ORDER — AMLODIPINE BESYLATE 10 MG/1
10 TABLET ORAL DAILY
Qty: 90 TABLET | Refills: 3 | Status: SHIPPED | OUTPATIENT
Start: 2022-05-18

## 2022-06-08 ENCOUNTER — TELEPHONE (OUTPATIENT)
Dept: FAMILY MEDICINE CLINIC | Age: 72
End: 2022-06-08

## 2022-06-20 ENCOUNTER — OFFICE VISIT (OUTPATIENT)
Dept: FAMILY MEDICINE CLINIC | Age: 72
End: 2022-06-20
Payer: MEDICARE

## 2022-06-20 VITALS
HEART RATE: 68 BPM | OXYGEN SATURATION: 98 % | SYSTOLIC BLOOD PRESSURE: 125 MMHG | RESPIRATION RATE: 18 BRPM | BODY MASS INDEX: 34.32 KG/M2 | HEIGHT: 65 IN | WEIGHT: 206 LBS | DIASTOLIC BLOOD PRESSURE: 62 MMHG | TEMPERATURE: 97.8 F

## 2022-06-20 DIAGNOSIS — E55.9 VITAMIN D DEFICIENCY: ICD-10-CM

## 2022-06-20 DIAGNOSIS — Z79.4 TYPE 2 DIABETES MELLITUS WITH STAGE 3 CHRONIC KIDNEY DISEASE, WITH LONG-TERM CURRENT USE OF INSULIN, UNSPECIFIED WHETHER STAGE 3A OR 3B CKD (HCC): Primary | ICD-10-CM

## 2022-06-20 DIAGNOSIS — E05.90 HYPERTHYROIDISM: ICD-10-CM

## 2022-06-20 DIAGNOSIS — I10 ESSENTIAL HYPERTENSION, BENIGN: ICD-10-CM

## 2022-06-20 DIAGNOSIS — I48.0 PAROXYSMAL ATRIAL FIBRILLATION (HCC): ICD-10-CM

## 2022-06-20 DIAGNOSIS — E78.5 DYSLIPIDEMIA: ICD-10-CM

## 2022-06-20 DIAGNOSIS — E11.22 TYPE 2 DIABETES MELLITUS WITH STAGE 3 CHRONIC KIDNEY DISEASE, WITH LONG-TERM CURRENT USE OF INSULIN, UNSPECIFIED WHETHER STAGE 3A OR 3B CKD (HCC): Primary | ICD-10-CM

## 2022-06-20 DIAGNOSIS — N18.30 STAGE 3 CHRONIC KIDNEY DISEASE, UNSPECIFIED WHETHER STAGE 3A OR 3B CKD (HCC): ICD-10-CM

## 2022-06-20 DIAGNOSIS — N18.30 TYPE 2 DIABETES MELLITUS WITH STAGE 3 CHRONIC KIDNEY DISEASE, WITH LONG-TERM CURRENT USE OF INSULIN, UNSPECIFIED WHETHER STAGE 3A OR 3B CKD (HCC): Primary | ICD-10-CM

## 2022-06-20 PROCEDURE — 99214 OFFICE O/P EST MOD 30 MIN: CPT | Performed by: NURSE PRACTITIONER

## 2022-06-20 PROCEDURE — 36415 COLL VENOUS BLD VENIPUNCTURE: CPT | Performed by: NURSE PRACTITIONER

## 2022-06-20 NOTE — PROGRESS NOTES
1. \"Have you been to the ER, urgent care clinic since your last visit? Hospitalized since your last visit? \" No    2. \"Have you seen or consulted any other health care providers outside of the [address] [place] [address] since your last visit? \" No     3. For patients aged 39-70: Has the patient had a colonoscopy / FIT/ Cologuard? Yes - no Care Gap present      If the patient is female:    4. For patients aged 41-77: Has the patient had a mammogram within the past 2 years? Needs mammo    5. For patients aged 21-65: Has the patient had a pap smear?  NA    Chief Complaint   Patient presents with    Hypertension    Diabetes    Thyroid Problem     Visit Vitals  BP (!) 144/78 (BP 1 Location: Left arm, BP Patient Position: Sitting)   Pulse 68   Temp 97.8 °F (36.6 °C) (Temporal)   Resp 18   Ht 5' 5\" (1.651 m)   Wt 206 lb (93.4 kg)   SpO2 98%   BMI 34.28 kg/m²

## 2022-06-20 NOTE — PROGRESS NOTES
Subjective:     CC: diabetes     Renny Simpson is a 66 y.o. female, accompanied by her , who presents today for a 3 month follow up for type 2 diabetes and other chronic medical problems. Type 2 diabetes  Lab Results   Component Value Date/Time    Hemoglobin A1c 6.9 (H) 03/15/2022 01:43 PM     She is on Metformin 924SW BID, Trulicity 9.9JB q 7 days, and Levemir 40 units daily.  states home BS are at goal. Denies any hypoglycemic episodes. She does have some peripheral neuropathy in the feet. Last eye exam 7-8-21. HTN with stage 3 CKD  BP at goal 125/62. After the last appt her [address] was reduced to 12.5mg daily due to increased creatinine. She continues to take Amlodipine 10mg daily, Lisinopril 40mg daily, and Metoprolol XR 100mg daily. Denies CP, SOB, or dizziness, She does have some chronic swelling in the ankles. She does not take NSAIDS. Hyperthyroidism  She was diagnosed in 1/2021 and referred to Endo Dr Sebastian Wang. She had no thyroid antibodies. Her thyroid nuclear uptake scan showed a dominant nodule in the right lobe with normal uptake of tracer. She was prescribed Methimazole. She followed up with Dr Daniel Alvarez in Feb 2022 and he reduced her Methimazole to 2.5mg TIW. TSH was at goal 3 months ago. Lab Results   Component Value Date/Time    TSH 0.709 03/29/2022 10:27 AM         H/O CVA with hemiparesis   She was admitted to [facility] in June of 2019 for CVA and then spent 2 weeks in [facility]. This was her second CVA. The first caused weakness and the last one affected her speech and mental status.  helps with ADLs, meals, and medication management. She is on a statin daily and takes Eliquis for A-fib.     Lab Results   Component Value Date/Time    Cholesterol, total 110 09/14/2021 02:10 PM    HDL Cholesterol 53 09/14/2021 02:10 PM    LDL, calculated 29 09/14/2021 02:10 PM    VLDL, calculated 28 09/14/2021 02:10 PM    Triglyceride 140 09/14/2021 02:10 PM    CHOL/HDL Ratio 2.1 09/14/2021 02:10 PM       A-fib  On Eliquis for stroke prophylaxis. Denies black or bloody stools. Followed by cardiologist Dr Lay Dickson who has retired. Saw NP Fern Dolan on 3-20-22. No changes were made. Lab Results   Component Value Date/Time    HGB (POC) 13.8 11/20/2015 01:36 PM    HGB 12.5 12/15/2021 01:45 PM       Vitamin D deficiency  She used to take Vit D 50,000 BIW but was told to stop in June when levels reached > 150. Levels have since normalized and she is is now on the lower dose of 1000 units daily. Lab Results   Component Value Date/Time    Vitamin D 25-Hydroxy 61.6 03/15/2022 01:43 PM       Health maintenance  Eye exam: done 7/2021  She is due for a mammogram- ordered back in June but never completed.  was given scheduling dept phone number to call and schedule at the last visit but still not done. They were reminded today.    PNA vaccines up to date  Flu shot up to date  Shingrix- she is not a candidate, never had the chicken pox   Covid 19 vaccine- she had both Moderna vaccines, boosted in       Patient Active Problem List   Diagnosis Code    Psoriasis L40.9    Essential hypertension, benign I10    Rosacea L71.9    Heart murmur R01.1    Type 2 diabetes mellitus with hyperglycemia, with long-term current use of insulin ([facility]) E11.65, Z79.4    Obesity, morbid ([facility]) E66.01    Type 2 diabetes mellitus with chronic kidney disease ([facility] E11.22    Acute ischemic cerebrovascular accident (CVA) involving right middle cerebral artery [facility]) I63.511    Cerebrovascular accident (CVA) due to thrombosis of right middle cerebral artery ([facility]) I63.311    Paroxysmal atrial fibrillation ([facility]) I48.0    Dyslipidemia E78.5    CVA (cerebral vascular accident) ([facility] I63.9    Diabetic peripheral neuropathy associated with type 2 diabetes mellitus ([facility] E11.42    Bilateral carotid artery stenosis N44.59    Embolic stroke involving left middle cerebral artery ([facility]) T87.522    CKD (chronic kidney disease) stage 3, GFR 30-59 ml/min ([facility]) N18.30    Low TSH level R79.89       Past Medical History:   Diagnosis Date    Diabetes ([facility]     Hypertension     Murmur     Osteopenia     Psoriasis     Rosacea     [facility]roke [facility]     Vitamin D deficiency          Current Outpatient Medications:     methIMAzole (TAPAZOLE) 5 mg tablet, Take 1/2 tablet on Monday, Wednesday, Friday, Disp: 20 Tablet, Rfl: 0    amLODIPine (NORVASC) 10 mg tablet, Take 1 Tablet by mouth daily. , Disp: 90 Tablet, Rfl: 3    apixaban (Eliquis) 5 mg tablet, TAKE 1 TABLET TWICE A DAY, Disp: 180 Tablet, Rfl: 1    lisinopriL (PRINIVIL, ZESTRIL) 40 mg tablet, Take 1 Tablet by mouth daily. , Disp: 90 Tablet, Rfl: 3    hydroCHLOROthiazide (HYDRODIURIL) 12.5 mg tablet, Take 1 Tablet by mouth daily. , Disp: 90 Tablet, Rfl: 1    cholecalciferol (VITAMIN D3) (1000 Units /25 mcg) tablet, Take 1 Tablet by mouth daily. , Disp: 90 Tablet, Rfl: 3    Basaglar KwikPen U-100 Insulin 100 unit/mL (3 mL) inpn, 40 Units by SubCUTAneous route every morning., Disp: 1 Pen, Rfl: 5    atorvastatin (LIPITOR) 80 mg tablet, TAKE 1 TABLET NIGHTLY, Disp: 90 Tablet, Rfl: 2    glucose blood VI test strips (ASCENSIA AUTODISC VI, ONE TOUCH ULTRA TEST VI) strip, Check glucose twice daily. , Disp: 180 Strip, Rfl: 3    metoprolol succinate (TOPROL-XL) 100 mg tablet, Take 1 Tablet by mouth daily. , Disp: 90 Tablet, Rfl: 3    metFORMIN (GLUCOPHAGE) 500 mg tablet, Take 1 Tablet by mouth two (2) times daily (with meals). (Patient taking differently: Take 500 mg by mouth two (2) times daily (with meals). 1 in AM and 1 with lunch), Disp: 180 Tablet, Rfl: 5    dulaglutide (Trulicity) 1.5 OQ/1.4 mL sub-q pen, Inject 1.5mg SQ every 7 days, Disp: 6 mL, Rfl: 5    Insulin Needles, Disposable, 31 gauge x 5/16\" ndle, Use as directed, Disp: 100 Pen Needle, Rfl: 5    multivitamin (ONE A DAY) tablet, Take 1 Tab by mouth daily. , Disp: , Rfl:     aspirin 81 mg chewable tablet, Take 81 mg by mouth daily. , Disp: , Rfl:     Blood-Glucose Meter monitoring kit, Check blood sugar twice daily. , Disp: 1 Kit, Rfl: 0    Allergies   Allergen Reactions    Pcn [Penicillins] Rash    Sulfa (Sulfonamide Antibiotics) Unknown (comments)       Past Surgical History:   Procedure Laterality Date    COLONOSCOPY N/A 4/4/2018    COLONOSCOPY   DIABETIC performed by Jaelyn Sumner MD at [facility] [address]      HX COLONOSCOPY  2018    3 polyps    HX TONSILLECTOMY         Social History     Tobacco Use   Smoking Status Never Smoker   Smokeless Tobacco Never Used       Social History     Socioeconomic History    Marital status:    Tobacco Use    Smoking status: Never Smoker    Smokeless tobacco: Never Used   Substance and Sexual Activity    Alcohol use: Yes     Alcohol/week: 0.0 standard drinks     Comment: 1/couple months       Family History   Problem Relation Age of Onset    Diabetes Mother     Heart Disease Father     No Known Problems Child      ROS:  Gen: denies fever, chills, or fatigue  HEENT:denies H/A, nosebleeds, or bleeding gums  Resp: denies dyspnea, cough, or wheezing  CV: denies chest pain, pressure, or palpitations  GI: denies abd pain, N/V, melena or hematochezia  : Denies dysuria, hematuria, or urinary frequency/urgency  Extremeties: +moderate edema in ankles  Musculoskeletal: Denies joint pain, stiffness, or muscle cramps  Neuro: denies numbness/tingling or dizziness  Endo: denies polyuria or polydipsia  Skin: denies rashes or new lesions  Psych: denies anxiety or depression    Objective:     Visit Vitals  /62   Pulse 68   Temp 97.8 °F (36.6 °C) (Temporal)   Resp 18   Ht 5' 5\" (1.651 m)   Wt 206 lb (93.4 kg)   SpO2 98%   BMI 34.28 kg/m²       General: Alert and oriented. No acute distress. +Obese. Eyes: Sclera white, conjunctiva clear. PERRLA. Neck: Supple with FROM. No carotid bruits  Lungs: Breathing even and unlabored.  All lobes clear to auscultation bilaterally   Heart :RRR, S1 and S2 normal intensity, no extra heart sounds  Extremities: +moderate edema to both ankles, pedal pulses 2+  Neuro: Cranial nerves grossly normal.   Musculoskeletal: Joints are without swelling, erythema, or heat. Psych: Mood and thought content appropriate for situation. Dressed appropriately and with good hygiene. Skin: Warm and dry and intact      Assessment/ Plan:     HTN with CKD stage 3  BP at goal  Check CMP  Cont current meds  Avoid NSAIDS  Low-sodium diet  RTO or go to ER for any CP, SOB, dizziness, or swelling. F/U: 4 months     DM  Check A1C   Cont Metformin 380GX BID  Cont Trulicity  Cont Basaglar insulin 40 units every morning  Cont to check glucose BID  Call for any problems  F/U 4 months    A-fib  Stable  Cont Eliquis for stroke prophylaxis  Report any recurrent nosebleeds or other s/s of abnormal bleeding  Check CBC  F/U with cardiologist as scheduled  F/U 4 monhs. Hyperthyroidism  Cont methimazole 5mg daily  F/U with Dr Raheel Matthews as scheduled    Hx of CVA with hemiparesis  Cont statin and asa  Keep BP and BS under good control  Go to ER for sudden unilateral numbness/tingling, weakness, facial drooping, slurred speech, confusion, AMS, or dizziness    Elevated serum Vit D   Cont Vit D 1000 units daily     Health maintenance  Eye exam: done 7/2021  She is due for a mammogram- ordered back in June but never completed.  was given scheduling dept phone number to call and schedule at the last visit but still not done. They were reminded today. PNA vaccines up to date  Flu shot up to date  Shingrix- she is not a candidate, never had the chicken pox   Covid 19 vaccine- she had both Moderna vaccines, boosted in               Verbal and written instructions (see AVS) provided.  Patient expresses understanding of diagnosis and treatment plan.     Health Maintenance Due   Topic Date Due    Breast Cancer Screen Mammogram  11/03/2018 Eusebio James.  Cleotis Opitz, NP

## 2022-06-21 LAB
ANION GAP SERPL CALC-SCNC: 6 MMOL/L (ref 5–15)
BUN SERPL-MCNC: 41 MG/DL (ref 6–20)
BUN/CREAT SERPL: 24 (ref 12–20)
CALCIUM SERPL-MCNC: 9.6 MG/DL (ref 8.5–10.1)
CHLORIDE SERPL-SCNC: 106 MMOL/L (ref 97–108)
CO2 SERPL-SCNC: 26 MMOL/L (ref 21–32)
CREAT SERPL-MCNC: 1.72 MG/DL (ref 0.55–1.02)
ERYTHROCYTE [DISTWIDTH] IN BLOOD BY AUTOMATED COUNT: 13.6 % (ref 11.5–14.5)
EST. AVERAGE GLUCOSE BLD GHB EST-MCNC: 134 MG/DL
GLUCOSE SERPL-MCNC: 85 MG/DL (ref 65–100)
HBA1C MFR BLD: 6.3 % (ref 4–5.6)
HCT VFR BLD AUTO: 40.2 % (ref 35–47)
HGB BLD-MCNC: 12.6 G/DL (ref 11.5–16)
MCH RBC QN AUTO: 30.1 PG (ref 26–34)
MCHC RBC AUTO-ENTMCNC: 31.3 G/DL (ref 30–36.5)
MCV RBC AUTO: 96.2 FL (ref 80–99)
NRBC # BLD: 0 K/UL (ref 0–0.01)
NRBC BLD-RTO: 0 PER 100 WBC
PLATELET # BLD AUTO: 232 K/UL (ref 150–400)
PMV BLD AUTO: 11.8 FL (ref 8.9–12.9)
POTASSIUM SERPL-SCNC: 4.9 MMOL/L (ref 3.5–5.1)
RBC # BLD AUTO: 4.18 M/UL (ref 3.8–5.2)
SODIUM SERPL-SCNC: 138 MMOL/L (ref 136–145)
WBC # BLD AUTO: 10.7 K/UL (ref 3.6–11)

## 2022-06-22 NOTE — PROGRESS NOTES
A1C looks great, diabetes is very well controlled. Kidney function is stable, I would recommend drinking more water.

## 2022-07-05 RX ORDER — DULAGLUTIDE 1.5 MG/.5ML
INJECTION, SOLUTION SUBCUTANEOUS
Qty: 6 ML | Refills: 4 | Status: SHIPPED | OUTPATIENT
Start: 2022-07-05

## 2022-07-05 RX ORDER — INSULIN GLARGINE 100 [IU]/ML
40 INJECTION, SOLUTION SUBCUTANEOUS
Qty: 10 PEN | Refills: 5 | Status: SHIPPED | OUTPATIENT
Start: 2022-07-05 | End: 2022-10-23

## 2022-07-05 RX ORDER — INSULIN GLARGINE 100 [IU]/ML
INJECTION, SOLUTION SUBCUTANEOUS
Qty: 15 ML | Refills: 5 | OUTPATIENT
Start: 2022-07-05

## 2022-07-11 ENCOUNTER — PATIENT MESSAGE (OUTPATIENT)
Dept: FAMILY MEDICINE CLINIC | Age: 72
End: 2022-07-11

## 2022-08-06 DIAGNOSIS — E11.65 TYPE 2 DIABETES MELLITUS WITH HYPERGLYCEMIA, WITH LONG-TERM CURRENT USE OF INSULIN (HCC): ICD-10-CM

## 2022-08-06 DIAGNOSIS — Z79.4 TYPE 2 DIABETES MELLITUS WITH HYPERGLYCEMIA, WITH LONG-TERM CURRENT USE OF INSULIN (HCC): ICD-10-CM

## 2022-08-08 RX ORDER — BLOOD SUGAR DIAGNOSTIC
STRIP MISCELLANEOUS
Qty: 200 STRIP | Refills: 3 | Status: SHIPPED | OUTPATIENT
Start: 2022-08-08

## 2022-08-12 RX ORDER — METFORMIN HYDROCHLORIDE 500 MG/1
TABLET ORAL
Qty: 180 TABLET | Refills: 3 | Status: SHIPPED | OUTPATIENT
Start: 2022-08-12 | End: 2022-10-22 | Stop reason: SINTOL

## 2022-08-17 ENCOUNTER — OFFICE VISIT (OUTPATIENT)
Dept: ENDOCRINOLOGY | Age: 72
End: 2022-08-17
Payer: MEDICARE

## 2022-08-17 VITALS
DIASTOLIC BLOOD PRESSURE: 72 MMHG | BODY MASS INDEX: 33.92 KG/M2 | HEIGHT: 65 IN | SYSTOLIC BLOOD PRESSURE: 148 MMHG | HEART RATE: 69 BPM | WEIGHT: 203.6 LBS

## 2022-08-17 DIAGNOSIS — E05.90 HYPERTHYROIDISM: Primary | ICD-10-CM

## 2022-08-17 PROCEDURE — G8754 DIAS BP LESS 90: HCPCS | Performed by: INTERNAL MEDICINE

## 2022-08-17 PROCEDURE — G8536 NO DOC ELDER MAL SCRN: HCPCS | Performed by: INTERNAL MEDICINE

## 2022-08-17 PROCEDURE — G8753 SYS BP > OR = 140: HCPCS | Performed by: INTERNAL MEDICINE

## 2022-08-17 PROCEDURE — G9899 SCRN MAM PERF RSLTS DOC: HCPCS | Performed by: INTERNAL MEDICINE

## 2022-08-17 PROCEDURE — G8427 DOCREV CUR MEDS BY ELIG CLIN: HCPCS | Performed by: INTERNAL MEDICINE

## 2022-08-17 PROCEDURE — G8399 PT W/DXA RESULTS DOCUMENT: HCPCS | Performed by: INTERNAL MEDICINE

## 2022-08-17 PROCEDURE — 1123F ACP DISCUSS/DSCN MKR DOCD: CPT | Performed by: INTERNAL MEDICINE

## 2022-08-17 PROCEDURE — 3017F COLORECTAL CA SCREEN DOC REV: CPT | Performed by: INTERNAL MEDICINE

## 2022-08-17 PROCEDURE — G8417 CALC BMI ABV UP PARAM F/U: HCPCS | Performed by: INTERNAL MEDICINE

## 2022-08-17 PROCEDURE — 1101F PT FALLS ASSESS-DOCD LE1/YR: CPT | Performed by: INTERNAL MEDICINE

## 2022-08-17 PROCEDURE — 1090F PRES/ABSN URINE INCON ASSESS: CPT | Performed by: INTERNAL MEDICINE

## 2022-08-17 PROCEDURE — G8432 DEP SCR NOT DOC, RNG: HCPCS | Performed by: INTERNAL MEDICINE

## 2022-08-17 PROCEDURE — 99214 OFFICE O/P EST MOD 30 MIN: CPT | Performed by: INTERNAL MEDICINE

## 2022-08-17 RX ORDER — METHIMAZOLE 5 MG/1
TABLET ORAL
Qty: 20 TABLET | Refills: 5 | Status: SHIPPED | OUTPATIENT
Start: 2022-08-17

## 2022-08-17 NOTE — PROGRESS NOTES
Chief Complaint   Patient presents with    Thyroid Problem     Pcp and pharmacy verified     History of Present Illness: Vin Renae is a 66 y.o. 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 She was seen for initial visit in January 2021 for low TSH. She had a thyroid nuclear scan which identified a toxic thyroid nodule in the right thyroid lobe. Ms Griselda Wells and Dr. Ирина Jauregui agreed to proceed with low dose MMI rather than surgery or GUNN. At their last visit in April 2021 Dr. Ирина Jauregui decreased her MMI to 2.5mg daily, because her TSH was 6.77. At our last visit in February 2022 her FT4 was 0.99 with TT3 of 89. I instructed her to decrease the MMI to 2.5mg every other day. After 6 weeks her TSH was 0.709 with FT4 of 1.13 and TT3 of 123. Pt was instructed to continue the MMI 2.5mg every other day. Pt denies any recent illnesses, injuries or hospitalizations. Pt has received both COVID vaccinations (Esequiel Carbo). Pt son notes that she is still taking the MMI 2.5mg every other day. She denies issues of dysphagia or dysphonia. She denies issues of CP, SOB, palpitations, tremors, heat or cold intolerance, diarrhea or constipation. Pt denies issues of Abdominal pain, N/V or Jaundice. Current Outpatient Medications   Medication Sig    methIMAzole (TAPAZOLE) 5 mg tablet TAKE 1/2 TABLET MONDAY,    WEDNESDAY, AND FRIDAY    metFORMIN (GLUCOPHAGE) 500 mg tablet TAKE 1 TABLET TWICE DAILY  WITH MEALS    Accu-Chek Guide test strips strip CHECK GLUCOSE TWO TIMES A  DAY    dulaglutide (Trulicity) 1.5 BF/9.2 mL sub-q pen INJECT 0.5ML (=1.5 MG)     SUBCUTANEOUSLY EVERY 7     DAYS    Basaglar KwikPen U-100 Insulin 100 unit/mL (3 mL) inpn 40 Units by SubCUTAneous route every morning. atorvastatin (LIPITOR) 80 mg tablet TAKE 1 TABLET NIGHTLY    amLODIPine (NORVASC) 10 mg tablet Take 1 Tablet by mouth daily.     apixaban (Eliquis) 5 mg tablet TAKE 1 TABLET TWICE A DAY    lisinopriL (PRINIVIL, ZESTRIL) 40 mg tablet Take 1 Tablet by mouth daily. hydroCHLOROthiazide (HYDRODIURIL) 12.5 mg tablet Take 1 Tablet by mouth daily. cholecalciferol (VITAMIN D3) (1000 Units /25 mcg) tablet Take 1 Tablet by mouth daily. metoprolol succinate (TOPROL-XL) 100 mg tablet Take 1 Tablet by mouth daily. Insulin Needles, Disposable, 31 gauge x 5/16\" ndle Use as directed    multivitamin (ONE A DAY) tablet Take 1 Tab by mouth daily. aspirin 81 mg chewable tablet Take 81 mg by mouth daily. Blood-Glucose Meter monitoring kit Check blood sugar twice daily. No current facility-administered medications for this visit. Allergies   Allergen Reactions    Pcn [Penicillins] Rash    Sulfa (Sulfonamide Antibiotics) Unknown (comments)     Review of Systems:  - Cardiovascular: no chest pain  - Neurological: no tremors  - Integumentary: skin is normal    Physical Examination:  Blood pressure (!) 148/72, pulse 69, height 5' 5\" (1.651 m), weight 203 lb 9.6 oz (92.4 kg). General: pleasant, no distress, good eye contact   Neck: no thyromegaly or thyroid bruits  Cardiovascular: regular, normal rate, nl s1 and s2, no m/r/g   Integumentary: skin is normal, no edema  Neurological: reflexes 2+ at biceps, no tremors  Psychiatric: normal mood and affect    Data Reviewed:   None    Assessment/Plan:   1) Hyperthyroidism > Pt is clinically euthyroid on MMI 2.5mg every other day. I will test her TFTs and adjust her dose as needed. Pt voices understanding and agreement with the plan. RTC 6 months    Follow-up and Dispositions    Return in about 6 months (around 2/17/2023).      Copy sent to:  Sandra Ashford

## 2022-08-17 NOTE — LETTER
8/17/2022    Patient: Mojgan Barfield   YOB: 1950   Date of Visit: 8/17/2022     Sai Chavez NP  [address] [facility]  Via In Basket    Dear Sai Chavez NP,      Thank you for referring Ms. Jeff Cristina to [facility] for evaluation. My notes for this consultation are attached. If you have questions, please do not hesitate to call me. I look forward to following your patient along with you.       Sincerely,    Delio Sandoval MD

## 2022-10-04 NOTE — PROGRESS NOTES
Danielle Hugo is a 66 y.o. female is here for routine f/u. Hx DM II, diabetic nephropathy, hypertension, dyslipidemia, CKD 3, obesity, with acute R MCA CVA 1/19. W/u with Echo/doppler 1/1/8/19 with mild to mod LVH, LVEF 65-70, normal atrial sizes, mild MR. Head CT/MRI/MRA with multiple sites of infarct R temporo-occipital, R parietal, R cerebellar, coronoradiatal  C/w embolic events. Subsequent Loop/Event monitor x 2 weeks showed NSR with episodes of paroxysmal afib/RVR. Seen in f/u by Dr. Gay Landaverde and started on Eliquis 5mg bid  (CHADs2-VASC = 6--DM, hypertension, female, >65, prior CVA (CVA 6.2%/TP, embolic/CVA/TIA 79.2%/QN). Hospitalized in June 2019 with acute CVA. Unfortunately, the confusion with the patients medications by pt/ which stopped Eliquis vs stopping ASA likely culprit of current CVA. This is not considered a failure of treatment as patient was not actually on Eliquis since 4/16/19. On BB and amlodipine. Continues to see PCP with recent OV/labs. Last seen by Dr Rashid Spears in 9/2021 and seen by me in 3/2022. She continues to see PCP and Dr Robina Henson for her  thyroid. Today,    No cardiac complaints. Does not engage in exercise. She  is on [address] [place] [address] Road, reports no melena, hematuria, or obvious signs of bleeding. No falls. The patient denies chest pain/ shortness of breath, orthopnea, PND, LE edema, palpitations, syncope, presyncope or fatigue.          Patient Active Problem List    Diagnosis Date Noted    Chronic renal disease, stage III 06/20/2022    Low TSH level 09/17/2020    CKD (chronic kidney disease) stage 3, GFR 30-59 ml/min ([address] 01/13/2020    Diabetic peripheral neuropathy associated with type 2 diabetes mellitus ([facility] 06/10/2019    Bilateral carotid artery stenosis 25/67/0830    Embolic stroke involving left middle cerebral artery ([address] 06/10/2019    CVA (cerebral vascular accident) ([facility] 06/09/2019    Paroxysmal atrial fibrillation ([facility] 04/16/2019 Dyslipidemia 04/16/2019    Cerebrovascular accident (CVA) due to thrombosis of right middle cerebral artery ([facility] 01/18/2019    Acute ischemic cerebrovascular accident (CVA) involving right middle cerebral artery territory [facility]) 01/17/2019    Obesity, morbid ([facility] 12/26/2017    Type 2 diabetes mellitus with chronic kidney disease ([facility] 12/26/2017    Type 2 diabetes mellitus with hyperglycemia, with long-term current use of insulin ([facility] 04/03/2017    Psoriasis 08/11/2014    Essential hypertension, benign 08/11/2014    Rosacea 08/11/2014    Heart murmur 08/11/2014      Lily CANO NP  Past Medical History:   Diagnosis Date    Diabetes ([facility]     Hypertension     Murmur     Osteopenia     Psoriasis     Rosacea     Stroke ([facility]     Vitamin D deficiency       Past Surgical History:   Procedure Laterality Date    COLONOSCOPY N/A 4/4/2018    COLONOSCOPY   DIABETIC performed by Rajat Espinal MD at [place] [address]     [facility] COLONOSCOPY  2018    3 polyps    HX TONSILLECTOMY       Allergies   Allergen Reactions    Pcn [Penicillins] Rash    Sulfa (Sulfonamide Antibiotics) Unknown (comments)      Family History   Problem Relation Age of Onset    Diabetes Mother     Heart Disease Father     No Known Problems Child       Social History     Socioeconomic History    Marital status:      Spouse name: Not on file    Number of children: Not on file    Years of education: Not on file    Highest education level: Not on file   Occupational History    Not on file   Tobacco Use    Smoking status: Never    Smokeless tobacco: Never   Substance and Sexual Activity    Alcohol use:  Yes     Alcohol/week: 0.0 standard drinks     Comment: 1/couple months    Drug use: Not on file    Sexual activity: Not on file   Other Topics Concern    Not on file   Social History Narrative    Not on file     Social Determinants of Health     Financial Resource Strain: Not on file   Food Insecurity: Not on file   Transportation Needs: Not on file   Physical Activity: Not on file   Stress: Not on file   Social Connections: Not on file   Intimate Partner Violence: Not on file   Housing Stability: Not on file      Current Outpatient Medications   Medication Sig    methIMAzole (TAPAZOLE) 5 mg tablet TAKE 1/2 TABLET MONDAY,    WEDNESDAY, AND FRIDAY    metFORMIN (GLUCOPHAGE) 500 mg tablet TAKE 1 TABLET TWICE DAILY  WITH MEALS    Accu-Chek Guide test strips strip CHECK GLUCOSE TWO TIMES A  DAY    dulaglutide (Trulicity) 1.5 YY/0.9 mL sub-q pen INJECT 0.5ML (=1.5 MG)     SUBCUTANEOUSLY EVERY 7     DAYS    Basaglar KwikPen U-100 Insulin 100 unit/mL (3 mL) inpn 40 Units by SubCUTAneous route every morning. atorvastatin (LIPITOR) 80 mg tablet TAKE 1 TABLET NIGHTLY    amLODIPine (NORVASC) 10 mg tablet Take 1 Tablet by mouth daily. apixaban (Eliquis) 5 mg tablet TAKE 1 TABLET TWICE A DAY    lisinopriL (PRINIVIL, ZESTRIL) 40 mg tablet Take 1 Tablet by mouth daily. hydroCHLOROthiazide (HYDRODIURIL) 12.5 mg tablet Take 1 Tablet by mouth daily. cholecalciferol (VITAMIN D3) (1000 Units /25 mcg) tablet Take 1 Tablet by mouth daily. metoprolol succinate (TOPROL-XL) 100 mg tablet Take 1 Tablet by mouth daily. Insulin Needles, Disposable, 31 gauge x 5/16\" ndle Use as directed    multivitamin (ONE A DAY) tablet Take 1 Tab by mouth daily. aspirin 81 mg chewable tablet Take 81 mg by mouth daily. Blood-Glucose Meter monitoring kit Check blood sugar twice daily. No current facility-administered medications for this visit. Review of Symptoms:    CONST  No weight change. No fever, chills, sweats    ENT No visual changes, URI sx, sore throat    CV  See HPI   RESP  No cough, or sputum, wheezing. Also see HPI   GI  No abdominal pain or change in bowel habits. No heartburn or dysphagia. No melena or rectal bleeding.   No dysuria, urgency, frequency, hematuria   MSKEL  No joint pain, swelling. No muscle pain.     SKIN  No rash or lesions. NEURO  No headache, syncope, or seizure. No weakness, loss of sensation, or paresthesias. PSYCH  No low mood or depression  No anxiety. HE/LYMPH  No easy bruising, abnormal bleeding, or enlarged glands. Physical ExamPhysical Exam:    There were no vitals taken for this visit. Gen: NAD  HEENT:  PERRL, throat clear  Neck: no adenopathy, no thyromegaly, no JVD   Heart:  Regular,Nl S1S2,  no murmur, gallop or rub. Lungs:  clear  Abdomen:   Soft, non-tender, bowel sounds are active.    Extremities:  No edema  Pulse: symmetric  Neuro: A&O times 3, +speech deficit    Cardiographics    ECG: NSR, low voltage, no acute changes    Labs:   Lab Results   Component Value Date/Time    Sodium 138 06/20/2022 04:12 PM    Sodium 138 03/31/2022 01:00 PM    Sodium 138 03/15/2022 01:43 PM    Sodium 140 12/15/2021 01:45 PM    Sodium 139 09/14/2021 02:10 PM    Potassium 4.9 06/20/2022 04:12 PM    Potassium 4.8 03/31/2022 01:00 PM    Potassium 4.8 03/15/2022 01:43 PM    Potassium 4.8 12/15/2021 01:45 PM    Potassium 4.7 09/14/2021 02:10 PM    Chloride 106 06/20/2022 04:12 PM    Chloride 107 03/31/2022 01:00 PM    Chloride 107 03/15/2022 01:43 PM    Chloride 109 (H) 12/15/2021 01:45 PM    Chloride 107 09/14/2021 02:10 PM    CO2 26 06/20/2022 04:12 PM    CO2 27 03/31/2022 01:00 PM    CO2 29 03/15/2022 01:43 PM    CO2 25 12/15/2021 01:45 PM    CO2 24 09/14/2021 02:10 PM    Anion gap 6 06/20/2022 04:12 PM    Anion gap 4 (L) 03/31/2022 01:00 PM    Anion gap 2 (L) 03/15/2022 01:43 PM    Anion gap 6 12/15/2021 01:45 PM    Anion gap 8 09/14/2021 02:10 PM    Glucose 85 06/20/2022 04:12 PM    Glucose 181 (H) 03/31/2022 01:00 PM    Glucose 94 03/15/2022 01:43 PM    Glucose 66 12/15/2021 01:45 PM    Glucose 233 (H) 09/14/2021 02:10 PM    BUN 41 (H) 06/20/2022 04:12 PM    BUN 26 (H) 03/31/2022 01:00 PM    BUN 38 (H) 03/15/2022 01:43 PM    BUN 25 (H) 12/15/2021 01:45 PM    BUN 25 (H) 09/14/2021 02:10 PM    Creatinine 1.72 (H) 06/20/2022 04:12 PM    Creatinine 1.67 (H) 03/31/2022 01:00 PM    Creatinine 1.76 (H) 03/15/2022 01:43 PM    Creatinine 1.42 (H) 12/15/2021 01:45 PM    Creatinine 1.56 (H) 09/14/2021 02:10 PM    BUN/Creatinine ratio 24 (H) 06/20/2022 04:12 PM    BUN/Creatinine ratio 16 03/31/2022 01:00 PM    BUN/Creatinine ratio 22 (H) 03/15/2022 01:43 PM    BUN/Creatinine ratio 18 12/15/2021 01:45 PM    BUN/Creatinine ratio 16 09/14/2021 02:10 PM    GFR est AA 35 (L) 06/20/2022 04:12 PM    GFR est AA 36 (L) 03/31/2022 01:00 PM    GFR est AA 34 (L) 03/15/2022 01:43 PM    GFR est AA 44 (L) 12/15/2021 01:45 PM    GFR est AA 40 (L) 09/14/2021 02:10 PM    GFR est non-AA 29 (L) 06/20/2022 04:12 PM    GFR est non-AA 30 (L) 03/31/2022 01:00 PM    GFR est non-AA 28 (L) 03/15/2022 01:43 PM    GFR est non-AA 36 (L) 12/15/2021 01:45 PM    GFR est non-AA 33 (L) 09/14/2021 02:10 PM    Calcium 9.6 06/20/2022 04:12 PM    Calcium 8.8 03/31/2022 01:00 PM    Calcium 9.3 03/15/2022 01:43 PM    Calcium 9.4 12/15/2021 01:45 PM    Calcium 8.9 09/14/2021 02:10 PM    Bilirubin, total 0.3 03/31/2022 01:00 PM    Bilirubin, total 0.2 09/14/2021 02:10 PM    Bilirubin, total 0.3 06/17/2021 11:00 PM    Bilirubin, total 0.3 09/15/2020 12:22 PM    Bilirubin, total 0.3 06/15/2020 12:02 PM    Alk. phosphatase 104 03/31/2022 01:00 PM    Alk. phosphatase 108 09/14/2021 02:10 PM    Alk. phosphatase 113 06/17/2021 11:00 PM    Alk. phosphatase 97 09/15/2020 12:22 PM    Alk.  phosphatase 96 06/15/2020 12:02 PM    Protein, total 6.7 03/31/2022 01:00 PM    Protein, total 7.2 09/14/2021 02:10 PM    Protein, total 7.6 06/17/2021 11:00 PM    Protein, total 6.7 09/15/2020 12:22 PM    Protein, total 7.2 06/15/2020 12:02 PM    Albumin 2.7 (L) 03/31/2022 01:00 PM    Albumin 2.8 (L) 09/14/2021 02:10 PM    Albumin 3.1 (L) 06/17/2021 11:00 PM    Albumin 3.6 (L) 09/15/2020 12:22 PM    Albumin 3.6 (L) 06/15/2020 12:02 PM    Globulin 4.0 03/31/2022 01:00 PM    Globulin 4.4 (H) 09/14/2021 02:10 PM    Globulin 4.5 (H) 06/17/2021 11:00 PM    Globulin 4.0 06/15/2019 05:08 AM    Globulin 4.6 (H) 06/10/2019 05:02 AM    A-G Ratio 0.7 (L) 03/31/2022 01:00 PM    A-G Ratio 0.6 (L) 09/14/2021 02:10 PM    A-G Ratio 0.7 (L) 06/17/2021 11:00 PM    A-G Ratio 1.2 09/15/2020 12:22 PM    A-G Ratio 1.0 (L) 06/15/2020 12:02 PM    ALT (SGPT) 18 03/31/2022 01:00 PM    ALT (SGPT) 20 09/14/2021 02:10 PM    ALT (SGPT) 21 06/17/2021 11:00 PM    ALT (SGPT) 21 09/15/2020 12:22 PM    ALT (SGPT) 20 06/15/2020 12:02 PM     Lab Results   Component Value Date/Time    CK 51 01/17/2019 10:25 PM     Lab Results   Component Value Date/Time    Cholesterol, total 110 09/14/2021 02:10 PM    Cholesterol, total 130 06/15/2020 12:02 PM    Cholesterol, total 131 01/13/2020 11:07 AM    Cholesterol, total 141 10/14/2019 09:38 AM    Cholesterol, total 136 06/10/2019 05:02 AM    HDL Cholesterol 53 09/14/2021 02:10 PM    HDL Cholesterol 58 06/15/2020 12:02 PM    HDL Cholesterol 59 01/13/2020 11:07 AM    HDL Cholesterol 52 10/14/2019 09:38 AM    HDL Cholesterol 59 06/10/2019 05:02 AM    LDL, calculated 29 09/14/2021 02:10 PM    LDL, calculated 53 06/15/2020 12:02 PM    LDL, calculated 53 01/13/2020 11:07 AM    LDL, calculated 59 10/14/2019 09:38 AM    LDL, calculated 54.8 06/10/2019 05:02 AM    Triglyceride 140 09/14/2021 02:10 PM    Triglyceride 93 06/15/2020 12:02 PM    Triglyceride 94 01/13/2020 11:07 AM    Triglyceride 149 10/14/2019 09:38 AM    Triglyceride 111 06/10/2019 05:02 AM    CHOL/HDL Ratio 2.1 09/14/2021 02:10 PM    CHOL/HDL Ratio 2.3 06/10/2019 05:02 AM    CHOL/HDL Ratio 3.6 01/18/2019 06:00 AM     No results found for this or any previous visit.     Assessment:         Patient Active Problem List    Diagnosis Date Noted    Chronic renal disease, stage III 06/20/2022    Low TSH level 09/17/2020    CKD (chronic kidney disease) stage 3, GFR 30-59 ml/min ([facility] 01/13/2020    Diabetic peripheral neuropathy associated with type 2 diabetes mellitus ([facility] 06/10/2019    Bilateral carotid artery stenosis 45/86/5690    Embolic stroke involving left middle cerebral artery ([facility] 06/10/2019    CVA (cerebral vascular accident) ([facility] 06/09/2019    Paroxysmal atrial fibrillation ([facility] 04/16/2019    Dyslipidemia 04/16/2019    Cerebrovascular accident (CVA) due to thrombosis of right middle cerebral artery ([facility] 01/18/2019    Acute ischemic cerebrovascular accident (CVA) involving right middle cerebral artery territory [facility]) 01/17/2019    Obesity, morbid ([facility] 12/26/2017    Type 2 diabetes mellitus with chronic kidney disease ([facility] 12/26/2017    Type 2 diabetes mellitus with hyperglycemia, with long-term current use of insulin ([facility] 04/03/2017    Psoriasis 08/11/2014    Essential hypertension, benign 08/11/2014    Rosacea 08/11/2014    Heart murmur 08/11/2014      Hx DM II, diabetic nephropathy, hypertension, dyslipidemia, CKD 3, obesity, with acute R MCA CVA 1/19. W/u with Echo/doppler 1/1/8/19 with mild to mod LVH, LVEF 65-70, normal atrial sizes, mild MR. Head CT/MRI/MRA with multiple sites of infarct R temporo-occipital, R parietal, R cerebellar, coronoradiatal  C/w embolic events. Subsequent Loop/Event monitor x 2 weeks showed NSR with episodes of paroxysmal afib/RVR. Seen in f/u by PCP and started on Eliquis 5mg bid  (CHADs2-VASC = 6--DM, hypertension, female, >65, prior CVA (CVA 5.9%/ZB, embolic/CVA/TIA 23.5%/FX). Hospitalized in June 2019 with acute CVA. Unfortunately, the confusion with the patients medications by pt/ which stopped Eliquis vs stopping ASA likely culprit of current CVA. This is not considered a failure of treatment as patient was not actually on Eliquis since 4/16/19. On BB and amlodipine. Continues to see PCP with recent OV/labs. Plan:     Paroxysmal atrial fibrillation ([address]  Echo in 1/1/8/19 with mild to mod LVH, LVEF 65-70, normal atrial sizes, mild MR.   Subsequent Loop/Event monitor in 2/19:   x 2 weeks showed NSR with episodes of paroxysmal afib/RVR. NST in 4/2019: Fixed defect inferolateral wall at the base is compatible with infarct. No evidence of christy-infarct ischemia. Left ventricular ejection fraction is 70 %. Continue  BB  [id number] score:  7. Continue Eliquis 5 mg BID  Lab Results   Component Value Date/Time    HGB (POC) 13.8 11/20/2015 01:36 PM    HGB 12.6 06/20/2022 04:12 PM       Atrial Fibrillation CHADSVASC2 Score Stroke Risk:   66 y.o. 72 to 76  +1   female Female +1   CHF HX: No    + 0   HTN HX: Yes    +1   Stroke/TIA/Thromboembolism Yes   +2   Vascular Disease HX: Yes    +1   Diabetes Mellitus Yes    +1   CHADSVASC 2 Score 7      Annual Stroke Risk 11.2% - moderate- high       Essential hypertension, benign  Controlled with current therapy     Dyslipidemia  9/2021 LDL 29 On atorva Labs and lipids per PCP  Lab Results   Component Value Date/Time    Cholesterol, total 110 09/14/2021 02:10 PM    HDL Cholesterol 53 09/14/2021 02:10 PM    LDL, calculated 29 09/14/2021 02:10 PM    VLDL, calculated 28 09/14/2021 02:10 PM    Triglyceride 140 09/14/2021 02:10 PM    CHOL/HDL Ratio 2.1 09/14/2021 25:96 PM         Embolic stroke involving left middle cerebral artery (HCC)  Carotid duplex 6/19: There is mild stenosis in the right and left ICA (<50%). Recall:   Hospitalized in June 2019 with acute CVA. Unfortunately, the confusion with the patients medications by pt/ which stopped Eliquis vs stopping ASA likely culprit of current CVA. 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     Geovanny Arias NP

## 2022-10-13 ENCOUNTER — OFFICE VISIT (OUTPATIENT)
Dept: CARDIOLOGY CLINIC | Age: 72
End: 2022-10-13
Payer: MEDICARE

## 2022-10-13 VITALS
RESPIRATION RATE: 20 BRPM | SYSTOLIC BLOOD PRESSURE: 130 MMHG | BODY MASS INDEX: 34.32 KG/M2 | HEIGHT: 65 IN | TEMPERATURE: 98.2 F | OXYGEN SATURATION: 99 % | HEART RATE: 70 BPM | DIASTOLIC BLOOD PRESSURE: 86 MMHG | WEIGHT: 206 LBS

## 2022-10-13 DIAGNOSIS — N18.30 STAGE 3 CHRONIC KIDNEY DISEASE, UNSPECIFIED WHETHER STAGE 3A OR 3B CKD (HCC): ICD-10-CM

## 2022-10-13 DIAGNOSIS — I63.412 EMBOLIC STROKE INVOLVING LEFT MIDDLE CEREBRAL ARTERY (HCC): ICD-10-CM

## 2022-10-13 DIAGNOSIS — I10 ESSENTIAL HYPERTENSION, BENIGN: ICD-10-CM

## 2022-10-13 DIAGNOSIS — I48.0 PAROXYSMAL ATRIAL FIBRILLATION (HCC): Primary | ICD-10-CM

## 2022-10-13 DIAGNOSIS — E66.01 SEVERE OBESITY (BMI 35.0-35.9 WITH COMORBIDITY) (HCC): ICD-10-CM

## 2022-10-13 DIAGNOSIS — I34.0 MITRAL VALVE INSUFFICIENCY, UNSPECIFIED ETIOLOGY: ICD-10-CM

## 2022-10-13 DIAGNOSIS — R01.1 HEART MURMUR: ICD-10-CM

## 2022-10-13 DIAGNOSIS — E78.5 DYSLIPIDEMIA: ICD-10-CM

## 2022-10-13 PROCEDURE — G8399 PT W/DXA RESULTS DOCUMENT: HCPCS | Performed by: NURSE PRACTITIONER

## 2022-10-13 PROCEDURE — G8427 DOCREV CUR MEDS BY ELIG CLIN: HCPCS | Performed by: NURSE PRACTITIONER

## 2022-10-13 PROCEDURE — G8417 CALC BMI ABV UP PARAM F/U: HCPCS | Performed by: NURSE PRACTITIONER

## 2022-10-13 PROCEDURE — G8432 DEP SCR NOT DOC, RNG: HCPCS | Performed by: NURSE PRACTITIONER

## 2022-10-13 PROCEDURE — 1101F PT FALLS ASSESS-DOCD LE1/YR: CPT | Performed by: NURSE PRACTITIONER

## 2022-10-13 PROCEDURE — 1123F ACP DISCUSS/DSCN MKR DOCD: CPT | Performed by: NURSE PRACTITIONER

## 2022-10-13 PROCEDURE — G8752 SYS BP LESS 140: HCPCS | Performed by: NURSE PRACTITIONER

## 2022-10-13 PROCEDURE — 1090F PRES/ABSN URINE INCON ASSESS: CPT | Performed by: NURSE PRACTITIONER

## 2022-10-13 PROCEDURE — G8754 DIAS BP LESS 90: HCPCS | Performed by: NURSE PRACTITIONER

## 2022-10-13 PROCEDURE — G8536 NO DOC ELDER MAL SCRN: HCPCS | Performed by: NURSE PRACTITIONER

## 2022-10-13 PROCEDURE — G9899 SCRN MAM PERF RSLTS DOC: HCPCS | Performed by: NURSE PRACTITIONER

## 2022-10-13 PROCEDURE — 93000 ELECTROCARDIOGRAM COMPLETE: CPT | Performed by: NURSE PRACTITIONER

## 2022-10-13 PROCEDURE — 99214 OFFICE O/P EST MOD 30 MIN: CPT | Performed by: NURSE PRACTITIONER

## 2022-10-13 PROCEDURE — 3017F COLORECTAL CA SCREEN DOC REV: CPT | Performed by: NURSE PRACTITIONER

## 2022-10-13 NOTE — PROGRESS NOTES
Identified pt with two pt identifiers(name and ). Reviewed record in preparation for visit and have obtained necessary documentation. Chief Complaint   Patient presents with    Irregular Heart Beat     Afib      Carotid Artery Stenosis    Hypertension    Cholesterol Problem      Vitals:    10/13/22 1101   BP: 130/86   Pulse: 70   Resp: 20   Temp: 98.2 °F (36.8 °C)   TempSrc: Temporal   SpO2: 99%   Weight: 206 lb (93.4 kg)   Height: 5' 5\" (1.651 m)   PainSc:   0 - No pain       Medications reviewed/approved by provider. Health Maintenance Review: Patient reminded of \"due or due soon\" health maintenance. I have asked the patient to contact his/her primary care provider (PCP) for follow-up on his/her health maintenance. Coordination of Care Questionnaire:  :   1) Have you been to an emergency room, urgent care, or hospitalized since your last visit? If yes, where when, and reason for visit? no       2. Have seen or consulted any other health care provider since your last visit? If yes, where when, and reason for visit?  no      Patient is accompanied by  I have received verbal consent from Kelli Kent to discuss any/all medical information while they are present in the room.

## 2022-10-16 DIAGNOSIS — I10 ESSENTIAL HYPERTENSION, BENIGN: ICD-10-CM

## 2022-10-17 RX ORDER — METOPROLOL SUCCINATE 100 MG/1
100 TABLET, EXTENDED RELEASE ORAL DAILY
Qty: 90 TABLET | Refills: 3 | Status: SHIPPED | OUTPATIENT
Start: 2022-10-17

## 2022-10-20 ENCOUNTER — OFFICE VISIT (OUTPATIENT)
Dept: FAMILY MEDICINE CLINIC | Age: 72
End: 2022-10-20
Payer: MEDICARE

## 2022-10-20 VITALS
BODY MASS INDEX: 34.32 KG/M2 | TEMPERATURE: 97.2 F | OXYGEN SATURATION: 98 % | SYSTOLIC BLOOD PRESSURE: 132 MMHG | RESPIRATION RATE: 16 BRPM | HEIGHT: 65 IN | WEIGHT: 206 LBS | HEART RATE: 81 BPM | DIASTOLIC BLOOD PRESSURE: 78 MMHG

## 2022-10-20 DIAGNOSIS — I65.23 BILATERAL CAROTID ARTERY STENOSIS: ICD-10-CM

## 2022-10-20 DIAGNOSIS — N18.4 TYPE 2 DIABETES MELLITUS WITH STAGE 4 CHRONIC KIDNEY DISEASE, WITH LONG-TERM CURRENT USE OF INSULIN (HCC): ICD-10-CM

## 2022-10-20 DIAGNOSIS — E05.90 HYPERTHYROIDISM: ICD-10-CM

## 2022-10-20 DIAGNOSIS — E55.9 VITAMIN D DEFICIENCY: ICD-10-CM

## 2022-10-20 DIAGNOSIS — I10 ESSENTIAL HYPERTENSION, BENIGN: Primary | ICD-10-CM

## 2022-10-20 DIAGNOSIS — E78.5 DYSLIPIDEMIA: ICD-10-CM

## 2022-10-20 DIAGNOSIS — E11.22 TYPE 2 DIABETES MELLITUS WITH STAGE 4 CHRONIC KIDNEY DISEASE, WITH LONG-TERM CURRENT USE OF INSULIN (HCC): ICD-10-CM

## 2022-10-20 DIAGNOSIS — I48.0 PAROXYSMAL ATRIAL FIBRILLATION (HCC): ICD-10-CM

## 2022-10-20 DIAGNOSIS — Z79.4 TYPE 2 DIABETES MELLITUS WITH STAGE 4 CHRONIC KIDNEY DISEASE, WITH LONG-TERM CURRENT USE OF INSULIN (HCC): ICD-10-CM

## 2022-10-20 PROCEDURE — 36415 COLL VENOUS BLD VENIPUNCTURE: CPT | Performed by: NURSE PRACTITIONER

## 2022-10-20 PROCEDURE — 99214 OFFICE O/P EST MOD 30 MIN: CPT | Performed by: NURSE PRACTITIONER

## 2022-10-20 NOTE — PROGRESS NOTES
Identified pt with two pt identifiers(name and ). Reviewed record in preparation for visit and have obtained necessary documentation. Chief Complaint   Patient presents with    Hypertension       1. \"Have you been to the ER, urgent care clinic since your last visit? Hospitalized since your last visit?\"     2. \"Have you seen or consulted any other health care providers outside of the [address] [place] [address] since your last visit? \" Yes, No Care Gap present    3. For patients aged 39-70: Has the patient had a colonoscopy / FIT/ Cologuard? No      If the patient is female:    4. For patients aged 41-77: Has the patient had a mammogram within the past 2 years? No      5. For patients aged 21-65: Has the patient had a pap smear?  NA - based on age or sex

## 2022-10-20 NOTE — PROGRESS NOTES
Subjective:     CC: diabetes     Krystal Daly is a 66 y.o. female, accompanied by her , who presents today for a 4 month follow up for type 2 diabetes and other chronic medical problems. Type 2 diabetes  Lab Results   Component Value Date/Time    Hemoglobin A1c 6.3 (H) 06/20/2022 04:12 PM     She is on Metformin 990RL BID, Trulicity 5.3TH q 7 days, and Levemir 40 units daily.  states home BS are at goal. 70's and 80's in the mornings, asymptomatic. Higher as the day goes on. She does have some peripheral neuropathy in the feet. Last eye exam: 3 months ago (June 2022)    HTN with stage 4 CKD  BP at goal.  She is only on 12.5mg of HTCZ daily due to increased creatinine. She continues to take Amlodipine 10mg daily, Lisinopril 40mg daily, and Metoprolol XR 100mg daily. Denies CP, SOB, or dizziness, She does have some chronic swelling in the ankles. She does not take NSAIDS    Hyperthyroidism  She was diagnosed in 1/2021 and referred to Endo Dr Jenaro Yin. She had no thyroid antibodies. Her thyroid nuclear uptake scan showed a dominant nodule in the right lobe with normal uptake of tracer. She was prescribed Methimazole. She followed up with Dr Cassius Rico in Feb 2022 and he reduced her Methimazole to 2.5mg TIW. TSH was at goal 2 months ago. Lab Results   Component Value Date/Time    TSH 0.37 08/17/2022 02:40 PM         H/O CVA with hemiparesis   She was admitted to [facility] in June of 2019 for CVA and then spent 2 weeks in [facility]. This was her second CVA. The first caused weakness and the last one affected her speech and mental status.  helps with ADLs, meals, and medication management. She is on a statin daily and takes Eliquis for A-fib.     Lab Results   Component Value Date/Time    Cholesterol, total 110 09/14/2021 02:10 PM    HDL Cholesterol 53 09/14/2021 02:10 PM    LDL, calculated 29 09/14/2021 02:10 PM    VLDL, calculated 28 09/14/2021 02:10 PM    Triglyceride 140 09/14/2021 02:10 PM    CHOL/HDL Ratio 2.1 09/14/2021 02:10 PM       A-fib  On Eliquis for stroke prophylaxis. Denies black or bloody stools. Followed by cardiologist Dr Tom Perkins who has retired. Saw NP Grayce Romberg on 10-13-22. No changes were made. Lab Results   Component Value Date/Time    HGB (POC) 13.8 11/20/2015 01:36 PM    HGB 12.6 06/20/2022 04:12 PM     Carotid stenosis, bilateral  Last ultrasound was done in 6/2019- showed very mild stenosis bilaterally  She is on a statin and ASA. Denies any dizziness or vision changes. Will order repeat ultrasound today. Vitamin D deficiency  She used to take Vit D 50,000 BIW but was told to stop in June when levels reached > 150. Levels have since normalized and she is is now on the lower dose of 1000 units daily. Lab Results   Component Value Date/Time    Vitamin D 25-Hydroxy 61.6 03/15/2022 01:43 PM       Health maintenance  She is due for a mammogram- ordered back in June but never completed.  was given scheduling dept phone number to call and schedule at the last visit but still not done. PNA vaccines up to date  Flu shot - up to date   Shingrix- she is not a candidate, never had the chicken pox   Covid 19 vaccine- she had both Moderna vaccines, boosted in .  Bivalent given 9-27-22      Patient Active Problem List   Diagnosis Code    Psoriasis L40.9    Essential hypertension, benign I10    Rosacea L71.9    Heart murmur R01.1    Type 2 diabetes mellitus with hyperglycemia, with long-term current use of insulin ([facility]) E11.65, Z79.4    Obesity, morbid ([facility]) E66.01    Type 2 diabetes mellitus with chronic kidney disease (HCC) E11.22    Acute ischemic cerebrovascular accident (CVA) involving right middle cerebral artery [facility]) I63.511    Cerebrovascular accident (CVA) due to thrombosis of right middle cerebral artery ([facility] I63.311    Paroxysmal atrial fibrillation ([facility]) I48.0    Dyslipidemia E78.5    CVA (cerebral vascular accident) ([facility] I63.9    Diabetic peripheral neuropathy associated with type 2 diabetes mellitus (HCC) E11.42    Bilateral carotid artery stenosis P80.54    Embolic stroke involving left middle cerebral artery (HCC) W10.015    CKD (chronic kidney disease) stage 3, GFR 30-59 ml/min ([facility]) N18.30    Low TSH level R79.89    Chronic renal disease, stage III N18.30       Past Medical History:   Diagnosis Date    [facility]     Hypertension     Murmur     Osteopenia     Psoriasis     Rosacea     Stroke ([facility]     Vitamin D deficiency          Current Outpatient Medications:     metoprolol succinate (TOPROL-XL) 100 mg tablet, Take 1 Tablet by mouth daily. , Disp: 90 Tablet, Rfl: 3    methIMAzole (TAPAZOLE) 5 mg tablet, TAKE 1/2 TABLET MONDAY,    WEDNESDAY, AND FRIDAY, Disp: 20 Tablet, Rfl: 5    metFORMIN (GLUCOPHAGE) 500 mg tablet, TAKE 1 TABLET TWICE DAILY  WITH MEALS, Disp: 180 Tablet, Rfl: 3    Accu-Chek Guide test strips strip, CHECK GLUCOSE TWO TIMES A  DAY, Disp: 200 Strip, Rfl: 3    dulaglutide (Trulicity) 1.5 LJ/5.3 mL sub-q pen, INJECT 0.5ML (=1.5 MG)     SUBCUTANEOUSLY EVERY 7     DAYS, Disp: 6 mL, Rfl: 4    Basaglar KwikPen U-100 Insulin 100 unit/mL (3 mL) inpn, 40 Units by SubCUTAneous route every morning., Disp: 10 Pen, Rfl: 5    atorvastatin (LIPITOR) 80 mg tablet, TAKE 1 TABLET NIGHTLY, Disp: 90 Tablet, Rfl: 2    amLODIPine (NORVASC) 10 mg tablet, Take 1 Tablet by mouth daily. , Disp: 90 Tablet, Rfl: 3    apixaban (Eliquis) 5 mg tablet, TAKE 1 TABLET TWICE A DAY, Disp: 180 Tablet, Rfl: 1    lisinopriL (PRINIVIL, ZESTRIL) 40 mg tablet, Take 1 Tablet by mouth daily. , Disp: 90 Tablet, Rfl: 3    hydroCHLOROthiazide (HYDRODIURIL) 12.5 mg tablet, Take 1 Tablet by mouth daily. , Disp: 90 Tablet, Rfl: 1    cholecalciferol (VITAMIN D3) (1000 Units /25 mcg) tablet, Take 1 Tablet by mouth daily. , Disp: 90 Tablet, Rfl: 3    Insulin Needles, Disposable, 31 gauge x 5/16\" ndle, Use as directed, Disp: 100 Pen Needle, Rfl: 5    multivitamin (ONE A DAY) tablet, Take 1 Tab by mouth daily. , Disp: , Rfl:     aspirin 81 mg chewable tablet, Take 81 mg by mouth daily. , Disp: , Rfl:     Blood-Glucose Meter monitoring kit, Check blood sugar twice daily. , Disp: 1 Kit, Rfl: 0    Allergies   Allergen Reactions    Pcn [Penicillins] Rash    Sulfa (Sulfonamide Antibiotics) Unknown (comments)       Past Surgical History:   Procedure Laterality Date    COLONOSCOPY N/A 4/4/2018    COLONOSCOPY   DIABETIC performed by Schuyler Brunner, MD at [place] [address]     [facility]      HX COLONOSCOPY  2018    3 polyps    HX TONSILLECTOMY         Social History     Tobacco Use   Smoking Status Never   Smokeless Tobacco Never       Social History     Socioeconomic History    Marital status:    Tobacco Use    Smoking status: Never    Smokeless tobacco: Never   Substance and Sexual Activity    Alcohol use: Yes     Alcohol/week: 0.0 standard drinks     Comment: 1/couple months       Family History   Problem Relation Age of Onset    Diabetes Mother     Heart Disease Father     No Known Problems Child      ROS:  Gen: denies fever, chills, or fatigue  HEENT:denies H/A, nosebleeds, or bleeding gums  Resp: denies dyspnea, cough, or wheezing  CV: denies chest pain, pressure, or palpitations  GI: denies abd pain, N/V, melena or hematochezia  : Denies dysuria, hematuria, or urinary frequency/urgency  Extremeties: +moderate edema in ankles  Musculoskeletal: Denies joint pain, stiffness, or muscle cramps  Neuro: denies numbness/tingling or dizziness  Endo: denies polyuria or polydipsia  Skin: denies rashes or new lesions  Psych: denies anxiety or depression    Objective:     Visit Vitals  /78 (BP 1 Location: Left upper arm)   Pulse 81   Temp 97.2 °F (36.2 °C) (Oral)   Resp 16   Ht 5' 5\" (1.651 m)   Wt 206 lb (93.4 kg)   SpO2 98%   BMI 34.28 kg/m²       General: Alert and oriented. No acute distress. +Obese. Eyes: Sclera white, conjunctiva clear. PERRLA. Neck: Supple with FROM.  No carotid bruits  Lungs: Breathing even and unlabored. All lobes clear to auscultation bilaterally   Heart :RRR, S1 and S2 normal intensity, no extra heart sounds  Extremities: +moderate edema to both ankles, pedal pulses 2+  Neuro: Cranial nerves grossly normal.   Musculoskeletal: Joints are without swelling, erythema, or heat. Psych: Mood and thought content appropriate for situation. Dressed appropriately and with good hygiene. Skin: Warm and dry and intact      Assessment/ Plan:     HTN with CKD stage 4  BP at goal  Check CMP  Cont current meds, if GFR still at stage 4 will D/C Metformin  Avoid NSAIDS  Low-sodium diet  RTO or go to ER for any CP, SOB, dizziness, or swelling. F/U: 4 months     DM  Check A1C   Cont Metformin 500mg BID- will d/c if gfr at stage 4 again today  Cont Trulicity  Cont Basaglar insulin 40 units every morning  Cont to check glucose BID  Call for any problems  F/U 4 months    A-fib  Stable  Cont Eliquis for stroke prophylaxis  Report any recurrent nosebleeds or other s/s of abnormal bleeding  Check CBC  F/U with cardiologist as scheduled  F/U 4 monhs. Hyperthyroidism  Cont methimazole 5mg daily  F/U with Dr Miri Layne as scheduled    Hx of CVA with hemiparesis  Cont statin and asa  Keep BP and BS under good control  Go to ER for sudden unilateral numbness/tingling, weakness, facial drooping, slurred speech, confusion, AMS, or dizziness    Elevated serum Vit D   Cont Vit D 1000 units daily   Check Vit D level    Carotid stenosis, bilateral  Last ultrasound was done in 6/2019- showed very mild stenosis bilaterally  She is on a statin and ASA   Will get repeat ultrasound- ordered today              Verbal and written instructions (see AVS) provided. Patient expresses understanding of diagnosis and treatment plan. Health Maintenance Due   Topic Date Due    Breast Cancer Screen Mammogram  11/03/2018    Lipid Screen  09/14/2022               Kelvin Gaitan NP

## 2022-10-20 NOTE — ACP (ADVANCE CARE PLANNING)
Identified pt with two pt identifiers(name and ). Reviewed record in preparation for visit and have obtained necessary documentation. Chief Complaint   Patient presents with    Other     Stroke follow-up       1. \"Have you been to the ER, urgent care clinic since your last visit? Hospitalized since your last visit? \" No    2. \"Have you seen or consulted any other health care providers outside of the [address] [place] [address] since your last visit? \" No     3. For patients aged 39-70: Has the patient had a colonoscopy / FIT/ Cologuard? Yes - no Care Gap present      If the patient is female:    4. For patients aged 41-77: Has the patient had a mammogram within the past 2 years? NO    5. For patients aged 21-65: Has the patient had a pap smear?  NA - based on age or sex

## 2022-10-21 LAB
25(OH)D3 SERPL-MCNC: 106.9 NG/ML (ref 30–100)
ANION GAP SERPL CALC-SCNC: 6 MMOL/L (ref 5–15)
BUN SERPL-MCNC: 32 MG/DL (ref 6–20)
BUN/CREAT SERPL: 18 (ref 12–20)
CALCIUM SERPL-MCNC: 9.3 MG/DL (ref 8.5–10.1)
CHLORIDE SERPL-SCNC: 109 MMOL/L (ref 97–108)
CHOLEST SERPL-MCNC: 116 MG/DL
CO2 SERPL-SCNC: 27 MMOL/L (ref 21–32)
CREAT SERPL-MCNC: 1.78 MG/DL (ref 0.55–1.02)
ERYTHROCYTE [DISTWIDTH] IN BLOOD BY AUTOMATED COUNT: 13.3 % (ref 11.5–14.5)
EST. AVERAGE GLUCOSE BLD GHB EST-MCNC: 137 MG/DL
GLUCOSE SERPL-MCNC: 125 MG/DL (ref 65–100)
HBA1C MFR BLD: 6.4 % (ref 4–5.6)
HCT VFR BLD AUTO: 38 % (ref 35–47)
HDLC SERPL-MCNC: 59 MG/DL
HDLC SERPL: 2 {RATIO} (ref 0–5)
HGB BLD-MCNC: 12 G/DL (ref 11.5–16)
LDLC SERPL CALC-MCNC: 39.6 MG/DL (ref 0–100)
MCH RBC QN AUTO: 30.1 PG (ref 26–34)
MCHC RBC AUTO-ENTMCNC: 31.6 G/DL (ref 30–36.5)
MCV RBC AUTO: 95.2 FL (ref 80–99)
NRBC # BLD: 0 K/UL (ref 0–0.01)
NRBC BLD-RTO: 0 PER 100 WBC
PLATELET # BLD AUTO: 254 K/UL (ref 150–400)
PMV BLD AUTO: 11.8 FL (ref 8.9–12.9)
POTASSIUM SERPL-SCNC: 5.2 MMOL/L (ref 3.5–5.1)
RBC # BLD AUTO: 3.99 M/UL (ref 3.8–5.2)
SODIUM SERPL-SCNC: 142 MMOL/L (ref 136–145)
TRIGL SERPL-MCNC: 87 MG/DL (ref ?–150)
VLDLC SERPL CALC-MCNC: 17.4 MG/DL
WBC # BLD AUTO: 10.3 K/UL (ref 3.6–11)

## 2022-10-22 ENCOUNTER — APPOINTMENT (OUTPATIENT)
Dept: GENERAL RADIOLOGY | Age: 72
End: 2022-10-22
Attending: EMERGENCY MEDICINE
Payer: MEDICARE

## 2022-10-22 ENCOUNTER — HOSPITAL ENCOUNTER (EMERGENCY)
Age: 72
Discharge: HOME | End: 2022-10-22
Attending: EMERGENCY MEDICINE
Payer: MEDICARE

## 2022-10-22 VITALS
HEART RATE: 65 BPM | RESPIRATION RATE: 18 BRPM | OXYGEN SATURATION: 98 % | TEMPERATURE: 97.8 F | DIASTOLIC BLOOD PRESSURE: 65 MMHG | SYSTOLIC BLOOD PRESSURE: 168 MMHG

## 2022-10-22 DIAGNOSIS — R41.82 ALTERED MENTAL STATUS, UNSPECIFIED ALTERED MENTAL STATUS TYPE: Primary | ICD-10-CM

## 2022-10-22 DIAGNOSIS — E16.2 HYPOGLYCEMIA: ICD-10-CM

## 2022-10-22 DIAGNOSIS — E11.22 CKD STAGE 4 DUE TO TYPE 2 DIABETES MELLITUS (HCC): Primary | ICD-10-CM

## 2022-10-22 DIAGNOSIS — N18.4 CKD STAGE 4 DUE TO TYPE 2 DIABETES MELLITUS (HCC): Primary | ICD-10-CM

## 2022-10-22 LAB
ALBUMIN SERPL-MCNC: 2.8 G/DL (ref 3.5–5)
ALBUMIN/GLOB SERPL: 0.6 {RATIO} (ref 1.1–2.2)
ALP SERPL-CCNC: 95 U/L (ref 45–117)
ALT SERPL-CCNC: 22 U/L (ref 12–78)
ANION GAP SERPL CALC-SCNC: 10 MMOL/L (ref 5–15)
AST SERPL-CCNC: 21 U/L (ref 15–37)
BASOPHILS # BLD: 0.1 K/UL (ref 0–0.1)
BASOPHILS NFR BLD: 1 % (ref 0–1)
BILIRUB SERPL-MCNC: 0.3 MG/DL (ref 0.2–1)
BUN SERPL-MCNC: 42 MG/DL (ref 6–20)
BUN/CREAT SERPL: 21 (ref 12–20)
CALCIUM SERPL-MCNC: 9.4 MG/DL (ref 8.5–10.1)
CHLORIDE SERPL-SCNC: 107 MMOL/L (ref 97–108)
CO2 SERPL-SCNC: 26 MMOL/L (ref 21–32)
CREAT SERPL-MCNC: 2.04 MG/DL (ref 0.55–1.02)
DIFFERENTIAL METHOD BLD: ABNORMAL
EOSINOPHIL # BLD: 0.6 K/UL (ref 0–0.4)
EOSINOPHIL NFR BLD: 4 % (ref 0–7)
ERYTHROCYTE [DISTWIDTH] IN BLOOD BY AUTOMATED COUNT: 13.2 % (ref 11.5–14.5)
GLOBULIN SER CALC-MCNC: 5 G/DL (ref 2–4)
GLUCOSE BLD STRIP.AUTO-MCNC: 120 MG/DL (ref 65–117)
GLUCOSE BLD STRIP.AUTO-MCNC: 166 MG/DL (ref 65–117)
GLUCOSE BLD STRIP.AUTO-MCNC: 40 MG/DL (ref 65–117)
GLUCOSE BLD STRIP.AUTO-MCNC: 41 MG/DL (ref 65–117)
GLUCOSE BLD STRIP.AUTO-MCNC: 62 MG/DL (ref 65–117)
GLUCOSE BLD STRIP.AUTO-MCNC: 64 MG/DL (ref 65–117)
GLUCOSE SERPL-MCNC: 38 MG/DL (ref 65–100)
HCT VFR BLD AUTO: 37.3 % (ref 35–47)
HGB BLD-MCNC: 12.3 G/DL (ref 11.5–16)
IMM GRANULOCYTES # BLD AUTO: 0.1 K/UL (ref 0–0.04)
IMM GRANULOCYTES NFR BLD AUTO: 0 % (ref 0–0.5)
INR PPP: 1 (ref 0.9–1.1)
LYMPHOCYTES # BLD: 4 K/UL (ref 0.8–3.5)
LYMPHOCYTES NFR BLD: 30 % (ref 12–49)
MCH RBC QN AUTO: 30.8 PG (ref 26–34)
MCHC RBC AUTO-ENTMCNC: 33 G/DL (ref 30–36.5)
MCV RBC AUTO: 93.5 FL (ref 80–99)
MONOCYTES # BLD: 0.9 K/UL (ref 0–1)
MONOCYTES NFR BLD: 7 % (ref 5–13)
NEUTS SEG # BLD: 7.9 K/UL (ref 1.8–8)
NEUTS SEG NFR BLD: 58 % (ref 32–75)
NRBC # BLD: 0 K/UL (ref 0–0.01)
NRBC BLD-RTO: 0 PER 100 WBC
PLATELET # BLD AUTO: 287 K/UL (ref 150–400)
PMV BLD AUTO: 10.5 FL (ref 8.9–12.9)
POTASSIUM SERPL-SCNC: 3.5 MMOL/L (ref 3.5–5.1)
PROT SERPL-MCNC: 7.8 G/DL (ref 6.4–8.2)
PROTHROMBIN TIME: 10.3 SEC (ref 9–11.1)
RBC # BLD AUTO: 3.99 M/UL (ref 3.8–5.2)
SERVICE CMNT-IMP: ABNORMAL
SODIUM SERPL-SCNC: 143 MMOL/L (ref 136–145)
TROPONIN-HIGH SENSITIVITY: 10 NG/L (ref 0–51)
WBC # BLD AUTO: 13.5 K/UL (ref 3.6–11)

## 2022-10-22 PROCEDURE — 99285 EMERGENCY DEPT VISIT HI MDM: CPT

## 2022-10-22 PROCEDURE — 74011000250 HC RX REV CODE- 250: Performed by: EMERGENCY MEDICINE

## 2022-10-22 PROCEDURE — 85025 COMPLETE CBC W/AUTO DIFF WBC: CPT

## 2022-10-22 PROCEDURE — 85610 PROTHROMBIN TIME: CPT

## 2022-10-22 PROCEDURE — 96365 THER/PROPH/DIAG IV INF INIT: CPT

## 2022-10-22 PROCEDURE — 93005 ELECTROCARDIOGRAM TRACING: CPT

## 2022-10-22 PROCEDURE — 71045 X-RAY EXAM CHEST 1 VIEW: CPT

## 2022-10-22 PROCEDURE — 36415 COLL VENOUS BLD VENIPUNCTURE: CPT

## 2022-10-22 PROCEDURE — 80053 COMPREHEN METABOLIC PANEL: CPT

## 2022-10-22 PROCEDURE — 82962 GLUCOSE BLOOD TEST: CPT

## 2022-10-22 PROCEDURE — 84484 ASSAY OF TROPONIN QUANT: CPT

## 2022-10-22 RX ORDER — QUINAPRIL 40 MG/1
40 TABLET ORAL DAILY
COMMUNITY

## 2022-10-22 RX ORDER — ATORVASTATIN CALCIUM 40 MG/1
40 TABLET, FILM COATED ORAL DAILY
Qty: 90 TABLET | Refills: 1 | Status: SHIPPED | OUTPATIENT
Start: 2022-10-22

## 2022-10-22 RX ORDER — DEXTROSE MONOHYDRATE 100 MG/ML
0-250 INJECTION, SOLUTION INTRAVENOUS AS NEEDED
Status: DISCONTINUED | OUTPATIENT
Start: 2022-10-22 | End: 2022-10-22 | Stop reason: HOSPADM

## 2022-10-22 RX ADMIN — DEXTROSE MONOHYDRATE 250 ML: 100 INJECTION, SOLUTION INTRAVENOUS at 10:18

## 2022-10-22 NOTE — ED NOTES
Pt pain assessed. Offered use of restroom and assistance as necessary. Pt declined. Pt offered repositioning in bed for comfort. Assessed pt's ability to access possessions, everything within reach of pt. Pt reminded to use call bell as necessary, report any changes in status. Pt thanked for allowing care. Bed locked in lowest position, side rails up as necessary.  at bedside.

## 2022-10-22 NOTE — ED NOTES
I have reviewed discharge instructions with the patient and spouse. The patient and spouse verbalized understanding. Discharge medications discussed with patient. No questions at this time. Ambulated out without difficulty.  Instructed to 1/2 am insulin until they talk with the PMD

## 2022-10-22 NOTE — ED NOTES
[facility] called er and informed us that they have no trucks to  this patient until 2300. Nursing Supervisor contacted.  Will update family when we obtain new ETA

## 2022-10-22 NOTE — ED TRIAGE NOTES
reports change in mental status approx 1 hor PTA, no new drift , droop or slur, ambulated back with assisitance

## 2022-10-22 NOTE — ED PROVIDER NOTES
EMERGENCY DEPARTMENT HISTORY AND PHYSICAL EXAM      Date: 10/22/2022  Patient Name: Boni Trinidad    History of Presenting Illness     Chief Complaint   Patient presents with    Altered mental status       History Provided By: Patient    HPI: Boni Trinidad, 66 y.o. female with PMHx significant for DM, hypertension, high cholesterol, A. fib on Eliquis, presents private vehicle to the ED with cc of \" possible stroke. \"  Patient is without complaints.  reports this morning after eating breakfast she was more confused than normal and he was worried that she had another stroke. She ambulated into the treatment area with assistance with a slow shuffling gait.  noticed that she has been more confused and not acting normally. He reports they were getting ready to go somewhere and he told her to go put her shoes on. He had finished up washing the dishes and she is still not returned he walked in the bedroom and found her lying on the bed was very slow to respond to him verbally. She however then got up and put her shoes on. This time we decided to bring her to the ER for further evaluation. No recent falls or trauma. PMHx: Significant for DM, hypertension, high cholesterol, A. fib on Eliquis  PSHx: Significant for tonsillectomy, , colonoscopy  Social Hx: Non-smoker. Rare alcohol use. There are no other complaints, changes, or physical findings at this time. PCP: Miguel Cooney NP    No current facility-administered medications on file prior to encounter. Current Outpatient Medications on File Prior to Encounter   Medication Sig Dispense Refill    atorvastatin (LIPITOR) 40 mg tablet Take 1 Tablet by mouth daily. 90 Tablet 1    metoprolol succinate (TOPROL-XL) 100 mg tablet Take 1 Tablet by mouth daily.  90 Tablet 3    methIMAzole (TAPAZOLE) 5 mg tablet TAKE 1/2 TABLET MONDAY,    WEDNESDAY, AND FRIDAY 20 Tablet 5    [DISCONTINUED] metFORMIN (GLUCOPHAGE) 500 mg tablet TAKE 1 TABLET TWICE DAILY  WITH MEALS 180 Tablet 3    Accu-Chek Guide test strips strip CHECK GLUCOSE TWO TIMES A   Strip 3    dulaglutide (Trulicity) 1.5 ME/3.1 mL sub-q pen INJECT 0.5ML (=1.5 MG)     SUBCUTANEOUSLY EVERY 7     DAYS 6 mL 4    Basaglar KwikPen U-100 Insulin 100 unit/mL (3 mL) inpn 40 Units by SubCUTAneous route every morning. 10 Pen 5    [DISCONTINUED] atorvastatin (LIPITOR) 80 mg tablet TAKE 1 TABLET NIGHTLY 90 Tablet 2    amLODIPine (NORVASC) 10 mg tablet Take 1 Tablet by mouth daily. 90 Tablet 3    apixaban (Eliquis) 5 mg tablet TAKE 1 TABLET TWICE A  Tablet 1    lisinopriL (PRINIVIL, ZESTRIL) 40 mg tablet Take 1 Tablet by mouth daily. 90 Tablet 3    hydroCHLOROthiazide (HYDRODIURIL) 12.5 mg tablet Take 1 Tablet by mouth daily. 90 Tablet 1    [DISCONTINUED] cholecalciferol (VITAMIN D3) (1000 Units /25 mcg) tablet Take 1 Tablet by mouth daily. 90 Tablet 3    Insulin Needles, Disposable, 31 gauge x 5/16\" ndle Use as directed 100 Pen Needle 5    multivitamin (ONE A DAY) tablet Take 1 Tab by mouth daily. aspirin 81 mg chewable tablet Take 81 mg by mouth daily. Blood-Glucose Meter monitoring kit Check blood sugar twice daily.  1 Kit 0       Past History     Past Medical History:  Past Medical History:   Diagnosis Date    Diabetes ([facility]     Hypertension     Murmur     Osteopenia     Psoriasis     Rosacea     Stroke ([facility]     Vitamin D deficiency        Past Surgical History:  Past Surgical History:   Procedure Laterality Date    COLONOSCOPY N/A 4/4/2018    COLONOSCOPY   DIABETIC performed by Terra Mejia MD at [facility] MAIN OR     [facility]      HX COLONOSCOPY  2018    3 polyps    HX TONSILLECTOMY         Family History:  Family History   Problem Relation Age of Onset    Diabetes Mother     Heart Disease Father     No Known Problems Child        Social History:  Social History     Tobacco Use    Smoking status: Never    Smokeless tobacco: Never   Vaping Use    Vaping Use: Never used   Substance Use Topics    Alcohol use: Yes     Comment: 1/couple months    Drug use: Never       Allergies: Allergies   Allergen Reactions    Pcn [Penicillins] Rash    Sulfa (Sulfonamide Antibiotics) Unknown (comments)         Review of Systems   Review of Systems   Constitutional:  Negative for activity change, chills and fever. HENT:  Negative for congestion and sore throat. Eyes:  Negative for pain and redness. Respiratory:  Negative for cough, chest tightness and shortness of breath. Cardiovascular:  Negative for chest pain and palpitations. Gastrointestinal:  Negative for abdominal pain, diarrhea, nausea and vomiting. Genitourinary:  Negative for dysuria, frequency and urgency. Musculoskeletal:  Negative for back pain and neck pain. Skin:  Negative for rash. Neurological:  Negative for syncope, light-headedness and headaches. Psychiatric/Behavioral:  Negative for confusion. All other systems reviewed and are negative. Physical Exam   Physical Exam  Vitals and nursing note reviewed. Constitutional:       General: She is not in acute distress. Appearance: She is well-developed. She is not diaphoretic. Comments: Calm and pleasant elderly white female. HENT:      Head: Normocephalic and atraumatic. Nose: Nose normal.      Mouth/Throat:      Pharynx: No oropharyngeal exudate. Eyes:      General: No scleral icterus. Conjunctiva/sclera: Conjunctivae normal.      Pupils: Pupils are equal, round, and reactive to light. Neck:      Thyroid: No thyromegaly. Vascular: No JVD. Trachea: No tracheal deviation. Cardiovascular:      Rate and Rhythm: Normal rate and regular rhythm. Heart sounds: No murmur heard. No friction rub. No gallop. Pulmonary:      Effort: Pulmonary effort is normal. No respiratory distress. Breath sounds: Normal breath sounds. No stridor. No wheezing or rales.    Abdominal:      General: Bowel sounds are normal. There is no distension. Palpations: Abdomen is soft. Tenderness: There is no abdominal tenderness. There is no guarding or rebound. Musculoskeletal:         General: Normal range of motion. Cervical back: Normal range of motion and neck supple. Lymphadenopathy:      Cervical: No cervical adenopathy. Skin:     General: Skin is warm and dry. Findings: No erythema or rash. Neurological:      Mental Status: She is alert. Cranial Nerves: No cranial nerve deficit. Motor: No abnormal muscle tone. Coordination: Coordination normal.      Comments: Alert to person and place. Mild confusion to time and current events. Follows commands without difficulty. 5 out of 5 strength in all extremities. Stands and ambulates with assistance. EOMI. No facial asymmetry. Slowed but clear and appropriate speech. Psychiatric:         Behavior: Behavior normal.           Diagnostic Study Results     Labs -     Recent Results (from the past 12 hour(s))   GLUCOSE, POC    Collection Time: 10/22/22 10:04 AM   Result Value Ref Range    Glucose (POC) 41 (LL) 65 - 117 mg/dL    Performed by [address] [place] [address], POC    Collection Time: 10/22/22 10:06 AM   Result Value Ref Range    Glucose (POC) 40 (LL) 65 - 117 mg/dL    Performed by MICHELINE GRIFFITHS    CBC WITH AUTOMATED DIFF    Collection Time: 10/22/22 10:09 AM   Result Value Ref Range    WBC 13.5 (H) 3.6 - 11.0 K/uL    RBC 3.99 3.80 - 5.20 M/uL    HGB 12.3 11.5 - 16.0 g/dL    HCT 37.3 35.0 - 47.0 %    MCV 93.5 80.0 - 99.0 FL    MCH 30.8 26.0 - 34.0 PG    MCHC 33.0 30.0 - 36.5 g/dL    RDW 13.2 11.5 - 14.5 %    PLATELET 061 974 - 311 K/uL    MPV 10.5 8.9 - 12.9 FL    NRBC 0.0 0.0  WBC    ABSOLUTE NRBC 0.00 0.00 - 0.01 K/uL    NEUTROPHILS 58 32 - 75 %    LYMPHOCYTES 30 12 - 49 %    MONOCYTES 7 5 - 13 %    EOSINOPHILS 4 0 - 7 %    BASOPHILS 1 0 - 1 %    IMMATURE GRANULOCYTES 0 0 - 0.5 %    ABS. NEUTROPHILS 7.9 1.8 - 8.0 K/UL    ABS.  LYMPHOCYTES 4.0 (H) 0.8 - 3.5 K/UL    ABS. MONOCYTES 0.9 0.0 - 1.0 K/UL    ABS. EOSINOPHILS 0.6 (H) 0.0 - 0.4 K/UL    ABS. BASOPHILS 0.1 0.0 - 0.1 K/UL    ABS. IMM. GRANS. 0.1 (H) 0.00 - 0.04 K/UL    DF AUTOMATED         Radiologic Studies -   XR CHEST PORT    (Results Pending)     CT Results  (Last 48 hours)      None          CXR Results  (Last 48 hours)      None              Medical Decision Making   I am the first provider for this patient. I reviewed the vital signs, available nursing notes, past medical history, past surgical history, family history and social history. Vital Signs-Reviewed the patient's vital signs. Patient Vitals for the past 12 hrs:   Pulse Resp BP SpO2   10/22/22 1001 80 14 (!) 153/68 95 %       Pulse Oximetry Analysis - 95% on RA    Cardiac Monitor:   Rate: 62 bpm  Rhythm: Normal Sinus Rhythm        ED EKG interpretation: 10:37 AM  Rhythm: normal sinus rhythm; and regular . Rate (approx.): 66; Axis: normal; FL Interval: normal; QRS interval: normal ; ST/T wave: non-specific changes; This EKG was interpreted by Jolanta Davis MD,ED Provider. Records Reviewed: Nursing Notes and Old Medical Records    Provider Notes (Medical Decision Making):   DDx: CVA, hypoglycemia, electrolyte abnormality, ICH. ED Course:   Initial assessment performed. The patients presenting problems have been discussed, and they are in agreement with the care plan formulated and outlined with them. I have encouraged them to ask questions as they arise throughout their visit. ED Course as of 10/22/22 1341   Sat Oct 22, 2022   1014 Old records reviewed. Patient had a presumptive embolic stroke in June 4668 with primary deficits of aphasia, dysphagia and visual field defects. [CH]      ED Course User Index  [CH] Jazmin Martinez MD         PROGRESS NOTE    Pt reevaluated. No focal deficits. Blood sugar 41. Patient with slightly improved mental status after IV dextrose. Still confused and not at baseline per . Although hypoglycemia is most likely cause of patient's altered mental status patient has a history of prior CVA and is on Eliquis for paroxysmal A. fib and will require CT scan of the head. Via [employer] transfer center spoke with Dr. Allyson Gutierrez, ER attending at [facility] and reviewed patient's history, presentation and her lack of CT availability. She accepted the patient as transfer to [facility] emergency department. There is no indication for tPA. Patient on Eliquis for paroxysmal A. fib. Written by Tami Perdomo MD       [facility] transport was not available to  the patient until 11 PM this evening. Patient back to baseline. Ambulated to bathroom with minimal assistance. No focal deficits. Had a long discussion with patient and . Since we have found a causative factor of the symptoms with hypoglycemia. Since once corrected patient is back to baseline. Felt that CT scan not of utmost importance.  patient and I agreed that this test was not detrimental at this point. We agreed on discharge home. Blood sugar stabilized.  agreed to call EMS to bring her back immediately if any recurrent symptoms. We will cut insulin in half to 20 units in the morning instead of 40. Patient and  verbalized understanding of risks and not obtaining head CT and I discussed the very small possibility of ICH. Critical Care Time:   0    Disposition:  Transfer  PLAN:  1. Current Discharge Medication List        2. Follow-up Information    None       Return to ED if worse     Diagnosis     Clinical Impression:   1. Altered mental status, unspecified altered mental status type    2. Hypoglycemia              Please note that this dictation was completed with [employer] the [employer] voice recognition software.   Quite often unanticipated grammatical, syntax, homophones, and other interpretive errors are inadvertently transcribed by the computer software. Please disregard these errors. Please excuse any errors that have escaped final proofreading.

## 2022-10-22 NOTE — PROGRESS NOTES
Vit D level is too high- Stop Vit D pill. Kidney function is still low- stop Metformin. Needs to see a kidney specialist. Please give her Dr Bridger Hernandez office info and I will place referral. Cholesterol too low- Cut back lipitor to 40mg daily. New script sent to mail order pharmacy. F/U 3 months as planned.  Mychart message me with any questions or concerns

## 2022-10-23 ENCOUNTER — TELEPHONE (OUTPATIENT)
Dept: FAMILY MEDICINE CLINIC | Age: 72
End: 2022-10-23

## 2022-10-23 RX ORDER — INSULIN GLARGINE 100 [IU]/ML
30 INJECTION, SOLUTION SUBCUTANEOUS
Qty: 10 PEN | Refills: 5
Start: 2022-10-23 | End: 2022-10-23 | Stop reason: SDUPTHER

## 2022-10-23 RX ORDER — INSULIN GLARGINE 100 [IU]/ML
30 INJECTION, SOLUTION SUBCUTANEOUS
Qty: 10 PEN | Refills: 5 | Status: SHIPPED | OUTPATIENT
Start: 2022-10-23

## 2022-10-23 NOTE — TELEPHONE ENCOUNTER
Called patient's  who is her main caregiver to follow up for her er visit for hypoglycemia. .I had meant to reduce her insulin when I saw her in the office on the 20th when he had reported the sugar was dropping in the 70's some mornings. Today he was advised to reduce insulin to 30 units and report back BS this week via [employer]. He verbalized understanding.

## 2022-10-24 NOTE — PROGRESS NOTES
Patient identified by Name and Date of birth. Results given as directed by provider.       Could not get appoint with Dee Noland until January so he set up appointment up for Aniceto Plata of Dec (

## 2022-10-25 ENCOUNTER — DOCUMENTATION ONLY (OUTPATIENT)
Dept: FAMILY MEDICINE CLINIC | Age: 72
End: 2022-10-25

## 2022-10-28 ENCOUNTER — TRANSCRIBE ORDER (OUTPATIENT)
Dept: SCHEDULING | Age: 72
End: 2022-10-28

## 2022-10-28 DIAGNOSIS — Z12.31 ENCOUNTER FOR MAMMOGRAM TO ESTABLISH BASELINE MAMMOGRAM: Primary | ICD-10-CM

## 2022-10-28 DIAGNOSIS — I65.23 BILATERAL CAROTID ARTERY STENOSIS: Primary | ICD-10-CM

## 2022-10-29 LAB
ATRIAL RATE: 66 BPM
CALCULATED P AXIS, ECG09: 62 DEGREES
CALCULATED R AXIS, ECG10: 5 DEGREES
CALCULATED T AXIS, ECG11: 6 DEGREES
DIAGNOSIS, 93000: NORMAL
P-R INTERVAL, ECG05: 208 MS
Q-T INTERVAL, ECG07: 434 MS
QRS DURATION, ECG06: 90 MS
QTC CALCULATION (BEZET), ECG08: 454 MS
VENTRICULAR RATE, ECG03: 66 BPM

## 2022-11-07 ENCOUNTER — HOSPITAL ENCOUNTER (OUTPATIENT)
Dept: ULTRASOUND IMAGING | Age: 72
Discharge: HOME | End: 2022-11-07
Attending: NURSE PRACTITIONER
Payer: MEDICARE

## 2022-11-07 DIAGNOSIS — E66.01 SEVERE OBESITY (BMI 35.0-39.9) WITH COMORBIDITY (HCC): ICD-10-CM

## 2022-11-07 DIAGNOSIS — N18.30 STAGE 3 CHRONIC KIDNEY DISEASE, UNSPECIFIED WHETHER STAGE 3A OR 3B CKD (HCC): ICD-10-CM

## 2022-11-07 DIAGNOSIS — E78.5 DYSLIPIDEMIA: ICD-10-CM

## 2022-11-07 DIAGNOSIS — I10 ESSENTIAL HYPERTENSION, BENIGN: ICD-10-CM

## 2022-11-07 DIAGNOSIS — I34.0 MITRAL VALVE INSUFFICIENCY, UNSPECIFIED ETIOLOGY: ICD-10-CM

## 2022-11-07 DIAGNOSIS — I48.0 PAROXYSMAL ATRIAL FIBRILLATION (HCC): ICD-10-CM

## 2022-11-07 DIAGNOSIS — I63.412 EMBOLIC STROKE INVOLVING LEFT MIDDLE CEREBRAL ARTERY (HCC): ICD-10-CM

## 2022-11-07 PROCEDURE — 93306 TTE W/DOPPLER COMPLETE: CPT

## 2022-11-09 LAB
ECHO AO ROOT DIAM: 2.7 CM
ECHO AV PEAK GRADIENT: 9 MMHG
ECHO AV PEAK VELOCITY: 1.5 M/S
ECHO EST RA PRESSURE: 10 MMHG
ECHO LA DIAMETER: 4 CM
ECHO LA TO AORTIC ROOT RATIO: 1.48
ECHO LA VOL 2C: 48 ML (ref 22–52)
ECHO LA VOL 4C: 44 ML (ref 22–52)
ECHO LA VOLUME AREA LENGTH: 50 ML
ECHO LV E' SEPTAL VELOCITY: 6 CM/S
ECHO LV FRACTIONAL SHORTENING: 45 % (ref 28–44)
ECHO LV INTERNAL DIMENSION DIASTOLIC: 4.4 CM (ref 3.9–5.3)
ECHO LV INTERNAL DIMENSION SYSTOLIC: 2.4 CM
ECHO LV IVSD: 1.5 CM (ref 0.6–0.9)
ECHO LV MASS 2D: 253.4 G (ref 67–162)
ECHO LV POSTERIOR WALL DIASTOLIC: 1.4 CM (ref 0.6–0.9)
ECHO LV RELATIVE WALL THICKNESS RATIO: 0.64
ECHO LVOT AREA: 3.8 CM2
ECHO LVOT DIAM: 2.2 CM
ECHO MV A VELOCITY: 0.91 M/S
ECHO MV E DECELERATION TIME (DT): 219.3 MS
ECHO MV E VELOCITY: 0.69 M/S
ECHO MV E/A RATIO: 0.76
ECHO MV E/E' SEPTAL: 11.5
ECHO MV REGURGITANT ALIASING (NYQUIST) VELOCITY: 30 CM/S
ECHO MV REGURGITANT VELOCITY PISA: 4.3 M/S
ECHO MV REGURGITANT VTIA: 168.4 CM
ECHO RIGHT VENTRICULAR SYSTOLIC PRESSURE (RVSP): 34 MMHG
ECHO TV REGURGITANT MAX VELOCITY: 2.44 M/S
ECHO TV REGURGITANT PEAK GRADIENT: 24 MMHG

## 2022-11-09 PROCEDURE — 93306 TTE W/DOPPLER COMPLETE: CPT | Performed by: INTERNAL MEDICINE

## 2022-11-10 ENCOUNTER — PATIENT MESSAGE (OUTPATIENT)
Dept: CARDIOLOGY CLINIC | Age: 72
End: 2022-11-10

## 2022-11-10 NOTE — PROGRESS NOTES
Her echo showed normal heart pumping strength,  moderate - probably severe narrowing across the mitral valve. IF she has cardiac symptoms,  Dr Caryn Pérez recommends LELIA which will give a better visualization of the valves.

## 2022-11-10 NOTE — TELEPHONE ENCOUNTER
Per the pt and spouse, \"very adamant in saying that she has experienced NONE of these symptoms\". They will contact the office if any cardiac symptoms develop.

## 2022-12-07 ENCOUNTER — HOSPITAL ENCOUNTER (OUTPATIENT)
Dept: MAMMOGRAPHY | Age: 72
Discharge: HOME | End: 2022-12-07
Payer: MEDICARE

## 2022-12-07 DIAGNOSIS — Z12.31 ENCOUNTER FOR MAMMOGRAM TO ESTABLISH BASELINE MAMMOGRAM: ICD-10-CM

## 2022-12-07 PROCEDURE — 77067 SCR MAMMO BI INCL CAD: CPT

## 2022-12-14 RX ORDER — HYDROCHLOROTHIAZIDE 25 MG/1
25 TABLET ORAL DAILY
Qty: 90 TABLET | Refills: 0
Start: 2022-12-14 | End: 2022-12-15 | Stop reason: SDUPTHER

## 2022-12-15 ENCOUNTER — TELEPHONE (OUTPATIENT)
Dept: FAMILY MEDICINE CLINIC | Age: 72
End: 2022-12-15

## 2022-12-15 RX ORDER — HYDROCHLOROTHIAZIDE 25 MG/1
25 TABLET ORAL DAILY
Qty: 90 TABLET | Refills: 0 | Status: SHIPPED | OUTPATIENT
Start: 2022-12-15

## 2022-12-15 NOTE — TELEPHONE ENCOUNTER
Can someone please call [facility] and let them know that patient should be taking Lisinopril 40mg daily and NOT Quinapril.  Please D/C Quinapril if it is active on her list.

## 2022-12-16 ENCOUNTER — PATIENT MESSAGE (OUTPATIENT)
Dept: FAMILY MEDICINE CLINIC | Age: 72
End: 2022-12-16

## 2022-12-16 DIAGNOSIS — N18.30 STAGE 3 CHRONIC KIDNEY DISEASE, UNSPECIFIED WHETHER STAGE 3A OR 3B CKD (HCC): Primary | ICD-10-CM

## 2022-12-19 RX ORDER — PEN NEEDLE, DIABETIC 30 GX3/16"
NEEDLE, DISPOSABLE MISCELLANEOUS
Qty: 100 PEN NEEDLE | Refills: 5 | Status: SHIPPED | OUTPATIENT
Start: 2022-12-19

## 2022-12-21 ENCOUNTER — LAB ONLY (OUTPATIENT)
Dept: FAMILY MEDICINE CLINIC | Age: 72
End: 2022-12-21

## 2022-12-21 DIAGNOSIS — N18.30 STAGE 3 CHRONIC KIDNEY DISEASE, UNSPECIFIED WHETHER STAGE 3A OR 3B CKD (HCC): ICD-10-CM

## 2022-12-22 LAB
APPEARANCE UR: ABNORMAL
BACTERIA URNS QL MICRO: ABNORMAL /HPF
BILIRUB UR QL: NEGATIVE
COLOR UR: ABNORMAL
CREAT UR-MCNC: 32.2 MG/DL
EPITH CASTS URNS QL MICRO: ABNORMAL /LPF
GLUCOSE UR STRIP.AUTO-MCNC: NEGATIVE MG/DL
HGB UR QL STRIP: ABNORMAL
HYALINE CASTS URNS QL MICRO: ABNORMAL /LPF (ref 0–5)
KETONES UR QL STRIP.AUTO: NEGATIVE MG/DL
LEUKOCYTE ESTERASE UR QL STRIP.AUTO: ABNORMAL
NITRITE UR QL STRIP.AUTO: POSITIVE
PH UR STRIP: 7.5 [PH] (ref 5–8)
PROT UR STRIP-MCNC: 100 MG/DL
PROT UR-MCNC: 125 MG/DL (ref 0–11.9)
PROT/CREAT UR-RTO: 3.9
RBC #/AREA URNS HPF: ABNORMAL /HPF (ref 0–5)
SP GR UR REFRACTOMETRY: 1.01 (ref 1–1.03)
UA: UC IF INDICATED,UAUC: ABNORMAL
UR CULT HOLD, URHOLD: NORMAL
UROBILINOGEN UR QL STRIP.AUTO: 0.2 EU/DL (ref 0.2–1)
WBC URNS QL MICRO: >100 /HPF (ref 0–4)

## 2022-12-23 DIAGNOSIS — N30.01 ACUTE CYSTITIS WITH HEMATURIA: Primary | ICD-10-CM

## 2022-12-23 RX ORDER — DOXYCYCLINE 100 MG/1
100 TABLET ORAL 2 TIMES DAILY
Qty: 20 TABLET | Refills: 0 | Status: SHIPPED | OUTPATIENT
Start: 2022-12-23 | End: 2023-01-02

## 2022-12-23 NOTE — PROGRESS NOTES
Please fax these labs to her nephrologist Dr Vincenzo Messer with a note stating I will treat her UTI.  Please also let her  know that it looks like she has a UTI and to  her antibiotic from pharmacy

## 2022-12-23 NOTE — PROGRESS NOTES
Patient identified by Name and Date of birth. Results given as directed by provider.       Result faxed to Dr. Eladio Littlejohn [facility])

## 2022-12-24 LAB
BACTERIA SPEC CULT: ABNORMAL
CC UR VC: ABNORMAL
SERVICE CMNT-IMP: ABNORMAL

## 2022-12-28 RX ORDER — AMLODIPINE BESYLATE 10 MG/1
10 TABLET ORAL DAILY
Qty: 90 TABLET | Refills: 3 | Status: SHIPPED | OUTPATIENT
Start: 2022-12-28

## 2022-12-28 RX ORDER — INSULIN GLARGINE 100 [IU]/ML
30 INJECTION, SOLUTION SUBCUTANEOUS
Qty: 10 PEN | Refills: 5 | Status: SHIPPED | OUTPATIENT
Start: 2022-12-28

## 2023-01-04 RX ORDER — DULAGLUTIDE 1.5 MG/.5ML
INJECTION, SOLUTION SUBCUTANEOUS
Qty: 6 ML | Refills: 4 | Status: SHIPPED | OUTPATIENT
Start: 2023-01-04

## 2023-02-15 ENCOUNTER — OFFICE VISIT (OUTPATIENT)
Dept: FAMILY MEDICINE CLINIC | Age: 73
End: 2023-02-15
Payer: MEDICARE

## 2023-02-15 VITALS
RESPIRATION RATE: 18 BRPM | DIASTOLIC BLOOD PRESSURE: 80 MMHG | OXYGEN SATURATION: 98 % | HEIGHT: 65 IN | BODY MASS INDEX: 35.65 KG/M2 | SYSTOLIC BLOOD PRESSURE: 135 MMHG | HEART RATE: 82 BPM | WEIGHT: 214 LBS

## 2023-02-15 DIAGNOSIS — I69.354 HEMIPARESIS AFFECTING LEFT SIDE AS LATE EFFECT OF STROKE (HCC): ICD-10-CM

## 2023-02-15 DIAGNOSIS — Z79.4 TYPE 2 DIABETES MELLITUS WITH DIABETIC POLYNEUROPATHY, WITH LONG-TERM CURRENT USE OF INSULIN (HCC): ICD-10-CM

## 2023-02-15 DIAGNOSIS — E55.9 VITAMIN D DEFICIENCY: ICD-10-CM

## 2023-02-15 DIAGNOSIS — E11.42 TYPE 2 DIABETES MELLITUS WITH DIABETIC POLYNEUROPATHY, WITH LONG-TERM CURRENT USE OF INSULIN (HCC): ICD-10-CM

## 2023-02-15 DIAGNOSIS — I48.0 PAROXYSMAL ATRIAL FIBRILLATION (HCC): ICD-10-CM

## 2023-02-15 DIAGNOSIS — E05.90 HYPERTHYROIDISM: ICD-10-CM

## 2023-02-15 DIAGNOSIS — I10 ESSENTIAL HYPERTENSION, BENIGN: ICD-10-CM

## 2023-02-15 DIAGNOSIS — N18.4 CKD (CHRONIC KIDNEY DISEASE) STAGE 4, GFR 15-29 ML/MIN (HCC): ICD-10-CM

## 2023-02-15 DIAGNOSIS — Z00.00 MEDICARE ANNUAL WELLNESS VISIT, SUBSEQUENT: Primary | ICD-10-CM

## 2023-02-15 PROCEDURE — 36415 COLL VENOUS BLD VENIPUNCTURE: CPT | Performed by: NURSE PRACTITIONER

## 2023-02-15 PROCEDURE — 3079F DIAST BP 80-89 MM HG: CPT | Performed by: NURSE PRACTITIONER

## 2023-02-15 PROCEDURE — G8399 PT W/DXA RESULTS DOCUMENT: HCPCS | Performed by: NURSE PRACTITIONER

## 2023-02-15 PROCEDURE — G9899 SCRN MAM PERF RSLTS DOC: HCPCS | Performed by: NURSE PRACTITIONER

## 2023-02-15 PROCEDURE — G8432 DEP SCR NOT DOC, RNG: HCPCS | Performed by: NURSE PRACTITIONER

## 2023-02-15 PROCEDURE — G8536 NO DOC ELDER MAL SCRN: HCPCS | Performed by: NURSE PRACTITIONER

## 2023-02-15 PROCEDURE — 3075F SYST BP GE 130 - 139MM HG: CPT | Performed by: NURSE PRACTITIONER

## 2023-02-15 PROCEDURE — 3017F COLORECTAL CA SCREEN DOC REV: CPT | Performed by: NURSE PRACTITIONER

## 2023-02-15 PROCEDURE — G0439 PPPS, SUBSEQ VISIT: HCPCS | Performed by: NURSE PRACTITIONER

## 2023-02-15 PROCEDURE — 2022F DILAT RTA XM EVC RTNOPTHY: CPT | Performed by: NURSE PRACTITIONER

## 2023-02-15 PROCEDURE — 3046F HEMOGLOBIN A1C LEVEL >9.0%: CPT | Performed by: NURSE PRACTITIONER

## 2023-02-15 PROCEDURE — 1101F PT FALLS ASSESS-DOCD LE1/YR: CPT | Performed by: NURSE PRACTITIONER

## 2023-02-15 PROCEDURE — G8417 CALC BMI ABV UP PARAM F/U: HCPCS | Performed by: NURSE PRACTITIONER

## 2023-02-15 PROCEDURE — G8427 DOCREV CUR MEDS BY ELIG CLIN: HCPCS | Performed by: NURSE PRACTITIONER

## 2023-02-15 PROCEDURE — 1123F ACP DISCUSS/DSCN MKR DOCD: CPT | Performed by: NURSE PRACTITIONER

## 2023-02-15 NOTE — PROGRESS NOTES
1. \"Have you been to the ER, urgent care clinic since your last visit? Hospitalized since your last visit? \" No    2. \"Have you seen or consulted any other health care providers outside of the [address] [place] [address] since your last visit? \" No     3. For patients aged 39-70: Has the patient had a colonoscopy / FIT/ Cologuard? Yes - no Care Gap present      If the patient is female:    4. For patients aged 41-77: Has the patient had a mammogram within the past 2 years? Yes - no Care Gap present      5. For patients aged 21-65: Has the patient had a pap smear? NA - based on age or sexThis is the Subsequent Medicare Annual Wellness Exam, performed 12 months or more after the Initial AWV or the last Subsequent AWV    I have reviewed the patient's medical history in detail and updated the computerized patient record. Assessment/Plan   Education and counseling provided:  Are appropriate based on today's review and evaluation       Depression Risk Factor Screening     3 most recent PHQ Screens 2/15/2023   PHQ Not Done -   Little interest or pleasure in doing things Not at all   Feeling down, depressed, irritable, or hopeless Not at all   Total Score PHQ 2 0       Alcohol & Drug Abuse Risk Screen    Do you average more than 1 drink per night or more than 7 drinks a week:  No    On any one occasion in the past three months have you have had more than 3 drinks containing alcohol:  No          Functional Ability and Level of Safety    Hearing: Hearing is good. Activities of Daily Living: The home contains: no safety equipment. Patient does total self care      Ambulation: with no difficulty     Fall Risk:  Fall Risk Assessment, last 12 mths 2/15/2023   Able to walk? Yes   Fall in past 12 months? 0   Do you feel unsteady? 0   Are you worried about falling 0   Is TUG test greater than 12 seconds? -   Is the gait abnormal? -   Number of falls in past 12 months -   Fall with injury?  -      Abuse Screen:  Patient is not abused       Cognitive Screening    Has your family/caregiver stated any concerns about your memory: no     Cognitive Screening: Normal - MMSE (Mini Mental Status Exam)    Health Maintenance Due     Health Maintenance Due   Topic Date Due    Diabetic Alb to Cr ratio (uACR) test  12/15/2022    Foot Exam Q1  12/15/2022    Medicare Yearly Exam  12/16/2022     Functional Ability:   Does the patient exhibit a steady gait? yes   How long did it take the patient to get up and walk from a sitting position? 3 seconds   Is the patient self reliant?  (ie can do own laundry, meals, household chores)  yes     Does the patient handle his/her own medications? yes     Does the patient handle his/her own money? yes     Is the patients home safe (ie good lighting, handrails on stairs and bath, etc.)? yes     Did you notice or did patient express any hearing difficulties? no     Did you notice or did patient express any vision difficulties?   no     Were distance and reading eye charts used? no       Advance Care Planning:   Patient was offered the opportunity to discuss advance care planning:  yes     Does patient have an Advance Directive:  yes   If no, did you provide information on Caring Connections?   no     ADL Assessment 2/15/2023   Feeding yourself No Help Needed   Getting from bed to chair No Help Needed   Getting dressed No Help Needed   Bathing or showering No Help Needed   Walk across the room (includes cane/walker) No Help Needed   Using the telphone No Help Needed   Taking your medications No Help Needed   Preparing meals No Help Needed   Managing money (expenses/bills) No Help Needed   Moderately strenuous housework (laundry) No Help Needed   Shopping for personal items (toiletries/medicines) No Help Needed   Shopping for groceries No Help Needed   Driving No Help Needed   Climbing a flight of stairs No Help Needed   Getting to places beyond walking distances No Help Needed       Abuse Screening Questionnaire 2/15/2023   Do you ever feel afraid of your partner? N   Are you in a relationship with someone who physically or mentally threatens you? N   Is it safe for you to go home?  Y      Patient Care Team   Patient Care Team:  Shelbi Mcdowell NP as PCP - General (Nurse Practitioner)  Shelbi Mcdowell NP as PCP - [facility] Empaneled Provider  Lela George NP (Nurse Practitioner)  Antonio Kaur MD (Cardiovascular Disease Physician)  Alejandra Lai MD as Consulting Provider (Endocrinology Physician)    History     Patient Active Problem List   Diagnosis Code    Psoriasis L40.9    Essential hypertension, benign I10    Rosacea L71.9    Heart murmur R01.1    Type 2 diabetes mellitus with hyperglycemia, with long-term current use of insulin ([facility]) E11.65, Z79.4    Obesity, morbid ([facility]) E66.01    Type 2 diabetes mellitus with chronic kidney disease ([facility] E11.22    Acute ischemic cerebrovascular accident (CVA) involving right middle cerebral artery [facility]) I63.511    Cerebrovascular accident (CVA) due to thrombosis of right middle cerebral artery ([facility] I63.311    Paroxysmal atrial fibrillation ([facility] I48.0    Dyslipidemia E78.5    CVA (cerebral vascular accident) ([facility] I63.9    Diabetic peripheral neuropathy associated with type 2 diabetes mellitus ([facility] E11.42    Bilateral carotid artery stenosis K08.02    Embolic stroke involving left middle cerebral artery ([facility]) I47.746    CKD (chronic kidney disease) stage 3, GFR 30-59 ml/min ([facility]) N18.30    Low TSH level R79.89    Chronic renal disease, stage III N18.30    CKD (chronic kidney disease) stage 4, GFR 15-29 ml/min ([facility]) N18.4    Hyperthyroidism E05.90     Past Medical History:   Diagnosis Date    Diabetes ([facility]     Hypertension     Menopause     Murmur     Osteopenia     Psoriasis     Rosacea     Stroke ([facility]     Vitamin D deficiency       Past Surgical History:   Procedure Laterality Date    COLONOSCOPY N/A 4/4/2018    COLONOSCOPY   DIABETIC performed by Hay Davidson MD at [address]      HX COLONOSCOPY  2018    3 polyps    HX TONSILLECTOMY       Current Outpatient Medications   Medication Sig Dispense Refill    dulaglutide (Trulicity) 1.5 VG/9.7 mL sub-q pen INJECT 0.5ML (=1.5 MG)     SUBCUTANEOUSLY EVERY 7     DAYS 6 mL 4    hydroCHLOROthiazide (HYDRODIURIL) 25 mg tablet TAKE 1 TABLET DAILY (DOSE  INCREASED) 90 Tablet 3    lisinopriL (PRINIVIL, ZESTRIL) 40 mg tablet TAKE 1 TABLET DAILY 90 Tablet 3    amLODIPine (NORVASC) 10 mg tablet Take 1 Tablet by mouth daily. 90 Tablet 3    Basaglar KwikPen U-100 Insulin 100 unit/mL (3 mL) inpn 30 Units by SubCUTAneous route every morning. 10 Pen 5    Insulin Needles, Disposable, 31 gauge x 5/16\" ndle Use as directed 100 Pen Needle 5    apixaban (Eliquis) 5 mg tablet TAKE 1 TABLET TWICE A  Tablet 1    atorvastatin (LIPITOR) 40 mg tablet Take 1 Tablet by mouth daily. (Patient taking differently: Take 80 mg by mouth daily.) 90 Tablet 1    metoprolol succinate (TOPROL-XL) 100 mg tablet Take 1 Tablet by mouth daily. 90 Tablet 3    methIMAzole (TAPAZOLE) 5 mg tablet TAKE 1/2 TABLET MONDAY,    WEDNESDAY, AND FRIDAY 20 Tablet 5    Accu-Chek Guide test strips strip CHECK GLUCOSE TWO TIMES A   Strip 3    multivitamin (ONE A DAY) tablet Take 1 Tab by mouth daily. aspirin 81 mg chewable tablet Take 81 mg by mouth daily. Blood-Glucose Meter monitoring kit Check blood sugar twice daily.  1 Kit 0     Allergies   Allergen Reactions    Pcn [Penicillins] Rash    Sulfa (Sulfonamide Antibiotics) Unknown (comments)       Family History   Problem Relation Age of Onset    Diabetes Mother     Heart Disease Father     No Known Problems Child      Social History     Tobacco Use    Smoking status: Never    Smokeless tobacco: Never   Substance Use Topics    Alcohol use: Yes     Comment: 1/couple months         Dianne Prado NP

## 2023-02-15 NOTE — PROGRESS NOTES
Subjective:     CC: diabetes, HTN, CKD     Wilfrido Cordoba is a 74 y.o. female, accompanied by her , who presents today for a 4 month follow up for type 2 diabetes, HTN, CKD, and other chronic medical problems. Type 2 diabetes  Lab Results   Component Value Date/Time    Hemoglobin A1c 6.4 (H) 10/20/2022 02:26 PM     She is no longer on Metformin due to CKD. She continues to take Trulicity 7.7YQ q 7 days, and Basaglar insulin 30 units daily.  states home BS at home are overall at goal. He reports some low readings in the mornings but they gradually rise after she eats breakfast.    She does have some peripheral neuropathy in the feet. Last eye exam: (June 2022)    HTN with stage 4 CKD    Lab Results   Component Value Date/Time    Creatinine 2.04 (H) 10/22/2022 10:09 AM     BP at goal.  She is on 25mg of HTCZ daily, Amlodipine 10mg daily, Lisinopril 40mg daily, and Metoprolol XR 100mg daily. Denies CP, SOB, or dizziness, She does have some chronic swelling in the ankles. She does not take NSAIDS. She is now seeing nephrologist Dr Elham Colindres, next appt March 17th, will repeat labs today and fax to him. Hyperthyroidism  She was diagnosed in 1/2021 and referred to Rahul Lee. She had no thyroid antibodies. Her thyroid nuclear uptake scan showed a dominant nodule in the right lobe with normal uptake of tracer. She was prescribed Methimazole. She followed up with Dr August Gonzales in Feb 2022 and he reduced her Methimazole to 2.5mg TIW. TSH was at goal 6 months ago. Will recheck today, has upcoming appt with endo 2-20-23. Lab Results   Component Value Date/Time    TSH 0.37 08/17/2022 02:40 PM         H/O CVA with hemiparesis   She was admitted to [facility] in June of 2019 for CVA and then spent 2 weeks in [facility]. This was her second CVA. The first caused weakness and the last one affected her speech and mental status.  helps with ADLs, meals, and medication management.   She is on a statin daily and takes Eliquis for A-fib. Lab Results   Component Value Date/Time    Cholesterol, total 116 10/20/2022 02:26 PM    HDL Cholesterol 59 10/20/2022 02:26 PM    LDL, calculated 39.6 10/20/2022 02:26 PM    VLDL, calculated 17.4 10/20/2022 02:26 PM    Triglyceride 87 10/20/2022 02:26 PM    CHOL/HDL Ratio 2.0 10/20/2022 02:26 PM       A-fib  On Eliquis for stroke prophylaxis. Denies black or bloody stools. Followed by cardiologist Dr Eugene Trinidad who has retired. Saw NP Loan Carneyson on 10-13-22. No changes were made. Next visit is scheduled for 4-20-23. Lab Results   Component Value Date/Time    HGB (POC) 13.8 11/20/2015 01:36 PM    HGB 12.3 10/22/2022 10:09 AM        Vitamin D deficiency  She used to take Vit D but was told to stop after her last OV due to elevated level. Will recheck today. Lab Results   Component Value Date/Time    Vitamin D 25-Hydroxy 106.9 (H) 10/20/2022 02:26 PM       Health maintenance  Mammogram- done  (normal)   PNA vaccines up to date  Flu shot - up to date   Shingrix- she is not a candidate, never had the chicken pox   Covid 19 vaccine- she had both Moderna vaccines, boosted in .  Bivalent given 9-27-22      Patient Active Problem List   Diagnosis Code    Psoriasis L40.9    Essential hypertension, benign I10    Rosacea L71.9    Heart murmur R01.1    Type 2 diabetes mellitus with hyperglycemia, with long-term current use of insulin ([facility]) E11.65, Z79.4    Obesity, morbid ([facility]) E66.01    Type 2 diabetes mellitus with chronic kidney disease ([facility]) E11.22    Acute ischemic cerebrovascular accident (CVA) involving right middle cerebral artery territory [facility]) I63.511    Cerebrovascular accident (CVA) due to thrombosis of right middle cerebral artery ([facility] I63.311    Paroxysmal atrial fibrillation (HCC) I48.0    Dyslipidemia E78.5    CVA (cerebral vascular accident) ([facility] I63.9    Diabetic peripheral neuropathy associated with type 2 diabetes mellitus [facility] E11.42    Bilateral carotid artery stenosis K72.57    Embolic stroke involving left middle cerebral artery (HCC) P85.499    CKD (chronic kidney disease) stage 3, GFR 30-59 ml/min ([facility]) N18.30    Low TSH level R79.89    Chronic renal disease, stage III N18.30    CKD (chronic kidney disease) stage 4, GFR 15-29 ml/min ([facility]) N18.4    Hyperthyroidism E05.90       Past Medical History:   Diagnosis Date    [facility]     Hypertension     Menopause     Murmur     Osteopenia     Psoriasis     Rosacea     Stroke ([facility]     Vitamin D deficiency          Current Outpatient Medications:     dulaglutide (Trulicity) 1.5 PG/8.2 mL sub-q pen, INJECT 0.5ML (=1.5 MG)     SUBCUTANEOUSLY EVERY 7     DAYS, Disp: 6 mL, Rfl: 4    hydroCHLOROthiazide (HYDRODIURIL) 25 mg tablet, TAKE 1 TABLET DAILY (DOSE  INCREASED), Disp: 90 Tablet, Rfl: 3    lisinopriL (PRINIVIL, ZESTRIL) 40 mg tablet, TAKE 1 TABLET DAILY, Disp: 90 Tablet, Rfl: 3    amLODIPine (NORVASC) 10 mg tablet, Take 1 Tablet by mouth daily. , Disp: 90 Tablet, Rfl: 3    Basaglar KwikPen U-100 Insulin 100 unit/mL (3 mL) inpn, 30 Units by SubCUTAneous route every morning., Disp: 10 Pen, Rfl: 5    Insulin Needles, Disposable, 31 gauge x 5/16\" ndle, Use as directed, Disp: 100 Pen Needle, Rfl: 5    apixaban (Eliquis) 5 mg tablet, TAKE 1 TABLET TWICE A DAY, Disp: 180 Tablet, Rfl: 1    atorvastatin (LIPITOR) 40 mg tablet, Take 1 Tablet by mouth daily. (Patient taking differently: Take 80 mg by mouth daily.), Disp: 90 Tablet, Rfl: 1    metoprolol succinate (TOPROL-XL) 100 mg tablet, Take 1 Tablet by mouth daily. , Disp: 90 Tablet, Rfl: 3    methIMAzole (TAPAZOLE) 5 mg tablet, TAKE 1/2 TABLET MONDAY,    WEDNESDAY, AND FRIDAY, Disp: 20 Tablet, Rfl: 5    Accu-Chek Guide test strips strip, CHECK GLUCOSE TWO TIMES A  DAY, Disp: 200 Strip, Rfl: 3    multivitamin (ONE A DAY) tablet, Take 1 Tab by mouth daily. , Disp: , Rfl:     aspirin 81 mg chewable tablet, Take 81 mg by mouth daily. , Disp: , Rfl:     Blood-Glucose Meter monitoring kit, Check blood sugar twice daily. , Disp: 1 Kit, Rfl: 0    Allergies   Allergen Reactions    Pcn [Penicillins] Rash    Sulfa (Sulfonamide Antibiotics) Unknown (comments)       Past Surgical History:   Procedure Laterality Date    COLONOSCOPY N/A 4/4/2018    COLONOSCOPY   DIABETIC performed by Angel Luis Moura MD at [place] [address]     [facility]      HX COLONOSCOPY  2018    3 polyps    HX TONSILLECTOMY         Social History     Tobacco Use   Smoking Status Never   Smokeless Tobacco Never       Social History     Socioeconomic History    Marital status:    Tobacco Use    Smoking status: Never    Smokeless tobacco: Never   Vaping Use    Vaping Use: Never used   Substance and Sexual Activity    Alcohol use: Yes     Comment: 1/couple months    Drug use: Never     Social Determinants of Health     Food Insecurity: No Food Insecurity    Worried About Running Out of Food in the Last Year: Never true    Ran Out of Food in the Last Year: Never true       Family History   Problem Relation Age of Onset    Diabetes Mother     Heart Disease Father     No Known Problems Child      ROS:  Gen: denies fever, chills, or fatigue  HEENT:denies H/A, nosebleeds, or bleeding gums  Resp: denies dyspnea, cough, or wheezing  CV: denies chest pain, pressure, or palpitations  GI: denies abd pain, N/V, melena or hematochezia  : Denies dysuria, hematuria, or urinary frequency/urgency  Extremeties: +mild edema in ankles  Musculoskeletal: Denies joint pain, stiffness, or muscle cramps  Neuro: denies numbness/tingling or dizziness  Endo: denies polyuria or polydipsia  Skin: denies rashes or new lesions  Psych: denies anxiety or depression    Objective:     Visit Vitals  /80 (BP 1 Location: Left upper arm, BP Patient Position: At rest, BP Cuff Size: Adult)   Pulse 82   Resp 18   Ht 5' 5\" (1.651 m)   Wt 214 lb (97.1 kg)   SpO2 98%   BMI 35.61 kg/m²       General: Alert and oriented. No acute distress. +Obese. Eyes: Sclera white, conjunctiva clear. PERRLA. Neck: Supple with FROM. No carotid bruits  Lungs: Breathing even and unlabored. All lobes clear to auscultation bilaterally   Heart :RRR, S1 and S2 normal intensity, no extra heart sounds  Extremities: +mild edema to both ankles, pedal pulses 2+  Neuro: Cranial nerves grossly normal.   Musculoskeletal: Joints are without swelling, erythema, or heat. Psych: Mood and thought content appropriate for situation. Dressed appropriately and with good hygiene. Skin: Warm and dry and intact    Diabetic foot exam  Bilateral feet are warm and dry. Skin intact without ulcerations. No pedal edema, bilateral pedal pulses 2+ bilaterally  Sensation to light touch is impaired with monofilament testing        Assessment/ Plan:     HTN with CKD stage 4  BP at goal  Check renal fn panel, CBC, ferritin, and Vit D- will fax labs to nephrologist- follow up with neph as scheduled  Cont current meds  Avoid NSAIDS  Low-sodium diet  RTO or go to ER for any CP, SOB, dizziness, or swelling. F/U: 6 months     DM  Check X5R   Cont Trulicity  Cont Basaglar insulin 30 units every morning  Call for any problems  Work on diet  F/U 6 months    A-fib  Stable  Cont Eliquis for stroke prophylaxis  Report any recurrent nosebleeds or other s/s of abnormal bleeding  Check CBC  F/U with cardiologist as scheduled  F/U 6 monhs. Hyperthyroidism  Check TSH, Free T4 and Total T3- will fax to Endo  Cont methimazole 5mg daily  F/U with Dr Alston Najjar as scheduled    Hx of CVA with hemiparesis  Cont statin and asa  Keep BP and BS under good control  Go to ER for sudden unilateral numbness/tingling, weakness, facial drooping, slurred speech, confusion, AMS, or dizziness    Elevated serum Vit D   Check Vit D level            Verbal and written instructions (see AVS) provided. Patient expresses understanding of diagnosis and treatment plan.     Health Maintenance Due   Topic Date Due    Diabetic Alb to Cr ratio (uACR) test  12/15/2022    Foot Exam Q1  12/15/2022    Medicare Yearly Exam  12/16/2022               Katty Ramos, NP

## 2023-02-15 NOTE — PATIENT INSTRUCTIONS
Medicare Wellness Visit, Female     The best way to live healthy is to have a lifestyle where you eat a well-balanced diet, exercise regularly, limit alcohol use, and quit all forms of tobacco/nicotine, if applicable. Regular preventive services are another way to keep healthy. Preventive services (vaccines, screening tests, monitoring & exams) can help personalize your care plan, which helps you manage your own care. Screening tests can find health problems at the earliest stages, when they are easiest to treat. [facility] follows the current, evidence-based guidelines published by the [employer] ([facility]) when recommending preventive services for our patients. Because we follow these guidelines, sometimes recommendations change over time as research supports it. (For example, mammograms used to be recommended annually. Even though Medicare will still pay for an annual mammogram, the newer guidelines recommend a mammogram every two years for women of average risk). Of course, you and your doctor may decide to screen more often for some diseases, based on your risk and your co-morbidities (chronic disease you are already diagnosed with). Preventive services for you include:  - Medicare offers their members a free annual wellness visit, which is time for you and your primary care provider to discuss and plan for your preventive service needs.  Take advantage of this benefit every year!    -Over the age of 72 should receive the recommended pneumonia vaccines.    -All adults should have a flu vaccine yearly.  -All adults should have a tetanus vaccine every 10 years.   -Over the age 48 should receive the shingles vaccines.        -All adults should be screened once for Hepatitis C.  -All adults age 38-68 who are overweight should have a diabetes screening test once every three years.   -Other screening tests and preventive services for persons with diabetes include: an eye exam to screen for diabetic retinopathy, a kidney function test, a foot exam, and stricter control over your cholesterol.   -Cardiovascular screening for adults with routine risk involves an electrocardiogram (ECG) at intervals determined by your doctor.     -Colorectal cancer screenings should be done for adults age 39-70 with no increased risk factors for colorectal cancer. There are a number of acceptable methods of screening for this type of cancer. Each test has its own benefits and drawbacks. Discuss with your doctor what is most appropriate for you during your annual wellness visit. The different tests include: colonoscopy (considered the best screening method), a fecal occult blood test, a fecal DNA test, and sigmoidoscopy.    -Lung cancer screening is recommended annually with a low dose CT scan for adults between age 54 and 68, who have smoked at least 30 pack years (equivalent of 1 pack per day for 30 days), and who is a current smoker or quit less than 15 years ago.    -A bone mass density test is recommended when a woman turns 65 to screen for osteoporosis. This test is only recommended one time, as a screening. Some providers will use this same test as a disease monitoring tool if you already have osteoporosis. -Breast cancer screenings are recommended every other year for women of normal risk, age 54-69.    -Cervical cancer screenings for women over age 72 are only recommended with certain risk factors.      Here is a list of your current Health Maintenance items (your personalized list of preventive services) with a due date:  Health Maintenance Due   Topic Date Due   Ophelia Barriga  12/15/2022    Diabetic Foot Care  12/15/2022

## 2023-02-16 LAB
25(OH)D3 SERPL-MCNC: 46.4 NG/ML (ref 30–100)
ALBUMIN SERPL-MCNC: 2.7 G/DL (ref 3.5–5)
ANION GAP SERPL CALC-SCNC: 6 MMOL/L (ref 5–15)
BUN SERPL-MCNC: 37 MG/DL (ref 6–20)
BUN/CREAT SERPL: 19 (ref 12–20)
CALCIUM SERPL-MCNC: 9.3 MG/DL (ref 8.5–10.1)
CHLORIDE SERPL-SCNC: 110 MMOL/L (ref 97–108)
CO2 SERPL-SCNC: 26 MMOL/L (ref 21–32)
CREAT SERPL-MCNC: 1.96 MG/DL (ref 0.55–1.02)
ERYTHROCYTE [DISTWIDTH] IN BLOOD BY AUTOMATED COUNT: 12.8 % (ref 11.5–14.5)
EST. AVERAGE GLUCOSE BLD GHB EST-MCNC: 160 MG/DL
FERRITIN SERPL-MCNC: 59 NG/ML (ref 26–388)
GLUCOSE SERPL-MCNC: 158 MG/DL (ref 65–100)
HBA1C MFR BLD: 7.2 % (ref 4–5.6)
HCT VFR BLD AUTO: 37.5 % (ref 35–47)
HGB BLD-MCNC: 11.7 G/DL (ref 11.5–16)
MCH RBC QN AUTO: 29.3 PG (ref 26–34)
MCHC RBC AUTO-ENTMCNC: 31.2 G/DL (ref 30–36.5)
MCV RBC AUTO: 94 FL (ref 80–99)
NRBC # BLD: 0 K/UL (ref 0–0.01)
NRBC BLD-RTO: 0 PER 100 WBC
PHOSPHATE SERPL-MCNC: 4.1 MG/DL (ref 2.6–4.7)
PLATELET # BLD AUTO: 219 K/UL (ref 150–400)
PMV BLD AUTO: 11.5 FL (ref 8.9–12.9)
POTASSIUM SERPL-SCNC: 5.1 MMOL/L (ref 3.5–5.1)
RBC # BLD AUTO: 3.99 M/UL (ref 3.8–5.2)
SODIUM SERPL-SCNC: 142 MMOL/L (ref 136–145)
T4 FREE SERPL-MCNC: 1 NG/DL (ref 0.8–1.5)
TSH SERPL DL<=0.05 MIU/L-ACNC: 1.22 UIU/ML (ref 0.36–3.74)
WBC # BLD AUTO: 10.2 K/UL (ref 3.6–11)

## 2023-02-17 LAB — T3 SERPL-MCNC: 124 NG/DL (ref 71–180)

## 2023-02-20 ENCOUNTER — OFFICE VISIT (OUTPATIENT)
Dept: ENDOCRINOLOGY | Age: 73
End: 2023-02-20
Payer: MEDICARE

## 2023-02-20 VITALS
WEIGHT: 215.6 LBS | BODY MASS INDEX: 35.92 KG/M2 | HEART RATE: 57 BPM | SYSTOLIC BLOOD PRESSURE: 130 MMHG | DIASTOLIC BLOOD PRESSURE: 60 MMHG | HEIGHT: 65 IN

## 2023-02-20 DIAGNOSIS — E05.90 HYPERTHYROIDISM: Primary | ICD-10-CM

## 2023-02-20 PROCEDURE — G8536 NO DOC ELDER MAL SCRN: HCPCS | Performed by: INTERNAL MEDICINE

## 2023-02-20 PROCEDURE — G8432 DEP SCR NOT DOC, RNG: HCPCS | Performed by: INTERNAL MEDICINE

## 2023-02-20 PROCEDURE — 3078F DIAST BP <80 MM HG: CPT | Performed by: INTERNAL MEDICINE

## 2023-02-20 PROCEDURE — 3075F SYST BP GE 130 - 139MM HG: CPT | Performed by: INTERNAL MEDICINE

## 2023-02-20 PROCEDURE — 99214 OFFICE O/P EST MOD 30 MIN: CPT | Performed by: INTERNAL MEDICINE

## 2023-02-20 PROCEDURE — 1090F PRES/ABSN URINE INCON ASSESS: CPT | Performed by: INTERNAL MEDICINE

## 2023-02-20 PROCEDURE — G8427 DOCREV CUR MEDS BY ELIG CLIN: HCPCS | Performed by: INTERNAL MEDICINE

## 2023-02-20 PROCEDURE — G9899 SCRN MAM PERF RSLTS DOC: HCPCS | Performed by: INTERNAL MEDICINE

## 2023-02-20 PROCEDURE — G8417 CALC BMI ABV UP PARAM F/U: HCPCS | Performed by: INTERNAL MEDICINE

## 2023-02-20 PROCEDURE — 1123F ACP DISCUSS/DSCN MKR DOCD: CPT | Performed by: INTERNAL MEDICINE

## 2023-02-20 PROCEDURE — G8399 PT W/DXA RESULTS DOCUMENT: HCPCS | Performed by: INTERNAL MEDICINE

## 2023-02-20 PROCEDURE — 3017F COLORECTAL CA SCREEN DOC REV: CPT | Performed by: INTERNAL MEDICINE

## 2023-02-20 PROCEDURE — 1101F PT FALLS ASSESS-DOCD LE1/YR: CPT | Performed by: INTERNAL MEDICINE

## 2023-02-20 RX ORDER — METHIMAZOLE 5 MG/1
TABLET ORAL
Qty: 45 TABLET | Refills: 3 | Status: SHIPPED | OUTPATIENT
Start: 2023-02-20

## 2023-02-20 NOTE — LETTER
2/20/2023    Patient: Yehuda Reid   YOB: 1950   Date of Visit: 2/20/2023     Eric Fried NP  [address] [facility]  Via In Basket    Dear Eric Fried NP,      Thank you for referring Ms. Brina Barker to [facility] for evaluation. My notes for this consultation are attached. If you have questions, please do not hesitate to call me. I look forward to following your patient along with you.       Sincerely,    Jocelyne Randhawa MD

## 2023-02-20 NOTE — PROGRESS NOTES
Chief Complaint   Patient presents with    Thyroid Problem     Pcp and pharmacy verified     History of Present Illness: Prasanna Ho is a 74 y.o. 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 She was seen for initial visit in January 2021 for low TSH. She had a thyroid nuclear scan which identified a toxic thyroid nodule in the right thyroid lobe. Ms Shruthi Randall and Dr. Brigitte Nur agreed to proceed with low dose MMI rather than surgery or GUNN. At their last visit in April 2021 Dr. Brigitte Nur decreased her MMI to 2.5mg daily, because her TSH was 6.77. At our  visit in February 2022 her FT4 was 0.99 with TT3 of 89. I instructed her to decrease the MMI to 2.5mg every other day. After 6 weeks her TSH was 0.709 with FT4 of 1.13 and TT3 of 123. Pt was instructed to continue the MMI 2.5mg every other day. Pt denies any recent illnesses, injuries or hospitalizations. Pt son notes that she is still taking the MMI 2.5mg every other day. She denies issues of dysphagia or dysphonia. She denies issues of CP, SOB, palpitations, tremors, heat or cold intolerance, diarrhea or constipation. Pt denies issues of Abdominal pain, N/V or Jaundice. Current Outpatient Medications   Medication Sig    dulaglutide (Trulicity) 1.5 QY/6.7 mL sub-q pen INJECT 0.5ML (=1.5 MG)     SUBCUTANEOUSLY EVERY 7     DAYS    hydroCHLOROthiazide (HYDRODIURIL) 25 mg tablet TAKE 1 TABLET DAILY (DOSE  INCREASED)    lisinopriL (PRINIVIL, ZESTRIL) 40 mg tablet TAKE 1 TABLET DAILY    amLODIPine (NORVASC) 10 mg tablet Take 1 Tablet by mouth daily. Basaglar KwikPen U-100 Insulin 100 unit/mL (3 mL) inpn 30 Units by SubCUTAneous route every morning. Insulin Needles, Disposable, 31 gauge x 5/16\" ndle Use as directed    apixaban (Eliquis) 5 mg tablet TAKE 1 TABLET TWICE A DAY    atorvastatin (LIPITOR) 40 mg tablet Take 1 Tablet by mouth daily.     metoprolol succinate (TOPROL-XL) 100 mg tablet Take 1 Tablet by mouth daily.    methIMAzole (TAPAZOLE) 5 mg tablet TAKE 1/2 TABLET MONDAY,    WEDNESDAY, AND FRIDAY    Accu-Chek Guide test strips strip CHECK GLUCOSE TWO TIMES A  DAY    multivitamin (ONE A DAY) tablet Take 1 Tab by mouth daily. aspirin 81 mg chewable tablet Take 81 mg by mouth daily. Blood-Glucose Meter monitoring kit Check blood sugar twice daily. No current facility-administered medications for this visit. Allergies   Allergen Reactions    Pcn [Penicillins] Rash    Sulfa (Sulfonamide Antibiotics) Unknown (comments)     Review of Systems:  - Cardiovascular: no chest pain  - Neurological: no tremors  - Integumentary: skin is normal    Physical Examination:  Blood pressure 130/60, pulse (!) 57, height 5' 5\" (1.651 m), weight 215 lb 9.6 oz (97.8 kg).   General: pleasant, no distress, good eye contact   Neck: no thyromegaly or thyroid bruits  Cardiovascular: regular, normal rate, nl s1 and s2, no m/r/g   Integumentary: skin is normal, no edema  Neurological: reflexes 2+ at biceps, no tremors  Psychiatric: normal mood and affect    Data Reviewed:   Component      Latest Ref Rng & Units 2/15/2023   Sodium      136 - 145 mmol/L 142   Potassium      3.5 - 5.1 mmol/L 5.1   Chloride      97 - 108 mmol/L 110 (H)   CO2      21 - 32 mmol/L 26   Anion gap      5 - 15 mmol/L 6   Glucose      65 - 100 mg/dL 158 (H)   BUN      6 - 20 MG/DL 37 (H)   Creatinine      0.55 - 1.02 MG/DL 1.96 (H)   BUN/Creatinine ratio      12 - 20   19   eGFR      >60 ml/min/1.73m2 26 (L)   Calcium      8.5 - 10.1 MG/DL 9.3   Phosphorus      2.6 - 4.7 MG/DL 4.1   Albumin      3.5 - 5.0 g/dL 2.7 (L)   WBC      3.6 - 11.0 K/uL 10.2   RBC      3.80 - 5.20 M/uL 3.99   HGB      11.5 - 16.0 g/dL 11.7   HCT      35.0 - 47.0 % 37.5   MCV      80.0 - 99.0 FL 94.0   MCH      26.0 - 34.0 PG 29.3   MCHC      30.0 - 36.5 g/dL 31.2   RDW      11.5 - 14.5 % 12.8   PLATELET      789 - 346 K/uL 219   MPV      8.9 - 12.9 FL 11.5   NRBC      0  WBC 0.0 ABSOLUTE NRBC      0.00 - 0.01 K/uL 0.00   T3, total      71 - 180 ng/dL 124   T4, Free      0.8 - 1.5 NG/DL 1.0   TSH      0.36 - 3.74 uIU/mL 1.22       Assessment/Plan:   1) Hyperthyroidism > Pt is clinically and biochemically euthyroid on MMI 2.5mg every other day. Pt to continue the MMI 2.5mg every other day. Pt voices understanding and agreement with the plan.     RTC 6 months      Copy sent to:  Neal Ramírez

## 2023-03-13 RX ORDER — ATORVASTATIN CALCIUM 40 MG/1
40 TABLET, FILM COATED ORAL DAILY
Qty: 90 TABLET | Refills: 3 | Status: SHIPPED | OUTPATIENT
Start: 2023-03-13

## 2023-03-18 NOTE — PROGRESS NOTES
A1c is higher. Increase Lantus to 55 units daily. Monitor glucose several times a day and call with blood sugars in 3 weeks.  May need to titrate Lantus further Central Prior Authorization Team   Phone: [phone]    PA Initiation    Medication: Saxenda 18mg/3ml  Insurance Company:    Pharmacy Filling the Rx: [facility] - [id number]  Filling Pharmacy Phone: [phone]  Filling Pharmacy Fax: [phone]  Start Date: 3/18/2023

## 2023-04-17 NOTE — PROGRESS NOTES
Lauren Leahy is a 74 y.o. female is here for routine f/u. Hx DM II, diabetic nephropathy, hypertension, dyslipidemia, CKD 3, obesity, with acute R MCA CVA 1/19. W/u with Echo/doppler 1/1/8/19 with mild to mod LVH, LVEF 65-70, normal atrial sizes, mild MR. Head CT/MRI/MRA with multiple sites of infarct R temporo-occipital, R parietal, R cerebellar, coronoradiatal  C/w embolic events. Subsequent Loop/Event monitor x 2 weeks showed NSR with episodes of paroxysmal afib/RVR. Seen in f/u by Dr. Keyonna Munson and started on Eliquis 5mg bid  (CHADs2-VASC = 6--DM, hypertension, female, >65, prior CVA (CVA 9.0%/JU, embolic/CVA/TIA 14.4%/FN). Hospitalized in June 2019 with acute CVA. Unfortunately, the confusion with the patients medications by pt/ which stopped Eliquis vs stopping ASA likely culprit of current CVA. This is not considered a failure of treatment as patient was not actually on Eliquis since 4/16/19. On BB and amlodipine. Continues to see PCP with recent OV/labs. Last seen by me in 10/2022    She continues to see PCP and Dr Vidya Ramirez for her  thyroid and last seen by him in 2/2023    Today,    No cardiac complaints. Does not engage in exercise. She  is on [facility], reports no melena, hematuria, or obvious signs of bleeding. No falls. The patient denies chest pain/ shortness of breath, orthopnea, PND, LE edema, palpitations, syncope, presyncope or fatigue.          Patient Active Problem List    Diagnosis Date Noted    Hemiparesis affecting left side as late effect of stroke ([facility] 02/15/2023    CKD (chronic kidney disease) stage 4, GFR 15-29 ml/min ([address] 10/20/2022    Hyperthyroidism 10/20/2022    Chronic renal disease, stage III 06/20/2022    Low TSH level 09/17/2020    CKD (chronic kidney disease) stage 3, GFR 30-59 ml/min ([facility]) 01/13/2020    Diabetic peripheral neuropathy associated with type 2 diabetes mellitus ([address] 06/10/2019    Bilateral carotid artery stenosis 06/10/2019 Embolic stroke involving left middle cerebral artery ([facility] 06/10/2019    CVA (cerebral vascular accident) ([facility] 06/09/2019    Paroxysmal atrial fibrillation ([address] 04/16/2019    Dyslipidemia 04/16/2019    Cerebrovascular accident (CVA) due to thrombosis of right middle cerebral artery ([address] 01/18/2019    Acute ischemic cerebrovascular accident (CVA) involving right middle cerebral artery territory ([address] 01/17/2019    Obesity, morbid ([address] 12/26/2017    Type 2 diabetes mellitus with chronic kidney disease ([facility] 12/26/2017    Type 2 diabetes mellitus with hyperglycemia, with long-term current use of insulin ([facility] 04/03/2017    Psoriasis 08/11/2014    Essential hypertension, benign 08/11/2014    Rosacea 08/11/2014    Heart murmur 08/11/2014      Teri CANO NP  Past Medical History:   Diagnosis Date    Diabetes ([facility]     Hypertension     Menopause     Murmur     Osteopenia     Psoriasis     Rosacea     Stroke ([facility]     Vitamin D deficiency       Past Surgical History:   Procedure Laterality Date    COLONOSCOPY N/A 4/4/2018    COLONOSCOPY   DIABETIC performed by Benita Castillo MD at [place] [id number]     [facility] COLONOSCOPY  2018    3 polyps    HX TONSILLECTOMY       Allergies   Allergen Reactions    Pcn [Penicillins] Rash    Sulfa (Sulfonamide Antibiotics) Unknown (comments)      Family History   Problem Relation Age of Onset    Diabetes Mother     Heart Disease Father     No Known Problems Child       Social History     Socioeconomic History    Marital status:      Spouse name: Not on file    Number of children: Not on file    Years of education: Not on file    Highest education level: Not on file   Occupational History    Not on file   Tobacco Use    Smoking status: Never    Smokeless tobacco: Never   Vaping Use    Vaping Use: Never used   Substance and Sexual Activity    Alcohol use: Yes     Comment: 1/couple months    Drug use: Never    Sexual activity: Not on file   Other Topics Concern Not on file   Social History Narrative    Not on file     Social Determinants of Health     Financial Resource Strain: Low Risk     Difficulty of Paying Living Expenses: Not very hard   Food Insecurity: No Food Insecurity    Worried About Running Out of Food in the Last Year: Never true    Ran Out of Food in the Last Year: Never true   Transportation Needs: Not on file   Physical Activity: Not on file   Stress: Not on file   Social Connections: Not on file   Intimate Partner Violence: Not on file   Housing Stability: [address] to Pay for Housing in the Last Year: No    Number of [place] in the Last Year: 1    Unstable Housing in the Last Year: No      Current Outpatient Medications   Medication Sig    dapagliflozin (Farxiga) 5 mg tab tablet Take 1 Tablet by mouth daily. atorvastatin (LIPITOR) 40 mg tablet Take 1 Tablet by mouth daily. methIMAzole (TAPAZOLE) 5 mg tablet TAKE 1/2 TABLET MONDAY,    WEDNESDAY, AND FRIDAY    dulaglutide (Trulicity) 1.5 VL/1.8 mL sub-q pen INJECT 0.5ML (=1.5 MG)     SUBCUTANEOUSLY EVERY 7     DAYS    hydroCHLOROthiazide (HYDRODIURIL) 25 mg tablet TAKE 1 TABLET DAILY (DOSE  INCREASED)    lisinopriL (PRINIVIL, ZESTRIL) 40 mg tablet TAKE 1 TABLET DAILY    amLODIPine (NORVASC) 10 mg tablet Take 1 Tablet by mouth daily. Basaglar KwikPen U-100 Insulin 100 unit/mL (3 mL) inpn 30 Units by SubCUTAneous route every morning. Insulin Needles, Disposable, 31 gauge x 5/16\" ndle Use as directed    apixaban (Eliquis) 5 mg tablet TAKE 1 TABLET TWICE A DAY    metoprolol succinate (TOPROL-XL) 100 mg tablet Take 1 Tablet by mouth daily. Accu-Chek Guide test strips strip CHECK GLUCOSE TWO TIMES A  DAY    multivitamin (ONE A DAY) tablet Take 1 Tablet by mouth daily. aspirin 81 mg chewable tablet Take 1 Tablet by mouth daily. Blood-Glucose Meter monitoring kit Check blood sugar twice daily. No current facility-administered medications for this visit.          Review of Symptoms:    CONST  No weight change. No fever, chills, sweats    ENT No visual changes, URI sx, sore throat    CV  See HPI   RESP  No cough, or sputum, wheezing. Also see HPI   GI  No abdominal pain or change in bowel habits. No heartburn or dysphagia. No melena or rectal bleeding.   No dysuria, urgency, frequency, hematuria   MSKEL  No joint pain, swelling. No muscle pain. SKIN  No rash or lesions. NEURO  No headache, syncope, or seizure. No weakness, loss of sensation, or paresthesias. PSYCH  No low mood or depression  No anxiety. HE/LYMPH  No easy bruising, abnormal bleeding, or enlarged glands. Physical ExamPhysical Exam:    Visit Vitals  /80 (BP 1 Location: Left upper arm, BP Patient Position: Sitting, BP Cuff Size: Large adult)   Pulse 67   Temp 98 °F (36.7 °C) (Temporal)   Resp 18   Ht 5' 5\" (1.651 m)   Wt 213 lb (96.6 kg)   SpO2 99% Comment: ra   BMI 35.45 kg/m²     Gen: NAD  HEENT:  PERRL, throat clear  Neck: no adenopathy, no thyromegaly, no JVD   Heart:  Regular,Nl S1S2,  no murmur, gallop or rub. Lungs:  clear  Abdomen:   Soft, non-tender, bowel sounds are active.    Extremities:  No edema  Pulse: symmetric  Neuro: A&O times 3, +speech deficit    Cardiographics    ECG: NSR, 1 AVB, no acute changes    Labs:   Lab Results   Component Value Date/Time    Sodium 142 02/15/2023 02:06 PM    Sodium 143 10/22/2022 10:09 AM    Sodium 142 10/20/2022 02:26 PM    Sodium 138 06/20/2022 04:12 PM    Sodium 138 03/31/2022 01:00 PM    Potassium 5.1 02/15/2023 02:06 PM    Potassium 3.5 10/22/2022 10:09 AM    Potassium 5.2 (H) 10/20/2022 02:26 PM    Potassium 4.9 06/20/2022 04:12 PM    Potassium 4.8 03/31/2022 01:00 PM    Chloride 110 (H) 02/15/2023 02:06 PM    Chloride 107 10/22/2022 10:09 AM    Chloride 109 (H) 10/20/2022 02:26 PM    Chloride 106 06/20/2022 04:12 PM    Chloride 107 03/31/2022 01:00 PM    CO2 26 02/15/2023 02:06 PM    CO2 26 10/22/2022 10:09 AM    CO2 27 10/20/2022 [FreeTextEntry1] : MARILYN MORELOS is a 63 year old male prediabetic, who presents for consultation for BPH/LUTS on rapaflo/finasteride previously seen by outside urologist. Patient has been on finasteride since 2018. PSA stable. Worsening LUTS.  Uroflow study demonstrates poor voiding pattern with decreased Q-Blake/average flow and elevated PVR of 197.  He status post rezum procedure on 9/23/2022 with urinary retention post.  He finally underwent successful trial of void and has been voiding well with some increased urinary frequency/urgency.\par \par He still on dutasteride and silodosin at this time.  He has not yet obtain PSA testing.\par He had left testicular swelling postprocedure.  He states there is no pain or discomfort associated with this.\par \par Scrotal ultrasound from 1/10/2023 demonstrated large right and small left hydroceles with a 4.8 cm right epididymal versus para epididymal tunical cyst.  Bilateral testicular microlithiasis. images visualized and agree w findings \par \par PVR 19 cc\par \par Previously:\par \par Uroflow study tracings today demonstrates prolonged voiding time with plateaued voiding pattern.  Q-Blake/average flow is 11.4/7 with a voided volume of 195 cc and the postvoid residual of 197 cc\par \par Previously:\par PSA=2.26, % free = 33%  from 2/2022 \par PSA= 2.48 percent free 41 % from 10/2021 \par \par MRI prostate images demonstrating 49 g prostate with mild intravesical protrusion no obvious T2 peripheral zone lesions however significant artifact from hip prosthesis limits evaluation for clinically significant prostate cancer as diffusion-weighted imaging is limited.\par \par Bladder ultrasound images from 10/26/2021 demonstrates a 43 g prostate with a prevoid volume of 170 cc and the postvoid volume of 45g,  yielding a PSA density of 0.115, intravesical mild wide protrusion\par \par Denies  PMH including previous kidney stones, recurrent UTIs. \par Family History: No  malignancies. Brother had TURP procedure. \par Social History: School safety. Pt reports he is not consuming bladder irritants. \par \par PSA=3.0  percent free 30 from 12/2020 \par PSA=4.5  11/ 2018 \par PSA=6  10/ 2018 \par \par Prostate TRUS: 45 grams prostate from 2014 \par \par UDS from 2018 demonstrates: normal Bladder compliance, max V=121 cc. Voiding phase, max detrusor pressure 194 cc, Qmax= 8 ml/sec. Tracing were reviewed: there is a sustained detrusor contraction, machine does not appear to be completely calibrated, somewhat difficult to interpret. \par \par RBUS images 7/2020: no hydronephrosis bL no stones BL, bladder unremarkable JRH=621 cc. \par Prostate V not measured however appears to have intravesical protrusion. \par \par  02:26 PM    CO2 26 06/20/2022 04:12 PM    CO2 27 03/31/2022 01:00 PM    Anion gap 6 02/15/2023 02:06 PM    Anion gap 10 10/22/2022 10:09 AM    Anion gap 6 10/20/2022 02:26 PM    Anion gap 6 06/20/2022 04:12 PM    Anion gap 4 (L) 03/31/2022 01:00 PM    Glucose 158 (H) 02/15/2023 02:06 PM    Glucose 38 (LL) 10/22/2022 10:09 AM    Glucose 125 (H) 10/20/2022 02:26 PM    Glucose 85 06/20/2022 04:12 PM    Glucose 181 (H) 03/31/2022 01:00 PM    BUN 37 (H) 02/15/2023 02:06 PM    BUN 42 (H) 10/22/2022 10:09 AM    BUN 32 (H) 10/20/2022 02:26 PM    BUN 41 (H) 06/20/2022 04:12 PM    BUN 26 (H) 03/31/2022 01:00 PM    Creatinine 1.96 (H) 02/15/2023 02:06 PM    Creatinine 2.04 (H) 10/22/2022 10:09 AM    Creatinine 1.78 (H) 10/20/2022 02:26 PM    Creatinine 1.72 (H) 06/20/2022 04:12 PM    Creatinine 1.67 (H) 03/31/2022 01:00 PM    BUN/Creatinine ratio 19 02/15/2023 02:06 PM    BUN/Creatinine ratio 21 (H) 10/22/2022 10:09 AM    BUN/Creatinine ratio 18 10/20/2022 02:26 PM    BUN/Creatinine ratio 24 (H) 06/20/2022 04:12 PM    BUN/Creatinine ratio 16 03/31/2022 01:00 PM    GFR est AA 35 (L) 06/20/2022 04:12 PM    GFR est AA 36 (L) 03/31/2022 01:00 PM    GFR est AA 34 (L) 03/15/2022 01:43 PM    GFR est AA 44 (L) 12/15/2021 01:45 PM    GFR est AA 40 (L) 09/14/2021 02:10 PM    GFR est non-AA 29 (L) 06/20/2022 04:12 PM    GFR est non-AA 30 (L) 03/31/2022 01:00 PM    GFR est non-AA 28 (L) 03/15/2022 01:43 PM    GFR est non-AA 36 (L) 12/15/2021 01:45 PM    GFR est non-AA 33 (L) 09/14/2021 02:10 PM    Calcium 9.3 02/15/2023 02:06 PM    Calcium 9.4 10/22/2022 10:09 AM    Calcium 9.3 10/20/2022 02:26 PM    Calcium 9.6 06/20/2022 04:12 PM    Calcium 8.8 03/31/2022 01:00 PM    Bilirubin, total 0.3 10/22/2022 10:09 AM    Bilirubin, total 0.3 03/31/2022 01:00 PM    Bilirubin, total 0.2 09/14/2021 02:10 PM    Bilirubin, total 0.3 06/17/2021 11:00 PM    Bilirubin, total 0.3 09/15/2020 12:22 PM    Alk. phosphatase 95 10/22/2022 10:09 AM    Alk. phosphatase 104 03/31/2022 01:00 PM    Alk. phosphatase 108 09/14/2021 02:10 PM    Alk. phosphatase 113 06/17/2021 11:00 PM    Alk.  phosphatase 97 09/15/2020 12:22 PM    Protein, total 7.8 10/22/2022 10:09 AM    Protein, total 6.7 03/31/2022 01:00 PM    Protein, total 7.2 09/14/2021 02:10 PM    Protein, total 7.6 06/17/2021 11:00 PM    Protein, total 6.7 09/15/2020 12:22 PM    Albumin 2.7 (L) 02/15/2023 02:06 PM    Albumin 2.8 (L) 10/22/2022 10:09 AM    Albumin 2.7 (L) 03/31/2022 01:00 PM    Albumin 2.8 (L) 09/14/2021 02:10 PM    Albumin 3.1 (L) 06/17/2021 11:00 PM    Globulin 5.0 (H) 10/22/2022 10:09 AM    Globulin 4.0 03/31/2022 01:00 PM    Globulin 4.4 (H) 09/14/2021 02:10 PM    Globulin 4.5 (H) 06/17/2021 11:00 PM    Globulin 4.0 06/15/2019 05:08 AM    A-G Ratio 0.6 (L) 10/22/2022 10:09 AM    A-G Ratio 0.7 (L) 03/31/2022 01:00 PM    A-G Ratio 0.6 (L) 09/14/2021 02:10 PM    A-G Ratio 0.7 (L) 06/17/2021 11:00 PM    A-G Ratio 1.2 09/15/2020 12:22 PM    ALT (SGPT) 22 10/22/2022 10:09 AM    ALT (SGPT) 18 03/31/2022 01:00 PM    ALT (SGPT) 20 09/14/2021 02:10 PM    ALT (SGPT) 21 06/17/2021 11:00 PM    ALT (SGPT) 21 09/15/2020 12:22 PM     Lab Results   Component Value Date/Time    CK 51 01/17/2019 10:25 PM     Lab Results   Component Value Date/Time    Cholesterol, total 116 10/20/2022 02:26 PM    Cholesterol, total 110 09/14/2021 02:10 PM    Cholesterol, total 130 06/15/2020 12:02 PM    Cholesterol, total 131 01/13/2020 11:07 AM    Cholesterol, total 141 10/14/2019 09:38 AM    HDL Cholesterol 59 10/20/2022 02:26 PM    HDL Cholesterol 53 09/14/2021 02:10 PM    HDL Cholesterol 58 06/15/2020 12:02 PM    HDL Cholesterol 59 01/13/2020 11:07 AM    HDL Cholesterol 52 10/14/2019 09:38 AM    LDL, calculated 39.6 10/20/2022 02:26 PM    LDL, calculated 29 09/14/2021 02:10 PM    LDL, calculated 53 06/15/2020 12:02 PM    LDL, calculated 53 01/13/2020 11:07 AM    LDL, calculated 59 10/14/2019 09:38 AM    Triglyceride 87 10/20/2022 02:26 PM    Triglyceride 140 09/14/2021 02:10 PM    Triglyceride 93 06/15/2020 12:02 PM    Triglyceride 94 01/13/2020 11:07 AM    Triglyceride 149 10/14/2019 09:38 AM    CHOL/HDL Ratio 2.0 10/20/2022 02:26 PM    CHOL/HDL Ratio 2.1 09/14/2021 02:10 PM    CHOL/HDL Ratio 2.3 06/10/2019 05:02 AM    CHOL/HDL Ratio 3.6 01/18/2019 06:00 AM     No results found for this or any previous visit. Assessment:         Patient Active Problem List    Diagnosis Date Noted    Hemiparesis affecting left side as late effect of stroke ([facility] [address] 02/15/2023    CKD (chronic kidney disease) stage 4, GFR 15-29 ml/min ([address] 10/20/2022    Hyperthyroidism 10/20/2022    Chronic renal disease, stage III 06/20/2022    Low TSH level 09/17/2020    CKD (chronic kidney disease) stage 3, GFR 30-59 ml/min ([address] 01/13/2020    Diabetic peripheral neuropathy associated with type 2 diabetes mellitus ([address] 06/10/2019    Bilateral carotid artery stenosis 91/57/6491    Embolic stroke involving left middle cerebral artery ([address] 06/10/2019    CVA (cerebral vascular accident) ([address] 06/09/2019    Paroxysmal atrial fibrillation ([address] 04/16/2019    Dyslipidemia 04/16/2019    Cerebrovascular accident (CVA) due to thrombosis of right middle cerebral artery ([address] 01/18/2019    Acute ischemic cerebrovascular accident (CVA) involving right middle cerebral artery territory ([address] 01/17/2019    Obesity, morbid ([address] 12/26/2017    Type 2 diabetes mellitus with chronic kidney disease ([address] 12/26/2017    Type 2 diabetes mellitus with hyperglycemia, with long-term current use of insulin ([address] 04/03/2017    Psoriasis 08/11/2014    Essential hypertension, benign 08/11/2014    Rosacea 08/11/2014    Heart murmur 08/11/2014      Hx DM II, diabetic nephropathy, hypertension, dyslipidemia, CKD 3, obesity, with acute R MCA CVA 1/19. W/u with Echo/doppler 1/1/8/19 with mild to mod LVH, LVEF 65-70, normal atrial sizes, mild MR.  Head CT/MRI/MRA with multiple sites of infarct R temporo-occipital, R parietal, R cerebellar, coronoradiatal  C/w embolic events. Subsequent Loop/Event monitor x 2 weeks showed NSR with episodes of paroxysmal afib/RVR. Seen in f/u by PCP and started on Eliquis 5mg bid  (CHADs2-VASC = 6--DM, hypertension, female, >65, prior CVA (CVA 2.1%/LQ, embolic/CVA/TIA 95.2%/RS). Hospitalized in June 2019 with acute CVA. Unfortunately, the confusion with the patients medications by pt/ which stopped Eliquis vs stopping ASA likely culprit of current CVA. This is not considered a failure of treatment as patient was not actually on Eliquis since 4/16/19. On BB and amlodipine. Continues to see PCP with recent OV/labs. Plan:     Paroxysmal atrial fibrillation ([facility] [address]  Echo in 1/1/8/19 with mild to mod LVH, LVEF 65-70, normal atrial sizes, mild MR. Subsequent Loop/Event monitor in 2/19:   x 2 weeks showed NSR with episodes of paroxysmal afib/RVR. NST in 4/2019: Fixed defect inferolateral wall at the base is compatible with infarct. No evidence of christy-infarct ischemia. Left ventricular ejection fraction is 70 %. Continue  BB  [id number] score:  7. Continue Eliquis 5 mg BID  Stable Hgb at 11.7 in 2/2023    Atrial Fibrillation CHADSVASC2 Score Stroke Risk:   74 y.o. 72 to 76  +1   female Female +1   CHF HX: No    + 0   HTN HX: Yes    +1   Stroke/TIA/Thromboembolism Yes   +2   Vascular Disease HX: Yes    +1   Diabetes Mellitus Yes    +1   CHADSVASC 2 Score 7      Annual Stroke Risk 11.2% - moderate- high       Essential hypertension, benign  Controlled with current therapy     Dyslipidemia  10/2022 LDL 39 On atorva Labs and lipids per PCP    Embolic stroke involving left middle cerebral artery ([facility]  Carotid duplex 6/19: There is mild stenosis in the right and left ICA (<50%). Recall:   Hospitalized in June 2019 with acute CVA.  Unfortunately, the confusion with the patients medications by pt/ which stopped Eliquis vs stopping ASA likely culprit of current CVA.  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

## 2023-04-20 ENCOUNTER — OFFICE VISIT (OUTPATIENT)
Dept: CARDIOLOGY CLINIC | Age: 73
End: 2023-04-20

## 2023-04-20 VITALS
HEIGHT: 65 IN | OXYGEN SATURATION: 99 % | DIASTOLIC BLOOD PRESSURE: 80 MMHG | SYSTOLIC BLOOD PRESSURE: 130 MMHG | HEART RATE: 67 BPM | WEIGHT: 213 LBS | TEMPERATURE: 98 F | RESPIRATION RATE: 18 BRPM | BODY MASS INDEX: 35.49 KG/M2

## 2023-04-20 DIAGNOSIS — I34.0 MITRAL VALVE INSUFFICIENCY, UNSPECIFIED ETIOLOGY: ICD-10-CM

## 2023-04-20 DIAGNOSIS — E66.01 SEVERE OBESITY (BMI 35.0-39.9) WITH COMORBIDITY (HCC): ICD-10-CM

## 2023-04-20 DIAGNOSIS — I10 ESSENTIAL HYPERTENSION, BENIGN: ICD-10-CM

## 2023-04-20 DIAGNOSIS — E11.21 TYPE 2 DIABETES MELLITUS WITH NEPHROPATHY (HCC): ICD-10-CM

## 2023-04-20 DIAGNOSIS — I48.0 PAROXYSMAL ATRIAL FIBRILLATION (HCC): Primary | ICD-10-CM

## 2023-04-20 DIAGNOSIS — I63.412 EMBOLIC STROKE INVOLVING LEFT MIDDLE CEREBRAL ARTERY (HCC): ICD-10-CM

## 2023-04-20 DIAGNOSIS — E78.5 DYSLIPIDEMIA: ICD-10-CM

## 2023-04-20 NOTE — PROGRESS NOTES
Identified pt with two pt identifiers(name and ). Reviewed record in preparation for visit and have obtained necessary documentation. Chief Complaint   Patient presents with    Stroke     6 month follow up    Irregular Heart Beat    Carotid Artery Stenosis    Hypertension    Cholesterol Problem      Vitals:    23 1102   BP: 130/80   Pulse: 67   Resp: 18   Temp: 98 °F (36.7 °C)   TempSrc: Temporal   SpO2: 99%   Weight: 213 lb (96.6 kg)   Height: 5' 5\" (1.651 m)   PainSc:   0 - No pain       Medications reviewed/approved by provider. Health Maintenance Review: Patient reminded of \"due or due soon\" health maintenance. I have asked the patient to contact his/her primary care provider (PCP) for follow-up on his/her health maintenance. Coordination of Care Questionnaire:  :   1) Have you been to an emergency room, urgent care, or hospitalized since your last visit? If yes, where when, and reason for visit? no       2. Have seen or consulted any other health care provider since your last visit? If yes, where when, and reason for visit? NO      Patient is accompanied by self and spouse I have received verbal consent from Luisa Whittaker to discuss any/all medical information while they are present in the room.

## 2023-04-22 DIAGNOSIS — E05.90 HYPERTHYROIDISM: Primary | ICD-10-CM

## 2023-04-23 DIAGNOSIS — E05.90 HYPERTHYROIDISM: Primary | ICD-10-CM

## 2023-04-24 DIAGNOSIS — E05.90 HYPERTHYROIDISM: Primary | ICD-10-CM

## 2023-05-30 RX ORDER — INSULIN GLARGINE 100 [IU]/ML
33 INJECTION, SOLUTION SUBCUTANEOUS NIGHTLY
Qty: 5 ADJUSTABLE DOSE PRE-FILLED PEN SYRINGE | Refills: 0
Start: 2023-05-30 | End: 2023-05-31 | Stop reason: SDUPTHER

## 2023-05-31 RX ORDER — INSULIN GLARGINE 100 [IU]/ML
30 INJECTION, SOLUTION SUBCUTANEOUS NIGHTLY
Qty: 5 ADJUSTABLE DOSE PRE-FILLED PEN SYRINGE | Refills: 0
Start: 2023-05-31

## 2023-07-17 NOTE — PROGRESS NOTES
Subjective:     CC: AYDE Sethi is a 74 y.o. female who presents today for a AYDE. She was hospitalized at [facility] from 7/7 to 7/8 for near syncope related to UTI with sepsis. Per discharge summary she was treated with IV fluids and antibiotics. Her BP meds were held. EKG showed NSR. Once stabilized she was discharged home. Told to cont all her meds with the exception of Amlodipine. She was given an event monitor to wear. She was advised to follow up with cardiology in 4 weeks. Today she states she feels fine and was surprised to hear that she had a UTI because she had no symptoms and felt fine the morning of her hospitalization. We will recheck her urine today. She is wearing the event monitor and denies any CP, SOB, fluttering in her chest, or dizzy spells. She does not have an appt with the cardiologist. States she had just seen him prior to the episode and was told everything was fine. BP is stable,  states she is taking all of her BP meds. She has A-fib and takes Eliquis for stroke prophylaxis. Type 2 diabetes  Lab Results   Component Value Date    LABA1C 7.2 (H) 02/15/2023     02/15/2023     She is no longer on Metformin due to CKD. She continues to take Trulicity 8.5LL q 7 days, and Basaglar insulin 30 units daily.  states home BS at home are overall at goal. He reports some low readings in the mornings but they gradually rise after she eats breakfast.     She does have some peripheral neuropathy in the feet.       Last eye exam: (June 2022)       Patient Active Problem List   Diagnosis    Rosacea    Type 2 diabetes mellitus with hyperglycemia, with long-term current use of insulin ([facility])    Dyslipidemia    Heart murmur    Psoriasis    Bilateral carotid artery stenosis    Low TSH level    CKD (chronic kidney disease) stage 3, GFR 30-59 ml/min ([facility])    Acute ischemic cerebrovascular accident (CVA) involving right middle cerebral artery territory [facility])    Cerebrovascular

## 2023-07-18 ENCOUNTER — OFFICE VISIT (OUTPATIENT)
Age: 73
End: 2023-07-18

## 2023-07-18 VITALS
RESPIRATION RATE: 16 BRPM | HEIGHT: 65 IN | TEMPERATURE: 97.3 F | WEIGHT: 211.2 LBS | SYSTOLIC BLOOD PRESSURE: 143 MMHG | OXYGEN SATURATION: 96 % | DIASTOLIC BLOOD PRESSURE: 86 MMHG | BODY MASS INDEX: 35.19 KG/M2 | HEART RATE: 73 BPM

## 2023-07-18 DIAGNOSIS — R31.29 MICROSCOPIC HEMATURIA: ICD-10-CM

## 2023-07-18 DIAGNOSIS — R55 NEAR SYNCOPE: ICD-10-CM

## 2023-07-18 DIAGNOSIS — Z09 HOSPITAL DISCHARGE FOLLOW-UP: Primary | ICD-10-CM

## 2023-07-18 DIAGNOSIS — Z79.4 TYPE 2 DIABETES MELLITUS WITH DIABETIC POLYNEUROPATHY, WITH LONG-TERM CURRENT USE OF INSULIN (HCC): ICD-10-CM

## 2023-07-18 DIAGNOSIS — N30.01 ACUTE CYSTITIS WITH HEMATURIA: ICD-10-CM

## 2023-07-18 DIAGNOSIS — E11.42 TYPE 2 DIABETES MELLITUS WITH DIABETIC POLYNEUROPATHY, WITH LONG-TERM CURRENT USE OF INSULIN (HCC): ICD-10-CM

## 2023-07-18 LAB
BILIRUBIN, URINE, POC: NEGATIVE
BLOOD URINE, POC: ABNORMAL
GLUCOSE URINE, POC: 1000
KETONES, URINE, POC: NEGATIVE
LEUKOCYTE ESTERASE, URINE, POC: NEGATIVE
NITRITE, URINE, POC: NEGATIVE
PH, URINE, POC: 6 (ref 4.6–8)
PROTEIN,URINE, POC: 100
SPECIFIC GRAVITY, URINE, POC: 1.01 (ref 1–1.03)
URINALYSIS CLARITY, POC: CLEAR
URINALYSIS COLOR, POC: YELLOW
UROBILINOGEN, POC: ABNORMAL

## 2023-07-18 SDOH — ECONOMIC STABILITY: FOOD INSECURITY: WITHIN THE PAST 12 MONTHS, YOU WORRIED THAT YOUR FOOD WOULD RUN OUT BEFORE YOU GOT MONEY TO BUY MORE.: NEVER TRUE

## 2023-07-18 SDOH — ECONOMIC STABILITY: HOUSING INSECURITY
IN THE LAST 12 MONTHS, WAS THERE A TIME WHEN YOU DID NOT HAVE A STEADY PLACE TO SLEEP OR SLEPT IN A SHELTER (INCLUDING NOW)?: NO

## 2023-07-18 SDOH — ECONOMIC STABILITY: FOOD INSECURITY: WITHIN THE PAST 12 MONTHS, THE FOOD YOU BOUGHT JUST DIDN'T LAST AND YOU DIDN'T HAVE MONEY TO GET MORE.: NEVER TRUE

## 2023-07-18 SDOH — ECONOMIC STABILITY: INCOME INSECURITY: HOW HARD IS IT FOR YOU TO PAY FOR THE VERY BASICS LIKE FOOD, HOUSING, MEDICAL CARE, AND HEATING?: NOT VERY HARD

## 2023-07-18 NOTE — PROGRESS NOTES
1. \"Have you been to the ER, urgent care clinic since your last visit? Hospitalized since your last visit? \" No    2. \"Have you seen or consulted any other health care providers outside of the [address] [place] [address] since your last visit? \" No     3. For patients aged 43-73: Has the patient had a colonoscopy / FIT/ Cologuard? Yes - no Care Gap present     If the patient is female:    4. For patients aged 43-66: Has the patient had a mammogram within the past 2 years? Yes - no Care Gap present    5. For patients aged 21-65: Has the patient had a pap smear? Yes - no Care Gap present    Chief Complaint   Patient presents with    Diabetes       There were no vitals filed for this visit.

## 2023-07-19 LAB
EST. AVERAGE GLUCOSE BLD GHB EST-MCNC: 177 MG/DL
HBA1C MFR BLD: 7.8 % (ref 4–5.6)

## 2023-07-20 LAB
BACTERIA SPEC CULT: ABNORMAL
CC UR VC: ABNORMAL
SERVICE CMNT-IMP: ABNORMAL

## 2023-07-21 RX ORDER — CEFDINIR 300 MG/1
300 CAPSULE ORAL 2 TIMES DAILY
Qty: 10 CAPSULE | Refills: 0 | Status: SHIPPED | OUTPATIENT
Start: 2023-07-21 | End: 2023-07-26

## 2023-07-29 DIAGNOSIS — N18.4 CHRONIC KIDNEY DISEASE, STAGE 4 (SEVERE) (HCC): Primary | ICD-10-CM

## 2023-08-08 ENCOUNTER — TELEPHONE (OUTPATIENT)
Age: 73
End: 2023-08-08

## 2023-08-08 NOTE — TELEPHONE ENCOUNTER
----- Message -----  From: POLLY Martines NP  Sent: 8/7/2023   3:11 PM EDT  To: Al Marmolejo LPN    Pls call patient---seen [facility] ED in July for near syncope and the obtained 20 day holter. I am not sure if they have called her re result but they sent us a copy of the holter which is fine---  No afib detected,  No abnormal heart rhythm or significant delay / heart block. I will scan result in chart      Discussed with the pts spouse. Verified patient with two patient identifiers. Relayed results. Spouse informed me that pt is doing great and will proceed with Oct follow up.

## 2023-09-29 NOTE — TELEPHONE ENCOUNTER
Patient requesting refill on     Requested Prescriptions     Pending Prescriptions Disp Refills    ELIQUIS 5 MG TABS tablet [Pharmacy Med Name: ELIQUIS TAB 5MG] 180 tablet 1     Sig: TAKE 1 TABLET TWICE A DAY    metoprolol succinate (TOPROL XL) 100 MG extended release tablet [Pharmacy Med Name: METOPROL SUC TAB 100MG ER] 90 tablet 3     Sig: TAKE 1 TABLET DAILY        Last OV 7/18/2023

## 2023-09-30 RX ORDER — METOPROLOL SUCCINATE 100 MG/1
100 TABLET, EXTENDED RELEASE ORAL DAILY
Qty: 90 TABLET | Refills: 3 | Status: SHIPPED | OUTPATIENT
Start: 2023-09-30

## 2023-09-30 RX ORDER — APIXABAN 5 MG/1
5 TABLET, FILM COATED ORAL 2 TIMES DAILY
Qty: 180 TABLET | Refills: 1 | Status: SHIPPED | OUTPATIENT
Start: 2023-09-30

## 2023-10-15 SDOH — ECONOMIC STABILITY: INCOME INSECURITY: HOW HARD IS IT FOR YOU TO PAY FOR THE VERY BASICS LIKE FOOD, HOUSING, MEDICAL CARE, AND HEATING?: NOT HARD AT ALL

## 2023-10-15 SDOH — ECONOMIC STABILITY: FOOD INSECURITY: WITHIN THE PAST 12 MONTHS, YOU WORRIED THAT YOUR FOOD WOULD RUN OUT BEFORE YOU GOT MONEY TO BUY MORE.: NEVER TRUE

## 2023-10-15 SDOH — ECONOMIC STABILITY: FOOD INSECURITY: WITHIN THE PAST 12 MONTHS, THE FOOD YOU BOUGHT JUST DIDN'T LAST AND YOU DIDN'T HAVE MONEY TO GET MORE.: NEVER TRUE

## 2023-10-15 SDOH — ECONOMIC STABILITY: TRANSPORTATION INSECURITY
IN THE PAST 12 MONTHS, HAS LACK OF TRANSPORTATION KEPT YOU FROM MEETINGS, WORK, OR FROM GETTING THINGS NEEDED FOR DAILY LIVING?: NO

## 2023-10-17 NOTE — TELEPHONE ENCOUNTER
Per Marla:    4/4/22 3:12 PM  Very good, have her continue that 1/2 pill on Mon, Wed and Fri as it is working well for her Nasal Bridge-BIPAP

## 2023-10-18 ENCOUNTER — OFFICE VISIT (OUTPATIENT)
Age: 73
End: 2023-10-18
Payer: MEDICARE

## 2023-10-18 VITALS
SYSTOLIC BLOOD PRESSURE: 129 MMHG | HEIGHT: 65 IN | BODY MASS INDEX: 35.52 KG/M2 | DIASTOLIC BLOOD PRESSURE: 76 MMHG | OXYGEN SATURATION: 96 % | TEMPERATURE: 98 F | HEART RATE: 65 BPM | WEIGHT: 213.2 LBS | RESPIRATION RATE: 20 BRPM

## 2023-10-18 DIAGNOSIS — E05.90 HYPERTHYROIDISM: ICD-10-CM

## 2023-10-18 DIAGNOSIS — I10 ESSENTIAL (PRIMARY) HYPERTENSION: ICD-10-CM

## 2023-10-18 DIAGNOSIS — N18.4 TYPE 2 DIABETES MELLITUS WITH STAGE 4 CHRONIC KIDNEY DISEASE, WITH LONG-TERM CURRENT USE OF INSULIN (HCC): Primary | ICD-10-CM

## 2023-10-18 DIAGNOSIS — N18.4 CHRONIC KIDNEY DISEASE, STAGE 4 (SEVERE) (HCC): ICD-10-CM

## 2023-10-18 DIAGNOSIS — I48.0 PAROXYSMAL ATRIAL FIBRILLATION (HCC): ICD-10-CM

## 2023-10-18 DIAGNOSIS — E11.22 TYPE 2 DIABETES MELLITUS WITH STAGE 4 CHRONIC KIDNEY DISEASE, WITH LONG-TERM CURRENT USE OF INSULIN (HCC): Primary | ICD-10-CM

## 2023-10-18 DIAGNOSIS — Z23 NEEDS FLU SHOT: ICD-10-CM

## 2023-10-18 DIAGNOSIS — Z79.4 TYPE 2 DIABETES MELLITUS WITH STAGE 4 CHRONIC KIDNEY DISEASE, WITH LONG-TERM CURRENT USE OF INSULIN (HCC): Primary | ICD-10-CM

## 2023-10-18 PROCEDURE — 99214 OFFICE O/P EST MOD 30 MIN: CPT | Performed by: NURSE PRACTITIONER

## 2023-10-18 PROCEDURE — 90694 VACC AIIV4 NO PRSRV 0.5ML IM: CPT | Performed by: NURSE PRACTITIONER

## 2023-10-18 PROCEDURE — 3078F DIAST BP <80 MM HG: CPT | Performed by: NURSE PRACTITIONER

## 2023-10-18 PROCEDURE — 1123F ACP DISCUSS/DSCN MKR DOCD: CPT | Performed by: NURSE PRACTITIONER

## 2023-10-18 PROCEDURE — G0008 ADMIN INFLUENZA VIRUS VAC: HCPCS | Performed by: NURSE PRACTITIONER

## 2023-10-18 PROCEDURE — 36415 COLL VENOUS BLD VENIPUNCTURE: CPT | Performed by: NURSE PRACTITIONER

## 2023-10-18 PROCEDURE — 3051F HG A1C>EQUAL 7.0%<8.0%: CPT | Performed by: NURSE PRACTITIONER

## 2023-10-18 PROCEDURE — 3074F SYST BP LT 130 MM HG: CPT | Performed by: NURSE PRACTITIONER

## 2023-10-18 ASSESSMENT — PATIENT HEALTH QUESTIONNAIRE - PHQ9
1. LITTLE INTEREST OR PLEASURE IN DOING THINGS: 0
2. FEELING DOWN, DEPRESSED OR HOPELESS: 0
SUM OF ALL RESPONSES TO PHQ9 QUESTIONS 1 & 2: 0
SUM OF ALL RESPONSES TO PHQ QUESTIONS 1-9: 0

## 2023-10-18 NOTE — PROGRESS NOTES
AVS) provided. Patient expresses understanding of diagnosis and treatment plan. Health Maintenance Due   Topic Date Due    DTaP/Tdap/Td vaccine (1 - Tdap) Never done    Shingles vaccine (1 of 2) Never done    Hepatitis B vaccine (1 of 3 - Risk 3-dose series) Never done    Diabetic retinal exam  07/29/2022    Diabetic Alb to Cr ratio (uACR) test  12/15/2022    COVID-19 Vaccine (6 - Moderna series) 01/27/2023    Flu vaccine (1) 08/01/2023    Lipids  10/20/2023         No follow-up provider specified. Umberto Kumar. JINA Godwin                                                        On the basis of positive falls risk screening, assessment and plan is as follows: patient will follow up in 3 month(s) for further evaluation.

## 2023-10-19 DIAGNOSIS — N18.4 CHRONIC KIDNEY DISEASE, STAGE 4 (SEVERE) (HCC): Primary | ICD-10-CM

## 2023-10-19 PROBLEM — I63.9 CVA (CEREBRAL VASCULAR ACCIDENT) (HCC): Status: RESOLVED | Noted: 2019-06-09 | Resolved: 2023-10-19

## 2023-10-19 PROBLEM — I63.511 ACUTE ISCHEMIC CEREBROVASCULAR ACCIDENT (CVA) INVOLVING RIGHT MIDDLE CEREBRAL ARTERY TERRITORY (HCC): Status: RESOLVED | Noted: 2019-01-17 | Resolved: 2023-10-19

## 2023-10-19 PROBLEM — R79.89 LOW TSH LEVEL: Status: RESOLVED | Noted: 2020-09-17 | Resolved: 2023-10-19

## 2023-10-19 PROBLEM — E78.00 HYPERCHOLESTEROLEMIA: Status: ACTIVE | Noted: 2019-04-16

## 2023-10-19 PROBLEM — I34.0 NONRHEUMATIC MITRAL VALVE REGURGITATION: Status: ACTIVE | Noted: 2023-10-19

## 2023-10-19 PROBLEM — Z79.01 CHRONIC ANTICOAGULATION: Status: ACTIVE | Noted: 2023-10-19

## 2023-10-19 PROBLEM — I63.412 EMBOLIC STROKE INVOLVING LEFT MIDDLE CEREBRAL ARTERY (HCC): Status: RESOLVED | Noted: 2019-06-10 | Resolved: 2023-10-19

## 2023-10-19 LAB
BASOPHILS # BLD: 0.1 K/UL (ref 0–0.1)
BASOPHILS NFR BLD: 1 % (ref 0–1)
DIFFERENTIAL METHOD BLD: ABNORMAL
EOSINOPHIL # BLD: 0.3 K/UL (ref 0–0.4)
EOSINOPHIL NFR BLD: 3 % (ref 0–7)
ERYTHROCYTE [DISTWIDTH] IN BLOOD BY AUTOMATED COUNT: 13.4 % (ref 11.5–14.5)
EST. AVERAGE GLUCOSE BLD GHB EST-MCNC: 174 MG/DL
HBA1C MFR BLD: 7.7 % (ref 4–5.6)
HCT VFR BLD AUTO: 41.8 % (ref 35–47)
HGB BLD-MCNC: 12.9 G/DL (ref 11.5–16)
IMM GRANULOCYTES # BLD AUTO: 0 K/UL (ref 0–0.04)
IMM GRANULOCYTES NFR BLD AUTO: 0 % (ref 0–0.5)
LYMPHOCYTES # BLD: 2 K/UL (ref 0.8–3.5)
LYMPHOCYTES NFR BLD: 18 % (ref 12–49)
MCH RBC QN AUTO: 30 PG (ref 26–34)
MCHC RBC AUTO-ENTMCNC: 30.9 G/DL (ref 30–36.5)
MCV RBC AUTO: 97.2 FL (ref 80–99)
MONOCYTES # BLD: 0.7 K/UL (ref 0–1)
MONOCYTES NFR BLD: 6 % (ref 5–13)
NEUTS SEG # BLD: 8.2 K/UL (ref 1.8–8)
NEUTS SEG NFR BLD: 72 % (ref 32–75)
NRBC # BLD: 0 K/UL (ref 0–0.01)
NRBC BLD-RTO: 0 PER 100 WBC
PLATELET # BLD AUTO: 250 K/UL (ref 150–400)
PMV BLD AUTO: 11.6 FL (ref 8.9–12.9)
RBC # BLD AUTO: 4.3 M/UL (ref 3.8–5.2)
WBC # BLD AUTO: 11.2 K/UL (ref 3.6–11)

## 2023-10-23 ENCOUNTER — TELEPHONE (OUTPATIENT)
Age: 73
End: 2023-10-23

## 2023-10-23 NOTE — TELEPHONE ENCOUNTER
S/w yogi at lab and she said the tubes were not spun down and they informed pt and she will come in this thurs for lab work

## 2023-10-23 NOTE — TELEPHONE ENCOUNTER
It looks like patient had a phos and CMP that were not processed or not drawn on 10-19-23. Can someone please call the lab for results and if non she will need to come back and have them redrawn for her kidney doctor.

## 2023-10-25 ENCOUNTER — OFFICE VISIT (OUTPATIENT)
Age: 73
End: 2023-10-25
Payer: MEDICARE

## 2023-10-25 VITALS
HEART RATE: 58 BPM | RESPIRATION RATE: 20 BRPM | WEIGHT: 212 LBS | DIASTOLIC BLOOD PRESSURE: 80 MMHG | BODY MASS INDEX: 35.32 KG/M2 | HEIGHT: 65 IN | OXYGEN SATURATION: 97 % | TEMPERATURE: 97 F | SYSTOLIC BLOOD PRESSURE: 110 MMHG

## 2023-10-25 DIAGNOSIS — E78.00 HYPERCHOLESTEROLEMIA: ICD-10-CM

## 2023-10-25 DIAGNOSIS — Z79.01 CHRONIC ANTICOAGULATION: ICD-10-CM

## 2023-10-25 DIAGNOSIS — I10 ESSENTIAL HYPERTENSION: ICD-10-CM

## 2023-10-25 DIAGNOSIS — I48.0 PAROXYSMAL ATRIAL FIBRILLATION (HCC): Primary | ICD-10-CM

## 2023-10-25 DIAGNOSIS — I34.0 NONRHEUMATIC MITRAL VALVE REGURGITATION: ICD-10-CM

## 2023-10-25 PROCEDURE — 99214 OFFICE O/P EST MOD 30 MIN: CPT | Performed by: INTERNAL MEDICINE

## 2023-10-25 PROCEDURE — 3074F SYST BP LT 130 MM HG: CPT | Performed by: INTERNAL MEDICINE

## 2023-10-25 PROCEDURE — 1123F ACP DISCUSS/DSCN MKR DOCD: CPT | Performed by: INTERNAL MEDICINE

## 2023-10-25 PROCEDURE — 3079F DIAST BP 80-89 MM HG: CPT | Performed by: INTERNAL MEDICINE

## 2023-10-25 ASSESSMENT — PATIENT HEALTH QUESTIONNAIRE - PHQ9
SUM OF ALL RESPONSES TO PHQ QUESTIONS 1-9: 0
SUM OF ALL RESPONSES TO PHQ QUESTIONS 1-9: 0
1. LITTLE INTEREST OR PLEASURE IN DOING THINGS: 0
SUM OF ALL RESPONSES TO PHQ9 QUESTIONS 1 & 2: 0
SUM OF ALL RESPONSES TO PHQ QUESTIONS 1-9: 0
SUM OF ALL RESPONSES TO PHQ QUESTIONS 1-9: 0
2. FEELING DOWN, DEPRESSED OR HOPELESS: 0

## 2023-10-25 NOTE — PATIENT INSTRUCTIONS
Schedule echocardiogram for reevaluation of your mitral valve. We will contact you with the results.

## 2023-10-26 ENCOUNTER — NURSE ONLY (OUTPATIENT)
Age: 73
End: 2023-10-26
Payer: MEDICARE

## 2023-10-26 DIAGNOSIS — N18.4 CHRONIC KIDNEY DISEASE, STAGE 4 (SEVERE) (HCC): ICD-10-CM

## 2023-10-26 LAB
ALBUMIN SERPL-MCNC: 2.6 G/DL (ref 3.5–5)
ALBUMIN/GLOB SERPL: 0.6 (ref 1.1–2.2)
ALP SERPL-CCNC: 101 U/L (ref 45–117)
ALT SERPL-CCNC: 21 U/L (ref 12–78)
ANION GAP SERPL CALC-SCNC: 5 MMOL/L (ref 5–15)
AST SERPL-CCNC: 18 U/L (ref 15–37)
BILIRUB SERPL-MCNC: 0.4 MG/DL (ref 0.2–1)
BUN SERPL-MCNC: 45 MG/DL (ref 6–20)
BUN/CREAT SERPL: 20 (ref 12–20)
CALCIUM SERPL-MCNC: 8.7 MG/DL (ref 8.5–10.1)
CHLORIDE SERPL-SCNC: 108 MMOL/L (ref 97–108)
CO2 SERPL-SCNC: 23 MMOL/L (ref 21–32)
CREAT SERPL-MCNC: 2.27 MG/DL (ref 0.55–1.02)
GLOBULIN SER CALC-MCNC: 4.6 G/DL (ref 2–4)
GLUCOSE SERPL-MCNC: 233 MG/DL (ref 65–100)
PHOSPHATE SERPL-MCNC: 3.8 MG/DL (ref 2.6–4.7)
POTASSIUM SERPL-SCNC: 5.1 MMOL/L (ref 3.5–5.1)
PROT SERPL-MCNC: 7.2 G/DL (ref 6.4–8.2)
SODIUM SERPL-SCNC: 136 MMOL/L (ref 136–145)

## 2023-10-26 PROCEDURE — 36415 COLL VENOUS BLD VENIPUNCTURE: CPT | Performed by: NURSE PRACTITIONER

## 2023-11-01 ENCOUNTER — CLINICAL DOCUMENTATION (OUTPATIENT)
Age: 73
End: 2023-11-01

## 2023-11-10 ENCOUNTER — HOSPITAL ENCOUNTER (OUTPATIENT)
Facility: HOSPITAL | Age: 73
End: 2023-11-10
Attending: INTERNAL MEDICINE
Payer: MEDICARE

## 2023-11-10 DIAGNOSIS — I34.0 NONRHEUMATIC MITRAL VALVE REGURGITATION: ICD-10-CM

## 2023-11-10 PROCEDURE — 93306 TTE W/DOPPLER COMPLETE: CPT

## 2023-11-11 LAB
ECHO AO ROOT DIAM: 2.7 CM
ECHO AV MEAN GRADIENT: 7 MMHG
ECHO AV MEAN VELOCITY: 1.2 M/S
ECHO AV PEAK GRADIENT: 12 MMHG
ECHO AV PEAK VELOCITY: 1.8 M/S
ECHO AV VTI: 43.6 CM
ECHO EST RA PRESSURE: 3 MMHG
ECHO LA DIAMETER: 3.9 CM
ECHO LA TO AORTIC ROOT RATIO: 1.44
ECHO LA VOL A-L A2C: 69 ML (ref 22–52)
ECHO LA VOL A-L A4C: 64 ML (ref 22–52)
ECHO LA VOL MOD A2C: 64 ML (ref 22–52)
ECHO LA VOL MOD A4C: 61 ML (ref 22–52)
ECHO LA VOLUME AREA LENGTH: 68 ML
ECHO LV E' LATERAL VELOCITY: 7 CM/S
ECHO LV E' SEPTAL VELOCITY: 6 CM/S
ECHO LV FRACTIONAL SHORTENING: 41 % (ref 28–44)
ECHO LV INTERNAL DIMENSION DIASTOLIC: 4.6 CM (ref 3.9–5.3)
ECHO LV INTERNAL DIMENSION SYSTOLIC: 2.7 CM
ECHO LV IVSD: 1 CM (ref 0.6–0.9)
ECHO LV MASS 2D: 158.8 G (ref 67–162)
ECHO LV POSTERIOR WALL DIASTOLIC: 1 CM (ref 0.6–0.9)
ECHO LV RELATIVE WALL THICKNESS RATIO: 0.43
ECHO LVOT AREA: 3.5 CM2
ECHO LVOT DIAM: 2.1 CM
ECHO MV A VELOCITY: 1.12 M/S
ECHO MV E DECELERATION TIME (DT): 310.5 MS
ECHO MV E VELOCITY: 0.72 M/S
ECHO MV E/A RATIO: 0.64
ECHO MV E/E' LATERAL: 10.29
ECHO MV REGURGITANT ALIASING (NYQUIST) VELOCITY: 38 CM/S
ECHO MV REGURGITANT VELOCITY PISA: 5.6 M/S
ECHO MV REGURGITANT VTIA: 215.9 CM
ECHO PULMONARY ARTERY END DIASTOLIC PRESSURE: 6 MMHG
ECHO PULMONARY ARTERY SYSTOLIC PRESSURE (PASP): 23 MMHG
ECHO PV REGURGITANT MAX VELOCITY: 1.2 M/S
ECHO RA AREA 4C: 11.6 CM2
ECHO RIGHT VENTRICULAR SYSTOLIC PRESSURE (RVSP): 23 MMHG
ECHO RV TAPSE: 2.4 CM (ref 1.7–?)
ECHO TV REGURGITANT MAX VELOCITY: 2.24 M/S
ECHO TV REGURGITANT PEAK GRADIENT: 20 MMHG

## 2023-11-11 PROCEDURE — 93306 TTE W/DOPPLER COMPLETE: CPT | Performed by: INTERNAL MEDICINE

## 2024-01-29 RX ORDER — DULAGLUTIDE 1.5 MG/.5ML
INJECTION, SOLUTION SUBCUTANEOUS
Qty: 6 ML | Refills: 4 | Status: SHIPPED | OUTPATIENT
Start: 2024-01-29

## 2024-01-29 RX ORDER — INSULIN GLARGINE 100 [IU]/ML
INJECTION, SOLUTION SUBCUTANEOUS
Qty: 30 ML | Refills: 3 | Status: SHIPPED | OUTPATIENT
Start: 2024-01-29 | End: 2024-02-01 | Stop reason: SDUPTHER

## 2024-01-29 NOTE — TELEPHONE ENCOUNTER
Patient requesting refill on     Requested Prescriptions     Pending Prescriptions Disp Refills    BASAGLAR KWIKPEN 100 UNIT/ML injection pen [Pharmacy Med Name: BASAGLAR KWK PEN 100U/ML] 30 mL 3     Sig: INJECT 30 UNITS            SUBCUTANEOUSLY IN THE      MORNING, DOSE REDUCED    TRULICITY 1.5 MG/0.5ML SC injection [Pharmacy Med Name: TRULICITY(4) PEN 1.5/0.5] 6 mL 4     Sig: INJECT 0.5ML (=1.5 MG)     SUBCUTANEOUSLY ONCE WEEKLY (EVERY 7 DAYS)        Last OV 10/18/2023

## 2024-02-01 DIAGNOSIS — E05.90 HYPERTHYROIDISM: ICD-10-CM

## 2024-02-01 NOTE — TELEPHONE ENCOUNTER
Medication Refill Request    Carolee Claros is requesting a refill of the following medication(s):     Needs needles for Pierre Yates    Please send refill to:     [facility] [address] [facility] - P [phone] - F [phone]  [facility] [place] [address]  Phone: [phone] Fax: [phone]

## 2024-02-01 NOTE — TELEPHONE ENCOUNTER
Patient requesting refill on     Requested Prescriptions     Pending Prescriptions Disp Refills    BASAGLAR KWIKPEN 100 UNIT/ML injection pen 30 mL 3        Last OV 10/18/2023

## 2024-02-02 RX ORDER — INSULIN GLARGINE 100 [IU]/ML
INJECTION, SOLUTION SUBCUTANEOUS
Qty: 30 ML | Refills: 3 | Status: SHIPPED | OUTPATIENT
Start: 2024-02-02

## 2024-02-06 RX ORDER — LISINOPRIL 40 MG/1
40 TABLET ORAL DAILY
Qty: 90 TABLET | Refills: 3 | Status: SHIPPED | OUTPATIENT
Start: 2024-02-06

## 2024-02-06 RX ORDER — AMLODIPINE BESYLATE 10 MG/1
10 TABLET ORAL DAILY
Qty: 90 TABLET | Refills: 3 | Status: SHIPPED | OUTPATIENT
Start: 2024-02-06

## 2024-02-06 NOTE — TELEPHONE ENCOUNTER
Patient requesting refill on     Requested Prescriptions     Pending Prescriptions Disp Refills    amLODIPine (NORVASC) 10 MG tablet [Pharmacy Med Name: AMLODIPINE TAB 10MG] 90 tablet 3     Sig: TAKE 1 TABLET DAILY    lisinopril (PRINIVIL;ZESTRIL) 40 MG tablet [Pharmacy Med Name: LISINOPRIL TAB 40MG] 90 tablet 3     Sig: TAKE 1 TABLET DAILY        Last OV 10/18/2023

## 2024-02-13 LAB
T3 SERPL-MCNC: 123 NG/DL (ref 71–180)
T4 FREE SERPL-MCNC: 1.13 NG/DL (ref 0.82–1.77)
TSH SERPL DL<=0.005 MIU/L-ACNC: 0.19 UIU/ML (ref 0.45–4.5)

## 2024-02-19 ENCOUNTER — OFFICE VISIT (OUTPATIENT)
Age: 74
End: 2024-02-19
Payer: MEDICARE

## 2024-02-19 VITALS
SYSTOLIC BLOOD PRESSURE: 141 MMHG | BODY MASS INDEX: 35.09 KG/M2 | DIASTOLIC BLOOD PRESSURE: 71 MMHG | HEIGHT: 65 IN | HEART RATE: 68 BPM | WEIGHT: 210.6 LBS

## 2024-02-19 DIAGNOSIS — E05.90 HYPERTHYROIDISM: Primary | ICD-10-CM

## 2024-02-19 DIAGNOSIS — E05.10 TOXIC THYROID NODULE: ICD-10-CM

## 2024-02-19 PROCEDURE — G2211 COMPLEX E/M VISIT ADD ON: HCPCS | Performed by: INTERNAL MEDICINE

## 2024-02-19 PROCEDURE — 3077F SYST BP >= 140 MM HG: CPT | Performed by: INTERNAL MEDICINE

## 2024-02-19 PROCEDURE — 3078F DIAST BP <80 MM HG: CPT | Performed by: INTERNAL MEDICINE

## 2024-02-19 PROCEDURE — 99214 OFFICE O/P EST MOD 30 MIN: CPT | Performed by: INTERNAL MEDICINE

## 2024-02-19 PROCEDURE — 1123F ACP DISCUSS/DSCN MKR DOCD: CPT | Performed by: INTERNAL MEDICINE

## 2024-02-19 RX ORDER — METHIMAZOLE 5 MG/1
TABLET ORAL
Qty: 45 TABLET | Refills: 2 | Status: SHIPPED | OUTPATIENT
Start: 2024-02-19

## 2024-02-19 NOTE — PROGRESS NOTES
Chief Complaint   Patient presents with    Diabetes    Thyroid Problem     Pcp and pharmacy verified     History of Present Illness: Carolee Claros is a 73 y.o. 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    She was seen for initial visit in January 2021 for low TSH. She had a thyroid nuclear scan which identified a toxic thyroid nodule in the right thyroid lobe. Ms Claros and Dr. Blakely agreed to proceed with low dose MMI rather than surgery or VANCE.    At our visit in February 2023 she was clinically and biochemically euthyroid on MMI 2.5mg every other day. Pt was encouraged to continue this regimen.    Pt denies any recent illnesses, injuries or hospitalizations.    She is still taking the MMI 2.5mg every other day.        Her TSH last week was 0.188 with FT4 of 1.13 and TT3 of 123.    She denies issues of dysphagia or dysphonia.  She denies issues of CP, SOB, palpitations, tremors, heat or cold intolerance, diarrhea or constipation.  Pt denies issues of Abdominal pain, N/V or Jaundice.    Current Outpatient Medications   Medication Sig    Multiple Vitamins-Minerals (MULTIVITAMIN ADULT, MINERALS, PO) Take by mouth daily    amLODIPine (NORVASC) 10 MG tablet TAKE 1 TABLET DAILY    lisinopril (PRINIVIL;ZESTRIL) 40 MG tablet TAKE 1 TABLET DAILY    BASAGLAR KWIKPEN 100 UNIT/ML injection pen INJECT 30 UNITS            SUBCUTANEOUSLY IN THE      MORNING, DOSE REDUCED    TRULICITY 1.5 MG/0.5ML SC injection INJECT 0.5ML (=1.5 MG)     SUBCUTANEOUSLY ONCE WEEKLY (EVERY 7 DAYS)    dapagliflozin (FARXIGA) 10 MG tablet Take 1 tablet by mouth daily    ELIQUIS 5 MG TABS tablet TAKE 1 TABLET TWICE A DAY    metoprolol succinate (TOPROL XL) 100 MG extended release tablet TAKE 1 TABLET DAILY    aspirin 81 MG chewable tablet Take 1 tablet by mouth daily    atorvastatin (LIPITOR) 40 MG tablet Take 1 tablet by mouth daily    hydroCHLOROthiazide (HYDRODIURIL) 25 MG tablet TAKE 1 TABLET DAILY (DOSE

## 2024-02-20 ENCOUNTER — TELEPHONE (OUTPATIENT)
Age: 74
End: 2024-02-20

## 2024-02-20 NOTE — TELEPHONE ENCOUNTER
Medication Refill Request    Carolee AIDE Claros is requesting a refill of the following medication(s):     Needs needles for Basaglar Kwik pen. Needs short supply to [facility] in [place] and a 90 day supply to [facility]  Pt is out of needles    [facility] [address] - [facility] - P [phone] - F [phone]  [facility]  [place] [address]  Phone: [phone] Fax: [phone]    [facility]/pharmacy #[id number] - [place], [place] - [address] [facility] [phone] - F [phone]  [address] [facility] [address]  Phone: [phone] Fax: [phone]

## 2024-02-21 RX ORDER — PEN NEEDLE, DIABETIC 30 GX3/16"
NEEDLE, DISPOSABLE MISCELLANEOUS
Qty: 30 EACH | Refills: 0 | Status: SHIPPED | OUTPATIENT
Start: 2024-02-21

## 2024-02-21 RX ORDER — PEN NEEDLE, DIABETIC 30 GX3/16"
NEEDLE, DISPOSABLE MISCELLANEOUS
Qty: 90 EACH | Refills: 3 | Status: SHIPPED | OUTPATIENT
Start: 2024-02-21

## 2024-02-26 RX ORDER — HYDROCHLOROTHIAZIDE 25 MG/1
TABLET ORAL
Qty: 90 TABLET | Refills: 2 | Status: SHIPPED | OUTPATIENT
Start: 2024-02-26

## 2024-02-26 NOTE — TELEPHONE ENCOUNTER
Patient requesting refill on     Requested Prescriptions     Pending Prescriptions Disp Refills    hydroCHLOROthiazide (HYDRODIURIL) 25 MG tablet [Pharmacy Med Name: HYDROCHLOROT TAB 25MG] 90 tablet 2     Sig: TAKE 1 TABLET DAILY (DOSE  INCREASED)        Last OV 10/18/2023

## 2024-03-05 ENCOUNTER — OFFICE VISIT (OUTPATIENT)
Age: 74
End: 2024-03-05

## 2024-03-05 ENCOUNTER — HOSPITAL ENCOUNTER (OUTPATIENT)
Facility: HOSPITAL | Age: 74
Setting detail: SPECIMEN
Discharge: HOME | End: 2024-03-08

## 2024-03-05 VITALS
SYSTOLIC BLOOD PRESSURE: 120 MMHG | DIASTOLIC BLOOD PRESSURE: 78 MMHG | TEMPERATURE: 97.7 F | BODY MASS INDEX: 34.82 KG/M2 | WEIGHT: 209 LBS | HEART RATE: 86 BPM | RESPIRATION RATE: 20 BRPM | HEIGHT: 65 IN | OXYGEN SATURATION: 99 %

## 2024-03-05 DIAGNOSIS — E78.00 HYPERCHOLESTEROLEMIA: ICD-10-CM

## 2024-03-05 DIAGNOSIS — Z79.4 TYPE 2 DIABETES MELLITUS WITH STAGE 4 CHRONIC KIDNEY DISEASE, WITH LONG-TERM CURRENT USE OF INSULIN (HCC): ICD-10-CM

## 2024-03-05 DIAGNOSIS — N18.4 TYPE 2 DIABETES MELLITUS WITH STAGE 4 CHRONIC KIDNEY DISEASE, WITH LONG-TERM CURRENT USE OF INSULIN (HCC): ICD-10-CM

## 2024-03-05 DIAGNOSIS — E11.22 TYPE 2 DIABETES MELLITUS WITH STAGE 4 CHRONIC KIDNEY DISEASE, WITH LONG-TERM CURRENT USE OF INSULIN (HCC): ICD-10-CM

## 2024-03-05 DIAGNOSIS — E66.01 OBESITY, MORBID (HCC): ICD-10-CM

## 2024-03-05 DIAGNOSIS — L40.9 PSORIASIS: ICD-10-CM

## 2024-03-05 DIAGNOSIS — D48.5 NEOPLASM OF UNCERTAIN BEHAVIOR OF SKIN: ICD-10-CM

## 2024-03-05 DIAGNOSIS — I69.354 HEMIPARESIS AFFECTING LEFT SIDE AS LATE EFFECT OF STROKE (HCC): ICD-10-CM

## 2024-03-05 DIAGNOSIS — I10 ESSENTIAL HYPERTENSION: ICD-10-CM

## 2024-03-05 DIAGNOSIS — L71.9 ROSACEA: ICD-10-CM

## 2024-03-05 DIAGNOSIS — Z00.00 MEDICARE ANNUAL WELLNESS VISIT, SUBSEQUENT: Primary | ICD-10-CM

## 2024-03-05 ASSESSMENT — PATIENT HEALTH QUESTIONNAIRE - PHQ9
2. FEELING DOWN, DEPRESSED OR HOPELESS: 0
1. LITTLE INTEREST OR PLEASURE IN DOING THINGS: 0
SUM OF ALL RESPONSES TO PHQ QUESTIONS 1-9: 0
SUM OF ALL RESPONSES TO PHQ9 QUESTIONS 1 & 2: 0

## 2024-03-05 ASSESSMENT — ENCOUNTER SYMPTOMS
GASTROINTESTINAL NEGATIVE: 1
RESPIRATORY NEGATIVE: 1
EYES NEGATIVE: 1
COLOR CHANGE: 1
ALLERGIC/IMMUNOLOGIC NEGATIVE: 1

## 2024-03-05 ASSESSMENT — LIFESTYLE VARIABLES
HOW MANY STANDARD DRINKS CONTAINING ALCOHOL DO YOU HAVE ON A TYPICAL DAY: PATIENT DOES NOT DRINK
HOW OFTEN DO YOU HAVE A DRINK CONTAINING ALCOHOL: NEVER

## 2024-03-05 NOTE — PATIENT INSTRUCTIONS
Patient Education        Skin Lesion Removal: What to Expect at Home  Your Recovery  Skin lesion removal is a procedure or surgery to remove growths from the skin. There are many ways the doctor could have done this.  After your procedure, you should not have much pain. But some soreness, swelling, or bruising is normal. Your doctor may recommend over-the-counter medicines to help with any discomfort. Most people can return to their normal routine the same day of their procedure.  How quickly your wound heals depends on the size of your wound and the type of procedure you had. Most wounds take 1 to 3 weeks to heal. If you had laser surgery, your skin may change color and then slowly return to its normal color.  You may need only a bandage, or you may need stitches. If you had stitches, your doctor will probably remove them 5 to 14 days later. If you have the type of stitches that dissolve, they don't have to be removed. They will disappear on their own.  This care sheet gives you a general idea about how long it will take for you to recover. But each person recovers at a different pace. Follow the steps below to get better as quickly as possible.  How can you care for yourself at home?  Activity    For the first few days, try not to bump or knock your wound.     Depending on where your wound is, you may need to avoid strenuous exercise for 2 weeks after the procedure or until your doctor says it is okay.     If you have had a lesion removed from your face, do not use makeup near your wound until you have your stitches taken out.     Ask your doctor when it is okay to shower, bathe, or swim.   Medicines    Your doctor will tell you if and when you can restart your medicines. The doctor will also give you instructions about taking any new medicines.     If you stopped taking aspirin or some other blood thinner, your doctor will tell you when to start taking it again.     Ask your doctor if you can take an

## 2024-03-05 NOTE — PROGRESS NOTES
[facility]  OFFICE PROCEDURE TIMEOUT NOTE        Chart reviewed for the following:   Junior RODRIGUEZ LPN, have reviewed the History, Physical and updated the Allergic reactions for Carolee Claros     TIME OUT performed immediately prior to start of procedure:   Junior RODRIGUEZ LPN, have performed the following reviews on Carolee Claros prior to the start of the procedure:            * Patient was identified by name and date of birth   * Agreement on procedure being performed was verified  * Risks and Benefits explained to the patient by Angelito Troy MD  * Procedure site verified and marked as necessary  * Patient was positioned for comfort  * Consent was signed and verified     Time: 1505      Date of procedure: 3/5/2024    Procedure performed by:  Angelito Troy MD    Provider assisted by: MIGUELANGEL MEANS    Patient assisted by: spouse    Pre Procedural Pain Scale: 0    Procedure start time:  1506    Procedure end time:  1515    How tolerated by patient: tolerated the procedure well with no complications    Post Procedural Pain Scale: 0 - No Hurt    Comments: none    Post op instructions and patient education reviewed.  Patient states understanding.    Copy of discharge instructions given to patient in AVS.           
care):   Patient Care Team:  Deborah Godwin APRN - NP as PCP - General  Deborah Godwin APRN - NP as PCP - Empaneled Provider  Jameson Blakely MD as Consulting Physician     Reviewed and updated this visit:  Allergies  Meds  Problems  Med Hx  Surg Hx  Soc Hx  Fam Hx

## 2024-03-13 RX ORDER — ATORVASTATIN CALCIUM 40 MG/1
TABLET, FILM COATED ORAL
Qty: 90 TABLET | Refills: 3 | Status: SHIPPED | OUTPATIENT
Start: 2024-03-13

## 2024-03-19 RX ORDER — METHIMAZOLE 5 MG/1
TABLET ORAL
Qty: 19 TABLET | Refills: 3 | Status: SHIPPED | OUTPATIENT
Start: 2024-03-19

## 2024-03-22 NOTE — TELEPHONE ENCOUNTER
Medication Refill Request    Carolee Claros is requesting a refill of the following medication(s):     Accu-Chek guide Amanda    Please send refill to:     [facility] - [address] [place] - [facility] -  [phone] - F [phone]  [facility]  [place] [address]  Phone: [phone] Fax: [phone]

## 2024-04-18 ENCOUNTER — OFFICE VISIT (OUTPATIENT)
Age: 74
End: 2024-04-18
Payer: MEDICARE

## 2024-04-18 VITALS
SYSTOLIC BLOOD PRESSURE: 126 MMHG | OXYGEN SATURATION: 97 % | TEMPERATURE: 98.2 F | BODY MASS INDEX: 34.45 KG/M2 | WEIGHT: 206.8 LBS | HEIGHT: 65 IN | DIASTOLIC BLOOD PRESSURE: 74 MMHG | RESPIRATION RATE: 16 BRPM | HEART RATE: 74 BPM

## 2024-04-18 DIAGNOSIS — I10 ESSENTIAL HYPERTENSION: ICD-10-CM

## 2024-04-18 DIAGNOSIS — E78.00 HYPERCHOLESTEROLEMIA: ICD-10-CM

## 2024-04-18 DIAGNOSIS — I48.0 PAROXYSMAL ATRIAL FIBRILLATION (HCC): ICD-10-CM

## 2024-04-18 DIAGNOSIS — Z79.4 TYPE 2 DIABETES MELLITUS WITH STAGE 4 CHRONIC KIDNEY DISEASE, WITH LONG-TERM CURRENT USE OF INSULIN (HCC): Primary | ICD-10-CM

## 2024-04-18 DIAGNOSIS — E11.22 TYPE 2 DIABETES MELLITUS WITH STAGE 4 CHRONIC KIDNEY DISEASE, WITH LONG-TERM CURRENT USE OF INSULIN (HCC): Primary | ICD-10-CM

## 2024-04-18 DIAGNOSIS — E05.90 HYPERTHYROIDISM: ICD-10-CM

## 2024-04-18 DIAGNOSIS — N18.4 TYPE 2 DIABETES MELLITUS WITH STAGE 4 CHRONIC KIDNEY DISEASE, WITH LONG-TERM CURRENT USE OF INSULIN (HCC): Primary | ICD-10-CM

## 2024-04-18 PROCEDURE — 36415 COLL VENOUS BLD VENIPUNCTURE: CPT | Performed by: NURSE PRACTITIONER

## 2024-04-18 PROCEDURE — 1123F ACP DISCUSS/DSCN MKR DOCD: CPT | Performed by: NURSE PRACTITIONER

## 2024-04-18 PROCEDURE — 3074F SYST BP LT 130 MM HG: CPT | Performed by: NURSE PRACTITIONER

## 2024-04-18 PROCEDURE — 99214 OFFICE O/P EST MOD 30 MIN: CPT | Performed by: NURSE PRACTITIONER

## 2024-04-18 PROCEDURE — 3078F DIAST BP <80 MM HG: CPT | Performed by: NURSE PRACTITIONER

## 2024-04-18 SDOH — ECONOMIC STABILITY: FOOD INSECURITY: WITHIN THE PAST 12 MONTHS, YOU WORRIED THAT YOUR FOOD WOULD RUN OUT BEFORE YOU GOT MONEY TO BUY MORE.: NEVER TRUE

## 2024-04-18 SDOH — ECONOMIC STABILITY: INCOME INSECURITY: HOW HARD IS IT FOR YOU TO PAY FOR THE VERY BASICS LIKE FOOD, HOUSING, MEDICAL CARE, AND HEATING?: NOT HARD AT ALL

## 2024-04-18 SDOH — ECONOMIC STABILITY: FOOD INSECURITY: WITHIN THE PAST 12 MONTHS, THE FOOD YOU BOUGHT JUST DIDN'T LAST AND YOU DIDN'T HAVE MONEY TO GET MORE.: NEVER TRUE

## 2024-04-18 ASSESSMENT — PATIENT HEALTH QUESTIONNAIRE - PHQ9
SUM OF ALL RESPONSES TO PHQ QUESTIONS 1-9: 0
1. LITTLE INTEREST OR PLEASURE IN DOING THINGS: NOT AT ALL
SUM OF ALL RESPONSES TO PHQ QUESTIONS 1-9: 0
SUM OF ALL RESPONSES TO PHQ QUESTIONS 1-9: 0
SUM OF ALL RESPONSES TO PHQ9 QUESTIONS 1 & 2: 0
SUM OF ALL RESPONSES TO PHQ QUESTIONS 1-9: 0
2. FEELING DOWN, DEPRESSED OR HOPELESS: NOT AT ALL

## 2024-04-18 NOTE — PROGRESS NOTES
Chief Complaint   Patient presents with    Diabetes       Vitals:    04/18/24 1337   BP: 126/74   Pulse: 74   Resp: 16   Temp: 98.2 °F (36.8 °C)   SpO2: 97%   \"Have you been to the ER, urgent care clinic since your last visit?  Hospitalized since your last visit?\"    NO    “Have you seen or consulted any other health care providers outside of [facility] since your last visit?”    NO            Click Here for Release of Records Request  
Patient labs drawn in right hand   per NP orders. Patient tolerated well.    
    Allergies   Allergen Reactions    Sulfa Antibiotics      Other reaction(s): Unknown (comments)    Penicillins Rash       Past Surgical History:   Procedure Laterality Date     SECTION      COLONOSCOPY N/A 2018    COLONOSCOPY   DIABETIC performed by Rosanne Ladd MD at [facility] MAIN OR    COLONOSCOPY  2018    3 polyps    TONSILLECTOMY         Social History     Tobacco Use   Smoking Status Never   Smokeless Tobacco Never       Social History     Socioeconomic History    Marital status:    Tobacco Use    Smoking status: Never    Smokeless tobacco: Never   Vaping Use    Vaping Use: Never used   Substance and Sexual Activity    Alcohol use: Yes    Drug use: Never    Sexual activity: Not Currently     Social Determinants of Health     Financial Resource Strain: Low Risk  (2023)    Overall Financial Resource Strain (CARDIA)     Difficulty of Paying Living Expenses: Not very hard   Transportation Needs: Unknown (2023)    PRAPARE - Transportation     Lack of Transportation (Non-Medical): No   Physical Activity: Inactive (3/5/2024)    Exercise Vital Sign     Days of Exercise per Week: 0 days     Minutes of Exercise per Session: 0 min   Housing Stability: Unknown (2023)    Housing Stability Vital Sign     Unstable Housing in the Last Year: No       Family History   Problem Relation Age of Onset    No Known Problems Child     Diabetes Mother     Heart Disease Father      ROS:  Gen: denies fever, chills, or fatigue  HEENT:denies H/A, nosebleeds, or bleeding gums  Resp: denies dyspnea, cough, or wheezing  CV: denies chest pain, pressure, or palpitations  GI: denies abd pain, N/V, melena or hematochezia  : Denies dysuria, hematuria, or urinary frequency/urgency  Extremeties: +mild swelling in ankles Denies cold numb feet  Musculoskeletal: Denies joint pain, stiffness, or muscle cramps  Neuro: denies numbness/tingling or dizziness  Endo: denies polyuria or polydipsia  Skin: denies rashes or new

## 2024-04-19 ENCOUNTER — CLINICAL DOCUMENTATION (OUTPATIENT)
Age: 74
End: 2024-04-19

## 2024-04-19 LAB
25(OH)D3 SERPL-MCNC: 59.1 NG/ML (ref 30–100)
ALBUMIN SERPL-MCNC: 2.8 G/DL (ref 3.5–5)
ALBUMIN/GLOB SERPL: 0.5 (ref 1.1–2.2)
ALP SERPL-CCNC: 103 U/L (ref 45–117)
ALT SERPL-CCNC: 18 U/L (ref 12–78)
ANION GAP SERPL CALC-SCNC: 4 MMOL/L (ref 5–15)
AST SERPL-CCNC: 16 U/L (ref 15–37)
BASOPHILS # BLD: 0.1 K/UL (ref 0–0.1)
BASOPHILS NFR BLD: 1 % (ref 0–1)
BILIRUB SERPL-MCNC: 0.3 MG/DL (ref 0.2–1)
BUN SERPL-MCNC: 53 MG/DL (ref 6–20)
BUN/CREAT SERPL: 21 (ref 12–20)
CALCIUM SERPL-MCNC: 9.4 MG/DL (ref 8.5–10.1)
CALCIUM SERPL-MCNC: 9.7 MG/DL (ref 8.5–10.1)
CHLORIDE SERPL-SCNC: 106 MMOL/L (ref 97–108)
CHOLEST SERPL-MCNC: 121 MG/DL
CO2 SERPL-SCNC: 26 MMOL/L (ref 21–32)
CREAT SERPL-MCNC: 2.47 MG/DL (ref 0.55–1.02)
DIFFERENTIAL METHOD BLD: NORMAL
EOSINOPHIL # BLD: 0.4 K/UL (ref 0–0.4)
EOSINOPHIL NFR BLD: 3 % (ref 0–7)
ERYTHROCYTE [DISTWIDTH] IN BLOOD BY AUTOMATED COUNT: 13.6 % (ref 11.5–14.5)
EST. AVERAGE GLUCOSE BLD GHB EST-MCNC: 183 MG/DL
GLOBULIN SER CALC-MCNC: 5.4 G/DL (ref 2–4)
GLUCOSE SERPL-MCNC: 235 MG/DL (ref 65–100)
HBA1C MFR BLD: 8 % (ref 4–5.6)
HCT VFR BLD AUTO: 40.2 % (ref 35–47)
HDLC SERPL-MCNC: 51 MG/DL
HDLC SERPL: 2.4 (ref 0–5)
HGB BLD-MCNC: 13 G/DL (ref 11.5–16)
IMM GRANULOCYTES # BLD AUTO: 0 K/UL (ref 0–0.04)
IMM GRANULOCYTES NFR BLD AUTO: 0 % (ref 0–0.5)
LDLC SERPL CALC-MCNC: 44.6 MG/DL (ref 0–100)
LYMPHOCYTES # BLD: 2.5 K/UL (ref 0.8–3.5)
LYMPHOCYTES NFR BLD: 24 % (ref 12–49)
MCH RBC QN AUTO: 30.4 PG (ref 26–34)
MCHC RBC AUTO-ENTMCNC: 32.3 G/DL (ref 30–36.5)
MCV RBC AUTO: 93.9 FL (ref 80–99)
MONOCYTES # BLD: 0.7 K/UL (ref 0–1)
MONOCYTES NFR BLD: 6 % (ref 5–13)
NEUTS SEG # BLD: 7 K/UL (ref 1.8–8)
NEUTS SEG NFR BLD: 66 % (ref 32–75)
NRBC # BLD: 0 K/UL (ref 0–0.01)
NRBC BLD-RTO: 0 PER 100 WBC
PHOSPHATE SERPL-MCNC: 3.8 MG/DL (ref 2.6–4.7)
PLATELET # BLD AUTO: 228 K/UL (ref 150–400)
PMV BLD AUTO: 11.6 FL (ref 8.9–12.9)
POTASSIUM SERPL-SCNC: 4.9 MMOL/L (ref 3.5–5.1)
PROT SERPL-MCNC: 8.2 G/DL (ref 6.4–8.2)
PTH-INTACT SERPL-MCNC: 146.8 PG/ML (ref 18.4–88)
RBC # BLD AUTO: 4.28 M/UL (ref 3.8–5.2)
SODIUM SERPL-SCNC: 136 MMOL/L (ref 136–145)
TRIGL SERPL-MCNC: 127 MG/DL
VLDLC SERPL CALC-MCNC: 25.4 MG/DL
WBC # BLD AUTO: 10.7 K/UL (ref 3.6–11)

## 2024-04-19 RX ORDER — APIXABAN 5 MG/1
5 TABLET, FILM COATED ORAL 2 TIMES DAILY
Qty: 180 TABLET | Refills: 1 | Status: SHIPPED | OUTPATIENT
Start: 2024-04-19

## 2024-04-19 NOTE — TELEPHONE ENCOUNTER
Patient requesting refill on     Requested Prescriptions     Pending Prescriptions Disp Refills    ELIQUIS 5 MG TABS tablet [Pharmacy Med Name: ELIQUIS TAB 5MG] 180 tablet 1     Sig: TAKE 1 TABLET TWICE A DAY        Last OV 4/18/2024

## 2024-04-25 ENCOUNTER — TELEPHONE (OUTPATIENT)
Age: 74
End: 2024-04-25

## 2024-04-25 NOTE — TELEPHONE ENCOUNTER
Medication Refill Request    Carolee AIDE Claros is requesting a refill of the following medication(s):     Trulicity 4 pen 1.5/0.5 and Trulicity 4 pen 3/0.5 are on manufacture backorder. Please send alternate.    [facility]     [facility] [place] [place] - [facility] - P [phone] - F [phone]  [facility]  [place] [address]  Phone: [phone] Fax: [phone]

## 2024-04-26 RX ORDER — DULAGLUTIDE 1.5 MG/.5ML
INJECTION, SOLUTION SUBCUTANEOUS
Qty: 6 ML | Refills: 4 | Status: SHIPPED | OUTPATIENT
Start: 2024-04-26 | End: 2024-04-28 | Stop reason: RX

## 2024-04-28 RX ORDER — TIRZEPATIDE 2.5 MG/.5ML
2.5 INJECTION, SOLUTION SUBCUTANEOUS WEEKLY
Qty: 12 EACH | Refills: 0 | Status: SHIPPED | OUTPATIENT
Start: 2024-04-28

## 2024-04-28 RX ORDER — DULAGLUTIDE 1.5 MG/.5ML
INJECTION, SOLUTION SUBCUTANEOUS
Refills: 4 | OUTPATIENT
Start: 2024-04-28

## 2024-04-30 ENCOUNTER — OFFICE VISIT (OUTPATIENT)
Age: 74
End: 2024-04-30
Payer: MEDICARE

## 2024-04-30 VITALS
OXYGEN SATURATION: 98 % | DIASTOLIC BLOOD PRESSURE: 76 MMHG | TEMPERATURE: 98.2 F | RESPIRATION RATE: 20 BRPM | SYSTOLIC BLOOD PRESSURE: 124 MMHG | WEIGHT: 212 LBS | HEART RATE: 61 BPM | BODY MASS INDEX: 35.32 KG/M2 | HEIGHT: 65 IN

## 2024-04-30 DIAGNOSIS — Z79.01 CHRONIC ANTICOAGULATION: ICD-10-CM

## 2024-04-30 DIAGNOSIS — E78.00 HYPERCHOLESTEROLEMIA: ICD-10-CM

## 2024-04-30 DIAGNOSIS — I34.0 NONRHEUMATIC MITRAL VALVE REGURGITATION: ICD-10-CM

## 2024-04-30 DIAGNOSIS — I48.0 PAROXYSMAL ATRIAL FIBRILLATION (HCC): Primary | ICD-10-CM

## 2024-04-30 DIAGNOSIS — I10 ESSENTIAL HYPERTENSION: ICD-10-CM

## 2024-04-30 PROCEDURE — 1123F ACP DISCUSS/DSCN MKR DOCD: CPT | Performed by: INTERNAL MEDICINE

## 2024-04-30 PROCEDURE — 3074F SYST BP LT 130 MM HG: CPT | Performed by: INTERNAL MEDICINE

## 2024-04-30 PROCEDURE — 3078F DIAST BP <80 MM HG: CPT | Performed by: INTERNAL MEDICINE

## 2024-04-30 PROCEDURE — 93000 ELECTROCARDIOGRAM COMPLETE: CPT | Performed by: INTERNAL MEDICINE

## 2024-04-30 PROCEDURE — 99213 OFFICE O/P EST LOW 20 MIN: CPT | Performed by: INTERNAL MEDICINE

## 2024-04-30 RX ORDER — DULAGLUTIDE 1.5 MG/.5ML
1.5 INJECTION, SOLUTION SUBCUTANEOUS WEEKLY
COMMUNITY

## 2024-04-30 ASSESSMENT — PATIENT HEALTH QUESTIONNAIRE - PHQ9
1. LITTLE INTEREST OR PLEASURE IN DOING THINGS: NOT AT ALL
SUM OF ALL RESPONSES TO PHQ9 QUESTIONS 1 & 2: 0
SUM OF ALL RESPONSES TO PHQ QUESTIONS 1-9: 0
2. FEELING DOWN, DEPRESSED OR HOPELESS: NOT AT ALL
SUM OF ALL RESPONSES TO PHQ QUESTIONS 1-9: 0

## 2024-04-30 NOTE — PATIENT INSTRUCTIONS
Follow-up in 6 months with an echocardiogram just prior to that visit for reevaluation of your mitral valve.

## 2024-04-30 NOTE — PROGRESS NOTES
Identified pt with two pt identifiers(name and ). Reviewed record in preparation for visit and have obtained necessary documentation.  Chief Complaint   Patient presents with    Atrial Fibrillation    Valvular Heart Disease    Coronary Artery Disease    Hypertension    Cholesterol Problem      /76 (Site: Left Upper Arm, Position: Sitting, Cuff Size: Large Adult)   Pulse 61   Temp 98.2 °F (36.8 °C) (Temporal)   Resp 20   Ht 1.651 m (5' 5\")   Wt 96.2 kg (212 lb)   SpO2 98%   BMI 35.28 kg/m²       Medications reviewed/approved by provider.      Health Maintenance Review: Patient reminded of \"due or due soon\" health maintenance. I have asked the patient to contact his/her primary care provider (PCP) for follow-up on his/her health maintenance.    Coordination of Care Questionnaire:  :   1) Have you been to an emergency room, urgent care, or hospitalized since your last visit?  If yes, where when, and reason for visit? no       2. Have seen or consulted any other health care provider since your last visit?   If yes, where when, and reason for visit?  no      Patient is accompanied by spouse I have received verbal consent from Caorlee Claros to discuss any/all medical information while they are present in the room.    
65%, AV sclerosis, 2+ MR, RV nl, PASP 34  ==> Echo 11/10/2023, EF 70%, AV sclerosis, 2+ MR, RV nl, PASP 23  Carotid artery disease  ==> Duplex 6/9/2019, <50% bilateral  Hypertension  Hyperlipidemia  Diabetes mellitus, type II  Obesity  CKD, stage 4  Right MCA CVA 1/2019, embolic  Hyperthyroidism  Peripheral neuropathy    Past medical history, past surgical history, family history, social history, and medications were all reviewed with the patient today and updated as necessary.     Current Outpatient Medications   Medication Sig    ELIQUIS 5 MG TABS tablet TAKE 1 TABLET TWICE A DAY    blood glucose test strips (ASCENSIA AUTODISC VI;ONE TOUCH ULTRA TEST VI) strip BS check BID  Dx: E11.65    methIMAzole (TAPAZOLE) 5 MG tablet TAKE 1/2 TABLET EVERY      MONDAY, WEDNESDAY, AND     FRIDAY    atorvastatin (LIPITOR) 40 MG tablet TAKE 1 TABLET DAILY (NOTE  DOSE REDUCED)    hydroCHLOROthiazide (HYDRODIURIL) 25 MG tablet TAKE 1 TABLET DAILY (DOSE  INCREASED)    Insulin Pen Needle (PEN NEEDLES) 31G X 5 MM MISC Use once a day with insulin    Multiple Vitamins-Minerals (MULTIVITAMIN ADULT, MINERALS, PO) Take by mouth daily    amLODIPine (NORVASC) 10 MG tablet TAKE 1 TABLET DAILY    lisinopril (PRINIVIL;ZESTRIL) 40 MG tablet TAKE 1 TABLET DAILY    BASAGLAR KWIKPEN 100 UNIT/ML injection pen INJECT 30 UNITS            SUBCUTANEOUSLY IN THE      MORNING, DOSE REDUCED    dapagliflozin (FARXIGA) 10 MG tablet Take 1 tablet by mouth daily    metoprolol succinate (TOPROL XL) 100 MG extended release tablet TAKE 1 TABLET DAILY    aspirin 81 MG chewable tablet Take 1 tablet by mouth daily     No current facility-administered medications for this visit.     Allergies   Allergen Reactions    Sulfa Antibiotics      Other reaction(s): Unknown (comments)     Past Medical History:   Diagnosis Date    Diabetes (HCC)     Hypertension     Menopause     Murmur     Osteopenia     Psoriasis     Rosacea     Stroke (HCC)     Vitamin D deficiency

## 2024-05-06 ENCOUNTER — TELEPHONE (OUTPATIENT)
Age: 74
End: 2024-05-06

## 2024-05-06 NOTE — TELEPHONE ENCOUNTER
Pt  called he received a letter in the mail saying the Mounjaro was approved, when looking in the chart it was d/c by Dr. Lock because pt wasn't taken, which she wasn't because they were waiting on approval because the Trulicity was not available. He states he had plenty of Trulicity left, and when she runs out he will see if the pharmacy has it back in stock if not then we already got the Mounjaro approved so we can send that in for him.

## 2024-05-07 NOTE — TELEPHONE ENCOUNTER
Yes that is correct, he did call back and they did get the Mounjaro in the mail, but he knows not to give that to her until he runs out of the trulicity.

## 2024-05-22 ENCOUNTER — TELEPHONE (OUTPATIENT)
Age: 74
End: 2024-05-22

## 2024-05-22 RX ORDER — BLOOD SUGAR DIAGNOSTIC
STRIP MISCELLANEOUS
Qty: 100 EACH | Refills: 3 | Status: SHIPPED | OUTPATIENT
Start: 2024-05-22

## 2024-05-22 NOTE — TELEPHONE ENCOUNTER
PT requesting:    ACCU-CHEK Guide Test Strips    [facility] - [place] [place] - [facility] - P [phone] - F [phone] [23]

## 2024-06-28 ENCOUNTER — PATIENT MESSAGE (OUTPATIENT)
Age: 74
End: 2024-06-28

## 2024-06-28 ENCOUNTER — CLINICAL DOCUMENTATION (OUTPATIENT)
Age: 74
End: 2024-06-28

## 2024-06-28 RX ORDER — HYDRALAZINE HYDROCHLORIDE 25 MG/1
25 TABLET, FILM COATED ORAL 2 TIMES DAILY
Qty: 90 TABLET | Refills: 3
Start: 2024-06-28 | End: 2024-06-30 | Stop reason: SDUPTHER

## 2024-06-28 RX ORDER — LISINOPRIL 20 MG/1
20 TABLET ORAL DAILY
Qty: 90 TABLET | Refills: 1 | Status: SHIPPED | OUTPATIENT
Start: 2024-06-28

## 2024-06-28 NOTE — PROGRESS NOTES
Seen by nephrology on 6/19/2024.  Recommended reducing lisinopril to 20 mg due to progression of CKD.  Note also states she is tolerating hydralazine 25mg BID.  She needs better diabetes control.  Plans to repeat labs in July.  May add vitamin D/calcitriol pending next PTH level.  She will follow-up in October.  Med list updated

## 2024-06-28 NOTE — TELEPHONE ENCOUNTER
From: Deborah Godwin  To: Carolee Claros  Sent: 6/28/2024 2:19 PM EDT  Subject: blood pressure medication    Hi Mr Waldrop,    I received a letter from the kidney doctor, Dr. Blanchard. It states he recommended reducing lisinopril to 20 mg daily and starting hydralazine 25 mg twice a day. Is this correct? Just need to update her med list and I can send a new script to the pharmacy for the lower dose of lisinopril.     Thanks,     Deborah

## 2024-06-30 RX ORDER — HYDRALAZINE HYDROCHLORIDE 25 MG/1
25 TABLET, FILM COATED ORAL 2 TIMES DAILY
Qty: 180 TABLET | Refills: 3 | Status: SHIPPED | OUTPATIENT
Start: 2024-06-30

## 2024-07-02 ENCOUNTER — OFFICE VISIT (OUTPATIENT)
Age: 74
End: 2024-07-02
Payer: MEDICARE

## 2024-07-02 VITALS
TEMPERATURE: 97.4 F | HEIGHT: 65 IN | RESPIRATION RATE: 18 BRPM | SYSTOLIC BLOOD PRESSURE: 120 MMHG | WEIGHT: 212.38 LBS | OXYGEN SATURATION: 96 % | DIASTOLIC BLOOD PRESSURE: 76 MMHG | HEART RATE: 68 BPM | BODY MASS INDEX: 35.38 KG/M2

## 2024-07-02 DIAGNOSIS — I65.23 BILATERAL CAROTID ARTERY STENOSIS: ICD-10-CM

## 2024-07-02 DIAGNOSIS — Z86.73 HISTORY OF STROKE: ICD-10-CM

## 2024-07-02 DIAGNOSIS — Z79.4 TYPE 2 DIABETES MELLITUS WITH STAGE 4 CHRONIC KIDNEY DISEASE, WITH LONG-TERM CURRENT USE OF INSULIN (HCC): Primary | ICD-10-CM

## 2024-07-02 DIAGNOSIS — N18.4 TYPE 2 DIABETES MELLITUS WITH STAGE 4 CHRONIC KIDNEY DISEASE, WITH LONG-TERM CURRENT USE OF INSULIN (HCC): Primary | ICD-10-CM

## 2024-07-02 DIAGNOSIS — E11.22 TYPE 2 DIABETES MELLITUS WITH STAGE 4 CHRONIC KIDNEY DISEASE, WITH LONG-TERM CURRENT USE OF INSULIN (HCC): Primary | ICD-10-CM

## 2024-07-02 DIAGNOSIS — I10 ESSENTIAL HYPERTENSION: ICD-10-CM

## 2024-07-02 PROCEDURE — 1123F ACP DISCUSS/DSCN MKR DOCD: CPT | Performed by: NURSE PRACTITIONER

## 2024-07-02 PROCEDURE — 99214 OFFICE O/P EST MOD 30 MIN: CPT | Performed by: NURSE PRACTITIONER

## 2024-07-02 PROCEDURE — 3052F HG A1C>EQUAL 8.0%<EQUAL 9.0%: CPT | Performed by: NURSE PRACTITIONER

## 2024-07-02 PROCEDURE — 3074F SYST BP LT 130 MM HG: CPT | Performed by: NURSE PRACTITIONER

## 2024-07-02 PROCEDURE — 3078F DIAST BP <80 MM HG: CPT | Performed by: NURSE PRACTITIONER

## 2024-07-02 RX ORDER — INSULIN GLARGINE 100 [IU]/ML
INJECTION, SOLUTION SUBCUTANEOUS
Qty: 30 ML | Refills: 3 | Status: SHIPPED | OUTPATIENT
Start: 2024-07-02

## 2024-07-02 ASSESSMENT — PATIENT HEALTH QUESTIONNAIRE - PHQ9
2. FEELING DOWN, DEPRESSED OR HOPELESS: NOT AT ALL
SUM OF ALL RESPONSES TO PHQ QUESTIONS 1-9: 0
SUM OF ALL RESPONSES TO PHQ QUESTIONS 1-9: 0
SUM OF ALL RESPONSES TO PHQ9 QUESTIONS 1 & 2: 0
SUM OF ALL RESPONSES TO PHQ QUESTIONS 1-9: 0
SUM OF ALL RESPONSES TO PHQ QUESTIONS 1-9: 0
1. LITTLE INTEREST OR PLEASURE IN DOING THINGS: NOT AT ALL

## 2024-07-02 NOTE — PROGRESS NOTES
\"Have you been to the ER, urgent care clinic since your last visit?  Hospitalized since your last visit?\"    NO    “Have you seen or consulted any other health care providers outside of [facility] since your last visit?”    YES - When: approximately 3 months ago.  Where and Why: cardiology.            Click Here for Release of Records Request  
N/V, melena or hematochezia  : Denies dysuria, hematuria, or urinary frequency/urgency  Extremeties: +mild swelling in ankles Denies cold numb feet  Musculoskeletal: Denies joint pain, stiffness, or muscle cramps  Neuro: denies numbness/tingling or dizziness  Endo: denies polyuria or polydipsia  Skin: denies rashes or new lesions  Psych: denies anxiety or depression       Objective:     Vitals:    07/02/24 1321   BP: 120/76   Pulse: 68   Resp: 18   Temp: 97.4 °F (36.3 °C)   SpO2: 96%         General: Alert and oriented. No acute distress.  +Obese.  Eyes: Sclera white, conjunctiva clear. PERRLA.    Neck: Supple with FROM. No carotid bruits  Lungs: Breathing even and unlabored. All lobes clear to auscultation bilaterally   Heart :RRR, S1 and S2 normal intensity, no extra heart sounds  Extremities: +mild edema to both ankles  Neuro: Cranial nerves grossly normal.   Musculoskeletal: Joints are without swelling, erythema, or heat.   Psych: Mood and thought content appropriate for situation. Dressed appropriately and with good hygiene.  Skin: Warm and dry and intact. Chronic hair thinning present.       Assessment/ Plan:     Type 2 DM  Home BS in the morning are the lowest- last week it was 74, will reduce insulin from 30 to 28 units daily- she takes it in the mornings.    Finish current supply of Trulicity and then switch to low dose Mounjaro-  will send me message via [facility] when this occurs.   Cont to work on diet  F/U 3 months or sooner prn       HTN with CKD stage 4  Per Nephrology      A-fib  Stable  Cont Eliquis for stroke prophylaxis  Report any recurrent nosebleeds or other s/s of abnormal bleeding  F/U with cardiologist as scheduled  F/U 3 months.     HLD  Well controlled  Cont statin    Carotid stenosis  Repeat ultrasound ordered- to be done in October   Cont statin     Hyperthyroidism  Per Endo      Hx of CVA with hemiparesis  Cont statin and asa  Keep BP and BS under good control  Go to ER for

## 2024-07-12 ENCOUNTER — CLINICAL DOCUMENTATION (OUTPATIENT)
Age: 74
End: 2024-07-12

## 2024-07-12 NOTE — PROGRESS NOTES
Received lab results dated 7/8/24.  Glucose elevated at 198, creatinine elevated at 2.29, GFR 22, BUN 52, A1c 8.0.  A1c has not changed since April.

## 2024-09-03 DIAGNOSIS — E05.10 TOXIC THYROID NODULE: ICD-10-CM

## 2024-09-03 DIAGNOSIS — E05.90 HYPERTHYROIDISM: Primary | ICD-10-CM

## 2024-09-16 RX ORDER — DAPAGLIFLOZIN 10 MG/1
10 TABLET, FILM COATED ORAL DAILY
Qty: 90 TABLET | Refills: 1 | Status: SHIPPED | OUTPATIENT
Start: 2024-09-16

## 2024-09-16 RX ORDER — METOPROLOL SUCCINATE 100 MG/1
100 TABLET, EXTENDED RELEASE ORAL DAILY
Qty: 90 TABLET | Refills: 3 | Status: SHIPPED | OUTPATIENT
Start: 2024-09-16

## 2024-09-25 ENCOUNTER — HOSPITAL ENCOUNTER (EMERGENCY)
Facility: HOSPITAL | Age: 74
Discharge: HOME | End: 2024-09-25
Attending: EMERGENCY MEDICINE
Payer: MEDICARE

## 2024-09-25 VITALS
OXYGEN SATURATION: 98 % | HEART RATE: 55 BPM | TEMPERATURE: 98 F | DIASTOLIC BLOOD PRESSURE: 69 MMHG | HEIGHT: 65 IN | RESPIRATION RATE: 15 BRPM | WEIGHT: 219 LBS | BODY MASS INDEX: 36.49 KG/M2 | SYSTOLIC BLOOD PRESSURE: 147 MMHG

## 2024-09-25 DIAGNOSIS — E16.2 HYPOGLYCEMIA: Primary | ICD-10-CM

## 2024-09-25 LAB
ALBUMIN SERPL-MCNC: 2.4 G/DL (ref 3.5–5)
ALBUMIN/GLOB SERPL: 0.4 (ref 1.1–2.2)
ALP SERPL-CCNC: 76 U/L (ref 45–117)
ALT SERPL-CCNC: 18 U/L (ref 12–78)
ANION GAP SERPL CALC-SCNC: 8 MMOL/L (ref 2–12)
AST SERPL-CCNC: 20 U/L (ref 15–37)
BASOPHILS # BLD: 0.1 K/UL (ref 0–0.1)
BASOPHILS NFR BLD: 1 % (ref 0–1)
BILIRUB SERPL-MCNC: 0.3 MG/DL (ref 0.2–1)
BUN SERPL-MCNC: 50 MG/DL (ref 6–20)
BUN/CREAT SERPL: 18 (ref 12–20)
CALCIUM SERPL-MCNC: 9 MG/DL (ref 8.5–10.1)
CHLORIDE SERPL-SCNC: 105 MMOL/L (ref 97–108)
CO2 SERPL-SCNC: 22 MMOL/L (ref 21–32)
CREAT SERPL-MCNC: 2.71 MG/DL (ref 0.55–1.02)
DIFFERENTIAL METHOD BLD: ABNORMAL
EOSINOPHIL # BLD: 0.2 K/UL (ref 0–0.4)
EOSINOPHIL NFR BLD: 2 % (ref 0–7)
ERYTHROCYTE [DISTWIDTH] IN BLOOD BY AUTOMATED COUNT: 13.1 % (ref 11.5–14.5)
GLOBULIN SER CALC-MCNC: 5.6 G/DL (ref 2–4)
GLUCOSE BLD STRIP.AUTO-MCNC: 111 MG/DL (ref 65–117)
GLUCOSE BLD STRIP.AUTO-MCNC: 140 MG/DL (ref 65–117)
GLUCOSE BLD STRIP.AUTO-MCNC: 161 MG/DL (ref 65–117)
GLUCOSE BLD STRIP.AUTO-MCNC: 44 MG/DL (ref 65–117)
GLUCOSE SERPL-MCNC: 94 MG/DL (ref 65–100)
HCT VFR BLD AUTO: 34.3 % (ref 35–47)
HGB BLD-MCNC: 11.2 G/DL (ref 11.5–16)
IMM GRANULOCYTES # BLD AUTO: 0.1 K/UL (ref 0–0.04)
IMM GRANULOCYTES NFR BLD AUTO: 1 % (ref 0–0.5)
LYMPHOCYTES # BLD: 2.2 K/UL (ref 0.8–3.5)
LYMPHOCYTES NFR BLD: 18 % (ref 12–49)
MAGNESIUM SERPL-MCNC: 2.1 MG/DL (ref 1.6–2.4)
MCH RBC QN AUTO: 29.7 PG (ref 26–34)
MCHC RBC AUTO-ENTMCNC: 32.7 G/DL (ref 30–36.5)
MCV RBC AUTO: 91 FL (ref 80–99)
MONOCYTES # BLD: 0.9 K/UL (ref 0–1)
MONOCYTES NFR BLD: 8 % (ref 5–13)
NEUTS SEG # BLD: 8.7 K/UL (ref 1.8–8)
NEUTS SEG NFR BLD: 70 % (ref 32–75)
NRBC # BLD: 0 K/UL (ref 0–0.01)
NRBC BLD-RTO: 0 PER 100 WBC
PLATELET # BLD AUTO: 217 K/UL (ref 150–400)
PMV BLD AUTO: 10.6 FL (ref 8.9–12.9)
POTASSIUM SERPL-SCNC: 4 MMOL/L (ref 3.5–5.1)
PROT SERPL-MCNC: 8 G/DL (ref 6.4–8.2)
RBC # BLD AUTO: 3.77 M/UL (ref 3.8–5.2)
SERVICE CMNT-IMP: ABNORMAL
SERVICE CMNT-IMP: NORMAL
SODIUM SERPL-SCNC: 135 MMOL/L (ref 136–145)
WBC # BLD AUTO: 12.2 K/UL (ref 3.6–11)

## 2024-09-25 PROCEDURE — 36415 COLL VENOUS BLD VENIPUNCTURE: CPT

## 2024-09-25 PROCEDURE — 99284 EMERGENCY DEPT VISIT MOD MDM: CPT

## 2024-09-25 PROCEDURE — 82962 GLUCOSE BLOOD TEST: CPT

## 2024-09-25 PROCEDURE — 83735 ASSAY OF MAGNESIUM: CPT

## 2024-09-25 PROCEDURE — 85025 COMPLETE CBC W/AUTO DIFF WBC: CPT

## 2024-09-25 PROCEDURE — 80053 COMPREHEN METABOLIC PANEL: CPT

## 2024-09-25 PROCEDURE — 2580000003 HC RX 258

## 2024-09-25 RX ORDER — DEXTROSE MONOHYDRATE 100 MG/ML
INJECTION, SOLUTION INTRAVENOUS
Status: COMPLETED
Start: 2024-09-25 | End: 2024-09-25

## 2024-09-25 RX ORDER — DIPHENHYDRAMINE HYDROCHLORIDE 50 MG/ML
25 INJECTION INTRAMUSCULAR; INTRAVENOUS
Status: DISCONTINUED | OUTPATIENT
Start: 2024-09-25 | End: 2024-09-25

## 2024-09-25 RX ADMIN — DEXTROSE MONOHYDRATE 250 ML: 100 INJECTION, SOLUTION INTRAVENOUS at 10:27

## 2024-10-03 RX ORDER — APIXABAN 5 MG/1
5 TABLET, FILM COATED ORAL 2 TIMES DAILY
Qty: 180 TABLET | Refills: 1 | Status: SHIPPED | OUTPATIENT
Start: 2024-10-03

## 2024-10-04 ENCOUNTER — CLINICAL DOCUMENTATION (OUTPATIENT)
Age: 74
End: 2024-10-04

## 2024-10-04 NOTE — PROGRESS NOTES
Patient seen by nephrologist Dr. Blanchard on 10-2-24.  Recent ER visit for hyperglycemia.  Blood sugar was 44.  She was given dextrose.  Has been held next dose of Mounjaro.  Dr. Blanchard encouraged him to restart it and reduce the insulin if necessary.  He was referred to vascular surgeon for dialysis access.  Likely not a good candidate for PD due to poorly controlled diabetes.  He would like a BMP, phosphorus, PTH, and albumin drawn around January prior to her next follow-up visit

## 2024-10-22 ENCOUNTER — OFFICE VISIT (OUTPATIENT)
Age: 74
End: 2024-10-22
Payer: MEDICARE

## 2024-10-22 VITALS
DIASTOLIC BLOOD PRESSURE: 89 MMHG | HEIGHT: 65 IN | SYSTOLIC BLOOD PRESSURE: 130 MMHG | TEMPERATURE: 97 F | HEART RATE: 79 BPM | WEIGHT: 216 LBS | OXYGEN SATURATION: 98 % | RESPIRATION RATE: 18 BRPM | BODY MASS INDEX: 35.99 KG/M2

## 2024-10-22 DIAGNOSIS — I48.0 PAROXYSMAL ATRIAL FIBRILLATION (HCC): ICD-10-CM

## 2024-10-22 DIAGNOSIS — E11.22 TYPE 2 DIABETES MELLITUS WITH STAGE 4 CHRONIC KIDNEY DISEASE, WITH LONG-TERM CURRENT USE OF INSULIN (HCC): Primary | ICD-10-CM

## 2024-10-22 DIAGNOSIS — E05.90 HYPERTHYROIDISM: ICD-10-CM

## 2024-10-22 DIAGNOSIS — N18.4 TYPE 2 DIABETES MELLITUS WITH STAGE 4 CHRONIC KIDNEY DISEASE, WITH LONG-TERM CURRENT USE OF INSULIN (HCC): Primary | ICD-10-CM

## 2024-10-22 DIAGNOSIS — E78.00 HYPERCHOLESTEROLEMIA: ICD-10-CM

## 2024-10-22 DIAGNOSIS — Z79.4 TYPE 2 DIABETES MELLITUS WITH STAGE 4 CHRONIC KIDNEY DISEASE, WITH LONG-TERM CURRENT USE OF INSULIN (HCC): Primary | ICD-10-CM

## 2024-10-22 DIAGNOSIS — I10 ESSENTIAL HYPERTENSION: ICD-10-CM

## 2024-10-22 DIAGNOSIS — Z86.73 HISTORY OF STROKE: ICD-10-CM

## 2024-10-22 LAB — HBA1C MFR BLD: 7.2 %

## 2024-10-22 PROCEDURE — 3052F HG A1C>EQUAL 8.0%<EQUAL 9.0%: CPT | Performed by: NURSE PRACTITIONER

## 2024-10-22 PROCEDURE — 3075F SYST BP GE 130 - 139MM HG: CPT | Performed by: NURSE PRACTITIONER

## 2024-10-22 PROCEDURE — 83036 HEMOGLOBIN GLYCOSYLATED A1C: CPT | Performed by: NURSE PRACTITIONER

## 2024-10-22 PROCEDURE — 99214 OFFICE O/P EST MOD 30 MIN: CPT | Performed by: NURSE PRACTITIONER

## 2024-10-22 PROCEDURE — 3079F DIAST BP 80-89 MM HG: CPT | Performed by: NURSE PRACTITIONER

## 2024-10-22 PROCEDURE — 1123F ACP DISCUSS/DSCN MKR DOCD: CPT | Performed by: NURSE PRACTITIONER

## 2024-10-22 RX ORDER — LIRAGLUTIDE 6 MG/ML
0.6 INJECTION SUBCUTANEOUS DAILY
Qty: 2 ADJUSTABLE DOSE PRE-FILLED PEN SYRINGE | Refills: 0 | Status: SHIPPED | OUTPATIENT
Start: 2024-10-22

## 2024-10-22 RX ORDER — LIRAGLUTIDE 6 MG/ML
0.6 INJECTION SUBCUTANEOUS DAILY
Qty: 12 ADJUSTABLE DOSE PRE-FILLED PEN SYRINGE | Refills: 0 | Status: SHIPPED | OUTPATIENT
Start: 2024-10-22

## 2024-10-22 ASSESSMENT — PATIENT HEALTH QUESTIONNAIRE - PHQ9
SUM OF ALL RESPONSES TO PHQ QUESTIONS 1-9: 0
8. MOVING OR SPEAKING SO SLOWLY THAT OTHER PEOPLE COULD HAVE NOTICED. OR THE OPPOSITE, BEING SO FIGETY OR RESTLESS THAT YOU HAVE BEEN MOVING AROUND A LOT MORE THAN USUAL: NOT AT ALL
4. FEELING TIRED OR HAVING LITTLE ENERGY: NOT AT ALL
SUM OF ALL RESPONSES TO PHQ QUESTIONS 1-9: 0
7. TROUBLE CONCENTRATING ON THINGS, SUCH AS READING THE NEWSPAPER OR WATCHING TELEVISION: NOT AT ALL
1. LITTLE INTEREST OR PLEASURE IN DOING THINGS: NOT AT ALL
SUM OF ALL RESPONSES TO PHQ QUESTIONS 1-9: 0
5. POOR APPETITE OR OVEREATING: NOT AT ALL
SUM OF ALL RESPONSES TO PHQ QUESTIONS 1-9: 0
10. IF YOU CHECKED OFF ANY PROBLEMS, HOW DIFFICULT HAVE THESE PROBLEMS MADE IT FOR YOU TO DO YOUR WORK, TAKE CARE OF THINGS AT HOME, OR GET ALONG WITH OTHER PEOPLE: NOT DIFFICULT AT ALL
SUM OF ALL RESPONSES TO PHQ9 QUESTIONS 1 & 2: 0
6. FEELING BAD ABOUT YOURSELF - OR THAT YOU ARE A FAILURE OR HAVE LET YOURSELF OR YOUR FAMILY DOWN: NOT AT ALL
2. FEELING DOWN, DEPRESSED OR HOPELESS: NOT AT ALL
9. THOUGHTS THAT YOU WOULD BE BETTER OFF DEAD, OR OF HURTING YOURSELF: NOT AT ALL
3. TROUBLE FALLING OR STAYING ASLEEP: NOT AT ALL

## 2024-10-22 NOTE — PROGRESS NOTES
Subjective:     CC: diabetes, HTN, CKD    Carolee Claros is a 73 y.o. female who presents today for a 3 month follow up for diabetes, HTN, CKD, and other chronic medical problems.     Type 2 diabetes  Rec'd labs from her nephrologist Dr Blanchard in July. A1C was 8.0, no change from April's A1C. At this time, nephrologist advised her to increase the insulin dose from 28 to 35 units. Her , however, was hesitant to do this out of concern about hypoglycemia so the highest he has gone is 32 units but usually gives her 30. (She did go to the ER for an episode of hypoglycemia back in September).    A1C today is 7.2!    She has had trouble getting trulicity from the pharmacy so Mounjaor was ordered back in August but they could not get this from the pharmacy either. Just used her last Trulicity pen last week. Will switch to Victoza now.     Still taking Farxiga.     She is no longer on Metformin due to CKD.     She does have some peripheral neuropathy in the feet.           HTN with stage 4 CKD     Lab Results   Component Value Date    CREATININE 2.71 (H) 09/25/2024      Creatinine was 2.29 in 7-2024, down from 2.47 in April.    BP at goal.  She is on 25mg of HTCZ daily, Amlodipine 10mg daily, Lisinopril 20mg daily, Metoprolol XR 100mg daily, and Hydralazine 25mg BID. (Saw her nephrologist Dr Blanchard 6/19/2024.  Recommended reducing lisinopril to 20 mg due to progression of CKD.)     Saw her again earlier this month. She was referred to vascular surgeon for dialysis access.  (Likely not a good candidate for PD due to poorly controlled diabetes. )     He would like a BMP, phosphorus, PTH, and albumin drawn around January prior to her next follow-up visit       Hyperthyroidism  She was diagnosed in 1/2021 and referred to Endo Dr Blakely. She had no thyroid antibodies. Her thyroid nuclear uptake scan showed a dominant nodule in the right lobe with normal uptake of tracer. She was prescribed Methimazole. Now on 2.5mg TIW.

## 2024-10-30 ENCOUNTER — HOSPITAL ENCOUNTER (OUTPATIENT)
Facility: HOSPITAL | Age: 74
Discharge: HOME | End: 2024-11-02
Attending: INTERNAL MEDICINE
Payer: MEDICARE

## 2024-10-30 DIAGNOSIS — I65.23 BILATERAL CAROTID ARTERY STENOSIS: ICD-10-CM

## 2024-10-30 DIAGNOSIS — I34.0 NONRHEUMATIC MITRAL VALVE REGURGITATION: ICD-10-CM

## 2024-10-30 LAB
ECHO AO ASC DIAM: 3.4 CM
ECHO AO ROOT DIAM: 2.6 CM
ECHO EST RA PRESSURE: 3 MMHG
ECHO LA DIAMETER: 3.4 CM
ECHO LA TO AORTIC ROOT RATIO: 1.31
ECHO LA VOL A-L A4C: 60 ML (ref 22–52)
ECHO LA VOL MOD A4C: 58 ML (ref 22–52)
ECHO LV E' LATERAL VELOCITY: 8.8 CM/S
ECHO LV E' SEPTAL VELOCITY: 7.1 CM/S
ECHO LV EF PHYSICIAN: 65 %
ECHO LV FRACTIONAL SHORTENING: 46 % (ref 28–44)
ECHO LV INTERNAL DIMENSION DIASTOLIC: 4.8 CM (ref 3.9–5.3)
ECHO LV INTERNAL DIMENSION SYSTOLIC: 2.6 CM
ECHO LV IVSD: 1 CM (ref 0.6–0.9)
ECHO LV MASS 2D: 170.2 G (ref 67–162)
ECHO LV POSTERIOR WALL DIASTOLIC: 1 CM (ref 0.6–0.9)
ECHO LV RELATIVE WALL THICKNESS RATIO: 0.42
ECHO LVOT AREA: 3.5 CM2
ECHO LVOT DIAM: 2.1 CM
ECHO MV A VELOCITY: 1.3 M/S
ECHO MV E DECELERATION TIME (DT): 263.9 MS
ECHO MV E VELOCITY: 0.78 M/S
ECHO MV E/A RATIO: 0.6
ECHO MV E/E' LATERAL: 8.86
ECHO MV E/E' RATIO (AVERAGED): 9.92
ECHO MV E/E' SEPTAL: 10.99
ECHO PULMONARY ARTERY SYSTOLIC PRESSURE (PASP): 23 MMHG
ECHO RA AREA 4C: 12.4 CM2
ECHO RIGHT VENTRICULAR SYSTOLIC PRESSURE (RVSP): 20 MMHG
ECHO RV BASAL DIMENSION: 3.5 CM
ECHO RV FREE WALL PEAK S': 13.7 CM/S
ECHO RV TAPSE: 2.4 CM (ref 1.7–?)
ECHO TV REGURGITANT MAX VELOCITY: 2.07 M/S
ECHO TV REGURGITANT PEAK GRADIENT: 17 MMHG
VAS LEFT CCA DIST EDV: 8.8 CM/S
VAS LEFT CCA DIST PSV: 59.2 CM/S
VAS LEFT CCA PROX EDV: 10.4 CM/S
VAS LEFT CCA PROX PSV: 104.2 CM/S
VAS LEFT ECA EDV: 4.19 CM/S
VAS LEFT ECA PSV: 79.3 CM/S
VAS LEFT ICA DIST EDV: 10 CM/S
VAS LEFT ICA DIST PSV: 56.2 CM/S
VAS LEFT ICA MID EDV: 18.6 CM/S
VAS LEFT ICA MID PSV: 69.2 CM/S
VAS LEFT ICA PROX EDV: 11.3 CM/S
VAS LEFT ICA PROX PSV: 65.4 CM/S
VAS LEFT ICA/CCA PSV: 1.17 NO UNITS
VAS LEFT VERTEBRAL EDV: 8.98 CM/S
VAS LEFT VERTEBRAL PSV: 50.3 CM/S
VAS RIGHT CCA DIST EDV: 10.3 CM/S
VAS RIGHT CCA DIST PSV: 71.6 CM/S
VAS RIGHT CCA PROX EDV: 10.3 CM/S
VAS RIGHT CCA PROX PSV: 61.2 CM/S
VAS RIGHT ECA EDV: 7.84 CM/S
VAS RIGHT ECA PSV: 103.9 CM/S
VAS RIGHT ICA DIST EDV: 13.2 CM/S
VAS RIGHT ICA DIST PSV: 60.9 CM/S
VAS RIGHT ICA MID EDV: 16 CM/S
VAS RIGHT ICA MID PSV: 65.4 CM/S
VAS RIGHT ICA PROX EDV: 10.4 CM/S
VAS RIGHT ICA PROX PSV: 55.6 CM/S
VAS RIGHT ICA/CCA PSV: 0.9 NO UNITS
VAS RIGHT VERTEBRAL EDV: 15.52 CM/S
VAS RIGHT VERTEBRAL PSV: 78.1 CM/S

## 2024-10-30 PROCEDURE — 93306 TTE W/DOPPLER COMPLETE: CPT

## 2024-10-30 PROCEDURE — 93880 EXTRACRANIAL BILAT STUDY: CPT

## 2024-10-30 NOTE — RESULT ENCOUNTER NOTE
The echo demonstrated normal heart function.  There is persistent mitral valve leakiness that is moderate or slightly less.  This is unchanged from the last several echo studies.  All other valves are working fine.  There were no abnormal or unexpected findings of concern.  Will discuss the findings further at the upcoming office visit 12/11 but call before then if any questions or concerns arise.

## 2024-11-14 RX ORDER — HYDROCHLOROTHIAZIDE 25 MG/1
TABLET ORAL
Qty: 90 TABLET | Refills: 1 | Status: SHIPPED | OUTPATIENT
Start: 2024-11-14

## 2024-11-26 NOTE — PROGRESS NOTES
1. Have you been to the ER, urgent care clinic since your last visit? Hospitalized since your last visit? No    2. Have you seen or consulted any other health care providers outside of the [address] [place] [address] since your last visit? Include any pap smears or colon screening. Yes When: Dr Bibi Lamar 9-29-21   This is the Subsequent Medicare Annual Wellness Exam, performed 12 months or more after the Initial AWV or the last Subsequent AWV    I have reviewed the patient's medical history in detail and updated the computerized patient record. Assessment/Plan   Education and counseling provided:  Are appropriate based on today's review and evaluation  End-of-Life planning (with patient's consent)  Influenza Vaccine  Diabetes screening test     Health maintenance  Eye exam: done 7/2021  She is due for a mammogram- ordered back in June but never completed.  given scheduling dept phone number to call and schedule  PNA vaccines up to date  Flu shot up to date  Shingrix- she is not a candidate, never had the chicken pox   Covid 19 vaccine- she had both Moderna vaccines, advised to get booster next month as soon as available    Depression Risk Factor Screening     3 most recent PHQ Screens 12/15/2021   PHQ Not Done -   Little interest or pleasure in doing things Not at all   Feeling down, depressed, irritable, or hopeless Not at all   Total Score PHQ 2 0       Alcohol Risk Screen    Do you average more than 1 drink per night or more than 7 drinks a week:  No    On any one occasion in the past three months have you have had more than 3 drinks containing alcohol:  Yes        Functional Ability and Level of Safety    Hearing: Hearing is good. Activities of Daily Living: The home contains: grab bars  Patient needs help with:  phone, transportation, shopping, housework, managing medications, managing money and bathing     Functional Ability:   Does the patient exhibit a steady gait?   yes   How long did it take the patient to get up and walk from a sitting position? 4sec   Is the patient self reliant?  (ie can do own laundry, meals, household chores)  no     Does the patient handle his/her own medications?  no     Does the patient handle his/her own money? no     Is the patients home safe (ie good lighting, handrails on stairs and bath, etc.)? yes     Did you notice or did patient express any hearing difficulties? no     Did you notice or did patient express any vision difficulties?   no     Were distance and reading eye charts used? no       Advance Care Planning:   Patient was offered the opportunity to discuss advance care planning:  yes     Does patient have an Advance Directive:  yes   If no, did you provide information on Caring Connections? no     ADL Assessment 12/15/2021   Feeding yourself No Help Needed   Getting from bed to chair No Help Needed   Getting dressed Help Needed   Bathing or showering Help Needed   Walk across the room (includes cane/walker) No Help Needed   Using the telphone Help Needed   Taking your medications Help Needed   Preparing meals Help Needed   Managing money (expenses/bills) Help Needed   Moderately strenuous housework (laundry) Help Needed   Shopping for personal items (toiletries/medicines) Help Needed   Shopping for groceries Help Needed   Driving Help Needed   Climbing a flight of stairs Help Needed   Getting to places beyond walking distances Help Needed       Abuse Screening Questionnaire 12/15/2021   Do you ever feel afraid of your partner? N   Are you in a relationship with someone who physically or mentally threatens you? N   Is it safe for you to go home? Y           Ambulation: with no difficulty     Fall Risk:  Fall Risk Assessment, last 12 mths 12/15/2021   Able to walk? Yes   Fall in past 12 months? 0   Do you feel unsteady? 0   Are you worried about falling 0   Is TUG test greater than 12 seconds?  -   Is the gait abnormal? -   Number of falls in past 12 months - Fall with injury?  -      Abuse Screen:  Patient is not abused       Cognitive Screening    Has your family/caregiver stated any concerns about your memory: no     Cognitive Screening: Abnormal - Clock Drawing Test    Health Maintenance Due     Health Maintenance Due   Topic Date Due    Breast Cancer Screen Mammogram  11/03/2018    Foot Exam Q1  12/17/2021    MICROALBUMIN Q1  12/17/2021    Medicare Yearly Exam  12/18/2021       Patient Care Team   Patient Care Team:  Dorcas Mason NP as PCP - General (Nurse Practitioner)  Dorcas Mason NP as PCP - [address] [place] [address] [facility]  Maurisio Pan NP (Nurse Practitioner)  Chas High MD (Cardiology)  Freddie Jones MD as Consulting Provider (Endocrinology)    History     Patient Active Problem List   Diagnosis Code    Psoriasis L40.9    Essential hypertension, benign I10    Rosacea L71.9    Heart murmur R01.1    Type 2 diabetes mellitus with hyperglycemia, with long-term current use of insulin ([facility]) E11.65, Z79.4    Obesity, morbid ([facility] E66.01    Type 2 diabetes mellitus with chronic kidney disease ([facility] E11.22    Acute ischemic cerebrovascular accident (CVA) involving right middle cerebral artery [facility]) I63.511    Cerebrovascular accident (CVA) due to thrombosis of right middle cerebral artery ([address] I63.311    Paroxysmal atrial fibrillation ([address] I48.0    Dyslipidemia E78.5    CVA (cerebral vascular accident) ([address] I63.9    Diabetic peripheral neuropathy associated with type 2 diabetes mellitus ([address] E11.42    Bilateral carotid artery stenosis L21.66    Embolic stroke involving left middle cerebral artery ([address] L26.129    CKD (chronic kidney disease) stage 3, GFR 30-59 ml/min ([facility]) N18.30    Low TSH level R79.89     Past Medical History:   Diagnosis Date    Diabetes ([address]     Hypertension     Murmur     Osteopenia     Psoriasis     Rosacea     Stroke ([address]     Vitamin D deficiency       Past Surgical History: Procedure Laterality Date    COLONOSCOPY N/A 2018    COLONOSCOPY   DIABETIC performed by Mirlande Rodriguez MD at [facility] HX  SECTION      HX COLONOSCOPY  2018    3 polyps    HX TONSILLECTOMY       Current Outpatient Medications   Medication Sig Dispense Refill    hydroCHLOROthiazide (HYDRODIURIL) 25 mg tablet Take 1 Tablet by mouth daily. 90 Tablet 1    amLODIPine (NORVASC) 10 mg tablet TAKE 1 TABLET BY MOUTH EVERY DAY 21 Tablet 0    Eliquis 5 mg tablet TAKE 1 TABLET BY MOUTH TWO TIMES A DAY. 60 Tablet 0    Basaglar KwikPen U-100 Insulin 100 unit/mL (3 mL) inpn 40 Units by SubCUTAneous route every morning. 1 Pen 5    quinapriL (ACCUPRIL) 40 mg tablet TAKE 1 TABLET TWICE A  Tablet 1    apixaban (Eliquis) 5 mg tablet TAKE 1 TABLET BY MOUTH TWICE A  Tablet 1    atorvastatin (LIPITOR) 80 mg tablet TAKE 1 TABLET NIGHTLY 90 Tablet 2    glucose blood VI test strips (ASCENSIA AUTODISC VI, ONE TOUCH ULTRA TEST VI) strip Check glucose twice daily. 180 Strip 3    metoprolol succinate (TOPROL-XL) 100 mg tablet Take 1 Tablet by mouth daily. 90 Tablet 3    metFORMIN (GLUCOPHAGE) 500 mg tablet Take 1 Tablet by mouth two (2) times daily (with meals). 180 Tablet 5    dulaglutide (Trulicity) 1.5 MY/2.5 mL sub-q pen Inject 1.5mg SQ every 7 days 6 mL 5    Insulin Needles, Disposable, 31 gauge x 5/16\" ndle Use as directed 100 Pen Needle 5    methIMAzole (TAPAZOLE) 5 mg tablet Take 1 Tab by mouth daily. (Patient taking differently: Take 2.5 mg by mouth daily.) 90 Tab 3    multivitamin (ONE A DAY) tablet Take 1 Tab by mouth daily.  aspirin 81 mg chewable tablet Take 81 mg by mouth daily.  Blood-Glucose Meter monitoring kit Check blood sugar twice daily.  1 Kit 0     Allergies   Allergen Reactions    Pcn [Penicillins] Rash    Sulfa (Sulfonamide Antibiotics) Unknown (comments)       Family History   Problem Relation Age of Onset    Diabetes Mother     Heart Disease Father    Floydene Ada No Known Problems Child      Social History     Tobacco Use    Smoking status: Never Smoker    Smokeless tobacco: Never Used   Substance Use Topics    Alcohol use:  Yes     Alcohol/week: 0.0 standard drinks     Comment: 1/couple months         Tino Rascon NP Patient/Father

## 2024-12-11 ENCOUNTER — OFFICE VISIT (OUTPATIENT)
Age: 74
End: 2024-12-11
Payer: MEDICARE

## 2024-12-11 VITALS
BODY MASS INDEX: 35.32 KG/M2 | HEIGHT: 65 IN | HEART RATE: 70 BPM | WEIGHT: 212 LBS | RESPIRATION RATE: 18 BRPM | OXYGEN SATURATION: 96 % | DIASTOLIC BLOOD PRESSURE: 70 MMHG | SYSTOLIC BLOOD PRESSURE: 126 MMHG | TEMPERATURE: 97.8 F

## 2024-12-11 DIAGNOSIS — E78.00 HYPERCHOLESTEROLEMIA: ICD-10-CM

## 2024-12-11 DIAGNOSIS — I10 PRIMARY HYPERTENSION: ICD-10-CM

## 2024-12-11 DIAGNOSIS — I48.0 PAROXYSMAL ATRIAL FIBRILLATION (HCC): Primary | ICD-10-CM

## 2024-12-11 DIAGNOSIS — Z79.01 CHRONIC ANTICOAGULATION: ICD-10-CM

## 2024-12-11 DIAGNOSIS — I34.0 NONRHEUMATIC MITRAL VALVE REGURGITATION: ICD-10-CM

## 2024-12-11 PROCEDURE — 3074F SYST BP LT 130 MM HG: CPT | Performed by: INTERNAL MEDICINE

## 2024-12-11 PROCEDURE — 1123F ACP DISCUSS/DSCN MKR DOCD: CPT | Performed by: INTERNAL MEDICINE

## 2024-12-11 PROCEDURE — 3078F DIAST BP <80 MM HG: CPT | Performed by: INTERNAL MEDICINE

## 2024-12-11 PROCEDURE — 99214 OFFICE O/P EST MOD 30 MIN: CPT | Performed by: INTERNAL MEDICINE

## 2024-12-11 PROCEDURE — 1126F AMNT PAIN NOTED NONE PRSNT: CPT | Performed by: INTERNAL MEDICINE

## 2024-12-11 ASSESSMENT — PATIENT HEALTH QUESTIONNAIRE - PHQ9
SUM OF ALL RESPONSES TO PHQ QUESTIONS 1-9: 0
1. LITTLE INTEREST OR PLEASURE IN DOING THINGS: NOT AT ALL
SUM OF ALL RESPONSES TO PHQ9 QUESTIONS 1 & 2: 0
SUM OF ALL RESPONSES TO PHQ QUESTIONS 1-9: 0
2. FEELING DOWN, DEPRESSED OR HOPELESS: NOT AT ALL

## 2024-12-11 NOTE — PROGRESS NOTES
Identified pt with two pt identifiers(name and ). Reviewed record in preparation for visit and have obtained necessary documentation.  Chief Complaint   Patient presents with    Atrial Fibrillation    Valvular Heart Disease    Cholesterol Problem    Hypertension      /70 (Site: Left Upper Arm, Position: Sitting, Cuff Size: Medium Adult)   Pulse 70   Temp 97.8 °F (36.6 °C) (Temporal)   Resp 18   Ht 1.651 m (5' 5\")   Wt 96.2 kg (212 lb)   SpO2 96%   BMI 35.28 kg/m²       Medications reviewed/approved by provider.      Health Maintenance Review: Patient reminded of \"due or due soon\" health maintenance. I have asked the patient to contact his/her primary care provider (PCP) for follow-up on his/her health maintenance.    Coordination of Care Questionnaire:  :   1) Have you been to an emergency room, urgent care, or hospitalized since your last visit?  If yes, where when, and reason for visit? no       2. Have seen or consulted any other health care provider since your last visit?   If yes, where when, and reason for visit?  no      Patient is accompanied by spouse I have received verbal consent from Carolee Claros to discuss any/all medical information while they are present in the room.    
  Allergen Reactions    Sulfa Antibiotics      Other reaction(s): Unknown (comments)     Past Medical History:   Diagnosis Date    Diabetes (HCC)     Hypertension     Menopause     Murmur     Osteopenia     Psoriasis     Rosacea     Stroke (HCC)     Vitamin D deficiency      Past Surgical History:   Procedure Laterality Date     SECTION      COLONOSCOPY N/A 2018    COLONOSCOPY   DIABETIC performed by Rosanne Ladd MD at [facility] MAIN OR    COLONOSCOPY      3 polyps    TONSILLECTOMY       Family History   Problem Relation Age of Onset    No Known Problems Child     Diabetes Mother     Heart Disease Father      Social History     Tobacco Use    Smoking status: Never     Passive exposure: Never    Smokeless tobacco: Never   Substance Use Topics    Alcohol use: Yes       VITAL SIGNS:  /70 (Site: Left Upper Arm, Position: Sitting, Cuff Size: Medium Adult)   Pulse 70   Temp 97.8 °F (36.6 °C) (Temporal)   Resp 18   Ht 1.651 m (5' 5\")   Wt 96.2 kg (212 lb)   SpO2 96% Comment: RA  BMI 35.28 kg/m²      Body mass index is 35.28 kg/m².    Wt Readings from Last 5 Encounters:   24 96.2 kg (212 lb)   10/22/24 98 kg (216 lb)   24 99.3 kg (219 lb)   24 96.3 kg (212 lb 6 oz)   24 96.2 kg (212 lb)       BP Readings from Last 5 Encounters:   24 126/70   10/22/24 130/89   24 (!) 147/69   24 120/76   24 124/76       PHYSICAL EXAM:  General: No distress, cooperative and alert  CV: Regular rate and rhythm; normal S1/S2, no gallop, soft 1/6 systolic murmur at the apex, no rub, no JVD, normal carotid upstrokes, no carotid bruits  Respiratory: Adequate air movement with normal effort, clear to auscultation, no wheezes, no ronchi, no rales  Abdomen: Soft, nontender, nondistended, normal bowel sounds. No audible bruits.  Extremities: Warm and well perfused, normal cap refill, no clubbing or cyanosis. No edema  Neuro: No focal neurologic abnormalities      Pertinent

## 2025-01-06 ENCOUNTER — TELEPHONE (OUTPATIENT)
Age: 75
End: 2025-01-06

## 2025-01-08 RX ORDER — AMLODIPINE BESYLATE 10 MG/1
10 TABLET ORAL DAILY
Qty: 90 TABLET | Refills: 3 | Status: SHIPPED | OUTPATIENT
Start: 2025-01-08

## 2025-01-08 RX ORDER — INSULIN GLARGINE 100 [IU]/ML
INJECTION, SOLUTION SUBCUTANEOUS
Qty: 30 ML | Refills: 3 | Status: SHIPPED | OUTPATIENT
Start: 2025-01-08

## 2025-01-24 ENCOUNTER — CLINICAL DOCUMENTATION (OUTPATIENT)
Age: 75
End: 2025-01-24

## 2025-01-24 NOTE — PROGRESS NOTES
Seen by nephrology on 1/15/2025.  Mounjaro is in shortage.  Has been states she has been injecting a different agent, not sure of the name.  Discussed importance of deciding between HD versus PD which helps us to allow access planning.  Has been considering PD but needs better diabetes control.  Follow-up 3 months.

## 2025-01-28 PROBLEM — I65.23 BILATERAL CAROTID ARTERY STENOSIS: Status: RESOLVED | Noted: 2019-06-10 | Resolved: 2025-01-28

## 2025-01-28 PROBLEM — I63.311 CEREBROVASCULAR ACCIDENT (CVA) DUE TO THROMBOSIS OF RIGHT MIDDLE CEREBRAL ARTERY (HCC): Status: RESOLVED | Noted: 2019-01-18 | Resolved: 2025-01-28

## 2025-01-30 ENCOUNTER — OFFICE VISIT (OUTPATIENT)
Age: 75
End: 2025-01-30

## 2025-01-30 VITALS
HEIGHT: 65 IN | HEART RATE: 68 BPM | WEIGHT: 213.8 LBS | OXYGEN SATURATION: 95 % | BODY MASS INDEX: 35.62 KG/M2 | RESPIRATION RATE: 18 BRPM | TEMPERATURE: 97.6 F | SYSTOLIC BLOOD PRESSURE: 139 MMHG | DIASTOLIC BLOOD PRESSURE: 80 MMHG

## 2025-01-30 DIAGNOSIS — Z79.4 TYPE 2 DIABETES MELLITUS WITH STAGE 4 CHRONIC KIDNEY DISEASE, WITH LONG-TERM CURRENT USE OF INSULIN (HCC): Primary | ICD-10-CM

## 2025-01-30 DIAGNOSIS — I10 ESSENTIAL HYPERTENSION: ICD-10-CM

## 2025-01-30 DIAGNOSIS — Z00.00 MEDICARE ANNUAL WELLNESS VISIT, SUBSEQUENT: ICD-10-CM

## 2025-01-30 DIAGNOSIS — E55.9 VITAMIN D DEFICIENCY, UNSPECIFIED: ICD-10-CM

## 2025-01-30 DIAGNOSIS — E11.22 TYPE 2 DIABETES MELLITUS WITH STAGE 4 CHRONIC KIDNEY DISEASE, WITH LONG-TERM CURRENT USE OF INSULIN (HCC): Primary | ICD-10-CM

## 2025-01-30 DIAGNOSIS — E78.00 HYPERCHOLESTEROLEMIA: ICD-10-CM

## 2025-01-30 DIAGNOSIS — E05.90 HYPERTHYROIDISM: ICD-10-CM

## 2025-01-30 DIAGNOSIS — I69.354 HEMIPARESIS AFFECTING LEFT SIDE AS LATE EFFECT OF STROKE (HCC): ICD-10-CM

## 2025-01-30 DIAGNOSIS — I48.0 PAROXYSMAL ATRIAL FIBRILLATION (HCC): ICD-10-CM

## 2025-01-30 DIAGNOSIS — N18.4 TYPE 2 DIABETES MELLITUS WITH STAGE 4 CHRONIC KIDNEY DISEASE, WITH LONG-TERM CURRENT USE OF INSULIN (HCC): Primary | ICD-10-CM

## 2025-01-30 SDOH — ECONOMIC STABILITY: FOOD INSECURITY: WITHIN THE PAST 12 MONTHS, THE FOOD YOU BOUGHT JUST DIDN'T LAST AND YOU DIDN'T HAVE MONEY TO GET MORE.: NEVER TRUE

## 2025-01-30 SDOH — ECONOMIC STABILITY: FOOD INSECURITY: WITHIN THE PAST 12 MONTHS, YOU WORRIED THAT YOUR FOOD WOULD RUN OUT BEFORE YOU GOT MONEY TO BUY MORE.: NEVER TRUE

## 2025-01-30 ASSESSMENT — PATIENT HEALTH QUESTIONNAIRE - PHQ9
SUM OF ALL RESPONSES TO PHQ QUESTIONS 1-9: 0
1. LITTLE INTEREST OR PLEASURE IN DOING THINGS: NOT AT ALL
SUM OF ALL RESPONSES TO PHQ QUESTIONS 1-9: 0
2. FEELING DOWN, DEPRESSED OR HOPELESS: NOT AT ALL
SUM OF ALL RESPONSES TO PHQ QUESTIONS 1-9: 0
SUM OF ALL RESPONSES TO PHQ QUESTIONS 1-9: 0
SUM OF ALL RESPONSES TO PHQ9 QUESTIONS 1 & 2: 0

## 2025-01-30 ASSESSMENT — LIFESTYLE VARIABLES
HOW OFTEN DO YOU HAVE A DRINK CONTAINING ALCOHOL: MONTHLY OR LESS
HOW MANY STANDARD DRINKS CONTAINING ALCOHOL DO YOU HAVE ON A TYPICAL DAY: 1 OR 2

## 2025-01-30 NOTE — PROGRESS NOTES
Chief Complaint   Patient presents with    Medicare AWV       Vitals:    01/30/25 1329   BP: 139/80   Pulse: 68   Resp: 18   Temp: 97.6 °F (36.4 °C)   SpO2: 95%   \"Have you been to the ER, urgent care clinic since your last visit?  Hospitalized since your last visit?\"    NO    “Have you seen or consulted any other health care providers outside our system since your last visit?”    NO           
Patient labs drawn in left arm per NP orders. Patient tolerated well.    
31G X 5 MM MISC, Use once a day with insulin, Disp: 90 each, Rfl: 3    Multiple Vitamins-Minerals (MULTIVITAMIN ADULT, MINERALS, PO), Take by mouth daily, Disp: , Rfl:     aspirin 81 MG chewable tablet, Take 1 tablet by mouth daily, Disp: , Rfl:     Allergies   Allergen Reactions    Sulfa Antibiotics      Other reaction(s): Unknown (comments)       Past Surgical History:   Procedure Laterality Date     SECTION      COLONOSCOPY N/A 2018    COLONOSCOPY   DIABETIC performed by Ros[facility] Ladd MD at [facility] MAIN OR    COLONOSCOPY      3 polyps    TONSILLECTOMY         Social History     Tobacco Use   Smoking Status Never    Passive exposure: Never   Smokeless Tobacco Never       Social History     Socioeconomic History    Marital status:      Spouse name: None    Number of children: None    Years of education: None    Highest education level: None   Tobacco Use    Smoking status: Never     Passive exposure: Never    Smokeless tobacco: Never   Vaping Use    Vaping status: Never Used   Substance and Sexual Activity    Alcohol use: Yes    Drug use: Never    Sexual activity: Not Currently     Partners: Male     Social Determinants of Health     Financial Resource Strain: Low Risk  (2024)    Overall Financial Resource Strain (CARDIA)     Difficulty of Paying Living Expenses: Not hard at all   Food Insecurity: No Food Insecurity (2025)    Hunger Vital Sign     Worried About Running Out of Food in the Last Year: Never true     Ran Out of Food in the Last Year: Never true   Transportation Needs: No Transportation Needs (2025)    PRAPARE - Transportation     Lack of Transportation (Medical): No     Lack of Transportation (Non-Medical): No   Physical Activity: Inactive (2025)    Exercise Vital Sign     Days of Exercise per Week: 0 days     Minutes of Exercise per Session: 0 min   Housing Stability: Low Risk  (2025)    Housing Stability Vital Sign     Unable to Pay for Housing in the

## 2025-01-31 LAB
25(OH)D3 SERPL-MCNC: 53.8 NG/ML (ref 30–100)
ALBUMIN SERPL-MCNC: 2.7 G/DL (ref 3.5–5)
ALBUMIN/GLOB SERPL: 0.5 (ref 1.1–2.2)
ALP SERPL-CCNC: 90 U/L (ref 45–117)
ALT SERPL-CCNC: 18 U/L (ref 12–78)
ANION GAP SERPL CALC-SCNC: 6 MMOL/L (ref 2–12)
AST SERPL-CCNC: 19 U/L (ref 15–37)
BASOPHILS # BLD: 0.08 K/UL (ref 0–0.1)
BASOPHILS NFR BLD: 0.9 % (ref 0–1)
BILIRUB SERPL-MCNC: 0.3 MG/DL (ref 0.2–1)
BUN SERPL-MCNC: 63 MG/DL (ref 6–20)
BUN/CREAT SERPL: 21 (ref 12–20)
CALCIUM SERPL-MCNC: 9 MG/DL (ref 8.5–10.1)
CALCIUM SERPL-MCNC: 9 MG/DL (ref 8.5–10.1)
CHLORIDE SERPL-SCNC: 108 MMOL/L (ref 97–108)
CHOLEST SERPL-MCNC: 114 MG/DL
CO2 SERPL-SCNC: 22 MMOL/L (ref 21–32)
CREAT SERPL-MCNC: 2.94 MG/DL (ref 0.55–1.02)
DIFFERENTIAL METHOD BLD: ABNORMAL
EOSINOPHIL # BLD: 0.26 K/UL (ref 0–0.4)
EOSINOPHIL NFR BLD: 3 % (ref 0–7)
ERYTHROCYTE [DISTWIDTH] IN BLOOD BY AUTOMATED COUNT: 13.8 % (ref 11.5–14.5)
EST. AVERAGE GLUCOSE BLD GHB EST-MCNC: 163 MG/DL
GLOBULIN SER CALC-MCNC: 5.2 G/DL (ref 2–4)
GLUCOSE SERPL-MCNC: 148 MG/DL (ref 65–100)
HBA1C MFR BLD: 7.3 % (ref 4–5.6)
HCT VFR BLD AUTO: 35.9 % (ref 35–47)
HDLC SERPL-MCNC: 58 MG/DL
HDLC SERPL: 2 (ref 0–5)
HGB BLD-MCNC: 11.2 G/DL (ref 11.5–16)
IMM GRANULOCYTES # BLD AUTO: 0.03 K/UL (ref 0–0.04)
IMM GRANULOCYTES NFR BLD AUTO: 0.3 % (ref 0–0.5)
LDLC SERPL CALC-MCNC: 31.8 MG/DL (ref 0–100)
LYMPHOCYTES # BLD: 2.09 K/UL (ref 0.8–3.5)
LYMPHOCYTES NFR BLD: 23.8 % (ref 12–49)
MCH RBC QN AUTO: 28.9 PG (ref 26–34)
MCHC RBC AUTO-ENTMCNC: 31.2 G/DL (ref 30–36.5)
MCV RBC AUTO: 92.8 FL (ref 80–99)
MONOCYTES # BLD: 0.59 K/UL (ref 0–1)
MONOCYTES NFR BLD: 6.7 % (ref 5–13)
NEUTS SEG # BLD: 5.74 K/UL (ref 1.8–8)
NEUTS SEG NFR BLD: 65.3 % (ref 32–75)
NRBC # BLD: 0 K/UL (ref 0–0.01)
NRBC BLD-RTO: 0 PER 100 WBC
PHOSPHATE SERPL-MCNC: 4.3 MG/DL (ref 2.6–4.7)
PLATELET # BLD AUTO: 206 K/UL (ref 150–400)
PMV BLD AUTO: 11.2 FL (ref 8.9–12.9)
POTASSIUM SERPL-SCNC: 5.4 MMOL/L (ref 3.5–5.1)
PROT SERPL-MCNC: 7.9 G/DL (ref 6.4–8.2)
PTH-INTACT SERPL-MCNC: 230.4 PG/ML (ref 18.4–88)
RBC # BLD AUTO: 3.87 M/UL (ref 3.8–5.2)
SODIUM SERPL-SCNC: 136 MMOL/L (ref 136–145)
T4 FREE SERPL-MCNC: 1 NG/DL (ref 0.8–1.5)
TRIGL SERPL-MCNC: 121 MG/DL
TSH SERPL DL<=0.05 MIU/L-ACNC: 0.54 UIU/ML (ref 0.36–3.74)
VLDLC SERPL CALC-MCNC: 24.2 MG/DL
WBC # BLD AUTO: 8.8 K/UL (ref 3.6–11)

## 2025-02-01 DIAGNOSIS — E05.90 HYPERTHYROIDISM: ICD-10-CM

## 2025-02-01 LAB — T3 SERPL-MCNC: 114 NG/DL (ref 71–180)

## 2025-02-01 RX ORDER — FUROSEMIDE 20 MG/1
20 TABLET ORAL DAILY
Qty: 30 TABLET | Refills: 0 | Status: SHIPPED | OUTPATIENT
Start: 2025-02-01

## 2025-02-18 RX ORDER — FLURBIPROFEN SODIUM 0.3 MG/ML
SOLUTION/ DROPS OPHTHALMIC
Qty: 90 EACH | Refills: 3 | Status: SHIPPED | OUTPATIENT
Start: 2025-02-18

## 2025-02-18 NOTE — TELEPHONE ENCOUNTER
Patient requesting refill on     Requested Prescriptions     Pending Prescriptions Disp Refills    Insulin Pen Needle (B-D UF III MINI PEN NEEDLES) 31G X 5 MM MISC [Pharmacy Med Name: BD MINI NDL  PEN [id number]]  3     Sig: USE ONCE A DAY WITH INSULIN        Last OV 1/30/2025

## 2025-02-19 ENCOUNTER — TELEMEDICINE (OUTPATIENT)
Age: 75
End: 2025-02-19
Payer: MEDICARE

## 2025-02-19 DIAGNOSIS — E05.90 HYPERTHYROIDISM: Primary | ICD-10-CM

## 2025-02-19 PROCEDURE — 99214 OFFICE O/P EST MOD 30 MIN: CPT | Performed by: INTERNAL MEDICINE

## 2025-02-19 PROCEDURE — G2211 COMPLEX E/M VISIT ADD ON: HCPCS | Performed by: INTERNAL MEDICINE

## 2025-02-19 PROCEDURE — 1123F ACP DISCUSS/DSCN MKR DOCD: CPT | Performed by: INTERNAL MEDICINE

## 2025-02-19 NOTE — PROGRESS NOTES
Chief Complaint   Patient presents with    Thyroid Problem     Pcp and pharmacy verified        [facility]    [facility]     History of Present Illness: Carolee Claros is a 74 y.o. 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    She was seen for initial visit in January 2021 for low TSH. She had a thyroid nuclear scan which identified a toxic thyroid nodule in the right thyroid lobe. Ms Claros and Dr. Blakely agreed to proceed with low dose MMI rather than surgery or VANCE.    At our visit in February 2024 she was clinically and biochemically euthyroid on MMI 2.5mg every other day. Pt was encouraged to continue this regimen.    Pt denies any recent illnesses, injuries or hospitalizations.    She is still taking the MMI 2.5mg every other day.    Her TSH last week was 0.188 with FT4 of 1.13 and TT3 of 123.    She denies issues of dysphagia or dysphonia.  She denies issues of CP, SOB, palpitations, tremors, heat or cold intolerance, diarrhea or constipation.  Pt denies issues of Abdominal pain, N/V or Jaundice.    Current Outpatient Medications   Medication Sig    Insulin Pen Needle (B-D UF III MINI PEN NEEDLES) 31G X 5 MM MISC USE ONCE A DAY WITH INSULIN    furosemide (LASIX) 20 MG tablet Take 1 tablet by mouth daily    amLODIPine (NORVASC) 10 MG tablet TAKE 1 TABLET DAILY    BASAGLAR KWIKPEN 100 UNIT/ML injection pen INJECT 30 UNITS            SUBCUTANEOUSLY IN THE      MORNING, DOSE REDUCED (Patient taking differently: 30-35 Units INJECT 30 UNITS            SUBCUTANEOUSLY IN THE      MORNING, DOSE REDUCED)    Liraglutide (VICTOZA) 18 MG/3ML SOPN SC injection Inject 0.6 mg into the skin daily    Liraglutide (VICTOZA) 18 MG/3ML SOPN SC injection Inject 0.6 mg into the skin daily    ELIQUIS 5 MG TABS tablet TAKE 1 TABLET TWICE A DAY    dapagliflozin (FARXIGA) 10 MG tablet Take 1 tablet by mouth daily    metoprolol succinate (TOPROL XL) 100 MG extended release tablet TAKE 1 TABLET DAILY

## 2025-02-24 RX ORDER — FUROSEMIDE 20 MG/1
20 TABLET ORAL DAILY
Qty: 90 TABLET | Refills: 1 | Status: SHIPPED | OUTPATIENT
Start: 2025-02-24 | End: 2025-02-26 | Stop reason: SDUPTHER

## 2025-02-24 NOTE — TELEPHONE ENCOUNTER
Patient requesting refill on     Requested Prescriptions     Pending Prescriptions Disp Refills    furosemide (LASIX) 20 MG tablet [Pharmacy Med Name: FUROSEMIDE 20 MG TABLET] 90 tablet 1     Sig: TAKE 1 TABLET BY MOUTH EVERY DAY        Last OV 1/30/2025

## 2025-02-26 DIAGNOSIS — Z79.4 TYPE 2 DIABETES MELLITUS WITH STAGE 4 CHRONIC KIDNEY DISEASE, WITH LONG-TERM CURRENT USE OF INSULIN (HCC): Primary | ICD-10-CM

## 2025-02-26 DIAGNOSIS — I10 ESSENTIAL HYPERTENSION: ICD-10-CM

## 2025-02-26 DIAGNOSIS — E11.22 TYPE 2 DIABETES MELLITUS WITH STAGE 4 CHRONIC KIDNEY DISEASE, WITH LONG-TERM CURRENT USE OF INSULIN (HCC): Primary | ICD-10-CM

## 2025-02-26 DIAGNOSIS — N18.4 TYPE 2 DIABETES MELLITUS WITH STAGE 4 CHRONIC KIDNEY DISEASE, WITH LONG-TERM CURRENT USE OF INSULIN (HCC): Primary | ICD-10-CM

## 2025-02-26 RX ORDER — FUROSEMIDE 20 MG/1
20 TABLET ORAL DAILY
Qty: 90 TABLET | Refills: 1 | Status: SHIPPED | OUTPATIENT
Start: 2025-02-26

## 2025-02-26 RX ORDER — FUROSEMIDE 20 MG/1
20 TABLET ORAL DAILY
Qty: 7 TABLET | Refills: 0 | Status: SHIPPED | OUTPATIENT
Start: 2025-02-26 | End: 2025-03-05

## 2025-02-28 ENCOUNTER — TELEPHONE (OUTPATIENT)
Age: 75
End: 2025-02-28

## 2025-02-28 DIAGNOSIS — E11.22 TYPE 2 DIABETES MELLITUS WITH STAGE 4 CHRONIC KIDNEY DISEASE, WITH LONG-TERM CURRENT USE OF INSULIN (HCC): ICD-10-CM

## 2025-02-28 DIAGNOSIS — N18.4 TYPE 2 DIABETES MELLITUS WITH STAGE 4 CHRONIC KIDNEY DISEASE, WITH LONG-TERM CURRENT USE OF INSULIN (HCC): ICD-10-CM

## 2025-02-28 DIAGNOSIS — Z79.4 TYPE 2 DIABETES MELLITUS WITH STAGE 4 CHRONIC KIDNEY DISEASE, WITH LONG-TERM CURRENT USE OF INSULIN (HCC): ICD-10-CM

## 2025-02-28 RX ORDER — LIRAGLUTIDE 6 MG/ML
INJECTION SUBCUTANEOUS
Qty: 6 ADJUSTABLE DOSE PRE-FILLED PEN SYRINGE | Refills: 1 | Status: SHIPPED | OUTPATIENT
Start: 2025-02-28

## 2025-02-28 NOTE — TELEPHONE ENCOUNTER
Patient requesting refill on     Requested Prescriptions     Pending Prescriptions Disp Refills    Liraglutide (VICTOZA) 18 MG/3ML SOPN SC injection [Pharmacy Med Name: LIRAGLUTIDE 2-CONRADO 18 MG/3 ML]       Sig: INJECT 0.6 MG UNDER THE SKIN ONCE DAILY        Last OV 1/30/2025

## 2025-03-02 RX ORDER — LIRAGLUTIDE 6 MG/ML
0.6 INJECTION SUBCUTANEOUS DAILY
Qty: 3 ML | Refills: 0 | Status: SHIPPED | OUTPATIENT
Start: 2025-03-02

## 2025-03-04 ENCOUNTER — LAB (OUTPATIENT)
Age: 75
End: 2025-03-04
Payer: MEDICARE

## 2025-03-04 VITALS
OXYGEN SATURATION: 97 % | DIASTOLIC BLOOD PRESSURE: 75 MMHG | SYSTOLIC BLOOD PRESSURE: 156 MMHG | HEART RATE: 63 BPM | RESPIRATION RATE: 18 BRPM

## 2025-03-04 DIAGNOSIS — I10 ESSENTIAL HYPERTENSION: Primary | ICD-10-CM

## 2025-03-04 PROCEDURE — 36415 COLL VENOUS BLD VENIPUNCTURE: CPT | Performed by: NURSE PRACTITIONER

## 2025-03-04 NOTE — PROGRESS NOTES
Chief Complaint   Patient presents with    Blood Work    Blood Pressure Check     recheck       Vitals:    03/04/25 1043   BP: (!) 156/75   Pulse: 63   Resp: 18   SpO2: 97%     Repeat BP 10:46 Lt arm 138/ 76, P 64    Labs drawn in left arm per Deborah Godwin's orders.  Patient tolerated well, 1 SST.

## 2025-03-05 LAB
ANION GAP SERPL CALC-SCNC: 7 MMOL/L (ref 2–12)
BUN SERPL-MCNC: 51 MG/DL (ref 6–20)
BUN/CREAT SERPL: 17 (ref 12–20)
CALCIUM SERPL-MCNC: 9.2 MG/DL (ref 8.5–10.1)
CHLORIDE SERPL-SCNC: 106 MMOL/L (ref 97–108)
CO2 SERPL-SCNC: 22 MMOL/L (ref 21–32)
CREAT SERPL-MCNC: 3.03 MG/DL (ref 0.55–1.02)
GLUCOSE SERPL-MCNC: 181 MG/DL (ref 65–100)
POTASSIUM SERPL-SCNC: 5.2 MMOL/L (ref 3.5–5.1)
SODIUM SERPL-SCNC: 135 MMOL/L (ref 136–145)

## 2025-03-08 ENCOUNTER — RESULTS FOLLOW-UP (OUTPATIENT)
Age: 75
End: 2025-03-08

## 2025-03-10 RX ORDER — ATORVASTATIN CALCIUM 40 MG/1
TABLET, FILM COATED ORAL
Qty: 90 TABLET | Refills: 3 | Status: SHIPPED | OUTPATIENT
Start: 2025-03-10

## 2025-03-10 NOTE — TELEPHONE ENCOUNTER
Patient requesting refill on     Requested Prescriptions     Pending Prescriptions Disp Refills    atorvastatin (LIPITOR) 40 MG tablet [Pharmacy Med Name: ATORVASTATIN TAB 40MG] 90 tablet 3     Sig: TAKE 1 TABLET DAILY (NOTE  DOSE REDUCED)        Last OV 1/30/2025

## 2025-03-31 RX ORDER — APIXABAN 5 MG/1
5 TABLET, FILM COATED ORAL 2 TIMES DAILY
Qty: 180 TABLET | Refills: 1 | Status: SHIPPED | OUTPATIENT
Start: 2025-03-31

## 2025-04-21 ENCOUNTER — HOSPITAL ENCOUNTER (OUTPATIENT)
Facility: HOSPITAL | Age: 75
Discharge: HOME | End: 2025-04-24
Payer: MEDICARE

## 2025-04-21 LAB
ANION GAP SERPL CALC-SCNC: 9 MMOL/L (ref 2–12)
BASOPHILS # BLD: 0.07 K/UL (ref 0–0.1)
BASOPHILS NFR BLD: 0.7 % (ref 0–1)
BUN SERPL-MCNC: 50 MG/DL (ref 6–20)
BUN/CREAT SERPL: 15 (ref 12–20)
CALCIUM SERPL-MCNC: 9.1 MG/DL (ref 8.5–10.1)
CALCIUM SERPL-MCNC: 9.2 MG/DL (ref 8.5–10.1)
CHLORIDE SERPL-SCNC: 105 MMOL/L (ref 97–108)
CO2 SERPL-SCNC: 25 MMOL/L (ref 21–32)
CREAT SERPL-MCNC: 3.28 MG/DL (ref 0.55–1.02)
DIFFERENTIAL METHOD BLD: ABNORMAL
EOSINOPHIL # BLD: 0.35 K/UL (ref 0–0.4)
EOSINOPHIL NFR BLD: 3.7 % (ref 0–0.7)
ERYTHROCYTE [DISTWIDTH] IN BLOOD BY AUTOMATED COUNT: 13.2 % (ref 11.5–14.5)
FERRITIN SERPL-MCNC: 79 NG/ML (ref 8–252)
GLUCOSE SERPL-MCNC: 146 MG/DL (ref 65–100)
HCT VFR BLD AUTO: 33.9 % (ref 35–47)
HGB BLD-MCNC: 10.9 G/DL (ref 11.5–16)
IMM GRANULOCYTES # BLD AUTO: 0.03 K/UL (ref 0–0.04)
IMM GRANULOCYTES NFR BLD AUTO: 0.3 % (ref 0–0.5)
IRON SATN MFR SERPL: 49 % (ref 20–50)
IRON SERPL-MCNC: 101 UG/DL (ref 50–170)
LYMPHOCYTES # BLD: 1.85 K/UL (ref 0.8–3.5)
LYMPHOCYTES NFR BLD: 19.5 % (ref 12–49)
MCH RBC QN AUTO: 30 PG (ref 26–34)
MCHC RBC AUTO-ENTMCNC: 32.2 G/DL (ref 30–36.5)
MCV RBC AUTO: 93.4 FL (ref 80–99)
MONOCYTES # BLD: 0.54 K/UL (ref 0–1)
MONOCYTES NFR BLD: 5.7 % (ref 5–13)
NEUTS SEG # BLD: 6.67 K/UL (ref 1.8–8)
NEUTS SEG NFR BLD: 70.1 % (ref 32–75)
NRBC # BLD: 0 K/UL (ref 0–0.01)
NRBC BLD-RTO: 0 PER 100 WBC
PHOSPHATE SERPL-MCNC: 4.2 MG/DL (ref 2.6–4.7)
PLATELET # BLD AUTO: 195 K/UL (ref 150–400)
PMV BLD AUTO: 9.8 FL (ref 8.9–12.9)
POTASSIUM SERPL-SCNC: 5 MMOL/L (ref 3.5–5.1)
PTH-INTACT SERPL-MCNC: 176 PG/ML (ref 18.4–88)
RBC # BLD AUTO: 3.63 M/UL (ref 3.8–5.2)
SODIUM SERPL-SCNC: 139 MMOL/L (ref 136–145)
TIBC SERPL-MCNC: 207 UG/DL (ref 250–450)
WBC # BLD AUTO: 9.5 K/UL (ref 3.6–11)

## 2025-04-21 PROCEDURE — 83550 IRON BINDING TEST: CPT

## 2025-04-21 PROCEDURE — 82728 ASSAY OF FERRITIN: CPT

## 2025-04-21 PROCEDURE — 80048 BASIC METABOLIC PNL TOTAL CA: CPT

## 2025-04-21 PROCEDURE — 85025 COMPLETE CBC W/AUTO DIFF WBC: CPT

## 2025-04-21 PROCEDURE — 84100 ASSAY OF PHOSPHORUS: CPT

## 2025-04-21 PROCEDURE — 36415 COLL VENOUS BLD VENIPUNCTURE: CPT

## 2025-04-21 PROCEDURE — 83540 ASSAY OF IRON: CPT

## 2025-04-21 PROCEDURE — 83970 ASSAY OF PARATHORMONE: CPT

## 2025-04-23 ENCOUNTER — OFFICE VISIT (OUTPATIENT)
Age: 75
End: 2025-04-23
Payer: MEDICARE

## 2025-04-23 VITALS
HEART RATE: 74 BPM | SYSTOLIC BLOOD PRESSURE: 134 MMHG | HEIGHT: 65 IN | OXYGEN SATURATION: 98 % | DIASTOLIC BLOOD PRESSURE: 76 MMHG | TEMPERATURE: 97 F | WEIGHT: 210 LBS | RESPIRATION RATE: 18 BRPM | BODY MASS INDEX: 34.99 KG/M2

## 2025-04-23 DIAGNOSIS — I48.0 PAROXYSMAL ATRIAL FIBRILLATION (HCC): ICD-10-CM

## 2025-04-23 DIAGNOSIS — N18.4 TYPE 2 DIABETES MELLITUS WITH STAGE 4 CHRONIC KIDNEY DISEASE, WITH LONG-TERM CURRENT USE OF INSULIN (HCC): Primary | ICD-10-CM

## 2025-04-23 DIAGNOSIS — Z86.73 HISTORY OF STROKE: ICD-10-CM

## 2025-04-23 DIAGNOSIS — I10 ESSENTIAL HYPERTENSION: ICD-10-CM

## 2025-04-23 DIAGNOSIS — E78.00 HYPERCHOLESTEROLEMIA: ICD-10-CM

## 2025-04-23 DIAGNOSIS — E05.90 HYPERTHYROIDISM: ICD-10-CM

## 2025-04-23 DIAGNOSIS — E11.22 TYPE 2 DIABETES MELLITUS WITH STAGE 4 CHRONIC KIDNEY DISEASE, WITH LONG-TERM CURRENT USE OF INSULIN (HCC): Primary | ICD-10-CM

## 2025-04-23 DIAGNOSIS — Z79.4 TYPE 2 DIABETES MELLITUS WITH STAGE 4 CHRONIC KIDNEY DISEASE, WITH LONG-TERM CURRENT USE OF INSULIN (HCC): Primary | ICD-10-CM

## 2025-04-23 LAB — HBA1C MFR BLD: 7.4 %

## 2025-04-23 PROCEDURE — 3078F DIAST BP <80 MM HG: CPT | Performed by: NURSE PRACTITIONER

## 2025-04-23 PROCEDURE — 83036 HEMOGLOBIN GLYCOSYLATED A1C: CPT | Performed by: NURSE PRACTITIONER

## 2025-04-23 PROCEDURE — 1126F AMNT PAIN NOTED NONE PRSNT: CPT | Performed by: NURSE PRACTITIONER

## 2025-04-23 PROCEDURE — 3075F SYST BP GE 130 - 139MM HG: CPT | Performed by: NURSE PRACTITIONER

## 2025-04-23 PROCEDURE — 3051F HG A1C>EQUAL 7.0%<8.0%: CPT | Performed by: NURSE PRACTITIONER

## 2025-04-23 PROCEDURE — 99214 OFFICE O/P EST MOD 30 MIN: CPT | Performed by: NURSE PRACTITIONER

## 2025-04-23 PROCEDURE — 1123F ACP DISCUSS/DSCN MKR DOCD: CPT | Performed by: NURSE PRACTITIONER

## 2025-04-23 NOTE — PROGRESS NOTES
\"Have you been to the ER, urgent care clinic since your last visit?  Hospitalized since your last visit?\"    NO    “Have you seen or consulted any other health care providers outside our system since your last visit?”    NO           
appt was in Feb 2025.      Lab Results   Component Value Date    TSH 0.54 01/30/2025         H/O CVA with hemiparesis   She was admitted to [facility] in June of 2019 for CVA and then spent 2 weeks in [facility].  This was her second CVA. The first caused weakness and the last one affected her speech and mental status.  helps with ADLs, meals, and medication management.  She is on a statin daily and takes Eliquis for A-fib.      A-fib  On Eliquis for stroke prophylaxis. Denies black or bloody stools.   Followed by cardiologist Dr Roa. Last seen . No changes were made. Denies CP or SOB or dizziness/lightheadedness.       Lab Results   Component Value Date    WBC 9.5 04/21/2025    HGB 10.9 (L) 04/21/2025    HCT 33.9 (L) 04/21/2025    MCV 93.4 04/21/2025     04/21/2025         HLD  Lab Results   Component Value Date    CHOL 114 01/30/2025    TRIG 121 01/30/2025    HDL 58 01/30/2025    CHOLHDLRATIO 2.0 01/30/2025      She is on a statin with good control.       Vitamin D deficiency  Last Vit D level was WNL as of 1-2025. She is currently NOT taking any supplement.                  Health maintenance  Mammogram- done  (normal)   PNA vaccines up to date  Flu shot - up to date   Shingrix- she is not a candidate, never had the chicken pox   Covid 2024 booster- up to date  RSV - not addressed today    Patient Active Problem List   Diagnosis    Rosacea    Hypercholesterolemia    Psoriasis    Obesity, morbid    Primary hypertension    Paroxysmal atrial fibrillation (HCC)    Type 2 diabetes mellitus with chronic kidney disease (HCC)    Diabetic peripheral neuropathy associated with type 2 diabetes mellitus    CKD (chronic kidney disease) stage 4, GFR 15-29 ml/min (HCC)    Hyperthyroidism    Hemiparesis affecting left side as late effect of stroke (HCC)    Chronic anticoagulation    Nonrheumatic mitral valve regurgitation    History of stroke       Past Medical History:   Diagnosis Date    Diabetes

## 2025-05-01 ENCOUNTER — CLINICAL DOCUMENTATION (OUTPATIENT)
Age: 75
End: 2025-05-01

## 2025-05-01 NOTE — PROGRESS NOTES
Seen by nephrology on 4/30/2025.  Note states she was referred for PD catheter placement.  If there is ongoing recurrent hyperkalemia her dose of lisinopril may need to be reduced and hydralazine increased.  If PTH remains high will add calcitriol.  Follow-up July 2, 2025 if

## 2025-06-06 RX ORDER — METHIMAZOLE 5 MG/1
TABLET ORAL
Qty: 19 TABLET | Refills: 1 | Status: SHIPPED | OUTPATIENT
Start: 2025-06-06

## 2025-06-09 NOTE — TELEPHONE ENCOUNTER
Medication Refill Request    Carolee Claros is requesting a refill of the following medication(s):     Dapagliflozin 10 MG Tab    (90 day supply)    Please send refill to:     [facility] [place] - [facility] - P [phone] - F [phone]  [facility]  [place] [address]  Phone: [phone] Fax: [phone]

## 2025-06-10 RX ORDER — DAPAGLIFLOZIN 10 MG/1
10 TABLET, FILM COATED ORAL DAILY
Qty: 90 TABLET | Refills: 1 | Status: SHIPPED | OUTPATIENT
Start: 2025-06-10

## 2025-06-16 ENCOUNTER — TELEPHONE (OUTPATIENT)
Age: 75
End: 2025-06-16

## 2025-06-20 ENCOUNTER — HOSPITAL ENCOUNTER (OUTPATIENT)
Facility: HOSPITAL | Age: 75
Discharge: HOME | End: 2025-06-23
Payer: MEDICARE

## 2025-06-20 VITALS
OXYGEN SATURATION: 100 % | DIASTOLIC BLOOD PRESSURE: 57 MMHG | HEIGHT: 64 IN | BODY MASS INDEX: 36.62 KG/M2 | TEMPERATURE: 97.7 F | WEIGHT: 214.51 LBS | HEART RATE: 64 BPM | RESPIRATION RATE: 14 BRPM | SYSTOLIC BLOOD PRESSURE: 152 MMHG

## 2025-06-20 LAB
ANION GAP SERPL CALC-SCNC: 5 MMOL/L (ref 2–12)
BUN SERPL-MCNC: 55 MG/DL (ref 6–20)
BUN/CREAT SERPL: 15 (ref 12–20)
CALCIUM SERPL-MCNC: 9.5 MG/DL (ref 8.5–10.1)
CHLORIDE SERPL-SCNC: 107 MMOL/L (ref 97–108)
CO2 SERPL-SCNC: 25 MMOL/L (ref 21–32)
CREAT SERPL-MCNC: 3.73 MG/DL (ref 0.55–1.02)
ERYTHROCYTE [DISTWIDTH] IN BLOOD BY AUTOMATED COUNT: 13.5 % (ref 11.5–14.5)
GLUCOSE SERPL-MCNC: 105 MG/DL (ref 65–100)
HCT VFR BLD AUTO: 33.5 % (ref 35–47)
HGB BLD-MCNC: 10.5 G/DL (ref 11.5–16)
MCH RBC QN AUTO: 29.4 PG (ref 26–34)
MCHC RBC AUTO-ENTMCNC: 31.3 G/DL (ref 30–36.5)
MCV RBC AUTO: 93.8 FL (ref 80–99)
NRBC # BLD: 0 K/UL (ref 0–0.01)
NRBC BLD-RTO: 0 PER 100 WBC
PLATELET # BLD AUTO: 201 K/UL (ref 150–400)
PMV BLD AUTO: 10.4 FL (ref 8.9–12.9)
POTASSIUM SERPL-SCNC: 4.5 MMOL/L (ref 3.5–5.1)
RBC # BLD AUTO: 3.57 M/UL (ref 3.8–5.2)
SODIUM SERPL-SCNC: 137 MMOL/L (ref 136–145)
WBC # BLD AUTO: 11.5 K/UL (ref 3.6–11)

## 2025-06-20 PROCEDURE — 36415 COLL VENOUS BLD VENIPUNCTURE: CPT

## 2025-06-20 PROCEDURE — 80048 BASIC METABOLIC PNL TOTAL CA: CPT

## 2025-06-20 PROCEDURE — 85027 COMPLETE CBC AUTOMATED: CPT

## 2025-06-20 RX ORDER — LIRAGLUTIDE 6 MG/ML
1.2 INJECTION SUBCUTANEOUS DAILY
COMMUNITY

## 2025-06-20 NOTE — PERIOP NOTE
[facility]  Preoperative Instructions        Surgery Date: 6/27/25          Time of Arrival To Be Called (day prior between 2-5pm)  Contact: [phone]     On the day of your surgery, please report to Surgical Services Registration Desk and sign in at your designated time.  The Surgery Center is located to the right of the Emergency Room.     2. You must have someone with you to drive you home. You should not drive a car for 24 hours following surgery. Please make arrangements for a friend or family member to stay with you for the first 24 hours after your surgery.    3. Do not have anything to eat or drink (including water, gum, mints, coffee, juice) after 11:59 PM Thursday June 26th.?This may not apply to medications prescribed by your physician. ?(Please note below the special instructions with medications to take the morning of your procedure.)    4. We recommend you do not drink any alcoholic beverages for 24 hours before and after your surgery.    5. Contact your surgeon's office for instructions on the following medications: non-steroidal anti-inflammatory drugs (i.e. Advil, Aleve), vitamins, and supplements. (Some surgeon's will want you to stop these medications prior to surgery and others may allow you to take them)  **If you are currently taking Plavix, Coumadin, Aspirin and/or other blood-thinning agents, contact your surgeon for instructions.** Your surgeon will partner with the physician prescribing these medications to determine if it is safe to stop or if you need to continue taking.  Please do not stop taking these medications without instructions from your surgeon    6. Wear comfortable clothes.  Wear glasses instead of contacts.  Do not bring any money or jewelry. Please bring picture ID, insurance card, and any prearranged co-payment or hospital payment.  Do not wear make-up, particularly mascara the morning of your surgery.  Do not wear nail polish, particularly if you

## 2025-06-22 LAB
EKG ATRIAL RATE: 64 BPM
EKG DIAGNOSIS: NORMAL
EKG P AXIS: 49 DEGREES
EKG P-R INTERVAL: 214 MS
EKG Q-T INTERVAL: 402 MS
EKG QRS DURATION: 92 MS
EKG QTC CALCULATION (BAZETT): 414 MS
EKG R AXIS: -11 DEGREES
EKG T AXIS: 35 DEGREES
EKG VENTRICULAR RATE: 64 BPM

## 2025-06-23 RX ORDER — CEFAZOLIN SODIUM/WATER 2 G/20 ML
2000 SYRINGE (ML) INTRAVENOUS ONCE
Status: CANCELLED | OUTPATIENT
Start: 2025-06-27

## 2025-06-23 RX ORDER — SODIUM CHLORIDE 9 MG/ML
INJECTION, SOLUTION INTRAVENOUS CONTINUOUS
Status: CANCELLED | OUTPATIENT
Start: 2025-06-27

## 2025-06-23 NOTE — PERIOP NOTE
[facility] Nurse Practitioner   Pre-Operative Chart Review/Assessment-- phone interview             Patient Name:  Carolee Claros          Age:   74 y.o.    :  1950    Surgeon : Yao     Primary Care Provider:    Deborah Godwin APRN - NP    Date of phone PAT:  25     Date of Surgery:    2025     Procedure(s):  LEFT BRACHIOCEPHALIC ARTERIOVENOUS FISTULA CREATION (MAC W/AXILLARY BLOCK)     Anesthesia:  Regional     Pertinent Medical History:         ESRD        Diabetes        Thyroid dysfunction        PAF             Allergies:    Allergies   Allergen Reactions    Sulfa Antibiotics      Pt cannot recall reaction             S: chart review, very mild leukocytosis   B: scheduled for left brachiocephalic AVF creation with Dr. Samayoa 25.  Chart brought to me for review given very mild leukocytosis of 11.5 on Cbc.   A: Ms. Waldrop denied concerning symptoms, per anesthesia grid no other labs/imaging to assess obtained.    R: No new orders at this time, limited elevation. Continue to monitor.      Vitals:    25 1320   BP: (!) 152/57   Pulse: 64   Resp: 14   Temp: 97.7 °F (36.5 °C)   SpO2: 100%       ________________________________________  PAST MEDICAL HISTORY  Past Medical History:   Diagnosis Date    At risk for sleep apnea     STOP-BANG 4    Atrial fibrillation (HCC)     Dr. Roa    Diabetes (HCC)     End stage renal disease (HCC)     Dr. Serg Blanchard[facility]    Hypertension     Menopause     Murmur     Nonrheumatic mitral (valve) insufficiency     Osteopenia     Psoriasis     Rosacea     Stroke (HCC)     Thyroid disease     Dr. Gamez    Vitamin D deficiency       ____________________________________________  PAST SURGICAL HISTORY  Past Surgical History:   Procedure Laterality Date    CATARACT REMOVAL       SECTION      COLONOSCOPY N/A 2018    COLONOSCOPY   DIABETIC performed by Rosanne Ladd MD at [facility] MAIN OR    COLONOSCOPY      3 polyps    LASIK

## 2025-06-26 RX ORDER — HYDRALAZINE HYDROCHLORIDE 25 MG/1
TABLET, FILM COATED ORAL
Qty: 180 TABLET | Refills: 3 | Status: SHIPPED | OUTPATIENT
Start: 2025-06-26

## 2025-06-26 NOTE — PROGRESS NOTES
Call to pre-op fr spouse. Inq about surgery time. Advs that PAT would call her later to confirm, but gave current schedule time as 0930, with time of arrival at 0730.

## 2025-06-27 ENCOUNTER — ANESTHESIA EVENT (OUTPATIENT)
Facility: HOSPITAL | Age: 75
End: 2025-06-27
Payer: MEDICARE

## 2025-06-27 ENCOUNTER — ANESTHESIA (OUTPATIENT)
Facility: HOSPITAL | Age: 75
End: 2025-06-27
Payer: MEDICARE

## 2025-06-27 ENCOUNTER — HOSPITAL ENCOUNTER (OUTPATIENT)
Facility: HOSPITAL | Age: 75
Setting detail: OUTPATIENT SURGERY
Discharge: HOME | End: 2025-06-27
Attending: SURGERY | Admitting: SURGERY
Payer: MEDICARE

## 2025-06-27 VITALS
TEMPERATURE: 97.7 F | HEART RATE: 76 BPM | DIASTOLIC BLOOD PRESSURE: 63 MMHG | RESPIRATION RATE: 19 BRPM | HEIGHT: 64 IN | BODY MASS INDEX: 36.02 KG/M2 | WEIGHT: 210.98 LBS | OXYGEN SATURATION: 94 % | SYSTOLIC BLOOD PRESSURE: 153 MMHG

## 2025-06-27 DIAGNOSIS — N18.4 CKD (CHRONIC KIDNEY DISEASE) STAGE 4, GFR 15-29 ML/MIN (HCC): Primary | ICD-10-CM

## 2025-06-27 LAB
ANION GAP BLD CALC-SCNC: 11.2 MMOL/L (ref 10–20)
CA-I BLD-MCNC: 1.22 MMOL/L (ref 1.15–1.33)
CHLORIDE BLD-SCNC: 110 MMOL/L (ref 98–107)
CO2 BLD-SCNC: 21.8 MMOL/L (ref 21–32)
CREAT BLD-MCNC: 4.03 MG/DL (ref 0.6–1.3)
GLUCOSE BLD STRIP.AUTO-MCNC: 179 MG/DL (ref 65–117)
GLUCOSE BLD-MCNC: 178 MG/DL (ref 74–99)
POTASSIUM BLD-SCNC: 4.9 MMOL/L (ref 3.5–5.1)
SERVICE CMNT-IMP: ABNORMAL
SERVICE CMNT-IMP: ABNORMAL
SODIUM BLD-SCNC: 143 MMOL/L (ref 136–145)

## 2025-06-27 PROCEDURE — 7100000001 HC PACU RECOVERY - ADDTL 15 MIN: Performed by: SURGERY

## 2025-06-27 PROCEDURE — 2720000010 HC SURG SUPPLY STERILE: Performed by: SURGERY

## 2025-06-27 PROCEDURE — 3700000001 HC ADD 15 MINUTES (ANESTHESIA): Performed by: SURGERY

## 2025-06-27 PROCEDURE — 2580000003 HC RX 258: Performed by: STUDENT IN AN ORGANIZED HEALTH CARE EDUCATION/TRAINING PROGRAM

## 2025-06-27 PROCEDURE — 3600000012 HC SURGERY LEVEL 2 ADDTL 15MIN: Performed by: SURGERY

## 2025-06-27 PROCEDURE — 2500000003 HC RX 250 WO HCPCS: Performed by: SURGERY

## 2025-06-27 PROCEDURE — 2580000003 HC RX 258: Performed by: SURGERY

## 2025-06-27 PROCEDURE — 82962 GLUCOSE BLOOD TEST: CPT

## 2025-06-27 PROCEDURE — 6360000002 HC RX W HCPCS: Performed by: NURSE ANESTHETIST, CERTIFIED REGISTERED

## 2025-06-27 PROCEDURE — 2580000003 HC RX 258: Performed by: NURSE ANESTHETIST, CERTIFIED REGISTERED

## 2025-06-27 PROCEDURE — 2709999900 HC NON-CHARGEABLE SUPPLY: Performed by: SURGERY

## 2025-06-27 PROCEDURE — 80047 BASIC METABLC PNL IONIZED CA: CPT

## 2025-06-27 PROCEDURE — 3700000000 HC ANESTHESIA ATTENDED CARE: Performed by: SURGERY

## 2025-06-27 PROCEDURE — 7100000000 HC PACU RECOVERY - FIRST 15 MIN: Performed by: SURGERY

## 2025-06-27 PROCEDURE — 6360000002 HC RX W HCPCS: Performed by: SURGERY

## 2025-06-27 PROCEDURE — 3600000002 HC SURGERY LEVEL 2 BASE: Performed by: SURGERY

## 2025-06-27 PROCEDURE — 6370000000 HC RX 637 (ALT 250 FOR IP): Performed by: SURGERY

## 2025-06-27 PROCEDURE — 2500000003 HC RX 250 WO HCPCS: Performed by: NURSE ANESTHETIST, CERTIFIED REGISTERED

## 2025-06-27 PROCEDURE — 6360000002 HC RX W HCPCS: Performed by: STUDENT IN AN ORGANIZED HEALTH CARE EDUCATION/TRAINING PROGRAM

## 2025-06-27 RX ORDER — LIDOCAINE HYDROCHLORIDE 20 MG/ML
INJECTION, SOLUTION INFILTRATION; PERINEURAL
Status: COMPLETED | OUTPATIENT
Start: 2025-06-27 | End: 2025-06-27

## 2025-06-27 RX ORDER — TRAMADOL HYDROCHLORIDE 50 MG/1
50 TABLET ORAL EVERY 6 HOURS PRN
Qty: 12 TABLET | Refills: 0 | Status: SHIPPED | OUTPATIENT
Start: 2025-06-27 | End: 2025-06-30

## 2025-06-27 RX ORDER — SODIUM CHLORIDE 0.9 % (FLUSH) 0.9 %
5-40 SYRINGE (ML) INJECTION EVERY 12 HOURS SCHEDULED
Status: DISCONTINUED | OUTPATIENT
Start: 2025-06-27 | End: 2025-06-27 | Stop reason: HOSPADM

## 2025-06-27 RX ORDER — OXYCODONE HYDROCHLORIDE 5 MG/1
5 TABLET ORAL
Status: DISCONTINUED | OUTPATIENT
Start: 2025-06-27 | End: 2025-06-27 | Stop reason: HOSPADM

## 2025-06-27 RX ORDER — PHENYLEPHRINE HCL IN 0.9% NACL 0.4MG/10ML
SYRINGE (ML) INTRAVENOUS
Status: DISCONTINUED | OUTPATIENT
Start: 2025-06-27 | End: 2025-06-27 | Stop reason: SDUPTHER

## 2025-06-27 RX ORDER — NALOXONE HYDROCHLORIDE 0.4 MG/ML
INJECTION, SOLUTION INTRAMUSCULAR; INTRAVENOUS; SUBCUTANEOUS PRN
Status: DISCONTINUED | OUTPATIENT
Start: 2025-06-27 | End: 2025-06-27 | Stop reason: HOSPADM

## 2025-06-27 RX ORDER — 0.9 % SODIUM CHLORIDE 0.9 %
INTRAVENOUS SOLUTION INTRAVENOUS
Status: DISCONTINUED | OUTPATIENT
Start: 2025-06-27 | End: 2025-06-27 | Stop reason: SDUPTHER

## 2025-06-27 RX ORDER — SODIUM CHLORIDE 0.9 % (FLUSH) 0.9 %
5-40 SYRINGE (ML) INJECTION PRN
Status: DISCONTINUED | OUTPATIENT
Start: 2025-06-27 | End: 2025-06-27 | Stop reason: HOSPADM

## 2025-06-27 RX ORDER — EPHEDRINE SULFATE/0.9% NACL/PF 25 MG/5 ML
SYRINGE (ML) INTRAVENOUS
Status: DISCONTINUED | OUTPATIENT
Start: 2025-06-27 | End: 2025-06-27 | Stop reason: SDUPTHER

## 2025-06-27 RX ORDER — FENTANYL CITRATE 50 UG/ML
25 INJECTION, SOLUTION INTRAMUSCULAR; INTRAVENOUS EVERY 5 MIN PRN
Status: DISCONTINUED | OUTPATIENT
Start: 2025-06-27 | End: 2025-06-27 | Stop reason: HOSPADM

## 2025-06-27 RX ORDER — ROPIVACAINE HYDROCHLORIDE 5 MG/ML
INJECTION, SOLUTION EPIDURAL; INFILTRATION; PERINEURAL
Status: COMPLETED | OUTPATIENT
Start: 2025-06-27 | End: 2025-06-27

## 2025-06-27 RX ORDER — HYDROMORPHONE HYDROCHLORIDE 1 MG/ML
0.5 INJECTION, SOLUTION INTRAMUSCULAR; INTRAVENOUS; SUBCUTANEOUS EVERY 5 MIN PRN
Status: DISCONTINUED | OUTPATIENT
Start: 2025-06-27 | End: 2025-06-27 | Stop reason: HOSPADM

## 2025-06-27 RX ORDER — SODIUM CHLORIDE 9 MG/ML
INJECTION, SOLUTION INTRAVENOUS CONTINUOUS
Status: DISCONTINUED | OUTPATIENT
Start: 2025-06-27 | End: 2025-06-27 | Stop reason: HOSPADM

## 2025-06-27 RX ORDER — ONDANSETRON 2 MG/ML
4 INJECTION INTRAMUSCULAR; INTRAVENOUS
Status: DISCONTINUED | OUTPATIENT
Start: 2025-06-27 | End: 2025-06-27 | Stop reason: HOSPADM

## 2025-06-27 RX ORDER — HEPARIN SODIUM 5000 [USP'U]/ML
INJECTION, SOLUTION INTRAVENOUS; SUBCUTANEOUS
Status: DISCONTINUED | OUTPATIENT
Start: 2025-06-27 | End: 2025-06-27 | Stop reason: SDUPTHER

## 2025-06-27 RX ADMIN — HEPARIN SODIUM 5000 UNITS: 5000 INJECTION INTRAVENOUS; SUBCUTANEOUS at 10:09

## 2025-06-27 RX ADMIN — Medication 40 MCG: at 09:53

## 2025-06-27 RX ADMIN — Medication 3 AMPULE: at 08:44

## 2025-06-27 RX ADMIN — WATER 2000 MG: 1 INJECTION INTRAMUSCULAR; INTRAVENOUS; SUBCUTANEOUS at 09:50

## 2025-06-27 RX ADMIN — LIDOCAINE HYDROCHLORIDE 10 ML: 20 INJECTION, SOLUTION INFILTRATION; PERINEURAL at 08:58

## 2025-06-27 RX ADMIN — PHENYLEPHRINE HYDROCHLORIDE 60 MCG/MIN: 10 INJECTION INTRAVENOUS at 09:56

## 2025-06-27 RX ADMIN — SODIUM CHLORIDE 350 ML: 900 INJECTION, SOLUTION INTRAVENOUS at 10:35

## 2025-06-27 RX ADMIN — VANCOMYCIN HYDROCHLORIDE 1500 MG: 1.5 INJECTION, POWDER, LYOPHILIZED, FOR SOLUTION INTRAVENOUS at 09:10

## 2025-06-27 RX ADMIN — LIDOCAINE HYDROCHLORIDE 100 MG: 20 INJECTION, SOLUTION INFILTRATION; PERINEURAL at 09:46

## 2025-06-27 RX ADMIN — PROPOFOL 30 MG: 10 INJECTION, EMULSION INTRAVENOUS at 08:58

## 2025-06-27 RX ADMIN — SODIUM CHLORIDE: 9 INJECTION, SOLUTION INTRAVENOUS at 08:43

## 2025-06-27 RX ADMIN — PROPOFOL 60 MCG/KG/MIN: 10 INJECTION, EMULSION INTRAVENOUS at 09:47

## 2025-06-27 RX ADMIN — EPHEDRINE SULFATE 10 MG: 5 INJECTION INTRAVENOUS at 09:53

## 2025-06-27 RX ADMIN — ROPIVACAINE HYDROCHLORIDE 30 ML: 5 INJECTION, SOLUTION EPIDURAL; INFILTRATION; PERINEURAL at 09:05

## 2025-06-27 RX ADMIN — PROPOFOL 40 MG: 10 INJECTION, EMULSION INTRAVENOUS at 09:46

## 2025-06-27 RX ADMIN — Medication 160 MCG: at 09:56

## 2025-06-27 ASSESSMENT — PAIN - FUNCTIONAL ASSESSMENT: PAIN_FUNCTIONAL_ASSESSMENT: NONE - DENIES PAIN

## 2025-06-27 NOTE — ANESTHESIA PROCEDURE NOTES
Peripheral Block    Patient location during procedure: pre-op  Reason for block: primary anesthetic and at surgeon's request  Start time: 6/27/2025 8:58 AM  End time: 6/27/2025 9:08 AM  Staffing  Performed: anesthesiologist   Anesthesiologist: Anders Aly MD  Performed by: Anders Aly MD  Authorized by: Anders Aly MD    Preanesthetic Checklist  Completed: patient identified, IV checked, site marked, risks and benefits discussed, surgical/procedural consents, equipment checked, pre-op evaluation, timeout performed, anesthesia consent given, oxygen available, monitors applied/VS acknowledged, fire risk safety assessment completed and verbalized and blood product R/B/A discussed and consented  Peripheral Block   Patient position: supine  Prep: ChloraPrep  Provider prep: mask and sterile gloves  Patient monitoring: cardiac monitor, continuous pulse ox, frequent blood pressure checks, IV access, oxygen and responsive to questions  Block type: Brachial plexus  Supraclavicular  Laterality: left  Injection technique: single-shot  Guidance: ultrasound guided    Needle   Needle type: insulated echogenic nerve stimulator needle   Needle gauge: 21 G  Needle localization: ultrasound guidance  Needle length: 5 cm  Assessment   Injection assessment: negative aspiration for heme, no paresthesia on injection, local visualized surrounding nerve on ultrasound and no intravascular symptoms  Paresthesia pain: none  Hemodynamics: stable  Outcomes: uncomplicated and patient tolerated procedure well    Additional Notes  10 cc lidocaine for intercostobrachial chart   Medications Administered  ropivacaine (NAROPIN) injection 0.5% - Perineural   30 mL - 6/27/2025 9:05:00 AM  lidocaine injection 2% - Perineural   10 mL - 6/27/2025 8:58:00 AM

## 2025-06-27 NOTE — ANESTHESIA POSTPROCEDURE EVALUATION
Department of Anesthesiology  Postprocedure Note    Patient: Carolee Claros  MRN: [id number]  YOB: 1950  Date of evaluation: 6/27/2025    Procedure Summary       Date: 06/27/25 Room / Location: [facility] MAIN OR M2 / [facility] MAIN OR    Anesthesia Start: 0938 Anesthesia Stop: 1045    Procedure: LEFT BRACHIOCEPHALIC ARTERIOVENOUS FISTULA CREATION (MAC W/AXILLARY BLOCK) (Arm Upper) Diagnosis:       End stage renal disease (HCC)      (End stage renal disease (HCC) [N18.6])    Providers: Galileo Samayoa MD Responsible Provider: Anders Aly MD    Anesthesia Type: Regional, MAC ASA Status: 3            Anesthesia Type: Regional, MAC    Gerri Phase I: Gerri Score: 10    Gerri Phase II:      Anesthesia Post Evaluation    Patient location during evaluation: PACU  Patient participation: complete - patient participated  Level of consciousness: awake and alert  Airway patency: patent  Nausea & Vomiting: no nausea and no vomiting  Cardiovascular status: hemodynamically stable  Respiratory status: acceptable  Hydration status: stable    No notable events documented.

## 2025-06-27 NOTE — FLOWSHEET NOTE
06/27/25 1100   Handoff   Communication Given Periop Handoff/Relief   Handoff phase Phase I relief   Handoff Given To Barb   Handoff Received From Martell   Handoff Communication Face to Face;At bedside   Time Handoff Given 1100     1128: Sign out received from Dr. Aly     06/27/25 1140   AVS Reviewed   AVS & discharge instructions reviewed with patient and/or representative? Yes  (by KIERSTEN Moura)   Reviewed instructions with Other (name and relationship in comment)  (Tony - spouse)   Level of Understanding Questions answered;Verbalized understanding     1145: Patient stable for discharge, denies any c/o, PIV removed. Escorted to car via wheelchair accompanied by spouse.

## 2025-06-27 NOTE — PERIOP NOTE
0800 - PT DENIES FEVER, COLD, COUGH, SOB, N/V, DIARRHEA.....   PRE-OP TCHING DONE AND REINFORCED.  PT VERBALIZES UNDERSTANDING.   STRETCHER IN LOWEST POSITION, CB IN PLACE AND S RUP X2.

## 2025-06-27 NOTE — DISCHARGE INSTRUCTIONS
Patient Discharge Instructions    Carolee Claros / [id number] : 1950    Admitted 2025 Discharged: 2025     What to do at Home  Recommended activity: Elevate arm   No needle sticks or BP checks in affected arm  Leave dressing for 72-96 hours.  Call for any HAND pain  Call to be seen in [place] office in 2 weeks     Information obtained by :  I understand that if any problems occur once I am at home I am to contact my physician.    I understand and acknowledge receipt of the instructions indicated above.                                                                                                                                           R.N.'s Signature                                                                  Date/Time                                                                                                                                              Patient or Representative Signature                                                          Date/Time      Galileo Samayoa MD

## 2025-06-27 NOTE — BRIEF OP NOTE
Brief Postoperative Note      Patient: Carolee Claros  YOB: 1950  MRN: [id number]    Date of Procedure: 6/27/2025    Pre-Op Diagnosis Codes:      * End stage renal disease (HCC) [N18.6]    Post-Op Diagnosis: Same       Proc: Left arm brachiocephalic AVF creation    Surgeon(s):  Galileo Samayoa MD    Assistant:  First Assistant: Leonora Chinchilla RN    Anesthesia: Block    Estimated Blood Loss (mL): Minimal    Complications: None    Findings: Good vein. Nice thrill.     Electronically signed by Galileo Samayoa MD on 6/27/2025 at 10:41 AM

## 2025-06-27 NOTE — ANESTHESIA PRE PROCEDURE
succinate (TOPROL XL) 100 MG extended release tablet TAKE 1 TABLET DAILY 9/16/24   Deborah Godwin APRN - NP   lisinopril (PRINIVIL;ZESTRIL) 20 MG tablet Take 1 tablet by mouth daily 6/28/24   Deborah Godwin APRN - NP   blood glucose test strips (ACCU-CHEK GUIDE) strip BS check BID Dx: E11.65 5/22/24   Deborah Godwin APRN - NP   Multiple Vitamins-Minerals (MULTIVITAMIN ADULT, MINERALS, PO) Take by mouth daily    Provider, MD Adriel   aspirin 81 MG chewable tablet Take 1 tablet by mouth daily    Automatic Reconciliation, Ar       Current medications:    No current facility-administered medications for this encounter.     Current Outpatient Medications   Medication Sig Dispense Refill    hydrALAZINE (APRESOLINE) 25 MG tablet TAKE 1 TABLET IN THE       MORNING AND AT BEDTIME 180 tablet 3    Liraglutide (VICTOZA) 18 MG/3ML SOPN SC injection Inject 1.2 mg into the skin daily      dapagliflozin (FARXIGA) 10 MG tablet Take 1 tablet by mouth daily 90 tablet 1    methIMAzole (TAPAZOLE) 5 MG tablet TAKE 1/2 TABLET EVERY      MONDAY, WEDNESDAY, AND     FRIDAY 19 tablet 1    ELIQUIS 5 MG TABS tablet TAKE 1 TABLET TWICE A  tablet 1    atorvastatin (LIPITOR) 40 MG tablet TAKE 1 TABLET DAILY (NOTE  DOSE REDUCED) 90 tablet 3    furosemide (LASIX) 20 MG tablet Take 1 tablet by mouth daily 90 tablet 1    furosemide (LASIX) 20 MG tablet Take 1 tablet by mouth daily for 7 days 7 tablet 0    Insulin Pen Needle (B-D UF III MINI PEN NEEDLES) 31G X 5 MM MISC USE ONCE A DAY WITH INSULIN 90 each 3    amLODIPine (NORVASC) 10 MG tablet TAKE 1 TABLET DAILY 90 tablet 3    BASAGLAR KWIKPEN 100 UNIT/ML injection pen INJECT 30 UNITS            SUBCUTANEOUSLY IN THE      MORNING, DOSE REDUCED (Patient taking differently: Inject 45 Units into the skin every morning (before breakfast) INJECT 30 UNITS            SUBCUTANEOUSLY IN THE      MORNING, DOSE REDUCED) 30 mL 3    metoprolol succinate (TOPROL XL) 100 MG extended release

## 2025-07-08 ENCOUNTER — CLINICAL DOCUMENTATION (OUTPATIENT)
Age: 75
End: 2025-07-08

## 2025-07-08 DIAGNOSIS — I77.0 A-V FISTULA: Primary | ICD-10-CM

## 2025-07-14 NOTE — PROGRESS NOTES
ASSESSMENT and PLAN  1. Paroxysmal atrial fibrillation (HCC)  No clinical signs or symptoms of recurrence.  Continue current therapy including OAC for stroke prophylaxis.    2. Chronic anticoagulation  No bleeding complications reported on Eliquis.    3. Nonrheumatic mitral valve regurgitation  Stable 2+ moderate MR on echo 10/3/2024.  Repeat echo in 1-2 years.    4.  Primary hypertension  Well-controlled.  Continue current medications though the amlodipine may be contributing to her lower extremity edema.    5. Hypercholesterolemia  Labs 1/30/2025 demonstrated total cholesterol 114, triglyceride 121, HDL 58, LDL 32.  Continue atorvastatin as prescribed.       Follow-up in 6 months.  The patient has been instructed and agrees to call our office with any issues or other concerns related to their cardiac condition(s) and/or complaint(s).      CHIEF COMPLAINT  Follow-up paroxysmal atrial fibrillation and MR    HPI:    Carolee Claros is a 74 y.o. female here for follow-up of paroxysmal atrial fibrillation.  No acute cardiac issues have developed since the last visit on 4/30/2024.  No cardiac uses have developed since the last visit on 12/11/2024.  She underwent left arm brachiocephalic AV fistula creation on 6/27/2025 without perioperative cardiovascular complications. She denies chest pain, dyspnea, heart failure symptoms, palpitations, changes in exercise tolerance, lightheadedness or syncope.  She denies bleeding complications related to Eliquis.  She is experiencing some pedal and lower leg edema that is unchanged from previous.  Does not change with leg elevation and/are bedrest overnight.    PERTINENT CARDIAC HISTORY: (Records reviewed and summarized below)  Atrial fibrillation, paroxysmal  ==> Discovered on event monitor 2/2019  ==> 20-day monitor 7/2023, no afib, rare PACs, no pauses or high-grade AV block.  Mitral valve regurgitation  ==> Echo 6/13/2018, EF 65%, AV sclerosis, RV nl, PASP 34  ==> Echo

## 2025-07-22 ENCOUNTER — OFFICE VISIT (OUTPATIENT)
Age: 75
End: 2025-07-22
Payer: MEDICARE

## 2025-07-22 VITALS
SYSTOLIC BLOOD PRESSURE: 140 MMHG | DIASTOLIC BLOOD PRESSURE: 76 MMHG | HEART RATE: 70 BPM | BODY MASS INDEX: 37.39 KG/M2 | WEIGHT: 219 LBS | OXYGEN SATURATION: 94 % | HEIGHT: 64 IN | TEMPERATURE: 97.8 F

## 2025-07-22 DIAGNOSIS — I10 PRIMARY HYPERTENSION: ICD-10-CM

## 2025-07-22 DIAGNOSIS — I48.0 PAROXYSMAL ATRIAL FIBRILLATION (HCC): Primary | ICD-10-CM

## 2025-07-22 DIAGNOSIS — E78.00 HYPERCHOLESTEROLEMIA: ICD-10-CM

## 2025-07-22 DIAGNOSIS — I34.0 NONRHEUMATIC MITRAL VALVE REGURGITATION: ICD-10-CM

## 2025-07-22 DIAGNOSIS — Z79.01 CHRONIC ANTICOAGULATION: ICD-10-CM

## 2025-07-22 PROCEDURE — 3078F DIAST BP <80 MM HG: CPT | Performed by: INTERNAL MEDICINE

## 2025-07-22 PROCEDURE — 1126F AMNT PAIN NOTED NONE PRSNT: CPT | Performed by: INTERNAL MEDICINE

## 2025-07-22 PROCEDURE — 3077F SYST BP >= 140 MM HG: CPT | Performed by: INTERNAL MEDICINE

## 2025-07-22 PROCEDURE — 99214 OFFICE O/P EST MOD 30 MIN: CPT | Performed by: INTERNAL MEDICINE

## 2025-07-22 PROCEDURE — 1123F ACP DISCUSS/DSCN MKR DOCD: CPT | Performed by: INTERNAL MEDICINE

## 2025-07-22 ASSESSMENT — PATIENT HEALTH QUESTIONNAIRE - PHQ9
2. FEELING DOWN, DEPRESSED OR HOPELESS: NOT AT ALL
SUM OF ALL RESPONSES TO PHQ QUESTIONS 1-9: 0
1. LITTLE INTEREST OR PLEASURE IN DOING THINGS: NOT AT ALL
SUM OF ALL RESPONSES TO PHQ QUESTIONS 1-9: 0

## 2025-07-22 NOTE — PROGRESS NOTES
Identified pt with two pt identifiers(name and ). Reviewed record in preparation for visit and have obtained necessary documentation.  Chief Complaint   Patient presents with    Atrial Fibrillation    Valvular Heart Disease    Hypertension    Cholesterol Problem      BP (!) 140/76 (BP Site: Left Upper Arm, Patient Position: Sitting, BP Cuff Size: Large Adult)   Pulse 70   Temp 97.8 °F (36.6 °C) (Temporal)   Ht 1.626 m (5' 4\")   Wt 99.3 kg (219 lb)   SpO2 94%   BMI 37.59 kg/m²       Medications reviewed/approved by provider.      Health Maintenance Review: Patient reminded of \"due or due soon\" health maintenance. I have asked the patient to contact his/her primary care provider (PCP) for follow-up on his/her health maintenance.    Coordination of Care Questionnaire:  :   1) Have you been to an emergency room, urgent care, or hospitalized since your last visit?  If yes, where when, and reason for visit? no       2. Have seen or consulted any other health care provider since your last visit?   If yes, where when, and reason for visit?  no      Patient is accompanied by spouse I have received verbal consent from Carolee Claros to discuss any/all medical information while they are present in the room.

## 2025-07-23 ENCOUNTER — OFFICE VISIT (OUTPATIENT)
Age: 75
End: 2025-07-23
Payer: MEDICARE

## 2025-07-23 ENCOUNTER — TELEPHONE (OUTPATIENT)
Age: 75
End: 2025-07-23

## 2025-07-23 ENCOUNTER — HOSPITAL ENCOUNTER (OUTPATIENT)
Facility: HOSPITAL | Age: 75
Discharge: HOME | End: 2025-07-26
Payer: MEDICARE

## 2025-07-23 VITALS
DIASTOLIC BLOOD PRESSURE: 66 MMHG | HEIGHT: 64 IN | TEMPERATURE: 97 F | RESPIRATION RATE: 18 BRPM | SYSTOLIC BLOOD PRESSURE: 129 MMHG | BODY MASS INDEX: 37.73 KG/M2 | WEIGHT: 221 LBS | OXYGEN SATURATION: 95 % | HEART RATE: 71 BPM

## 2025-07-23 DIAGNOSIS — E05.90 HYPERTHYROIDISM: ICD-10-CM

## 2025-07-23 DIAGNOSIS — M79.89 SWELLING OF RIGHT UPPER EXTREMITY: ICD-10-CM

## 2025-07-23 DIAGNOSIS — E11.22 TYPE 2 DIABETES MELLITUS WITH STAGE 4 CHRONIC KIDNEY DISEASE, WITH LONG-TERM CURRENT USE OF INSULIN (HCC): Primary | ICD-10-CM

## 2025-07-23 DIAGNOSIS — Z86.73 HISTORY OF STROKE: ICD-10-CM

## 2025-07-23 DIAGNOSIS — I48.0 PAROXYSMAL ATRIAL FIBRILLATION (HCC): ICD-10-CM

## 2025-07-23 DIAGNOSIS — Z79.4 TYPE 2 DIABETES MELLITUS WITH STAGE 4 CHRONIC KIDNEY DISEASE, WITH LONG-TERM CURRENT USE OF INSULIN (HCC): Primary | ICD-10-CM

## 2025-07-23 DIAGNOSIS — I10 ESSENTIAL HYPERTENSION: ICD-10-CM

## 2025-07-23 DIAGNOSIS — N18.4 CKD (CHRONIC KIDNEY DISEASE) STAGE 4, GFR 15-29 ML/MIN (HCC): ICD-10-CM

## 2025-07-23 DIAGNOSIS — N18.4 TYPE 2 DIABETES MELLITUS WITH STAGE 4 CHRONIC KIDNEY DISEASE, WITH LONG-TERM CURRENT USE OF INSULIN (HCC): Primary | ICD-10-CM

## 2025-07-23 PROCEDURE — 1159F MED LIST DOCD IN RCRD: CPT | Performed by: NURSE PRACTITIONER

## 2025-07-23 PROCEDURE — 3074F SYST BP LT 130 MM HG: CPT | Performed by: NURSE PRACTITIONER

## 2025-07-23 PROCEDURE — 3051F HG A1C>EQUAL 7.0%<8.0%: CPT | Performed by: NURSE PRACTITIONER

## 2025-07-23 PROCEDURE — 1160F RVW MEDS BY RX/DR IN RCRD: CPT | Performed by: NURSE PRACTITIONER

## 2025-07-23 PROCEDURE — 1126F AMNT PAIN NOTED NONE PRSNT: CPT | Performed by: NURSE PRACTITIONER

## 2025-07-23 PROCEDURE — 3078F DIAST BP <80 MM HG: CPT | Performed by: NURSE PRACTITIONER

## 2025-07-23 PROCEDURE — 1123F ACP DISCUSS/DSCN MKR DOCD: CPT | Performed by: NURSE PRACTITIONER

## 2025-07-23 PROCEDURE — 36415 COLL VENOUS BLD VENIPUNCTURE: CPT | Performed by: NURSE PRACTITIONER

## 2025-07-23 PROCEDURE — 99214 OFFICE O/P EST MOD 30 MIN: CPT | Performed by: NURSE PRACTITIONER

## 2025-07-23 PROCEDURE — 93971 EXTREMITY STUDY: CPT

## 2025-07-23 RX ORDER — METOPROLOL SUCCINATE 100 MG/1
100 TABLET, EXTENDED RELEASE ORAL DAILY
Qty: 90 TABLET | Refills: 3 | Status: ON HOLD | OUTPATIENT
Start: 2025-07-23

## 2025-07-23 RX ORDER — FUROSEMIDE 20 MG/1
20 TABLET ORAL DAILY
Qty: 14 TABLET | Refills: 0 | Status: ON HOLD | OUTPATIENT
Start: 2025-07-23 | End: 2025-08-06

## 2025-07-23 RX ORDER — INSULIN GLARGINE 100 [IU]/ML
INJECTION, SOLUTION SUBCUTANEOUS
Qty: 30 ML | Refills: 3 | Status: ON HOLD | OUTPATIENT
Start: 2025-07-23

## 2025-07-23 RX ORDER — FUROSEMIDE 20 MG/1
20 TABLET ORAL DAILY
Qty: 90 TABLET | Refills: 1 | Status: ON HOLD | OUTPATIENT
Start: 2025-07-23

## 2025-07-23 RX ORDER — LISINOPRIL 20 MG/1
20 TABLET ORAL DAILY
Qty: 90 TABLET | Refills: 3 | Status: ON HOLD | OUTPATIENT
Start: 2025-07-23

## 2025-07-23 ASSESSMENT — PATIENT HEALTH QUESTIONNAIRE - PHQ9
3. TROUBLE FALLING OR STAYING ASLEEP: NOT AT ALL
9. THOUGHTS THAT YOU WOULD BE BETTER OFF DEAD, OR OF HURTING YOURSELF: NOT AT ALL
1. LITTLE INTEREST OR PLEASURE IN DOING THINGS: NOT AT ALL
SUM OF ALL RESPONSES TO PHQ QUESTIONS 1-9: 0
5. POOR APPETITE OR OVEREATING: NOT AT ALL
2. FEELING DOWN, DEPRESSED OR HOPELESS: NOT AT ALL
7. TROUBLE CONCENTRATING ON THINGS, SUCH AS READING THE NEWSPAPER OR WATCHING TELEVISION: NOT AT ALL
SUM OF ALL RESPONSES TO PHQ QUESTIONS 1-9: 0
6. FEELING BAD ABOUT YOURSELF - OR THAT YOU ARE A FAILURE OR HAVE LET YOURSELF OR YOUR FAMILY DOWN: NOT AT ALL
8. MOVING OR SPEAKING SO SLOWLY THAT OTHER PEOPLE COULD HAVE NOTICED. OR THE OPPOSITE, BEING SO FIGETY OR RESTLESS THAT YOU HAVE BEEN MOVING AROUND A LOT MORE THAN USUAL: NOT AT ALL
SUM OF ALL RESPONSES TO PHQ QUESTIONS 1-9: 0
10. IF YOU CHECKED OFF ANY PROBLEMS, HOW DIFFICULT HAVE THESE PROBLEMS MADE IT FOR YOU TO DO YOUR WORK, TAKE CARE OF THINGS AT HOME, OR GET ALONG WITH OTHER PEOPLE: NOT DIFFICULT AT ALL
SUM OF ALL RESPONSES TO PHQ QUESTIONS 1-9: 0
4. FEELING TIRED OR HAVING LITTLE ENERGY: NOT AT ALL

## 2025-07-23 NOTE — PROGRESS NOTES
Labs drawn in left arm per Deborah Godwin's orders.  Patient tolerated well. 4 SST, 1 short, 1 lavender /short, difficult stick slow draw

## 2025-07-23 NOTE — TELEPHONE ENCOUNTER
SILVANO Serrano to schedule STAT Vascular duplex upper extremity venous right.  Per Maggie, test was scheduled for today at 3 pm, BS RG arriving at 2:30 pm.  Pt is to bring photo ID and insurance information.  Pt, spouse and Deborah was informed of, OK thanks.

## 2025-07-23 NOTE — PROGRESS NOTES
Subjective:     CC: diabetes, HTN, CKD    Carolee Claros is a 74 y.o. female who presents today for a 3 month follow up for diabetes, HTN, CKD, and other chronic medical problems.     She is here with her  today.    New issues:  She has very dry peeling skin on her face and arms today.  Says she got sunburned while driving around in her 's convertible without the top on.  I recommended she use a moisturizer cream daily.    Type 2 diabetes  The last A1C was 7.4 in APRIL, up from from 7.2 in , down from 8.0 in 7-2024, no change from from 4-2024. Will recheck today.    She is on Farxiga 10 mg daily and insulin therapy (Basaglar 35 units daily).      states her home blood sugars have been running about the same as usual.  They did spike around the time of her dialysis access surgery but have come down since then.    Denies s/s of hypoglycemia.    She does have some peripheral neuropathy in the feet.         HTN with stage 4 CKD     Lab Results   Component Value Date    CREATININE 4.03 (H) 06/27/2025      Patient had LEFT AV fistula placed end of June by Dr. John Samayoa in preparation for HD.    Her right arm is twice the size of her left arm, however.  She and her  did not notice that it was swollen.  She denies any pain, numbness, or tingling in the arm.  Denies any injury to the arm.  Denies any lumps or bumps under the arm or in the left breast.    Her lower legs are very swollen today.    Weight today 221 pounds, up from 210 pounds in April.      She is supposed to be taking Lasix 20mg daily.  Unfortunately a refill was never sent to her mail order pharmacy so she has not been taking it.  I will refill this today.    She is also on Amlodipine 10mg daily, Lisinopril 20mg daily, Metoprolol XR 100mg daily, and Hydralazine 25mg BID.    Her next visit with nephrologist Dr. Blanchard is AUGUST 1.  Patient states he would like labs done today.      Hyperthyroidism  She was diagnosed in 1/2021.

## 2025-07-24 LAB
ALBUMIN SERPL-MCNC: 2.4 G/DL (ref 3.5–5)
ALBUMIN/GLOB SERPL: 0.5 (ref 1.1–2.2)
ALP SERPL-CCNC: 85 U/L (ref 45–117)
ALT SERPL-CCNC: 28 U/L (ref 12–78)
ANION GAP SERPL CALC-SCNC: 6 MMOL/L (ref 2–12)
AST SERPL-CCNC: 38 U/L (ref 15–37)
BASOPHILS # BLD: 0.16 K/UL (ref 0–0.1)
BASOPHILS NFR BLD: 1.3 % (ref 0–1)
BILIRUB SERPL-MCNC: 0.4 MG/DL (ref 0.2–1)
BUN SERPL-MCNC: 64 MG/DL (ref 6–20)
BUN/CREAT SERPL: 16 (ref 12–20)
CALCIUM SERPL-MCNC: 8.3 MG/DL (ref 8.5–10.1)
CALCIUM SERPL-MCNC: 8.3 MG/DL (ref 8.5–10.1)
CHLORIDE SERPL-SCNC: 109 MMOL/L (ref 97–108)
CO2 SERPL-SCNC: 23 MMOL/L (ref 21–32)
CREAT SERPL-MCNC: 4.06 MG/DL (ref 0.55–1.02)
DIFFERENTIAL METHOD BLD: ABNORMAL
ECHO BSA: 2.13 M2
EOSINOPHIL # BLD: 0.25 K/UL (ref 0–0.4)
EOSINOPHIL NFR BLD: 2 % (ref 0–7)
ERYTHROCYTE [DISTWIDTH] IN BLOOD BY AUTOMATED COUNT: 14.1 % (ref 11.5–14.5)
EST. AVERAGE GLUCOSE BLD GHB EST-MCNC: 148 MG/DL
FERRITIN SERPL-MCNC: 123 NG/ML (ref 8–252)
GLOBULIN SER CALC-MCNC: 4.8 G/DL (ref 2–4)
GLUCOSE SERPL-MCNC: 94 MG/DL (ref 65–100)
HBA1C MFR BLD: 6.8 % (ref 4–5.6)
HCT VFR BLD AUTO: 29.4 % (ref 35–47)
HGB BLD-MCNC: 8.7 G/DL (ref 11.5–16)
IMM GRANULOCYTES # BLD AUTO: 0.08 K/UL (ref 0–0.04)
IMM GRANULOCYTES NFR BLD AUTO: 0.6 % (ref 0–0.5)
IRON SATN MFR SERPL: 10 % (ref 20–50)
IRON SERPL-MCNC: 20 UG/DL (ref 35–150)
LYMPHOCYTES # BLD: 1.58 K/UL (ref 0.8–3.5)
LYMPHOCYTES NFR BLD: 12.4 % (ref 12–49)
MCH RBC QN AUTO: 29.9 PG (ref 26–34)
MCHC RBC AUTO-ENTMCNC: 29.6 G/DL (ref 30–36.5)
MCV RBC AUTO: 101 FL (ref 80–99)
MONOCYTES # BLD: 0.76 K/UL (ref 0–1)
MONOCYTES NFR BLD: 5.9 % (ref 5–13)
NEUTS SEG # BLD: 9.95 K/UL (ref 1.8–8)
NEUTS SEG NFR BLD: 77.8 % (ref 32–75)
NRBC # BLD: 0 K/UL (ref 0–0.01)
NRBC BLD-RTO: 0 PER 100 WBC
PHOSPHATE SERPL-MCNC: 4.4 MG/DL (ref 2.6–4.7)
PLATELET # BLD AUTO: 260 K/UL (ref 150–400)
PMV BLD AUTO: 10.8 FL (ref 8.9–12.9)
POTASSIUM SERPL-SCNC: 4.7 MMOL/L (ref 3.5–5.1)
PROT SERPL-MCNC: 7.2 G/DL (ref 6.4–8.2)
PTH-INTACT SERPL-MCNC: 336.3 PG/ML (ref 18.4–88)
RBC # BLD AUTO: 2.91 M/UL (ref 3.8–5.2)
SODIUM SERPL-SCNC: 138 MMOL/L (ref 136–145)
T4 FREE SERPL-MCNC: 1.2 NG/DL (ref 0.8–1.5)
TIBC SERPL-MCNC: 199 UG/DL (ref 250–450)
TSH SERPL DL<=0.05 MIU/L-ACNC: 0.24 UIU/ML (ref 0.36–3.74)
WBC # BLD AUTO: 12.8 K/UL (ref 3.6–11)

## 2025-07-25 LAB — T3 SERPL-MCNC: 103 NG/DL (ref 71–180)

## 2025-07-26 ENCOUNTER — HOSPITAL ENCOUNTER (INPATIENT)
Facility: HOSPITAL | Age: 75
LOS: 9 days | Discharge: HOME HEALTH | DRG: 291 | End: 2025-08-04
Attending: STUDENT IN AN ORGANIZED HEALTH CARE EDUCATION/TRAINING PROGRAM | Admitting: STUDENT IN AN ORGANIZED HEALTH CARE EDUCATION/TRAINING PROGRAM
Payer: MEDICARE

## 2025-07-26 ENCOUNTER — APPOINTMENT (OUTPATIENT)
Facility: HOSPITAL | Age: 75
End: 2025-07-26
Payer: MEDICARE

## 2025-07-26 ENCOUNTER — HOSPITAL ENCOUNTER (EMERGENCY)
Facility: HOSPITAL | Age: 75
Discharge: ANOTHER HOSPITAL | End: 2025-07-26
Attending: STUDENT IN AN ORGANIZED HEALTH CARE EDUCATION/TRAINING PROGRAM
Payer: MEDICARE

## 2025-07-26 ENCOUNTER — APPOINTMENT (OUTPATIENT)
Facility: HOSPITAL | Age: 75
DRG: 291 | End: 2025-07-26
Attending: STUDENT IN AN ORGANIZED HEALTH CARE EDUCATION/TRAINING PROGRAM
Payer: MEDICARE

## 2025-07-26 VITALS
RESPIRATION RATE: 25 BRPM | BODY MASS INDEX: 37.73 KG/M2 | SYSTOLIC BLOOD PRESSURE: 134 MMHG | HEIGHT: 64 IN | WEIGHT: 221 LBS | OXYGEN SATURATION: 94 % | HEART RATE: 83 BPM | DIASTOLIC BLOOD PRESSURE: 63 MMHG

## 2025-07-26 DIAGNOSIS — R06.02 SHORTNESS OF BREATH: ICD-10-CM

## 2025-07-26 DIAGNOSIS — R06.00 DYSPNEA, UNSPECIFIED TYPE: ICD-10-CM

## 2025-07-26 DIAGNOSIS — I63.9 CEREBROVASCULAR ACCIDENT (CVA), UNSPECIFIED MECHANISM (HCC): Primary | ICD-10-CM

## 2025-07-26 DIAGNOSIS — N18.6 ESRD (END STAGE RENAL DISEASE) (HCC): ICD-10-CM

## 2025-07-26 DIAGNOSIS — J96.01 ACUTE HYPOXIC RESPIRATORY FAILURE (HCC): ICD-10-CM

## 2025-07-26 DIAGNOSIS — J81.0 ACUTE PULMONARY EDEMA (HCC): ICD-10-CM

## 2025-07-26 DIAGNOSIS — R06.02 SHORTNESS OF BREATH: Primary | ICD-10-CM

## 2025-07-26 LAB
ANION GAP SERPL CALC-SCNC: 10 MMOL/L (ref 2–12)
ANION GAP SERPL CALC-SCNC: 8 MMOL/L (ref 2–12)
BASE DEFICIT BLDV-SCNC: 3.1 MMOL/L
BASOPHILS # BLD: 0.12 K/UL (ref 0–0.1)
BASOPHILS NFR BLD: 0.9 % (ref 0–1)
BDY SITE: ABNORMAL
BUN SERPL-MCNC: 64 MG/DL (ref 6–20)
BUN SERPL-MCNC: 68 MG/DL (ref 6–20)
BUN/CREAT SERPL: 14 (ref 12–20)
BUN/CREAT SERPL: 15 (ref 12–20)
CALCIUM SERPL-MCNC: 8.4 MG/DL (ref 8.5–10.1)
CALCIUM SERPL-MCNC: 8.5 MG/DL (ref 8.5–10.1)
CHLORIDE SERPL-SCNC: 108 MMOL/L (ref 97–108)
CHLORIDE SERPL-SCNC: 109 MMOL/L (ref 97–108)
CO2 SERPL-SCNC: 22 MMOL/L (ref 21–32)
CO2 SERPL-SCNC: 23 MMOL/L (ref 21–32)
CREAT SERPL-MCNC: 4.49 MG/DL (ref 0.55–1.02)
CREAT SERPL-MCNC: 4.53 MG/DL (ref 0.55–1.02)
DIFFERENTIAL METHOD BLD: ABNORMAL
EKG ATRIAL RATE: 86 BPM
EKG DIAGNOSIS: NORMAL
EKG P AXIS: 42 DEGREES
EKG P-R INTERVAL: 184 MS
EKG Q-T INTERVAL: 408 MS
EKG QRS DURATION: 100 MS
EKG QTC CALCULATION (BAZETT): 488 MS
EKG R AXIS: 1 DEGREES
EKG T AXIS: 10 DEGREES
EKG VENTRICULAR RATE: 86 BPM
EOSINOPHIL # BLD: 0.12 K/UL (ref 0–0.4)
EOSINOPHIL NFR BLD: 0.9 % (ref 0–0.7)
ERYTHROCYTE [DISTWIDTH] IN BLOOD BY AUTOMATED COUNT: 14.4 % (ref 11.5–14.5)
ERYTHROCYTE [DISTWIDTH] IN BLOOD BY AUTOMATED COUNT: 14.5 % (ref 11.5–14.5)
EST. AVERAGE GLUCOSE BLD GHB EST-MCNC: 143 MG/DL
GAS FLOW.O2 O2 DELIVERY SYS: 2 L/MIN
GLUCOSE SERPL-MCNC: 121 MG/DL (ref 65–100)
GLUCOSE SERPL-MCNC: 176 MG/DL (ref 65–100)
HBA1C MFR BLD: 6.6 % (ref 4–5.6)
HCO3 BLDV-SCNC: 21 MMOL/L (ref 23–28)
HCT VFR BLD AUTO: 25.2 % (ref 35–47)
HCT VFR BLD AUTO: 25.7 % (ref 35–47)
HGB BLD-MCNC: 7.8 G/DL (ref 11.5–16)
HGB BLD-MCNC: 8 G/DL (ref 11.5–16)
IMM GRANULOCYTES # BLD AUTO: 0.05 K/UL (ref 0–0.04)
IMM GRANULOCYTES NFR BLD AUTO: 0.4 % (ref 0–0.5)
LACTATE SERPL-SCNC: 1.1 MMOL/L (ref 0.4–2)
LYMPHOCYTES # BLD: 1.57 K/UL (ref 0.8–3.5)
LYMPHOCYTES NFR BLD: 12.2 % (ref 12–49)
MAGNESIUM SERPL-MCNC: 2.4 MG/DL (ref 1.6–2.4)
MCH RBC QN AUTO: 29.6 PG (ref 26–34)
MCH RBC QN AUTO: 29.9 PG (ref 26–34)
MCHC RBC AUTO-ENTMCNC: 31 G/DL (ref 30–36.5)
MCHC RBC AUTO-ENTMCNC: 31.1 G/DL (ref 30–36.5)
MCV RBC AUTO: 95.2 FL (ref 80–99)
MCV RBC AUTO: 96.6 FL (ref 80–99)
MONOCYTES # BLD: 0.61 K/UL (ref 0–1)
MONOCYTES NFR BLD: 4.8 % (ref 5–13)
NEUTS SEG # BLD: 10.35 K/UL (ref 1.8–8)
NEUTS SEG NFR BLD: 80.8 % (ref 32–75)
NRBC # BLD: 0 K/UL (ref 0–0.01)
NRBC # BLD: 0 K/UL (ref 0–0.01)
NRBC BLD-RTO: 0 PER 100 WBC
NRBC BLD-RTO: 0 PER 100 WBC
NT PRO BNP: 9481 PG/ML (ref 0–450)
PCO2 BLDV: 33.1 MMHG (ref 41–51)
PH BLDV: 7.41 (ref 7.32–7.42)
PHOSPHATE SERPL-MCNC: 4.5 MG/DL (ref 2.6–4.7)
PLATELET # BLD AUTO: 273 K/UL (ref 150–400)
PLATELET # BLD AUTO: 293 K/UL (ref 150–400)
PMV BLD AUTO: 10.1 FL (ref 8.9–12.9)
PMV BLD AUTO: 9.8 FL (ref 8.9–12.9)
PO2 BLDV: 64 MMHG (ref 25–40)
POTASSIUM SERPL-SCNC: 4.5 MMOL/L (ref 3.5–5.1)
POTASSIUM SERPL-SCNC: 4.6 MMOL/L (ref 3.5–5.1)
PROCALCITONIN SERPL-MCNC: 0.13 NG/ML
RBC # BLD AUTO: 2.61 M/UL (ref 3.8–5.2)
RBC # BLD AUTO: 2.7 M/UL (ref 3.8–5.2)
SAO2 % BLDV: 93 % (ref 65–88)
SAO2% DEVICE SAO2% SENSOR NAME: ABNORMAL
SODIUM SERPL-SCNC: 139 MMOL/L (ref 136–145)
SODIUM SERPL-SCNC: 141 MMOL/L (ref 136–145)
SPECIMEN SITE: ABNORMAL
T4 FREE SERPL-MCNC: 1.2 NG/DL (ref 0.8–1.5)
TSH SERPL DL<=0.05 MIU/L-ACNC: 0.26 UIU/ML (ref 0.36–3.74)
WBC # BLD AUTO: 10.6 K/UL (ref 3.6–11)
WBC # BLD AUTO: 12.8 K/UL (ref 3.6–11)

## 2025-07-26 PROCEDURE — 84100 ASSAY OF PHOSPHORUS: CPT

## 2025-07-26 PROCEDURE — 6360000002 HC RX W HCPCS: Performed by: STUDENT IN AN ORGANIZED HEALTH CARE EDUCATION/TRAINING PROGRAM

## 2025-07-26 PROCEDURE — 93005 ELECTROCARDIOGRAM TRACING: CPT | Performed by: STUDENT IN AN ORGANIZED HEALTH CARE EDUCATION/TRAINING PROGRAM

## 2025-07-26 PROCEDURE — 84443 ASSAY THYROID STIM HORMONE: CPT

## 2025-07-26 PROCEDURE — 84439 ASSAY OF FREE THYROXINE: CPT

## 2025-07-26 PROCEDURE — 36415 COLL VENOUS BLD VENIPUNCTURE: CPT

## 2025-07-26 PROCEDURE — 71045 X-RAY EXAM CHEST 1 VIEW: CPT

## 2025-07-26 PROCEDURE — 80048 BASIC METABOLIC PNL TOTAL CA: CPT

## 2025-07-26 PROCEDURE — 83036 HEMOGLOBIN GLYCOSYLATED A1C: CPT

## 2025-07-26 PROCEDURE — 2060000000 HC ICU INTERMEDIATE R&B

## 2025-07-26 PROCEDURE — 99285 EMERGENCY DEPT VISIT HI MDM: CPT

## 2025-07-26 PROCEDURE — 85027 COMPLETE CBC AUTOMATED: CPT

## 2025-07-26 PROCEDURE — 51702 INSERT TEMP BLADDER CATH: CPT

## 2025-07-26 PROCEDURE — 84145 PROCALCITONIN (PCT): CPT

## 2025-07-26 PROCEDURE — 6370000000 HC RX 637 (ALT 250 FOR IP): Performed by: STUDENT IN AN ORGANIZED HEALTH CARE EDUCATION/TRAINING PROGRAM

## 2025-07-26 PROCEDURE — 83880 ASSAY OF NATRIURETIC PEPTIDE: CPT

## 2025-07-26 PROCEDURE — 82803 BLOOD GASES ANY COMBINATION: CPT

## 2025-07-26 PROCEDURE — 83605 ASSAY OF LACTIC ACID: CPT

## 2025-07-26 PROCEDURE — 85025 COMPLETE CBC W/AUTO DIFF WBC: CPT

## 2025-07-26 PROCEDURE — 96374 THER/PROPH/DIAG INJ IV PUSH: CPT

## 2025-07-26 PROCEDURE — 83735 ASSAY OF MAGNESIUM: CPT

## 2025-07-26 PROCEDURE — 2500000003 HC RX 250 WO HCPCS: Performed by: STUDENT IN AN ORGANIZED HEALTH CARE EDUCATION/TRAINING PROGRAM

## 2025-07-26 RX ORDER — ACETAMINOPHEN 650 MG/1
650 SUPPOSITORY RECTAL EVERY 6 HOURS PRN
Status: DISCONTINUED | OUTPATIENT
Start: 2025-07-26 | End: 2025-08-04 | Stop reason: HOSPADM

## 2025-07-26 RX ORDER — ACETAMINOPHEN 325 MG/1
650 TABLET ORAL EVERY 6 HOURS PRN
Status: DISCONTINUED | OUTPATIENT
Start: 2025-07-26 | End: 2025-08-04 | Stop reason: HOSPADM

## 2025-07-26 RX ORDER — AZITHROMYCIN 250 MG/1
500 TABLET, FILM COATED ORAL EVERY 24 HOURS
Status: COMPLETED | OUTPATIENT
Start: 2025-07-26 | End: 2025-07-28

## 2025-07-26 RX ORDER — AMLODIPINE BESYLATE 5 MG/1
10 TABLET ORAL DAILY
Status: DISCONTINUED | OUTPATIENT
Start: 2025-07-26 | End: 2025-08-01

## 2025-07-26 RX ORDER — METHIMAZOLE 5 MG/1
2.5 TABLET ORAL
Status: DISCONTINUED | OUTPATIENT
Start: 2025-07-28 | End: 2025-08-04 | Stop reason: HOSPADM

## 2025-07-26 RX ORDER — ASPIRIN 81 MG/1
81 TABLET, CHEWABLE ORAL DAILY
Status: DISCONTINUED | OUTPATIENT
Start: 2025-07-26 | End: 2025-08-04 | Stop reason: HOSPADM

## 2025-07-26 RX ORDER — SODIUM CHLORIDE 0.9 % (FLUSH) 0.9 %
5-40 SYRINGE (ML) INJECTION PRN
Status: DISCONTINUED | OUTPATIENT
Start: 2025-07-26 | End: 2025-08-04 | Stop reason: HOSPADM

## 2025-07-26 RX ORDER — ATORVASTATIN CALCIUM 40 MG/1
40 TABLET, FILM COATED ORAL NIGHTLY
Status: DISCONTINUED | OUTPATIENT
Start: 2025-07-26 | End: 2025-08-04 | Stop reason: HOSPADM

## 2025-07-26 RX ORDER — HYDRALAZINE HYDROCHLORIDE 25 MG/1
25 TABLET, FILM COATED ORAL 2 TIMES DAILY
Status: DISCONTINUED | OUTPATIENT
Start: 2025-07-26 | End: 2025-07-28

## 2025-07-26 RX ORDER — FUROSEMIDE 10 MG/ML
60 INJECTION INTRAMUSCULAR; INTRAVENOUS ONCE
Status: COMPLETED | OUTPATIENT
Start: 2025-07-26 | End: 2025-07-26

## 2025-07-26 RX ORDER — FUROSEMIDE 20 MG/1
20 TABLET ORAL DAILY
Status: DISCONTINUED | OUTPATIENT
Start: 2025-07-26 | End: 2025-07-28

## 2025-07-26 RX ORDER — METOPROLOL SUCCINATE 50 MG/1
100 TABLET, EXTENDED RELEASE ORAL DAILY
Status: DISCONTINUED | OUTPATIENT
Start: 2025-07-26 | End: 2025-07-30

## 2025-07-26 RX ORDER — HYDROXYZINE HYDROCHLORIDE 25 MG/1
25 TABLET, FILM COATED ORAL 3 TIMES DAILY PRN
Status: DISCONTINUED | OUTPATIENT
Start: 2025-07-26 | End: 2025-08-04 | Stop reason: HOSPADM

## 2025-07-26 RX ORDER — FUROSEMIDE 10 MG/ML
40 INJECTION INTRAMUSCULAR; INTRAVENOUS 2 TIMES DAILY
Status: DISCONTINUED | OUTPATIENT
Start: 2025-07-26 | End: 2025-07-27

## 2025-07-26 RX ORDER — SODIUM CHLORIDE 9 MG/ML
INJECTION, SOLUTION INTRAVENOUS PRN
Status: DISCONTINUED | OUTPATIENT
Start: 2025-07-26 | End: 2025-08-04 | Stop reason: HOSPADM

## 2025-07-26 RX ORDER — LEVALBUTEROL INHALATION SOLUTION 0.63 MG/3ML
0.63 SOLUTION RESPIRATORY (INHALATION) EVERY 8 HOURS PRN
Status: DISCONTINUED | OUTPATIENT
Start: 2025-07-26 | End: 2025-08-04 | Stop reason: HOSPADM

## 2025-07-26 RX ORDER — SODIUM CHLORIDE 0.9 % (FLUSH) 0.9 %
5-40 SYRINGE (ML) INJECTION EVERY 12 HOURS SCHEDULED
Status: DISCONTINUED | OUTPATIENT
Start: 2025-07-26 | End: 2025-08-04 | Stop reason: HOSPADM

## 2025-07-26 RX ORDER — FUROSEMIDE 10 MG/ML
40 INJECTION INTRAMUSCULAR; INTRAVENOUS
Status: COMPLETED | OUTPATIENT
Start: 2025-07-26 | End: 2025-07-26

## 2025-07-26 RX ORDER — POLYETHYLENE GLYCOL 3350 17 G/17G
17 POWDER, FOR SOLUTION ORAL DAILY PRN
Status: DISCONTINUED | OUTPATIENT
Start: 2025-07-26 | End: 2025-08-04 | Stop reason: HOSPADM

## 2025-07-26 RX ADMIN — WATER 1000 MG: 1 INJECTION INTRAMUSCULAR; INTRAVENOUS; SUBCUTANEOUS at 18:35

## 2025-07-26 RX ADMIN — APIXABAN 5 MG: 5 TABLET, FILM COATED ORAL at 21:31

## 2025-07-26 RX ADMIN — SODIUM CHLORIDE, PRESERVATIVE FREE 10 ML: 5 INJECTION INTRAVENOUS at 21:33

## 2025-07-26 RX ADMIN — METOPROLOL SUCCINATE 100 MG: 50 TABLET, EXTENDED RELEASE ORAL at 16:53

## 2025-07-26 RX ADMIN — FUROSEMIDE 40 MG: 10 INJECTION, SOLUTION INTRAMUSCULAR; INTRAVENOUS at 17:19

## 2025-07-26 RX ADMIN — MELATONIN 3 MG: at 21:30

## 2025-07-26 RX ADMIN — AMLODIPINE BESYLATE 10 MG: 5 TABLET ORAL at 16:53

## 2025-07-26 RX ADMIN — HYDRALAZINE HYDROCHLORIDE 25 MG: 25 TABLET ORAL at 21:30

## 2025-07-26 RX ADMIN — ATORVASTATIN CALCIUM 40 MG: 40 TABLET, FILM COATED ORAL at 21:30

## 2025-07-26 RX ADMIN — FUROSEMIDE 60 MG: 10 INJECTION, SOLUTION INTRAMUSCULAR; INTRAVENOUS at 18:34

## 2025-07-26 RX ADMIN — FUROSEMIDE 40 MG: 10 INJECTION, SOLUTION INTRAMUSCULAR; INTRAVENOUS at 11:12

## 2025-07-26 RX ADMIN — AZITHROMYCIN 500 MG: 250 TABLET, FILM COATED ORAL at 18:34

## 2025-07-26 RX ADMIN — ASPIRIN 81 MG: 81 TABLET, CHEWABLE ORAL at 16:53

## 2025-07-26 ASSESSMENT — PAIN - FUNCTIONAL ASSESSMENT: PAIN_FUNCTIONAL_ASSESSMENT: NONE - DENIES PAIN

## 2025-07-26 ASSESSMENT — PAIN SCALES - GENERAL
PAINLEVEL_OUTOF10: 0
PAINLEVEL_OUTOF10: 0

## 2025-07-26 NOTE — ED NOTES
Pt has been assigned [facility] room 3216. Report number [phone], Nursing sup aware to make ALS transport

## 2025-07-26 NOTE — ED PROVIDER NOTES
[facility] EMERGENCY DEPT  EMERGENCY DEPARTMENT HISTORY AND PHYSICAL EXAM      Date of evaluation: 7/26/2025  Patient Name: Carolee Claros  Birthdate 1950  MRN: [id number]  ED Provider: Tahir Roldan MD   Note Started: 9:39 AM EDT 7/26/25    HISTORY OF PRESENT ILLNESS     Chief Complaint   Patient presents with    Shortness of Breath       History Provided By: Patient    HPI: Carolee Claros is a 74 y.o. female presenting with shortness of breath and new acute oxygen requirement    Chief Complaint  Difficulty breathing    History of Present Illness  MARIE is a 74-year-old female with a history of stage 5 kidney disease, diabetes, presenting to the Emergency Department with difficulty breathing. The patient was initially reported to have speech issues by her , but these symptoms had resolved by the time of evaluation once oxygen was administered.  EMS found her oxygen of 73% on room air and severe difficulty breathing.  Stroke scale negative.    The patient reports experiencing shortness of breath, which has improved since arrival.  And being on oxygen with EMS 1 breathing treatment given/DuoNeb.  She denies any history of COPD, asthma, or smoking.  Patient describes a cough that has been present for about a week, occurring mostly at night.  Shortness of breath worse at night.  Additionally, she has noticed swelling in her hands, arms, and legs for approximately one week. The patient denies any chest pain or home oxygen use.    Of note, MARIE recently had a fistula placed for dialysis on the 27th, but has not yet started a dialysis schedule. Her nephrologist is Dr. CHRIS Blanchard in [place]. The patient's current medication regimen includes amlodipine, furosemide, aspirin, lisinopril, methimazole, metoprolol, atorvastatin, hydroxyzine, Eliquis (for history of stroke), and insulin.    She does still make urine and takes daily Lasix.    Medical History  - Stage 5 kidney disease  - History of stroke  - Diabetes  - Hypertension  consult: Internal Medicine. We discussed patient's medical condition and need for admission for further treatment and monitoring    Risk: Prescription medication management, Prescription IV medications given in the ER, New serious or life-threatening medical condition diagnosed in the ER, Acute on chronic exacerbation of medical condition, and Considered admission or transfer to higher level of care given patient's medical condition         Vitals:    Vitals:    07/26/25 0902   BP: (!) 150/57   Pulse: 81   Resp: 30   SpO2: 97%   Weight: 100.2 kg (221 lb)   Height: 1.626 m (5' 4\")       ED COURSE  ED Course as of 07/26/25 1006   Sat Jul 26, 2025   0905 Non ED chart review Patient's last nephrology note reviewed from [facility] 7-25 patient being treated for end-stage renal disease and has fistula in place currently placed approximately 1 month ago, chronic kidney disease [AH]      ED Course User Index  [AH] Tahir Roldan MD       Clinical Management Tools:  Not Applicable    Smoking Cessation: Not Applicable    Patient was given the following medications:  Medications - No data to display    CONSULTS: See ED Course/MDM for further details.  None   PROCEDURES   Unless otherwise noted above, none  Procedures    SEPSIS REASSESSMENT & CRITICAL CARE TIME   SEPSIS REASSESSMENT: Patient does NOT meet Sepsis criteria after ED workup    Critical Care Time: There was a high probability of life-threatening clinical deterioration in the patient's condition requiring my urgent intervention.  I personally saw the patient and independently provided 40 minutes of non-concurrent critical care out of the total shared critical care time provided, excluding separately reportable procedures.     CLINICAL IMPRESSIONS     1. Shortness of breath    2. Dyspnea, unspecified type    3. Acute pulmonary edema (HCC)    4. ESRD (end stage renal disease) (HCC)       SDOH/DISPOSITION/PLAN     DISPOSITION         Dr Elizabeth Toure accepting at

## 2025-07-26 NOTE — ED NOTES
TRANSFER - OUT REPORT:    Verbal report given to Camille Thomson RN on Carolee Claros  being transferred to [facility] for routine progression of patient care       Report consisted of patient's Situation, Background, Assessment and   Recommendations(SBAR).     Information from the following report(s) Nurse Handoff Report, ED Encounter Summary, ED SBAR, Intake/Output, MAR, Recent Results, and Cardiac Rhythm NSR was reviewed with the receiving nurse.    [place] Fall Assessment:    Presents to emergency department  because of falls (Syncope, seizure, or loss of consciousness): No  Age > 70: Yes  Altered Mental Status, Intoxication with alcohol or substance confusion (Disorientation, impaired judgment, poor safety awaremess, or inability to follow instructions): No  Impaired Mobility: Ambulates or transfers with assistive devices or assistance; Unable to ambulate or transer.: Yes  Nursing Judgement: Yes          Lines:   Peripheral IV 07/26/25 Right Antecubital (Active)   Site Assessment Clean, dry & intact 07/26/25 0923   Line Status Blood return noted 07/26/25 0923   Phlebitis Assessment No symptoms 07/26/25 0923   Infiltration Assessment 0 07/26/25 0923        Opportunity for questions and clarification was provided.      Patient transported with:  O2 @ 2lpm

## 2025-07-26 NOTE — ED TRIAGE NOTES
Woke with SOB this morning , audible wheezes, given duo neb and Atrovent en route by rescue on 2 lmp/nc normally on RA at home

## 2025-07-26 NOTE — PROGRESS NOTES
Writer contacted [facility] and spoke with Dominic to arrange ALS transport for this patient to [facility] Bed # 3216. Requested information provided (Pt diagnosis, weight, demographics, cardiac monitoring, saline lock, O2 at 2 liters nc, no isolation precautions and insurance information given) ETA 1400-- ALEXSANDER Recinos RN made aware.

## 2025-07-27 LAB
ALBUMIN SERPL-MCNC: 2.1 G/DL (ref 3.5–5)
ALBUMIN/GLOB SERPL: 0.4 (ref 1.1–2.2)
ALP SERPL-CCNC: 76 U/L (ref 45–117)
ALT SERPL-CCNC: 23 U/L (ref 12–78)
ANION GAP SERPL CALC-SCNC: 9 MMOL/L (ref 2–12)
AST SERPL-CCNC: 26 U/L (ref 15–37)
BILIRUB DIRECT SERPL-MCNC: 0.1 MG/DL (ref 0–0.2)
BILIRUB SERPL-MCNC: 0.3 MG/DL (ref 0.2–1)
BUN SERPL-MCNC: 70 MG/DL (ref 6–20)
BUN/CREAT SERPL: 14 (ref 12–20)
CALCIUM SERPL-MCNC: 8.7 MG/DL (ref 8.5–10.1)
CHLORIDE SERPL-SCNC: 108 MMOL/L (ref 97–108)
CHOLEST SERPL-MCNC: 79 MG/DL
CO2 SERPL-SCNC: 23 MMOL/L (ref 21–32)
CREAT SERPL-MCNC: 4.97 MG/DL (ref 0.55–1.02)
EKG ATRIAL RATE: 86 BPM
EKG DIAGNOSIS: NORMAL
EKG P AXIS: -28 DEGREES
EKG P-R INTERVAL: 172 MS
EKG Q-T INTERVAL: 386 MS
EKG QRS DURATION: 88 MS
EKG QTC CALCULATION (BAZETT): 461 MS
EKG R AXIS: -53 DEGREES
EKG T AXIS: 100 DEGREES
EKG VENTRICULAR RATE: 86 BPM
FERRITIN SERPL-MCNC: 69 NG/ML (ref 26–388)
GLOBULIN SER CALC-MCNC: 5.1 G/DL (ref 2–4)
GLUCOSE BLD STRIP.AUTO-MCNC: 183 MG/DL (ref 65–117)
GLUCOSE BLD STRIP.AUTO-MCNC: 249 MG/DL (ref 65–117)
GLUCOSE BLD STRIP.AUTO-MCNC: 255 MG/DL (ref 65–117)
GLUCOSE SERPL-MCNC: 234 MG/DL (ref 65–100)
HDLC SERPL-MCNC: 50 MG/DL
HDLC SERPL: 1.6 (ref 0–5)
IRON SATN MFR SERPL: 18 % (ref 20–50)
IRON SERPL-MCNC: 39 UG/DL (ref 35–150)
LACTATE SERPL-SCNC: 0.8 MMOL/L (ref 0.4–2)
LDLC SERPL CALC-MCNC: 13 MG/DL (ref 0–100)
MAGNESIUM SERPL-MCNC: 2.3 MG/DL (ref 1.6–2.4)
POTASSIUM SERPL-SCNC: 5 MMOL/L (ref 3.5–5.1)
PROT SERPL-MCNC: 7.2 G/DL (ref 6.4–8.2)
SERVICE CMNT-IMP: ABNORMAL
SODIUM SERPL-SCNC: 140 MMOL/L (ref 136–145)
TIBC SERPL-MCNC: 211 UG/DL (ref 250–450)
TRIGL SERPL-MCNC: 80 MG/DL
TSH SERPL DL<=0.05 MIU/L-ACNC: 0.36 UIU/ML (ref 0.36–3.74)
VLDLC SERPL CALC-MCNC: 16 MG/DL

## 2025-07-27 PROCEDURE — 6370000000 HC RX 637 (ALT 250 FOR IP): Performed by: STUDENT IN AN ORGANIZED HEALTH CARE EDUCATION/TRAINING PROGRAM

## 2025-07-27 PROCEDURE — 83735 ASSAY OF MAGNESIUM: CPT

## 2025-07-27 PROCEDURE — 2700000000 HC OXYGEN THERAPY PER DAY

## 2025-07-27 PROCEDURE — 2060000000 HC ICU INTERMEDIATE R&B

## 2025-07-27 PROCEDURE — 83550 IRON BINDING TEST: CPT

## 2025-07-27 PROCEDURE — 82728 ASSAY OF FERRITIN: CPT

## 2025-07-27 PROCEDURE — 80076 HEPATIC FUNCTION PANEL: CPT

## 2025-07-27 PROCEDURE — 80061 LIPID PANEL: CPT

## 2025-07-27 PROCEDURE — 84443 ASSAY THYROID STIM HORMONE: CPT

## 2025-07-27 PROCEDURE — 83605 ASSAY OF LACTIC ACID: CPT

## 2025-07-27 PROCEDURE — 36415 COLL VENOUS BLD VENIPUNCTURE: CPT

## 2025-07-27 PROCEDURE — 6360000002 HC RX W HCPCS: Performed by: STUDENT IN AN ORGANIZED HEALTH CARE EDUCATION/TRAINING PROGRAM

## 2025-07-27 PROCEDURE — 80048 BASIC METABOLIC PNL TOTAL CA: CPT

## 2025-07-27 PROCEDURE — 6360000002 HC RX W HCPCS: Performed by: INTERNAL MEDICINE

## 2025-07-27 PROCEDURE — 82962 GLUCOSE BLOOD TEST: CPT

## 2025-07-27 PROCEDURE — 83540 ASSAY OF IRON: CPT

## 2025-07-27 PROCEDURE — 2500000003 HC RX 250 WO HCPCS: Performed by: STUDENT IN AN ORGANIZED HEALTH CARE EDUCATION/TRAINING PROGRAM

## 2025-07-27 RX ORDER — DEXTROSE MONOHYDRATE 100 MG/ML
INJECTION, SOLUTION INTRAVENOUS CONTINUOUS PRN
Status: DISCONTINUED | OUTPATIENT
Start: 2025-07-27 | End: 2025-08-04 | Stop reason: HOSPADM

## 2025-07-27 RX ORDER — INSULIN LISPRO 100 [IU]/ML
0-8 INJECTION, SOLUTION INTRAVENOUS; SUBCUTANEOUS
Status: DISCONTINUED | OUTPATIENT
Start: 2025-07-27 | End: 2025-08-04 | Stop reason: HOSPADM

## 2025-07-27 RX ORDER — FUROSEMIDE 10 MG/ML
60 INJECTION INTRAMUSCULAR; INTRAVENOUS EVERY 12 HOURS
Status: DISCONTINUED | OUTPATIENT
Start: 2025-07-27 | End: 2025-07-28

## 2025-07-27 RX ORDER — GLUCAGON 1 MG/ML
1 KIT INJECTION PRN
Status: DISCONTINUED | OUTPATIENT
Start: 2025-07-27 | End: 2025-08-04 | Stop reason: HOSPADM

## 2025-07-27 RX ADMIN — EMPAGLIFLOZIN 10 MG: 10 TABLET, FILM COATED ORAL at 08:40

## 2025-07-27 RX ADMIN — INSULIN LISPRO 2 UNITS: 100 INJECTION, SOLUTION INTRAVENOUS; SUBCUTANEOUS at 21:33

## 2025-07-27 RX ADMIN — SODIUM CHLORIDE, PRESERVATIVE FREE 10 ML: 5 INJECTION INTRAVENOUS at 08:40

## 2025-07-27 RX ADMIN — HYDRALAZINE HYDROCHLORIDE 25 MG: 25 TABLET ORAL at 21:34

## 2025-07-27 RX ADMIN — WATER 1000 MG: 1 INJECTION INTRAMUSCULAR; INTRAVENOUS; SUBCUTANEOUS at 18:13

## 2025-07-27 RX ADMIN — HYDRALAZINE HYDROCHLORIDE 25 MG: 25 TABLET ORAL at 08:40

## 2025-07-27 RX ADMIN — FUROSEMIDE 40 MG: 10 INJECTION, SOLUTION INTRAMUSCULAR; INTRAVENOUS at 08:40

## 2025-07-27 RX ADMIN — ASPIRIN 81 MG: 81 TABLET, CHEWABLE ORAL at 08:39

## 2025-07-27 RX ADMIN — INSULIN LISPRO 2 UNITS: 100 INJECTION, SOLUTION INTRAVENOUS; SUBCUTANEOUS at 18:13

## 2025-07-27 RX ADMIN — SODIUM CHLORIDE, PRESERVATIVE FREE 10 ML: 5 INJECTION INTRAVENOUS at 21:36

## 2025-07-27 RX ADMIN — FUROSEMIDE 60 MG: 10 INJECTION, SOLUTION INTRAMUSCULAR; INTRAVENOUS at 21:35

## 2025-07-27 RX ADMIN — APIXABAN 5 MG: 5 TABLET, FILM COATED ORAL at 08:40

## 2025-07-27 RX ADMIN — METOPROLOL SUCCINATE 100 MG: 50 TABLET, EXTENDED RELEASE ORAL at 08:39

## 2025-07-27 RX ADMIN — INSULIN LISPRO 4 UNITS: 100 INJECTION, SOLUTION INTRAVENOUS; SUBCUTANEOUS at 13:20

## 2025-07-27 RX ADMIN — AMLODIPINE BESYLATE 10 MG: 5 TABLET ORAL at 08:40

## 2025-07-27 RX ADMIN — ATORVASTATIN CALCIUM 40 MG: 40 TABLET, FILM COATED ORAL at 21:34

## 2025-07-27 RX ADMIN — AZITHROMYCIN 500 MG: 250 TABLET, FILM COATED ORAL at 18:12

## 2025-07-27 RX ADMIN — MELATONIN 3 MG: at 21:34

## 2025-07-27 ASSESSMENT — PAIN SCALES - GENERAL: PAINLEVEL_OUTOF10: 0

## 2025-07-28 ENCOUNTER — APPOINTMENT (OUTPATIENT)
Facility: HOSPITAL | Age: 75
DRG: 291 | End: 2025-07-28
Attending: STUDENT IN AN ORGANIZED HEALTH CARE EDUCATION/TRAINING PROGRAM
Payer: MEDICARE

## 2025-07-28 LAB
ANION GAP SERPL CALC-SCNC: 6 MMOL/L (ref 2–12)
BASOPHILS # BLD: 0.18 K/UL (ref 0–0.1)
BASOPHILS NFR BLD: 1.6 % (ref 0–1)
BUN SERPL-MCNC: 75 MG/DL (ref 6–20)
BUN/CREAT SERPL: 15 (ref 12–20)
CALCIUM SERPL-MCNC: 8.7 MG/DL (ref 8.5–10.1)
CHLORIDE SERPL-SCNC: 109 MMOL/L (ref 97–108)
CO2 SERPL-SCNC: 25 MMOL/L (ref 21–32)
CREAT SERPL-MCNC: 4.87 MG/DL (ref 0.55–1.02)
DIFFERENTIAL METHOD BLD: ABNORMAL
EOSINOPHIL # BLD: 0.54 K/UL (ref 0–0.4)
EOSINOPHIL NFR BLD: 4.7 % (ref 0–7)
ERYTHROCYTE [DISTWIDTH] IN BLOOD BY AUTOMATED COUNT: 14.3 % (ref 11.5–14.5)
GLUCOSE BLD STRIP.AUTO-MCNC: 192 MG/DL (ref 65–117)
GLUCOSE BLD STRIP.AUTO-MCNC: 192 MG/DL (ref 65–117)
GLUCOSE BLD STRIP.AUTO-MCNC: 217 MG/DL (ref 65–117)
GLUCOSE BLD STRIP.AUTO-MCNC: 227 MG/DL (ref 65–117)
GLUCOSE SERPL-MCNC: 152 MG/DL (ref 65–100)
HCT VFR BLD AUTO: 30.2 % (ref 35–47)
HGB BLD-MCNC: 9.1 G/DL (ref 11.5–16)
IMM GRANULOCYTES # BLD AUTO: 0.05 K/UL (ref 0–0.04)
IMM GRANULOCYTES NFR BLD AUTO: 0.4 % (ref 0–0.5)
LYMPHOCYTES # BLD: 2.09 K/UL (ref 0.8–3.5)
LYMPHOCYTES NFR BLD: 18.2 % (ref 12–49)
MAGNESIUM SERPL-MCNC: 2.4 MG/DL (ref 1.6–2.4)
MCH RBC QN AUTO: 29.7 PG (ref 26–34)
MCHC RBC AUTO-ENTMCNC: 30.1 G/DL (ref 30–36.5)
MCV RBC AUTO: 98.7 FL (ref 80–99)
MONOCYTES # BLD: 0.59 K/UL (ref 0–1)
MONOCYTES NFR BLD: 5.1 % (ref 5–13)
NEUTS SEG # BLD: 8.06 K/UL (ref 1.8–8)
NEUTS SEG NFR BLD: 70 % (ref 32–75)
NRBC # BLD: 0 K/UL (ref 0–0.01)
NRBC BLD-RTO: 0 PER 100 WBC
NT PRO BNP: 9268 PG/ML
PLATELET # BLD AUTO: 327 K/UL (ref 150–400)
PMV BLD AUTO: 9.9 FL (ref 8.9–12.9)
POTASSIUM SERPL-SCNC: 4.6 MMOL/L (ref 3.5–5.1)
RBC # BLD AUTO: 3.06 M/UL (ref 3.8–5.2)
SERVICE CMNT-IMP: ABNORMAL
SODIUM SERPL-SCNC: 140 MMOL/L (ref 136–145)
WBC # BLD AUTO: 11.5 K/UL (ref 3.6–11)

## 2025-07-28 PROCEDURE — 97162 PT EVAL MOD COMPLEX 30 MIN: CPT

## 2025-07-28 PROCEDURE — 2060000000 HC ICU INTERMEDIATE R&B

## 2025-07-28 PROCEDURE — 6360000002 HC RX W HCPCS: Performed by: INTERNAL MEDICINE

## 2025-07-28 PROCEDURE — 97535 SELF CARE MNGMENT TRAINING: CPT

## 2025-07-28 PROCEDURE — 6370000000 HC RX 637 (ALT 250 FOR IP): Performed by: STUDENT IN AN ORGANIZED HEALTH CARE EDUCATION/TRAINING PROGRAM

## 2025-07-28 PROCEDURE — 83735 ASSAY OF MAGNESIUM: CPT

## 2025-07-28 PROCEDURE — 2500000003 HC RX 250 WO HCPCS: Performed by: STUDENT IN AN ORGANIZED HEALTH CARE EDUCATION/TRAINING PROGRAM

## 2025-07-28 PROCEDURE — 97116 GAIT TRAINING THERAPY: CPT

## 2025-07-28 PROCEDURE — 6370000000 HC RX 637 (ALT 250 FOR IP): Performed by: INTERNAL MEDICINE

## 2025-07-28 PROCEDURE — 6360000002 HC RX W HCPCS: Performed by: STUDENT IN AN ORGANIZED HEALTH CARE EDUCATION/TRAINING PROGRAM

## 2025-07-28 PROCEDURE — 82962 GLUCOSE BLOOD TEST: CPT

## 2025-07-28 PROCEDURE — 80048 BASIC METABOLIC PNL TOTAL CA: CPT

## 2025-07-28 PROCEDURE — 93306 TTE W/DOPPLER COMPLETE: CPT

## 2025-07-28 PROCEDURE — 85025 COMPLETE CBC W/AUTO DIFF WBC: CPT

## 2025-07-28 PROCEDURE — 36415 COLL VENOUS BLD VENIPUNCTURE: CPT

## 2025-07-28 PROCEDURE — 83880 ASSAY OF NATRIURETIC PEPTIDE: CPT

## 2025-07-28 PROCEDURE — 97165 OT EVAL LOW COMPLEX 30 MIN: CPT

## 2025-07-28 PROCEDURE — 2700000000 HC OXYGEN THERAPY PER DAY

## 2025-07-28 RX ORDER — HYDRALAZINE HYDROCHLORIDE 50 MG/1
50 TABLET, FILM COATED ORAL 2 TIMES DAILY
Status: DISCONTINUED | OUTPATIENT
Start: 2025-07-28 | End: 2025-08-04 | Stop reason: HOSPADM

## 2025-07-28 RX ORDER — FUROSEMIDE 10 MG/ML
80 INJECTION INTRAMUSCULAR; INTRAVENOUS 3 TIMES DAILY
Status: DISCONTINUED | OUTPATIENT
Start: 2025-07-28 | End: 2025-08-01

## 2025-07-28 RX ADMIN — METHIMAZOLE 2.5 MG: 5 TABLET ORAL at 09:34

## 2025-07-28 RX ADMIN — METOPROLOL SUCCINATE 100 MG: 50 TABLET, EXTENDED RELEASE ORAL at 09:37

## 2025-07-28 RX ADMIN — ASPIRIN 81 MG: 81 TABLET, CHEWABLE ORAL at 09:33

## 2025-07-28 RX ADMIN — FUROSEMIDE 80 MG: 10 INJECTION, SOLUTION INTRAMUSCULAR; INTRAVENOUS at 15:09

## 2025-07-28 RX ADMIN — INSULIN LISPRO 2 UNITS: 100 INJECTION, SOLUTION INTRAVENOUS; SUBCUTANEOUS at 12:48

## 2025-07-28 RX ADMIN — INSULIN LISPRO 2 UNITS: 100 INJECTION, SOLUTION INTRAVENOUS; SUBCUTANEOUS at 17:06

## 2025-07-28 RX ADMIN — FUROSEMIDE 80 MG: 10 INJECTION, SOLUTION INTRAMUSCULAR; INTRAVENOUS at 21:46

## 2025-07-28 RX ADMIN — HYDRALAZINE HYDROCHLORIDE 50 MG: 50 TABLET ORAL at 21:46

## 2025-07-28 RX ADMIN — HYDRALAZINE HYDROCHLORIDE 25 MG: 25 TABLET ORAL at 09:37

## 2025-07-28 RX ADMIN — AMLODIPINE BESYLATE 10 MG: 5 TABLET ORAL at 09:36

## 2025-07-28 RX ADMIN — MELATONIN 3 MG: at 21:46

## 2025-07-28 RX ADMIN — INSULIN LISPRO 2 UNITS: 100 INJECTION, SOLUTION INTRAVENOUS; SUBCUTANEOUS at 21:54

## 2025-07-28 RX ADMIN — AZITHROMYCIN 500 MG: 250 TABLET, FILM COATED ORAL at 18:04

## 2025-07-28 RX ADMIN — WATER 1000 MG: 1 INJECTION INTRAMUSCULAR; INTRAVENOUS; SUBCUTANEOUS at 18:03

## 2025-07-28 RX ADMIN — ATORVASTATIN CALCIUM 40 MG: 40 TABLET, FILM COATED ORAL at 21:46

## 2025-07-28 RX ADMIN — FUROSEMIDE 60 MG: 10 INJECTION, SOLUTION INTRAMUSCULAR; INTRAVENOUS at 09:37

## 2025-07-28 RX ADMIN — SODIUM CHLORIDE, PRESERVATIVE FREE 10 ML: 5 INJECTION INTRAVENOUS at 21:56

## 2025-07-28 RX ADMIN — SODIUM CHLORIDE, PRESERVATIVE FREE 10 ML: 5 INJECTION INTRAVENOUS at 09:38

## 2025-07-28 RX ADMIN — INSULIN LISPRO 2 UNITS: 100 INJECTION, SOLUTION INTRAVENOUS; SUBCUTANEOUS at 09:37

## 2025-07-28 RX ADMIN — EMPAGLIFLOZIN 10 MG: 10 TABLET, FILM COATED ORAL at 09:35

## 2025-07-28 ASSESSMENT — PAIN SCALES - GENERAL
PAINLEVEL_OUTOF10: 0

## 2025-07-29 ENCOUNTER — TELEPHONE (OUTPATIENT)
Age: 75
End: 2025-07-29

## 2025-07-29 LAB
ANION GAP SERPL CALC-SCNC: 8 MMOL/L (ref 2–12)
BUN SERPL-MCNC: 77 MG/DL (ref 6–20)
BUN/CREAT SERPL: 16 (ref 12–20)
CALCIUM SERPL-MCNC: 8.6 MG/DL (ref 8.5–10.1)
CHLORIDE SERPL-SCNC: 107 MMOL/L (ref 97–108)
CO2 SERPL-SCNC: 24 MMOL/L (ref 21–32)
CREAT SERPL-MCNC: 4.93 MG/DL (ref 0.55–1.02)
ECHO AV MEAN GRADIENT: 17 MMHG
ECHO AV MEAN VELOCITY: 1.9 M/S
ECHO AV PEAK GRADIENT: 33 MMHG
ECHO AV PEAK VELOCITY: 2.9 M/S
ECHO AV VELOCITY RATIO: 0.52
ECHO AV VTI: 72.4 CM
ECHO BSA: 2.13 M2
ECHO EST RA PRESSURE: 3 MMHG
ECHO LA VOL A-L A4C: 81 ML (ref 22–52)
ECHO LA VOL MOD A4C: 73 ML (ref 22–52)
ECHO LA VOLUME INDEX A-L A4C: 40 ML/M2 (ref 16–34)
ECHO LA VOLUME INDEX MOD A4C: 36 ML/M2 (ref 16–34)
ECHO LV EF PHYSICIAN: 60 %
ECHO LVOT PEAK GRADIENT: 9 MMHG
ECHO LVOT PEAK VELOCITY: 1.5 M/S
ECHO MV A VELOCITY: 1.38 M/S
ECHO MV E DECELERATION TIME (DT): 271.4 MS
ECHO MV E VELOCITY: 1.14 M/S
ECHO MV E/A RATIO: 0.83
ECHO MV EROA PISA: 0.1 CM2
ECHO MV REGURGITANT ALIASING (NYQUIST) VELOCITY: 39 CM/S
ECHO MV REGURGITANT RADIUS PISA: 0.5 CM
ECHO MV REGURGITANT VELOCITY PISA: 6 M/S
ECHO MV REGURGITANT VOLUME PISA: 18.22 ML
ECHO MV REGURGITANT VTIA: 178.5 CM
ECHO RIGHT VENTRICULAR SYSTOLIC PRESSURE (RVSP): 40 MMHG
ECHO RV TAPSE: 1.8 CM (ref 1.7–?)
ECHO TV REGURGITANT MAX VELOCITY: 3.06 M/S
ECHO TV REGURGITANT PEAK GRADIENT: 39 MMHG
ERYTHROCYTE [DISTWIDTH] IN BLOOD BY AUTOMATED COUNT: 14 % (ref 11.5–14.5)
GLUCOSE BLD STRIP.AUTO-MCNC: 205 MG/DL (ref 65–117)
GLUCOSE BLD STRIP.AUTO-MCNC: 274 MG/DL (ref 65–117)
GLUCOSE BLD STRIP.AUTO-MCNC: 348 MG/DL (ref 65–117)
GLUCOSE BLD STRIP.AUTO-MCNC: 381 MG/DL (ref 65–117)
GLUCOSE BLD STRIP.AUTO-MCNC: 384 MG/DL (ref 65–117)
GLUCOSE SERPL-MCNC: 178 MG/DL (ref 65–100)
HCT VFR BLD AUTO: 26.6 % (ref 35–47)
HGB BLD-MCNC: 8.1 G/DL (ref 11.5–16)
MAGNESIUM SERPL-MCNC: 2.2 MG/DL (ref 1.6–2.4)
MCH RBC QN AUTO: 29.8 PG (ref 26–34)
MCHC RBC AUTO-ENTMCNC: 30.5 G/DL (ref 30–36.5)
MCV RBC AUTO: 97.8 FL (ref 80–99)
NRBC # BLD: 0 K/UL (ref 0–0.01)
NRBC BLD-RTO: 0 PER 100 WBC
PLATELET # BLD AUTO: 263 K/UL (ref 150–400)
PMV BLD AUTO: 10.2 FL (ref 8.9–12.9)
POTASSIUM SERPL-SCNC: 4.6 MMOL/L (ref 3.5–5.1)
RBC # BLD AUTO: 2.72 M/UL (ref 3.8–5.2)
SERVICE CMNT-IMP: ABNORMAL
SODIUM SERPL-SCNC: 139 MMOL/L (ref 136–145)
WBC # BLD AUTO: 10.1 K/UL (ref 3.6–11)

## 2025-07-29 PROCEDURE — 6360000002 HC RX W HCPCS: Performed by: STUDENT IN AN ORGANIZED HEALTH CARE EDUCATION/TRAINING PROGRAM

## 2025-07-29 PROCEDURE — 6370000000 HC RX 637 (ALT 250 FOR IP): Performed by: INTERNAL MEDICINE

## 2025-07-29 PROCEDURE — 2500000003 HC RX 250 WO HCPCS: Performed by: STUDENT IN AN ORGANIZED HEALTH CARE EDUCATION/TRAINING PROGRAM

## 2025-07-29 PROCEDURE — 2060000000 HC ICU INTERMEDIATE R&B

## 2025-07-29 PROCEDURE — 36415 COLL VENOUS BLD VENIPUNCTURE: CPT

## 2025-07-29 PROCEDURE — 85027 COMPLETE CBC AUTOMATED: CPT

## 2025-07-29 PROCEDURE — 97116 GAIT TRAINING THERAPY: CPT

## 2025-07-29 PROCEDURE — 82962 GLUCOSE BLOOD TEST: CPT

## 2025-07-29 PROCEDURE — 6360000002 HC RX W HCPCS: Performed by: INTERNAL MEDICINE

## 2025-07-29 PROCEDURE — 80048 BASIC METABOLIC PNL TOTAL CA: CPT

## 2025-07-29 PROCEDURE — 83735 ASSAY OF MAGNESIUM: CPT

## 2025-07-29 PROCEDURE — 6370000000 HC RX 637 (ALT 250 FOR IP): Performed by: STUDENT IN AN ORGANIZED HEALTH CARE EDUCATION/TRAINING PROGRAM

## 2025-07-29 PROCEDURE — 97530 THERAPEUTIC ACTIVITIES: CPT

## 2025-07-29 RX ADMIN — SODIUM CHLORIDE, PRESERVATIVE FREE 10 ML: 5 INJECTION INTRAVENOUS at 09:34

## 2025-07-29 RX ADMIN — SODIUM CHLORIDE, PRESERVATIVE FREE 10 ML: 5 INJECTION INTRAVENOUS at 20:54

## 2025-07-29 RX ADMIN — FUROSEMIDE 80 MG: 10 INJECTION, SOLUTION INTRAMUSCULAR; INTRAVENOUS at 13:26

## 2025-07-29 RX ADMIN — INSULIN LISPRO 8 UNITS: 100 INJECTION, SOLUTION INTRAVENOUS; SUBCUTANEOUS at 20:54

## 2025-07-29 RX ADMIN — FUROSEMIDE 80 MG: 10 INJECTION, SOLUTION INTRAMUSCULAR; INTRAVENOUS at 20:54

## 2025-07-29 RX ADMIN — METOPROLOL SUCCINATE 100 MG: 50 TABLET, EXTENDED RELEASE ORAL at 09:31

## 2025-07-29 RX ADMIN — AMLODIPINE BESYLATE 10 MG: 5 TABLET ORAL at 09:33

## 2025-07-29 RX ADMIN — MELATONIN 3 MG: at 20:54

## 2025-07-29 RX ADMIN — EMPAGLIFLOZIN 10 MG: 10 TABLET, FILM COATED ORAL at 09:32

## 2025-07-29 RX ADMIN — WATER 1000 MG: 1 INJECTION INTRAMUSCULAR; INTRAVENOUS; SUBCUTANEOUS at 18:15

## 2025-07-29 RX ADMIN — ATORVASTATIN CALCIUM 40 MG: 40 TABLET, FILM COATED ORAL at 20:54

## 2025-07-29 RX ADMIN — HYDRALAZINE HYDROCHLORIDE 50 MG: 50 TABLET ORAL at 20:54

## 2025-07-29 RX ADMIN — FUROSEMIDE 80 MG: 10 INJECTION, SOLUTION INTRAMUSCULAR; INTRAVENOUS at 09:33

## 2025-07-29 RX ADMIN — ASPIRIN 81 MG: 81 TABLET, CHEWABLE ORAL at 09:31

## 2025-07-29 RX ADMIN — INSULIN LISPRO 4 UNITS: 100 INJECTION, SOLUTION INTRAVENOUS; SUBCUTANEOUS at 12:39

## 2025-07-29 RX ADMIN — INSULIN LISPRO 8 UNITS: 100 INJECTION, SOLUTION INTRAVENOUS; SUBCUTANEOUS at 18:15

## 2025-07-29 RX ADMIN — INSULIN LISPRO 2 UNITS: 100 INJECTION, SOLUTION INTRAVENOUS; SUBCUTANEOUS at 09:34

## 2025-07-29 RX ADMIN — HYDRALAZINE HYDROCHLORIDE 50 MG: 50 TABLET ORAL at 09:32

## 2025-07-29 ASSESSMENT — PAIN SCALES - GENERAL: PAINLEVEL_OUTOF10: 0

## 2025-07-30 LAB
ANION GAP SERPL CALC-SCNC: 14 MMOL/L (ref 2–14)
BASOPHILS # BLD: 0.11 K/UL (ref 0–0.1)
BASOPHILS NFR BLD: 1.1 % (ref 0–1)
BUN SERPL-MCNC: 78 MG/DL (ref 8–23)
BUN/CREAT SERPL: 16 (ref 12–20)
CALCIUM SERPL-MCNC: 8.7 MG/DL (ref 8.8–10.2)
CHLORIDE SERPL-SCNC: 102 MMOL/L (ref 98–107)
CO2 SERPL-SCNC: 20 MMOL/L (ref 20–29)
CREAT SERPL-MCNC: 5.03 MG/DL (ref 0.6–1)
DIFFERENTIAL METHOD BLD: ABNORMAL
EOSINOPHIL # BLD: 0.72 K/UL (ref 0–0.4)
EOSINOPHIL NFR BLD: 7.5 % (ref 0–7)
ERYTHROCYTE [DISTWIDTH] IN BLOOD BY AUTOMATED COUNT: 14 % (ref 11.5–14.5)
GLUCOSE BLD STRIP.AUTO-MCNC: 195 MG/DL (ref 65–117)
GLUCOSE BLD STRIP.AUTO-MCNC: 320 MG/DL (ref 65–117)
GLUCOSE BLD STRIP.AUTO-MCNC: 338 MG/DL (ref 65–117)
GLUCOSE BLD STRIP.AUTO-MCNC: 343 MG/DL (ref 65–117)
GLUCOSE BLD STRIP.AUTO-MCNC: 431 MG/DL (ref 65–117)
GLUCOSE SERPL-MCNC: 150 MG/DL (ref 65–100)
HCT VFR BLD AUTO: 26.7 % (ref 35–47)
HGB BLD-MCNC: 8.1 G/DL (ref 11.5–16)
IMM GRANULOCYTES # BLD AUTO: 0.06 K/UL (ref 0–0.04)
IMM GRANULOCYTES NFR BLD AUTO: 0.6 % (ref 0–0.5)
LYMPHOCYTES # BLD: 1.81 K/UL (ref 0.8–3.5)
LYMPHOCYTES NFR BLD: 18.8 % (ref 12–49)
MAGNESIUM SERPL-MCNC: 2.1 MG/DL (ref 1.6–2.4)
MCH RBC QN AUTO: 29.1 PG (ref 26–34)
MCHC RBC AUTO-ENTMCNC: 30.3 G/DL (ref 30–36.5)
MCV RBC AUTO: 96 FL (ref 80–99)
MONOCYTES # BLD: 0.72 K/UL (ref 0–1)
MONOCYTES NFR BLD: 7.5 % (ref 5–13)
NEUTS SEG # BLD: 6.21 K/UL (ref 1.8–8)
NEUTS SEG NFR BLD: 64.5 % (ref 32–75)
NRBC # BLD: 0 K/UL (ref 0–0.01)
NRBC BLD-RTO: 0 PER 100 WBC
NT PRO BNP: 6375 PG/ML (ref 0–450)
PLATELET # BLD AUTO: 279 K/UL (ref 150–400)
PMV BLD AUTO: 9.8 FL (ref 8.9–12.9)
POTASSIUM SERPL-SCNC: 4.7 MMOL/L (ref 3.5–5.1)
RBC # BLD AUTO: 2.78 M/UL (ref 3.8–5.2)
SERVICE CMNT-IMP: ABNORMAL
SODIUM SERPL-SCNC: 136 MMOL/L (ref 136–145)
WBC # BLD AUTO: 9.6 K/UL (ref 3.6–11)

## 2025-07-30 PROCEDURE — 97116 GAIT TRAINING THERAPY: CPT

## 2025-07-30 PROCEDURE — 5A09357 ASSISTANCE WITH RESPIRATORY VENTILATION, LESS THAN 24 CONSECUTIVE HOURS, CONTINUOUS POSITIVE AIRWAY PRESSURE: ICD-10-PCS | Performed by: STUDENT IN AN ORGANIZED HEALTH CARE EDUCATION/TRAINING PROGRAM

## 2025-07-30 PROCEDURE — 83735 ASSAY OF MAGNESIUM: CPT

## 2025-07-30 PROCEDURE — 6360000002 HC RX W HCPCS: Performed by: STUDENT IN AN ORGANIZED HEALTH CARE EDUCATION/TRAINING PROGRAM

## 2025-07-30 PROCEDURE — 80048 BASIC METABOLIC PNL TOTAL CA: CPT

## 2025-07-30 PROCEDURE — 82962 GLUCOSE BLOOD TEST: CPT

## 2025-07-30 PROCEDURE — 6370000000 HC RX 637 (ALT 250 FOR IP): Performed by: STUDENT IN AN ORGANIZED HEALTH CARE EDUCATION/TRAINING PROGRAM

## 2025-07-30 PROCEDURE — 85025 COMPLETE CBC W/AUTO DIFF WBC: CPT

## 2025-07-30 PROCEDURE — 2060000000 HC ICU INTERMEDIATE R&B

## 2025-07-30 PROCEDURE — 94660 CPAP INITIATION&MGMT: CPT

## 2025-07-30 PROCEDURE — 83880 ASSAY OF NATRIURETIC PEPTIDE: CPT

## 2025-07-30 PROCEDURE — 97535 SELF CARE MNGMENT TRAINING: CPT | Performed by: OCCUPATIONAL THERAPIST

## 2025-07-30 PROCEDURE — 2500000003 HC RX 250 WO HCPCS: Performed by: STUDENT IN AN ORGANIZED HEALTH CARE EDUCATION/TRAINING PROGRAM

## 2025-07-30 PROCEDURE — 6360000002 HC RX W HCPCS: Performed by: INTERNAL MEDICINE

## 2025-07-30 PROCEDURE — 6370000000 HC RX 637 (ALT 250 FOR IP): Performed by: INTERNAL MEDICINE

## 2025-07-30 PROCEDURE — 36415 COLL VENOUS BLD VENIPUNCTURE: CPT

## 2025-07-30 RX ORDER — METOPROLOL SUCCINATE 50 MG/1
50 TABLET, EXTENDED RELEASE ORAL DAILY
Status: DISCONTINUED | OUTPATIENT
Start: 2025-07-31 | End: 2025-08-04 | Stop reason: HOSPADM

## 2025-07-30 RX ADMIN — INSULIN LISPRO 6 UNITS: 100 INJECTION, SOLUTION INTRAVENOUS; SUBCUTANEOUS at 18:00

## 2025-07-30 RX ADMIN — HYDRALAZINE HYDROCHLORIDE 50 MG: 50 TABLET ORAL at 09:05

## 2025-07-30 RX ADMIN — SODIUM CHLORIDE, PRESERVATIVE FREE 10 ML: 5 INJECTION INTRAVENOUS at 20:23

## 2025-07-30 RX ADMIN — INSULIN LISPRO 2 UNITS: 100 INJECTION, SOLUTION INTRAVENOUS; SUBCUTANEOUS at 09:07

## 2025-07-30 RX ADMIN — HYDRALAZINE HYDROCHLORIDE 50 MG: 50 TABLET ORAL at 20:21

## 2025-07-30 RX ADMIN — SODIUM CHLORIDE, PRESERVATIVE FREE 10 ML: 5 INJECTION INTRAVENOUS at 09:06

## 2025-07-30 RX ADMIN — ASPIRIN 81 MG: 81 TABLET, CHEWABLE ORAL at 09:06

## 2025-07-30 RX ADMIN — INSULIN LISPRO 6 UNITS: 100 INJECTION, SOLUTION INTRAVENOUS; SUBCUTANEOUS at 11:59

## 2025-07-30 RX ADMIN — EMPAGLIFLOZIN 10 MG: 10 TABLET, FILM COATED ORAL at 09:05

## 2025-07-30 RX ADMIN — WATER 1000 MG: 1 INJECTION INTRAMUSCULAR; INTRAVENOUS; SUBCUTANEOUS at 18:01

## 2025-07-30 RX ADMIN — ATORVASTATIN CALCIUM 40 MG: 40 TABLET, FILM COATED ORAL at 20:21

## 2025-07-30 RX ADMIN — METHIMAZOLE 2.5 MG: 5 TABLET ORAL at 09:06

## 2025-07-30 RX ADMIN — MELATONIN 3 MG: at 20:21

## 2025-07-30 RX ADMIN — FUROSEMIDE 80 MG: 10 INJECTION, SOLUTION INTRAMUSCULAR; INTRAVENOUS at 09:06

## 2025-07-30 RX ADMIN — FUROSEMIDE 80 MG: 10 INJECTION, SOLUTION INTRAMUSCULAR; INTRAVENOUS at 15:56

## 2025-07-30 RX ADMIN — FUROSEMIDE 80 MG: 10 INJECTION, SOLUTION INTRAMUSCULAR; INTRAVENOUS at 20:21

## 2025-07-30 RX ADMIN — AMLODIPINE BESYLATE 10 MG: 5 TABLET ORAL at 09:04

## 2025-07-30 RX ADMIN — INSULIN LISPRO 8 UNITS: 100 INJECTION, SOLUTION INTRAVENOUS; SUBCUTANEOUS at 20:29

## 2025-07-31 LAB
ANION GAP SERPL CALC-SCNC: 16 MMOL/L (ref 2–14)
BUN SERPL-MCNC: 82 MG/DL (ref 8–23)
BUN/CREAT SERPL: 17 (ref 12–20)
CALCIUM SERPL-MCNC: 9.1 MG/DL (ref 8.8–10.2)
CHLORIDE SERPL-SCNC: 101 MMOL/L (ref 98–107)
CO2 SERPL-SCNC: 21 MMOL/L (ref 20–29)
CREAT SERPL-MCNC: 4.89 MG/DL (ref 0.6–1)
ERYTHROCYTE [DISTWIDTH] IN BLOOD BY AUTOMATED COUNT: 14 % (ref 11.5–14.5)
GLUCOSE BLD STRIP.AUTO-MCNC: 232 MG/DL (ref 65–117)
GLUCOSE BLD STRIP.AUTO-MCNC: 262 MG/DL (ref 65–117)
GLUCOSE BLD STRIP.AUTO-MCNC: 327 MG/DL (ref 65–117)
GLUCOSE BLD STRIP.AUTO-MCNC: 404 MG/DL (ref 65–117)
GLUCOSE BLD STRIP.AUTO-MCNC: 409 MG/DL (ref 65–117)
GLUCOSE SERPL-MCNC: 226 MG/DL (ref 65–100)
HCT VFR BLD AUTO: 28.8 % (ref 35–47)
HGB BLD-MCNC: 9 G/DL (ref 11.5–16)
MAGNESIUM SERPL-MCNC: 2.1 MG/DL (ref 1.6–2.4)
MCH RBC QN AUTO: 29.8 PG (ref 26–34)
MCHC RBC AUTO-ENTMCNC: 31.3 G/DL (ref 30–36.5)
MCV RBC AUTO: 95.4 FL (ref 80–99)
NRBC # BLD: 0 K/UL (ref 0–0.01)
NRBC BLD-RTO: 0 PER 100 WBC
PLATELET # BLD AUTO: 308 K/UL (ref 150–400)
PMV BLD AUTO: 10.1 FL (ref 8.9–12.9)
POTASSIUM SERPL-SCNC: 4.6 MMOL/L (ref 3.5–5.1)
RBC # BLD AUTO: 3.02 M/UL (ref 3.8–5.2)
SERVICE CMNT-IMP: ABNORMAL
SODIUM SERPL-SCNC: 139 MMOL/L (ref 136–145)
WBC # BLD AUTO: 10 K/UL (ref 3.6–11)

## 2025-07-31 PROCEDURE — 2500000003 HC RX 250 WO HCPCS: Performed by: STUDENT IN AN ORGANIZED HEALTH CARE EDUCATION/TRAINING PROGRAM

## 2025-07-31 PROCEDURE — 85027 COMPLETE CBC AUTOMATED: CPT

## 2025-07-31 PROCEDURE — 6370000000 HC RX 637 (ALT 250 FOR IP): Performed by: NURSE PRACTITIONER

## 2025-07-31 PROCEDURE — 6370000000 HC RX 637 (ALT 250 FOR IP): Performed by: INTERNAL MEDICINE

## 2025-07-31 PROCEDURE — 36415 COLL VENOUS BLD VENIPUNCTURE: CPT

## 2025-07-31 PROCEDURE — 6370000000 HC RX 637 (ALT 250 FOR IP): Performed by: STUDENT IN AN ORGANIZED HEALTH CARE EDUCATION/TRAINING PROGRAM

## 2025-07-31 PROCEDURE — 6360000002 HC RX W HCPCS: Performed by: INTERNAL MEDICINE

## 2025-07-31 PROCEDURE — 2060000000 HC ICU INTERMEDIATE R&B

## 2025-07-31 PROCEDURE — 83735 ASSAY OF MAGNESIUM: CPT

## 2025-07-31 PROCEDURE — 94660 CPAP INITIATION&MGMT: CPT

## 2025-07-31 PROCEDURE — 80048 BASIC METABOLIC PNL TOTAL CA: CPT

## 2025-07-31 PROCEDURE — 82962 GLUCOSE BLOOD TEST: CPT

## 2025-07-31 RX ADMIN — FUROSEMIDE 80 MG: 10 INJECTION, SOLUTION INTRAMUSCULAR; INTRAVENOUS at 09:31

## 2025-07-31 RX ADMIN — ASPIRIN 81 MG: 81 TABLET, CHEWABLE ORAL at 09:31

## 2025-07-31 RX ADMIN — ATORVASTATIN CALCIUM 40 MG: 40 TABLET, FILM COATED ORAL at 20:41

## 2025-07-31 RX ADMIN — HYDRALAZINE HYDROCHLORIDE 50 MG: 50 TABLET ORAL at 09:31

## 2025-07-31 RX ADMIN — MELATONIN 3 MG: at 20:41

## 2025-07-31 RX ADMIN — SODIUM CHLORIDE, PRESERVATIVE FREE 10 ML: 5 INJECTION INTRAVENOUS at 09:32

## 2025-07-31 RX ADMIN — SODIUM CHLORIDE, PRESERVATIVE FREE 10 ML: 5 INJECTION INTRAVENOUS at 20:46

## 2025-07-31 RX ADMIN — HYDRALAZINE HYDROCHLORIDE 50 MG: 50 TABLET ORAL at 20:41

## 2025-07-31 RX ADMIN — EMPAGLIFLOZIN 10 MG: 10 TABLET, FILM COATED ORAL at 09:31

## 2025-07-31 RX ADMIN — APIXABAN 2.5 MG: 2.5 TABLET, FILM COATED ORAL at 09:31

## 2025-07-31 RX ADMIN — METOPROLOL SUCCINATE 50 MG: 50 TABLET, EXTENDED RELEASE ORAL at 09:31

## 2025-07-31 RX ADMIN — INSULIN LISPRO 4 UNITS: 100 INJECTION, SOLUTION INTRAVENOUS; SUBCUTANEOUS at 12:27

## 2025-07-31 RX ADMIN — APIXABAN 2.5 MG: 2.5 TABLET, FILM COATED ORAL at 20:42

## 2025-07-31 RX ADMIN — INSULIN LISPRO 8 UNITS: 100 INJECTION, SOLUTION INTRAVENOUS; SUBCUTANEOUS at 21:14

## 2025-07-31 RX ADMIN — AMLODIPINE BESYLATE 10 MG: 5 TABLET ORAL at 09:31

## 2025-07-31 RX ADMIN — INSULIN LISPRO 2 UNITS: 100 INJECTION, SOLUTION INTRAVENOUS; SUBCUTANEOUS at 09:32

## 2025-07-31 RX ADMIN — FUROSEMIDE 80 MG: 10 INJECTION, SOLUTION INTRAMUSCULAR; INTRAVENOUS at 20:43

## 2025-07-31 RX ADMIN — INSULIN LISPRO 8 UNITS: 100 INJECTION, SOLUTION INTRAVENOUS; SUBCUTANEOUS at 16:52

## 2025-07-31 RX ADMIN — FUROSEMIDE 80 MG: 10 INJECTION, SOLUTION INTRAMUSCULAR; INTRAVENOUS at 15:24

## 2025-08-01 LAB
ANION GAP SERPL CALC-SCNC: 15 MMOL/L (ref 2–14)
BUN SERPL-MCNC: 86 MG/DL (ref 8–23)
BUN/CREAT SERPL: 17 (ref 12–20)
CALCIUM SERPL-MCNC: 8.7 MG/DL (ref 8.8–10.2)
CHLORIDE SERPL-SCNC: 100 MMOL/L (ref 98–107)
CO2 SERPL-SCNC: 22 MMOL/L (ref 20–29)
CREAT SERPL-MCNC: 5.12 MG/DL (ref 0.6–1)
ERYTHROCYTE [DISTWIDTH] IN BLOOD BY AUTOMATED COUNT: 14.1 % (ref 11.5–14.5)
GLUCOSE BLD STRIP.AUTO-MCNC: 178 MG/DL (ref 65–117)
GLUCOSE BLD STRIP.AUTO-MCNC: 302 MG/DL (ref 65–117)
GLUCOSE BLD STRIP.AUTO-MCNC: 356 MG/DL (ref 65–117)
GLUCOSE BLD STRIP.AUTO-MCNC: 362 MG/DL (ref 65–117)
GLUCOSE BLD STRIP.AUTO-MCNC: 414 MG/DL (ref 65–117)
GLUCOSE SERPL-MCNC: 123 MG/DL (ref 65–100)
HCT VFR BLD AUTO: 26.5 % (ref 35–47)
HGB BLD-MCNC: 8.3 G/DL (ref 11.5–16)
MCH RBC QN AUTO: 29.5 PG (ref 26–34)
MCHC RBC AUTO-ENTMCNC: 31.3 G/DL (ref 30–36.5)
MCV RBC AUTO: 94.3 FL (ref 80–99)
NRBC # BLD: 0 K/UL (ref 0–0.01)
NRBC BLD-RTO: 0 PER 100 WBC
NT PRO BNP: 4228 PG/ML (ref 0–450)
PLATELET # BLD AUTO: 252 K/UL (ref 150–400)
PMV BLD AUTO: 10.3 FL (ref 8.9–12.9)
POTASSIUM SERPL-SCNC: 4.7 MMOL/L (ref 3.5–5.1)
RBC # BLD AUTO: 2.81 M/UL (ref 3.8–5.2)
SERVICE CMNT-IMP: ABNORMAL
SODIUM SERPL-SCNC: 138 MMOL/L (ref 136–145)
WBC # BLD AUTO: 8.4 K/UL (ref 3.6–11)

## 2025-08-01 PROCEDURE — 36415 COLL VENOUS BLD VENIPUNCTURE: CPT

## 2025-08-01 PROCEDURE — 83880 ASSAY OF NATRIURETIC PEPTIDE: CPT

## 2025-08-01 PROCEDURE — 6370000000 HC RX 637 (ALT 250 FOR IP): Performed by: INTERNAL MEDICINE

## 2025-08-01 PROCEDURE — 6370000000 HC RX 637 (ALT 250 FOR IP): Performed by: STUDENT IN AN ORGANIZED HEALTH CARE EDUCATION/TRAINING PROGRAM

## 2025-08-01 PROCEDURE — 97530 THERAPEUTIC ACTIVITIES: CPT

## 2025-08-01 PROCEDURE — 2060000000 HC ICU INTERMEDIATE R&B

## 2025-08-01 PROCEDURE — 6370000000 HC RX 637 (ALT 250 FOR IP): Performed by: NURSE PRACTITIONER

## 2025-08-01 PROCEDURE — 6360000002 HC RX W HCPCS: Performed by: INTERNAL MEDICINE

## 2025-08-01 PROCEDURE — 97535 SELF CARE MNGMENT TRAINING: CPT

## 2025-08-01 PROCEDURE — 80048 BASIC METABOLIC PNL TOTAL CA: CPT

## 2025-08-01 PROCEDURE — 2500000003 HC RX 250 WO HCPCS: Performed by: STUDENT IN AN ORGANIZED HEALTH CARE EDUCATION/TRAINING PROGRAM

## 2025-08-01 PROCEDURE — 85027 COMPLETE CBC AUTOMATED: CPT

## 2025-08-01 PROCEDURE — 82962 GLUCOSE BLOOD TEST: CPT

## 2025-08-01 RX ADMIN — SODIUM CHLORIDE, PRESERVATIVE FREE 10 ML: 5 INJECTION INTRAVENOUS at 20:29

## 2025-08-01 RX ADMIN — INSULIN LISPRO 6 UNITS: 100 INJECTION, SOLUTION INTRAVENOUS; SUBCUTANEOUS at 12:01

## 2025-08-01 RX ADMIN — INSULIN LISPRO 8 UNITS: 100 INJECTION, SOLUTION INTRAVENOUS; SUBCUTANEOUS at 20:45

## 2025-08-01 RX ADMIN — FUROSEMIDE 80 MG: 10 INJECTION, SOLUTION INTRAMUSCULAR; INTRAVENOUS at 09:18

## 2025-08-01 RX ADMIN — METHIMAZOLE 2.5 MG: 5 TABLET ORAL at 09:19

## 2025-08-01 RX ADMIN — ATORVASTATIN CALCIUM 40 MG: 40 TABLET, FILM COATED ORAL at 20:28

## 2025-08-01 RX ADMIN — HYDRALAZINE HYDROCHLORIDE 50 MG: 50 TABLET ORAL at 09:18

## 2025-08-01 RX ADMIN — AMLODIPINE BESYLATE 10 MG: 5 TABLET ORAL at 09:18

## 2025-08-01 RX ADMIN — MELATONIN 3 MG: at 20:29

## 2025-08-01 RX ADMIN — SODIUM CHLORIDE, PRESERVATIVE FREE 10 ML: 5 INJECTION INTRAVENOUS at 09:23

## 2025-08-01 RX ADMIN — BUMETANIDE 1 MG/HR: 0.25 INJECTION INTRAMUSCULAR; INTRAVENOUS at 11:12

## 2025-08-01 RX ADMIN — ASPIRIN 81 MG: 81 TABLET, CHEWABLE ORAL at 09:19

## 2025-08-01 RX ADMIN — HYDRALAZINE HYDROCHLORIDE 50 MG: 50 TABLET ORAL at 20:28

## 2025-08-01 RX ADMIN — EMPAGLIFLOZIN 10 MG: 10 TABLET, FILM COATED ORAL at 09:18

## 2025-08-01 RX ADMIN — INSULIN LISPRO 8 UNITS: 100 INJECTION, SOLUTION INTRAVENOUS; SUBCUTANEOUS at 17:28

## 2025-08-01 RX ADMIN — APIXABAN 2.5 MG: 2.5 TABLET, FILM COATED ORAL at 09:19

## 2025-08-01 RX ADMIN — APIXABAN 2.5 MG: 2.5 TABLET, FILM COATED ORAL at 20:28

## 2025-08-01 RX ADMIN — METOPROLOL SUCCINATE 50 MG: 50 TABLET, EXTENDED RELEASE ORAL at 09:18

## 2025-08-01 ASSESSMENT — PAIN SCALES - GENERAL
PAINLEVEL_OUTOF10: 0
PAINLEVEL_OUTOF10: 0

## 2025-08-02 LAB
ANION GAP SERPL CALC-SCNC: 15 MMOL/L (ref 2–14)
BUN SERPL-MCNC: 93 MG/DL (ref 8–23)
BUN/CREAT SERPL: 19 (ref 12–20)
CALCIUM SERPL-MCNC: 9.1 MG/DL (ref 8.8–10.2)
CHLORIDE SERPL-SCNC: 99 MMOL/L (ref 98–107)
CO2 SERPL-SCNC: 24 MMOL/L (ref 20–29)
CREAT SERPL-MCNC: 5.01 MG/DL (ref 0.6–1)
ERYTHROCYTE [DISTWIDTH] IN BLOOD BY AUTOMATED COUNT: 14.4 % (ref 11.5–14.5)
GLUCOSE BLD STRIP.AUTO-MCNC: 207 MG/DL (ref 65–117)
GLUCOSE BLD STRIP.AUTO-MCNC: 302 MG/DL (ref 65–117)
GLUCOSE BLD STRIP.AUTO-MCNC: 336 MG/DL (ref 65–117)
GLUCOSE BLD STRIP.AUTO-MCNC: 365 MG/DL (ref 65–117)
GLUCOSE SERPL-MCNC: 194 MG/DL (ref 65–100)
HCT VFR BLD AUTO: 28.5 % (ref 35–47)
HGB BLD-MCNC: 9 G/DL (ref 11.5–16)
MCH RBC QN AUTO: 29.9 PG (ref 26–34)
MCHC RBC AUTO-ENTMCNC: 31.6 G/DL (ref 30–36.5)
MCV RBC AUTO: 94.7 FL (ref 80–99)
NRBC # BLD: 0 K/UL (ref 0–0.01)
NRBC BLD-RTO: 0 PER 100 WBC
PLATELET # BLD AUTO: 235 K/UL (ref 150–400)
PMV BLD AUTO: 9.9 FL (ref 8.9–12.9)
POTASSIUM SERPL-SCNC: 4.6 MMOL/L (ref 3.5–5.1)
RBC # BLD AUTO: 3.01 M/UL (ref 3.8–5.2)
SERVICE CMNT-IMP: ABNORMAL
SODIUM SERPL-SCNC: 137 MMOL/L (ref 136–145)
WBC # BLD AUTO: 8.6 K/UL (ref 3.6–11)

## 2025-08-02 PROCEDURE — 6360000002 HC RX W HCPCS: Performed by: INTERNAL MEDICINE

## 2025-08-02 PROCEDURE — 6370000000 HC RX 637 (ALT 250 FOR IP): Performed by: NURSE PRACTITIONER

## 2025-08-02 PROCEDURE — 6370000000 HC RX 637 (ALT 250 FOR IP): Performed by: INTERNAL MEDICINE

## 2025-08-02 PROCEDURE — 2500000003 HC RX 250 WO HCPCS: Performed by: STUDENT IN AN ORGANIZED HEALTH CARE EDUCATION/TRAINING PROGRAM

## 2025-08-02 PROCEDURE — 36415 COLL VENOUS BLD VENIPUNCTURE: CPT

## 2025-08-02 PROCEDURE — 94660 CPAP INITIATION&MGMT: CPT

## 2025-08-02 PROCEDURE — 6370000000 HC RX 637 (ALT 250 FOR IP): Performed by: STUDENT IN AN ORGANIZED HEALTH CARE EDUCATION/TRAINING PROGRAM

## 2025-08-02 PROCEDURE — 82962 GLUCOSE BLOOD TEST: CPT

## 2025-08-02 PROCEDURE — 80048 BASIC METABOLIC PNL TOTAL CA: CPT

## 2025-08-02 PROCEDURE — 2060000000 HC ICU INTERMEDIATE R&B

## 2025-08-02 PROCEDURE — 85027 COMPLETE CBC AUTOMATED: CPT

## 2025-08-02 RX ADMIN — SODIUM CHLORIDE, PRESERVATIVE FREE 10 ML: 5 INJECTION INTRAVENOUS at 09:43

## 2025-08-02 RX ADMIN — INSULIN LISPRO 2 UNITS: 100 INJECTION, SOLUTION INTRAVENOUS; SUBCUTANEOUS at 08:03

## 2025-08-02 RX ADMIN — ASPIRIN 81 MG: 81 TABLET, CHEWABLE ORAL at 09:43

## 2025-08-02 RX ADMIN — HYDRALAZINE HYDROCHLORIDE 50 MG: 50 TABLET ORAL at 20:40

## 2025-08-02 RX ADMIN — BUMETANIDE 1 MG/HR: 0.25 INJECTION INTRAMUSCULAR; INTRAVENOUS at 11:28

## 2025-08-02 RX ADMIN — ATORVASTATIN CALCIUM 40 MG: 40 TABLET, FILM COATED ORAL at 20:40

## 2025-08-02 RX ADMIN — METOPROLOL SUCCINATE 50 MG: 50 TABLET, EXTENDED RELEASE ORAL at 09:43

## 2025-08-02 RX ADMIN — APIXABAN 2.5 MG: 2.5 TABLET, FILM COATED ORAL at 20:41

## 2025-08-02 RX ADMIN — APIXABAN 2.5 MG: 2.5 TABLET, FILM COATED ORAL at 09:41

## 2025-08-02 RX ADMIN — INSULIN LISPRO 6 UNITS: 100 INJECTION, SOLUTION INTRAVENOUS; SUBCUTANEOUS at 20:52

## 2025-08-02 RX ADMIN — HYDRALAZINE HYDROCHLORIDE 50 MG: 50 TABLET ORAL at 09:42

## 2025-08-02 RX ADMIN — BUMETANIDE 1 MG/HR: 0.25 INJECTION INTRAMUSCULAR; INTRAVENOUS at 00:18

## 2025-08-02 RX ADMIN — SODIUM CHLORIDE, PRESERVATIVE FREE 10 ML: 5 INJECTION INTRAVENOUS at 20:41

## 2025-08-02 RX ADMIN — EMPAGLIFLOZIN 10 MG: 10 TABLET, FILM COATED ORAL at 09:43

## 2025-08-02 RX ADMIN — INSULIN LISPRO 8 UNITS: 100 INJECTION, SOLUTION INTRAVENOUS; SUBCUTANEOUS at 17:08

## 2025-08-02 RX ADMIN — MELATONIN 3 MG: at 20:41

## 2025-08-02 RX ADMIN — INSULIN LISPRO 6 UNITS: 100 INJECTION, SOLUTION INTRAVENOUS; SUBCUTANEOUS at 12:20

## 2025-08-03 LAB
ANION GAP SERPL CALC-SCNC: 15 MMOL/L (ref 2–14)
BUN SERPL-MCNC: 100 MG/DL (ref 8–23)
BUN/CREAT SERPL: 18 (ref 12–20)
CALCIUM SERPL-MCNC: 8.8 MG/DL (ref 8.8–10.2)
CHLORIDE SERPL-SCNC: 97 MMOL/L (ref 98–107)
CO2 SERPL-SCNC: 24 MMOL/L (ref 20–29)
CREAT SERPL-MCNC: 5.4 MG/DL (ref 0.6–1)
ERYTHROCYTE [DISTWIDTH] IN BLOOD BY AUTOMATED COUNT: 14.1 % (ref 11.5–14.5)
GLUCOSE BLD STRIP.AUTO-MCNC: 232 MG/DL (ref 65–117)
GLUCOSE BLD STRIP.AUTO-MCNC: 360 MG/DL (ref 65–117)
GLUCOSE BLD STRIP.AUTO-MCNC: 393 MG/DL (ref 65–117)
GLUCOSE BLD STRIP.AUTO-MCNC: 397 MG/DL (ref 65–117)
GLUCOSE BLD STRIP.AUTO-MCNC: 403 MG/DL (ref 65–117)
GLUCOSE BLD STRIP.AUTO-MCNC: 418 MG/DL (ref 65–117)
GLUCOSE BLD STRIP.AUTO-MCNC: 423 MG/DL (ref 65–117)
GLUCOSE BLD STRIP.AUTO-MCNC: 433 MG/DL (ref 65–117)
GLUCOSE SERPL-MCNC: 183 MG/DL (ref 65–100)
HCT VFR BLD AUTO: 28.2 % (ref 35–47)
HGB BLD-MCNC: 8.8 G/DL (ref 11.5–16)
MCH RBC QN AUTO: 29.2 PG (ref 26–34)
MCHC RBC AUTO-ENTMCNC: 31.2 G/DL (ref 30–36.5)
MCV RBC AUTO: 93.7 FL (ref 80–99)
NRBC # BLD: 0 K/UL (ref 0–0.01)
NRBC BLD-RTO: 0 PER 100 WBC
PLATELET # BLD AUTO: 232 K/UL (ref 150–400)
PMV BLD AUTO: 10 FL (ref 8.9–12.9)
POTASSIUM SERPL-SCNC: 4.8 MMOL/L (ref 3.5–5.1)
RBC # BLD AUTO: 3.01 M/UL (ref 3.8–5.2)
SERVICE CMNT-IMP: ABNORMAL
SODIUM SERPL-SCNC: 137 MMOL/L (ref 136–145)
WBC # BLD AUTO: 8.3 K/UL (ref 3.6–11)

## 2025-08-03 PROCEDURE — 6370000000 HC RX 637 (ALT 250 FOR IP): Performed by: STUDENT IN AN ORGANIZED HEALTH CARE EDUCATION/TRAINING PROGRAM

## 2025-08-03 PROCEDURE — 2500000003 HC RX 250 WO HCPCS: Performed by: STUDENT IN AN ORGANIZED HEALTH CARE EDUCATION/TRAINING PROGRAM

## 2025-08-03 PROCEDURE — 80048 BASIC METABOLIC PNL TOTAL CA: CPT

## 2025-08-03 PROCEDURE — 85027 COMPLETE CBC AUTOMATED: CPT

## 2025-08-03 PROCEDURE — 2060000000 HC ICU INTERMEDIATE R&B

## 2025-08-03 PROCEDURE — 94660 CPAP INITIATION&MGMT: CPT

## 2025-08-03 PROCEDURE — 36415 COLL VENOUS BLD VENIPUNCTURE: CPT

## 2025-08-03 PROCEDURE — 6370000000 HC RX 637 (ALT 250 FOR IP): Performed by: NURSE PRACTITIONER

## 2025-08-03 PROCEDURE — 6370000000 HC RX 637 (ALT 250 FOR IP): Performed by: INTERNAL MEDICINE

## 2025-08-03 PROCEDURE — 6360000002 HC RX W HCPCS: Performed by: INTERNAL MEDICINE

## 2025-08-03 PROCEDURE — 82962 GLUCOSE BLOOD TEST: CPT

## 2025-08-03 RX ORDER — INSULIN GLARGINE 100 [IU]/ML
10 INJECTION, SOLUTION SUBCUTANEOUS NIGHTLY
Status: DISCONTINUED | OUTPATIENT
Start: 2025-08-03 | End: 2025-08-04 | Stop reason: HOSPADM

## 2025-08-03 RX ORDER — FUROSEMIDE 40 MG/1
80 TABLET ORAL 2 TIMES DAILY
Status: DISCONTINUED | OUTPATIENT
Start: 2025-08-04 | End: 2025-08-04 | Stop reason: HOSPADM

## 2025-08-03 RX ADMIN — APIXABAN 2.5 MG: 2.5 TABLET, FILM COATED ORAL at 20:02

## 2025-08-03 RX ADMIN — HYDRALAZINE HYDROCHLORIDE 50 MG: 50 TABLET ORAL at 08:35

## 2025-08-03 RX ADMIN — ASPIRIN 81 MG: 81 TABLET, CHEWABLE ORAL at 08:35

## 2025-08-03 RX ADMIN — INSULIN LISPRO 2 UNITS: 100 INJECTION, SOLUTION INTRAVENOUS; SUBCUTANEOUS at 08:35

## 2025-08-03 RX ADMIN — METOPROLOL SUCCINATE 50 MG: 50 TABLET, EXTENDED RELEASE ORAL at 08:35

## 2025-08-03 RX ADMIN — INSULIN LISPRO 8 UNITS: 100 INJECTION, SOLUTION INTRAVENOUS; SUBCUTANEOUS at 12:11

## 2025-08-03 RX ADMIN — BUMETANIDE 1 MG/HR: 0.25 INJECTION INTRAMUSCULAR; INTRAVENOUS at 00:06

## 2025-08-03 RX ADMIN — APIXABAN 2.5 MG: 2.5 TABLET, FILM COATED ORAL at 08:35

## 2025-08-03 RX ADMIN — SODIUM CHLORIDE, PRESERVATIVE FREE 10 ML: 5 INJECTION INTRAVENOUS at 20:02

## 2025-08-03 RX ADMIN — BUMETANIDE 1 MG/HR: 0.25 INJECTION INTRAMUSCULAR; INTRAVENOUS at 11:28

## 2025-08-03 RX ADMIN — EMPAGLIFLOZIN 10 MG: 10 TABLET, FILM COATED ORAL at 08:35

## 2025-08-03 RX ADMIN — MELATONIN 3 MG: at 20:02

## 2025-08-03 RX ADMIN — INSULIN LISPRO 8 UNITS: 100 INJECTION, SOLUTION INTRAVENOUS; SUBCUTANEOUS at 20:09

## 2025-08-03 RX ADMIN — INSULIN LISPRO 8 UNITS: 100 INJECTION, SOLUTION INTRAVENOUS; SUBCUTANEOUS at 17:15

## 2025-08-03 RX ADMIN — INSULIN GLARGINE 10 UNITS: 100 INJECTION, SOLUTION SUBCUTANEOUS at 17:14

## 2025-08-03 RX ADMIN — ATORVASTATIN CALCIUM 40 MG: 40 TABLET, FILM COATED ORAL at 20:02

## 2025-08-04 VITALS
TEMPERATURE: 98 F | DIASTOLIC BLOOD PRESSURE: 43 MMHG | RESPIRATION RATE: 14 BRPM | OXYGEN SATURATION: 98 % | HEART RATE: 66 BPM | BODY MASS INDEX: 34.21 KG/M2 | HEIGHT: 64 IN | SYSTOLIC BLOOD PRESSURE: 108 MMHG | WEIGHT: 200.4 LBS

## 2025-08-04 LAB
ANION GAP SERPL CALC-SCNC: 15 MMOL/L (ref 2–14)
BUN SERPL-MCNC: 109 MG/DL (ref 8–23)
BUN/CREAT SERPL: 20 (ref 12–20)
CALCIUM SERPL-MCNC: 8.7 MG/DL (ref 8.8–10.2)
CHLORIDE SERPL-SCNC: 94 MMOL/L (ref 98–107)
CO2 SERPL-SCNC: 24 MMOL/L (ref 20–29)
CREAT SERPL-MCNC: 5.4 MG/DL (ref 0.6–1)
ERYTHROCYTE [DISTWIDTH] IN BLOOD BY AUTOMATED COUNT: 14.2 % (ref 11.5–14.5)
GLUCOSE BLD STRIP.AUTO-MCNC: 128 MG/DL (ref 65–117)
GLUCOSE BLD STRIP.AUTO-MCNC: 392 MG/DL (ref 65–117)
GLUCOSE SERPL-MCNC: 95 MG/DL (ref 65–100)
HCT VFR BLD AUTO: 27.9 % (ref 35–47)
HGB BLD-MCNC: 8.8 G/DL (ref 11.5–16)
MCH RBC QN AUTO: 29.6 PG (ref 26–34)
MCHC RBC AUTO-ENTMCNC: 31.5 G/DL (ref 30–36.5)
MCV RBC AUTO: 93.9 FL (ref 80–99)
NRBC # BLD: 0 K/UL (ref 0–0.01)
NRBC BLD-RTO: 0 PER 100 WBC
PLATELET # BLD AUTO: 198 K/UL (ref 150–400)
PMV BLD AUTO: 9.6 FL (ref 8.9–12.9)
POTASSIUM SERPL-SCNC: 4.4 MMOL/L (ref 3.5–5.1)
RBC # BLD AUTO: 2.97 M/UL (ref 3.8–5.2)
SERVICE CMNT-IMP: ABNORMAL
SERVICE CMNT-IMP: ABNORMAL
SODIUM SERPL-SCNC: 132 MMOL/L (ref 136–145)
WBC # BLD AUTO: 9.2 K/UL (ref 3.6–11)

## 2025-08-04 PROCEDURE — 6370000000 HC RX 637 (ALT 250 FOR IP): Performed by: STUDENT IN AN ORGANIZED HEALTH CARE EDUCATION/TRAINING PROGRAM

## 2025-08-04 PROCEDURE — 2700000000 HC OXYGEN THERAPY PER DAY

## 2025-08-04 PROCEDURE — 36415 COLL VENOUS BLD VENIPUNCTURE: CPT

## 2025-08-04 PROCEDURE — 6370000000 HC RX 637 (ALT 250 FOR IP): Performed by: INTERNAL MEDICINE

## 2025-08-04 PROCEDURE — 6370000000 HC RX 637 (ALT 250 FOR IP): Performed by: NURSE PRACTITIONER

## 2025-08-04 PROCEDURE — 85027 COMPLETE CBC AUTOMATED: CPT

## 2025-08-04 PROCEDURE — 80048 BASIC METABOLIC PNL TOTAL CA: CPT

## 2025-08-04 PROCEDURE — 94660 CPAP INITIATION&MGMT: CPT

## 2025-08-04 PROCEDURE — 82962 GLUCOSE BLOOD TEST: CPT

## 2025-08-04 RX ORDER — METOPROLOL SUCCINATE 50 MG/1
50 TABLET, EXTENDED RELEASE ORAL DAILY
Qty: 30 TABLET | Refills: 3 | Status: SHIPPED | OUTPATIENT
Start: 2025-08-04

## 2025-08-04 RX ORDER — FUROSEMIDE 80 MG/1
80 TABLET ORAL 2 TIMES DAILY
Qty: 60 TABLET | Refills: 3 | Status: SHIPPED | OUTPATIENT
Start: 2025-08-04

## 2025-08-04 RX ADMIN — ASPIRIN 81 MG: 81 TABLET, CHEWABLE ORAL at 09:43

## 2025-08-04 RX ADMIN — APIXABAN 2.5 MG: 2.5 TABLET, FILM COATED ORAL at 09:42

## 2025-08-04 RX ADMIN — FUROSEMIDE 80 MG: 40 TABLET ORAL at 09:43

## 2025-08-04 RX ADMIN — METHIMAZOLE 2.5 MG: 5 TABLET ORAL at 09:42

## 2025-08-04 RX ADMIN — EMPAGLIFLOZIN 10 MG: 10 TABLET, FILM COATED ORAL at 09:43

## 2025-08-04 RX ADMIN — METOPROLOL SUCCINATE 50 MG: 50 TABLET, EXTENDED RELEASE ORAL at 09:42

## 2025-08-04 RX ADMIN — HYDRALAZINE HYDROCHLORIDE 50 MG: 50 TABLET ORAL at 09:42

## 2025-08-05 ENCOUNTER — TELEPHONE (OUTPATIENT)
Age: 75
End: 2025-08-05

## 2025-08-06 ENCOUNTER — OFFICE VISIT (OUTPATIENT)
Age: 75
End: 2025-08-06

## 2025-08-06 VITALS
SYSTOLIC BLOOD PRESSURE: 140 MMHG | BODY MASS INDEX: 34.15 KG/M2 | TEMPERATURE: 97 F | HEART RATE: 61 BPM | HEIGHT: 64 IN | WEIGHT: 200 LBS | DIASTOLIC BLOOD PRESSURE: 76 MMHG | RESPIRATION RATE: 18 BRPM

## 2025-08-06 DIAGNOSIS — E11.22 TYPE 2 DIABETES MELLITUS WITH STAGE 4 CHRONIC KIDNEY DISEASE, WITH LONG-TERM CURRENT USE OF INSULIN (HCC): ICD-10-CM

## 2025-08-06 DIAGNOSIS — I50.9 ACUTE CONGESTIVE HEART FAILURE, UNSPECIFIED HEART FAILURE TYPE (HCC): Primary | ICD-10-CM

## 2025-08-06 DIAGNOSIS — N18.4 TYPE 2 DIABETES MELLITUS WITH STAGE 4 CHRONIC KIDNEY DISEASE, WITH LONG-TERM CURRENT USE OF INSULIN (HCC): ICD-10-CM

## 2025-08-06 DIAGNOSIS — Z79.4 TYPE 2 DIABETES MELLITUS WITH STAGE 4 CHRONIC KIDNEY DISEASE, WITH LONG-TERM CURRENT USE OF INSULIN (HCC): ICD-10-CM

## 2025-08-06 RX ORDER — INSULIN ASPART 100 [IU]/ML
INJECTION, SOLUTION INTRAVENOUS; SUBCUTANEOUS
Qty: 5 ADJUSTABLE DOSE PRE-FILLED PEN SYRINGE | Refills: 0 | Status: SHIPPED | OUTPATIENT
Start: 2025-08-06

## 2025-08-06 RX ORDER — INSULIN GLARGINE 100 [IU]/ML
INJECTION, SOLUTION SUBCUTANEOUS
Qty: 30 ML | Refills: 3
Start: 2025-08-06

## 2025-08-06 RX ORDER — ACYCLOVIR 400 MG/1
TABLET ORAL
Qty: 6 EACH | Refills: 3 | Status: SHIPPED | OUTPATIENT
Start: 2025-08-06

## 2025-08-06 RX ORDER — ACYCLOVIR 400 MG/1
TABLET ORAL
Qty: 1 EACH | Refills: 0 | Status: SHIPPED | OUTPATIENT
Start: 2025-08-06

## 2025-08-06 ASSESSMENT — PATIENT HEALTH QUESTIONNAIRE - PHQ9
10. IF YOU CHECKED OFF ANY PROBLEMS, HOW DIFFICULT HAVE THESE PROBLEMS MADE IT FOR YOU TO DO YOUR WORK, TAKE CARE OF THINGS AT HOME, OR GET ALONG WITH OTHER PEOPLE: NOT DIFFICULT AT ALL
7. TROUBLE CONCENTRATING ON THINGS, SUCH AS READING THE NEWSPAPER OR WATCHING TELEVISION: NOT AT ALL
9. THOUGHTS THAT YOU WOULD BE BETTER OFF DEAD, OR OF HURTING YOURSELF: NOT AT ALL
3. TROUBLE FALLING OR STAYING ASLEEP: NOT AT ALL
8. MOVING OR SPEAKING SO SLOWLY THAT OTHER PEOPLE COULD HAVE NOTICED. OR THE OPPOSITE, BEING SO FIGETY OR RESTLESS THAT YOU HAVE BEEN MOVING AROUND A LOT MORE THAN USUAL: NOT AT ALL
SUM OF ALL RESPONSES TO PHQ QUESTIONS 1-9: 0
SUM OF ALL RESPONSES TO PHQ QUESTIONS 1-9: 0
5. POOR APPETITE OR OVEREATING: NOT AT ALL
2. FEELING DOWN, DEPRESSED OR HOPELESS: NOT AT ALL
1. LITTLE INTEREST OR PLEASURE IN DOING THINGS: NOT AT ALL
4. FEELING TIRED OR HAVING LITTLE ENERGY: NOT AT ALL
SUM OF ALL RESPONSES TO PHQ QUESTIONS 1-9: 0
SUM OF ALL RESPONSES TO PHQ QUESTIONS 1-9: 0
6. FEELING BAD ABOUT YOURSELF - OR THAT YOU ARE A FAILURE OR HAVE LET YOURSELF OR YOUR FAMILY DOWN: NOT AT ALL

## 2025-08-07 ENCOUNTER — TELEPHONE (OUTPATIENT)
Age: 75
End: 2025-08-07

## 2025-08-08 DIAGNOSIS — E05.90 HYPERTHYROIDISM: Primary | ICD-10-CM

## 2025-08-11 RX ORDER — BLOOD-GLUCOSE METER
KIT MISCELLANEOUS
Qty: 1 KIT | Refills: 0 | Status: SHIPPED | OUTPATIENT
Start: 2025-08-11

## 2025-08-11 RX ORDER — BLOOD SUGAR DIAGNOSTIC
STRIP MISCELLANEOUS
Qty: 100 EACH | Refills: 3 | Status: SHIPPED | OUTPATIENT
Start: 2025-08-11

## 2025-08-11 RX ORDER — AVOBENZONE, HOMOSALATE, OCTISALATE, OCTOCRYLENE 30; 40; 45; 26 MG/ML; MG/ML; MG/ML; MG/ML
1 CREAM TOPICAL DAILY
Qty: 100 EACH | Refills: 3 | Status: SHIPPED | OUTPATIENT
Start: 2025-08-11

## 2025-08-18 ENCOUNTER — TELEMEDICINE (OUTPATIENT)
Age: 75
End: 2025-08-18
Payer: MEDICARE

## 2025-08-18 DIAGNOSIS — E11.649 TYPE 2 DIABETES MELLITUS WITH HYPOGLYCEMIA WITHOUT COMA, WITH LONG-TERM CURRENT USE OF INSULIN (HCC): Primary | ICD-10-CM

## 2025-08-18 DIAGNOSIS — Z79.4 TYPE 2 DIABETES MELLITUS WITH HYPOGLYCEMIA WITHOUT COMA, WITH LONG-TERM CURRENT USE OF INSULIN (HCC): Primary | ICD-10-CM

## 2025-08-18 PROCEDURE — 1123F ACP DISCUSS/DSCN MKR DOCD: CPT | Performed by: NURSE PRACTITIONER

## 2025-08-18 PROCEDURE — 1160F RVW MEDS BY RX/DR IN RCRD: CPT | Performed by: NURSE PRACTITIONER

## 2025-08-18 PROCEDURE — 1159F MED LIST DOCD IN RCRD: CPT | Performed by: NURSE PRACTITIONER

## 2025-08-18 PROCEDURE — 3044F HG A1C LEVEL LT 7.0%: CPT | Performed by: NURSE PRACTITIONER

## 2025-08-18 PROCEDURE — 99213 OFFICE O/P EST LOW 20 MIN: CPT | Performed by: NURSE PRACTITIONER

## 2025-08-18 RX ORDER — INSULIN GLARGINE 100 [IU]/ML
INJECTION, SOLUTION SUBCUTANEOUS
Qty: 30 ML | Refills: 3 | Status: SHIPPED | OUTPATIENT
Start: 2025-08-18

## 2025-08-18 ASSESSMENT — PATIENT HEALTH QUESTIONNAIRE - PHQ9
SUM OF ALL RESPONSES TO PHQ QUESTIONS 1-9: 1
SUM OF ALL RESPONSES TO PHQ QUESTIONS 1-9: 1
1. LITTLE INTEREST OR PLEASURE IN DOING THINGS: SEVERAL DAYS
SUM OF ALL RESPONSES TO PHQ QUESTIONS 1-9: 1
2. FEELING DOWN, DEPRESSED OR HOPELESS: NOT AT ALL
SUM OF ALL RESPONSES TO PHQ QUESTIONS 1-9: 1

## 2025-08-27 ENCOUNTER — CLINICAL DOCUMENTATION (OUTPATIENT)
Age: 75
End: 2025-08-27

## 2025-08-27 DIAGNOSIS — N18.4 CKD (CHRONIC KIDNEY DISEASE) STAGE 4, GFR 15-29 ML/MIN (HCC): Primary | ICD-10-CM

## 2025-08-27 RX ORDER — FUROSEMIDE 80 MG/1
80 TABLET ORAL DAILY
Qty: 30 TABLET | Refills: 3
Start: 2025-08-27

## 2025-08-27 RX ORDER — FERRIC CITRATE 210 MG/1
210 TABLET, FILM COATED ORAL
Qty: 270 TABLET | Refills: 0
Start: 2025-08-27

## (undated) DEVICE — SUTURE PROL SZ 6-0 L24IN NONABSORBABLE BLU L9.3MM BV-1 3/8 8805H

## (undated) DEVICE — SOLUTION IV 500 ML 0.9 NACL INJ EXCEL CONTAINER USP LF

## (undated) DEVICE — GOWN,SIRUS,NONRNF,SETINSLV,2XL,18/CS: Brand: MEDLINE

## (undated) DEVICE — SPONGE GZ W4XL4IN COT 12 PLY TYP VII WVN C FLD DSGN STERILE

## (undated) DEVICE — SUTURE PROL SZ 5-0 L24IN NONABSORBABLE BLU RB-2 L13IN 1/2 8554H

## (undated) DEVICE — GLOVE SURG SZ 8 CRM LTX FREE POLYISOPRENE POLYMER BEAD ANTI

## (undated) DEVICE — STAPLER SKIN H3.9MM WIRE DIA0.58MM CRWN 6.9MM 35 STPL ROT

## (undated) DEVICE — BANDAGE,GAUZE,BULKEE II,4.5"X4.1YD,STRL: Brand: MEDLINE

## (undated) DEVICE — TAPE SURG W4INXL10YD SFT CLTH H2O RESIST MEDIPORE H

## (undated) DEVICE — SUTURE NONABSORBABLE MONOFILAMENT 5-0 CV-6 TTC-9 24 IN GORTX 6K02A

## (undated) DEVICE — SUTURE MONOCRYL SZ 4-0 L27IN ABSRB UD L19MM PS-2 1/2 CIR PRIM Y426H

## (undated) DEVICE — PROBE VASC 8MHZ WTRPRF

## (undated) DEVICE — EVACUATOR SMOKE L 10 FT SMOKE MANAGEMENT EXTENDED EDGE ELECTRODE STERILE DISP

## (undated) DEVICE — SUTURE VICRYL + SZ 3-0 L27IN ABSRB UD L26MM SH 1/2 CIR VCP416H

## (undated) DEVICE — AV FISTULA - MRMC: Brand: MEDLINE INDUSTRIES, INC.

## (undated) DEVICE — AGENT HEMSTAT W4XL4IN OXIDIZED REGENERATED CELOS ABSRB SFT

## (undated) DEVICE — SUTURE N ABSRB L 18 IN SZ 4-0 NDL L 19 MM NYL MONOFILAMENT